# Patient Record
Sex: MALE | Race: BLACK OR AFRICAN AMERICAN | Employment: OTHER | URBAN - METROPOLITAN AREA
[De-identification: names, ages, dates, MRNs, and addresses within clinical notes are randomized per-mention and may not be internally consistent; named-entity substitution may affect disease eponyms.]

---

## 2017-01-20 ENCOUNTER — OFFICE VISIT (OUTPATIENT)
Dept: PRIMARY CARE CLINIC | Age: 77
End: 2017-01-20

## 2017-01-20 VITALS
DIASTOLIC BLOOD PRESSURE: 72 MMHG | OXYGEN SATURATION: 100 % | HEART RATE: 46 BPM | WEIGHT: 155.8 LBS | SYSTOLIC BLOOD PRESSURE: 131 MMHG | BODY MASS INDEX: 23.01 KG/M2 | TEMPERATURE: 97 F

## 2017-01-20 DIAGNOSIS — R60.0 BILATERAL LEG EDEMA: Primary | ICD-10-CM

## 2017-01-20 DIAGNOSIS — R00.1 BRADYCARDIA: ICD-10-CM

## 2017-01-20 PROCEDURE — 99215 OFFICE O/P EST HI 40 MIN: CPT | Performed by: FAMILY MEDICINE

## 2017-01-20 PROCEDURE — 93000 ELECTROCARDIOGRAM COMPLETE: CPT | Performed by: FAMILY MEDICINE

## 2017-01-20 PROCEDURE — G8510 SCR DEP NEG, NO PLAN REQD: HCPCS | Performed by: FAMILY MEDICINE

## 2017-01-20 PROCEDURE — 3288F FALL RISK ASSESSMENT DOCD: CPT | Performed by: FAMILY MEDICINE

## 2017-01-20 RX ORDER — FUROSEMIDE 20 MG/1
20 TABLET ORAL DAILY
Qty: 60 TABLET | Refills: 3 | Status: SHIPPED | OUTPATIENT
Start: 2017-01-20 | End: 2017-10-03 | Stop reason: SDUPTHER

## 2017-01-20 ASSESSMENT — PATIENT HEALTH QUESTIONNAIRE - PHQ9
1. LITTLE INTEREST OR PLEASURE IN DOING THINGS: 0
SUM OF ALL RESPONSES TO PHQ QUESTIONS 1-9: 0
SUM OF ALL RESPONSES TO PHQ9 QUESTIONS 1 & 2: 0
2. FEELING DOWN, DEPRESSED OR HOPELESS: 0

## 2017-01-23 ENCOUNTER — HOSPITAL ENCOUNTER (OUTPATIENT)
Dept: VASCULAR LAB | Age: 77
Discharge: HOME | End: 2017-01-23
Attending: FAMILY MEDICINE | Admitting: FAMILY MEDICINE

## 2017-01-23 DIAGNOSIS — R60.0 BILATERAL LEG EDEMA: ICD-10-CM

## 2017-01-23 DIAGNOSIS — R60.0 LOCALIZED EDEMA: ICD-10-CM

## 2017-01-23 LAB
A/G RATIO: 1.4 (ref 1.1–2.2)
ALBUMIN SERPL-MCNC: 3.7 G/DL (ref 3.4–5)
ALP BLD-CCNC: 111 U/L (ref 40–129)
ALT SERPL-CCNC: 10 U/L (ref 10–40)
ANION GAP SERPL CALCULATED.3IONS-SCNC: 11 MMOL/L (ref 3–16)
APTT: 45.7 SEC (ref 25.2–36.4)
AST SERPL-CCNC: 17 U/L (ref 15–37)
BILIRUB SERPL-MCNC: 0.7 MG/DL (ref 0–1)
BUN BLDV-MCNC: 14 MG/DL (ref 7–20)
CALCIUM SERPL-MCNC: 9.3 MG/DL (ref 8.3–10.6)
CHLORIDE BLD-SCNC: 107 MMOL/L (ref 99–110)
CO2: 28 MMOL/L (ref 21–32)
CREAT SERPL-MCNC: 1.1 MG/DL (ref 0.8–1.3)
GFR AFRICAN AMERICAN: >60
GFR NON-AFRICAN AMERICAN: >60
GLOBULIN: 2.6 G/DL
GLUCOSE BLD-MCNC: 124 MG/DL (ref 70–99)
POTASSIUM SERPL-SCNC: 4.5 MMOL/L (ref 3.5–5.1)
PRO-BNP: 285 PG/ML (ref 0–449)
SODIUM BLD-SCNC: 146 MMOL/L (ref 136–145)
TOTAL PROTEIN: 6.3 G/DL (ref 6.4–8.2)
TSH SERPL DL<=0.05 MIU/L-ACNC: 0.77 UIU/ML (ref 0.27–4.2)

## 2017-01-24 ENCOUNTER — OFFICE VISIT (OUTPATIENT)
Dept: PRIMARY CARE CLINIC | Age: 77
End: 2017-01-24

## 2017-01-24 VITALS
SYSTOLIC BLOOD PRESSURE: 120 MMHG | BODY MASS INDEX: 23.79 KG/M2 | HEIGHT: 68 IN | WEIGHT: 157 LBS | DIASTOLIC BLOOD PRESSURE: 60 MMHG

## 2017-01-24 DIAGNOSIS — R60.0 BILATERAL EDEMA OF LOWER EXTREMITY: Primary | ICD-10-CM

## 2017-01-24 PROCEDURE — 99213 OFFICE O/P EST LOW 20 MIN: CPT | Performed by: FAMILY MEDICINE

## 2017-01-24 RX ORDER — POTASSIUM CHLORIDE 20 MEQ/1
20 TABLET, EXTENDED RELEASE ORAL DAILY
Qty: 30 TABLET | Refills: 3 | Status: SHIPPED | OUTPATIENT
Start: 2017-01-24 | End: 2017-05-24 | Stop reason: SDUPTHER

## 2017-01-24 ASSESSMENT — ENCOUNTER SYMPTOMS
WHEEZING: 0
BLOOD IN STOOL: 0
CHEST TIGHTNESS: 0
CHOKING: 0
STRIDOR: 0
APNEA: 0
ABDOMINAL PAIN: 0
ABDOMINAL DISTENTION: 0
SHORTNESS OF BREATH: 0

## 2017-01-30 ENCOUNTER — TELEPHONE (OUTPATIENT)
Dept: PRIMARY CARE CLINIC | Age: 77
End: 2017-01-30

## 2017-05-12 PROBLEM — R19.00 PELVIC MASS IN MALE: Status: ACTIVE | Noted: 2017-05-12

## 2017-05-12 PROBLEM — E27.8 RIGHT ADRENAL MASS (HCC): Status: ACTIVE | Noted: 2017-05-12

## 2017-05-15 ENCOUNTER — HOSPITAL ENCOUNTER (OUTPATIENT)
Dept: CT IMAGING | Age: 77
Discharge: HOME | End: 2017-05-15

## 2017-05-17 ENCOUNTER — HOSPITAL ENCOUNTER (OUTPATIENT)
Dept: CT IMAGING | Age: 77
Discharge: HOME | End: 2017-05-17
Attending: INTERNAL MEDICINE | Admitting: INTERNAL MEDICINE

## 2017-05-17 VITALS
RESPIRATION RATE: 18 BRPM | TEMPERATURE: 98.4 F | SYSTOLIC BLOOD PRESSURE: 126 MMHG | DIASTOLIC BLOOD PRESSURE: 67 MMHG | OXYGEN SATURATION: 97 % | HEIGHT: 68 IN | WEIGHT: 155.75 LBS | BODY MASS INDEX: 23.61 KG/M2 | HEART RATE: 68 BPM

## 2017-05-17 DIAGNOSIS — C67.9 MALIGNANT NEOPLASM OF BLADDER (HCC): ICD-10-CM

## 2017-05-17 DIAGNOSIS — R19.00 PELVIC MASS IN MALE: ICD-10-CM

## 2017-05-17 DIAGNOSIS — C67.9 MALIGNANT NEOPLASM OF URINARY BLADDER, UNSPECIFIED SITE (HCC): Chronic | ICD-10-CM

## 2017-05-17 RX ORDER — ONDANSETRON 2 MG/ML
4 INJECTION INTRAMUSCULAR; INTRAVENOUS EVERY 8 HOURS PRN
Status: DISCONTINUED | OUTPATIENT
Start: 2017-05-17 | End: 2017-05-18 | Stop reason: HOSPADM

## 2017-05-17 RX ORDER — OXYCODONE HYDROCHLORIDE AND ACETAMINOPHEN 5; 325 MG/1; MG/1
1 TABLET ORAL EVERY 4 HOURS PRN
Status: DISCONTINUED | OUTPATIENT
Start: 2017-05-17 | End: 2017-05-18 | Stop reason: HOSPADM

## 2017-05-17 RX ORDER — OXYCODONE HYDROCHLORIDE AND ACETAMINOPHEN 5; 325 MG/1; MG/1
2 TABLET ORAL EVERY 4 HOURS PRN
Status: DISCONTINUED | OUTPATIENT
Start: 2017-05-17 | End: 2017-05-18 | Stop reason: HOSPADM

## 2017-05-17 RX ORDER — FENTANYL CITRATE 50 UG/ML
INJECTION, SOLUTION INTRAMUSCULAR; INTRAVENOUS DAILY PRN
Status: COMPLETED | OUTPATIENT
Start: 2017-05-17 | End: 2017-05-17

## 2017-05-17 RX ORDER — ACETAMINOPHEN 325 MG/1
650 TABLET ORAL EVERY 4 HOURS PRN
Status: DISCONTINUED | OUTPATIENT
Start: 2017-05-17 | End: 2017-05-18 | Stop reason: HOSPADM

## 2017-05-17 RX ORDER — MIDAZOLAM HYDROCHLORIDE 1 MG/ML
INJECTION INTRAMUSCULAR; INTRAVENOUS DAILY PRN
Status: COMPLETED | OUTPATIENT
Start: 2017-05-17 | End: 2017-05-17

## 2017-05-17 RX ADMIN — MIDAZOLAM HYDROCHLORIDE 1 MG: 1 INJECTION INTRAMUSCULAR; INTRAVENOUS at 13:36

## 2017-05-17 RX ADMIN — MIDAZOLAM HYDROCHLORIDE 1 MG: 1 INJECTION INTRAMUSCULAR; INTRAVENOUS at 13:27

## 2017-05-17 RX ADMIN — FENTANYL CITRATE 50 MCG: 50 INJECTION, SOLUTION INTRAMUSCULAR; INTRAVENOUS at 13:27

## 2017-05-17 ASSESSMENT — PAIN - FUNCTIONAL ASSESSMENT: PAIN_FUNCTIONAL_ASSESSMENT: 0-10

## 2017-05-17 ASSESSMENT — PAIN SCALES - GENERAL
PAINLEVEL_OUTOF10: 0

## 2017-05-18 ENCOUNTER — HOSPITAL ENCOUNTER (OUTPATIENT)
Dept: NUCLEAR MEDICINE | Age: 77
Discharge: HOME | End: 2017-05-18
Attending: INTERNAL MEDICINE | Admitting: INTERNAL MEDICINE

## 2017-05-18 DIAGNOSIS — E27.8 RIGHT ADRENAL MASS (HCC): ICD-10-CM

## 2017-05-18 DIAGNOSIS — R19.00 PELVIC MASS IN MALE: ICD-10-CM

## 2017-05-18 DIAGNOSIS — C67.9 MALIGNANT NEOPLASM OF URINARY BLADDER, UNSPECIFIED SITE (HCC): Chronic | ICD-10-CM

## 2017-05-18 RX ORDER — TC 99M MEDRONATE 20 MG/10ML
25 INJECTION, POWDER, LYOPHILIZED, FOR SOLUTION INTRAVENOUS
Status: COMPLETED | OUTPATIENT
Start: 2017-05-18 | End: 2017-05-18

## 2017-05-18 RX ORDER — TC 99M MEDRONATE 20 MG/10ML
25 INJECTION, POWDER, LYOPHILIZED, FOR SOLUTION INTRAVENOUS
Status: DISCONTINUED | OUTPATIENT
Start: 2017-05-18 | End: 2017-05-18

## 2017-05-18 RX ADMIN — TC 99M MEDRONATE 25 MILLICURIE: 20 INJECTION, POWDER, LYOPHILIZED, FOR SOLUTION INTRAVENOUS at 08:57

## 2017-05-23 ENCOUNTER — OFFICE VISIT (OUTPATIENT)
Dept: PRIMARY CARE CLINIC | Age: 77
End: 2017-05-23

## 2017-05-23 VITALS
HEART RATE: 60 BPM | WEIGHT: 158.2 LBS | OXYGEN SATURATION: 97 % | HEIGHT: 68 IN | SYSTOLIC BLOOD PRESSURE: 121 MMHG | DIASTOLIC BLOOD PRESSURE: 71 MMHG | BODY MASS INDEX: 23.98 KG/M2

## 2017-05-23 DIAGNOSIS — Z23 NEED FOR PNEUMOCOCCAL VACCINATION: ICD-10-CM

## 2017-05-23 DIAGNOSIS — C67.9 MALIGNANT NEOPLASM OF URINARY BLADDER, UNSPECIFIED SITE (HCC): ICD-10-CM

## 2017-05-23 DIAGNOSIS — R16.0 LIVER MASS: ICD-10-CM

## 2017-05-23 DIAGNOSIS — R19.00 PELVIC MASS: Primary | ICD-10-CM

## 2017-05-23 DIAGNOSIS — E27.8 ADRENAL MASS (HCC): ICD-10-CM

## 2017-05-23 DIAGNOSIS — I10 ESSENTIAL HYPERTENSION: ICD-10-CM

## 2017-05-23 PROCEDURE — 99213 OFFICE O/P EST LOW 20 MIN: CPT | Performed by: FAMILY MEDICINE

## 2017-05-23 PROCEDURE — 90670 PCV13 VACCINE IM: CPT | Performed by: FAMILY MEDICINE

## 2017-05-23 PROCEDURE — G0009 ADMIN PNEUMOCOCCAL VACCINE: HCPCS | Performed by: FAMILY MEDICINE

## 2017-05-24 DIAGNOSIS — R60.0 BILATERAL EDEMA OF LOWER EXTREMITY: ICD-10-CM

## 2017-05-24 RX ORDER — POTASSIUM CHLORIDE 20 MEQ/1
20 TABLET, EXTENDED RELEASE ORAL DAILY
Qty: 30 TABLET | Refills: 3 | Status: SHIPPED | OUTPATIENT
Start: 2017-05-24 | End: 2017-10-03 | Stop reason: SDUPTHER

## 2017-05-25 PROBLEM — G89.3 CANCER ASSOCIATED PAIN: Status: ACTIVE | Noted: 2017-05-25

## 2017-05-25 PROBLEM — C79.51 BLADDER CANCER METASTASIZED TO BONE (HCC): Status: ACTIVE | Noted: 2017-05-25

## 2017-05-25 PROBLEM — C67.9 BLADDER CANCER METASTASIZED TO BONE (HCC): Status: ACTIVE | Noted: 2017-05-25

## 2017-05-30 ENCOUNTER — TELEPHONE (OUTPATIENT)
Dept: INTERVENTIONAL RADIOLOGY/VASCULAR | Age: 77
End: 2017-05-30

## 2017-06-06 ENCOUNTER — HOSPITAL ENCOUNTER (OUTPATIENT)
Dept: INTERVENTIONAL RADIOLOGY/VASCULAR | Age: 77
Discharge: HOME | End: 2017-06-06
Attending: INTERNAL MEDICINE | Admitting: INTERNAL MEDICINE

## 2017-06-06 VITALS
WEIGHT: 156 LBS | BODY MASS INDEX: 23.64 KG/M2 | HEIGHT: 68 IN | HEART RATE: 70 BPM | OXYGEN SATURATION: 99 % | SYSTOLIC BLOOD PRESSURE: 141 MMHG | DIASTOLIC BLOOD PRESSURE: 74 MMHG | TEMPERATURE: 97.1 F | RESPIRATION RATE: 18 BRPM

## 2017-06-06 DIAGNOSIS — C67.9 MALIGNANT NEOPLASM OF BLADDER (HCC): ICD-10-CM

## 2017-06-06 DIAGNOSIS — C79.51 BLADDER CANCER METASTASIZED TO BONE (HCC): ICD-10-CM

## 2017-06-06 DIAGNOSIS — C67.9 BLADDER CANCER METASTASIZED TO BONE (HCC): ICD-10-CM

## 2017-06-06 RX ORDER — HYDROCODONE BITARTRATE AND ACETAMINOPHEN 5; 325 MG/1; MG/1
2 TABLET ORAL EVERY 4 HOURS PRN
Status: DISCONTINUED | OUTPATIENT
Start: 2017-06-06 | End: 2017-06-07 | Stop reason: HOSPADM

## 2017-06-06 RX ORDER — MIDAZOLAM HYDROCHLORIDE 1 MG/ML
INJECTION INTRAMUSCULAR; INTRAVENOUS DAILY PRN
Status: COMPLETED | OUTPATIENT
Start: 2017-06-06 | End: 2017-06-06

## 2017-06-06 RX ORDER — SODIUM CHLORIDE 9 MG/ML
INJECTION, SOLUTION INTRAVENOUS ONCE
Status: COMPLETED | OUTPATIENT
Start: 2017-06-06 | End: 2017-06-06

## 2017-06-06 RX ORDER — FENTANYL CITRATE 50 UG/ML
INJECTION, SOLUTION INTRAMUSCULAR; INTRAVENOUS DAILY PRN
Status: COMPLETED | OUTPATIENT
Start: 2017-06-06 | End: 2017-06-06

## 2017-06-06 RX ORDER — ONDANSETRON 2 MG/ML
4 INJECTION INTRAMUSCULAR; INTRAVENOUS EVERY 8 HOURS PRN
Status: DISCONTINUED | OUTPATIENT
Start: 2017-06-06 | End: 2017-06-07 | Stop reason: HOSPADM

## 2017-06-06 RX ORDER — HYDROCODONE BITARTRATE AND ACETAMINOPHEN 5; 325 MG/1; MG/1
1 TABLET ORAL EVERY 4 HOURS PRN
Status: DISCONTINUED | OUTPATIENT
Start: 2017-06-06 | End: 2017-06-07 | Stop reason: HOSPADM

## 2017-06-06 RX ADMIN — MIDAZOLAM HYDROCHLORIDE 1 MG: 1 INJECTION INTRAMUSCULAR; INTRAVENOUS at 10:31

## 2017-06-06 RX ADMIN — FENTANYL CITRATE 50 MCG: 50 INJECTION, SOLUTION INTRAMUSCULAR; INTRAVENOUS at 10:27

## 2017-06-06 RX ADMIN — SODIUM CHLORIDE: 9 INJECTION, SOLUTION INTRAVENOUS at 10:00

## 2017-06-06 RX ADMIN — MIDAZOLAM HYDROCHLORIDE 1 MG: 1 INJECTION INTRAMUSCULAR; INTRAVENOUS at 10:28

## 2017-06-06 ASSESSMENT — PAIN SCALES - GENERAL
PAINLEVEL_OUTOF10: 0

## 2017-06-06 ASSESSMENT — PAIN - FUNCTIONAL ASSESSMENT: PAIN_FUNCTIONAL_ASSESSMENT: 0-10

## 2017-06-12 PROBLEM — R53.82 CHRONIC FATIGUE: Status: ACTIVE | Noted: 2017-06-12

## 2017-06-20 ENCOUNTER — OFFICE VISIT (OUTPATIENT)
Dept: PRIMARY CARE CLINIC | Age: 77
End: 2017-06-20

## 2017-06-20 VITALS
BODY MASS INDEX: 22.28 KG/M2 | OXYGEN SATURATION: 100 % | WEIGHT: 147 LBS | HEART RATE: 71 BPM | DIASTOLIC BLOOD PRESSURE: 63 MMHG | HEIGHT: 68 IN | TEMPERATURE: 96.7 F | SYSTOLIC BLOOD PRESSURE: 107 MMHG

## 2017-06-20 DIAGNOSIS — C67.9 MALIGNANT NEOPLASM OF URINARY BLADDER, UNSPECIFIED SITE (HCC): ICD-10-CM

## 2017-06-20 DIAGNOSIS — I10 ESSENTIAL HYPERTENSION: Primary | ICD-10-CM

## 2017-06-20 PROCEDURE — 99213 OFFICE O/P EST LOW 20 MIN: CPT | Performed by: FAMILY MEDICINE

## 2017-06-20 PROCEDURE — G8510 SCR DEP NEG, NO PLAN REQD: HCPCS | Performed by: FAMILY MEDICINE

## 2017-06-20 ASSESSMENT — ENCOUNTER SYMPTOMS
EYE PAIN: 0
NAUSEA: 0
SINUS PRESSURE: 0
BACK PAIN: 0
SHORTNESS OF BREATH: 0
ABDOMINAL PAIN: 0
FACIAL SWELLING: 0
EYE DISCHARGE: 0
EYE REDNESS: 0
SORE THROAT: 0
PHOTOPHOBIA: 0
BLOOD IN STOOL: 0
CHEST TIGHTNESS: 0
TROUBLE SWALLOWING: 0
VOICE CHANGE: 0
APNEA: 0
WHEEZING: 0
CONSTIPATION: 0
VOMITING: 0
ANAL BLEEDING: 0
RECTAL PAIN: 0
COUGH: 0
COLOR CHANGE: 0
CHOKING: 0
EYE ITCHING: 0

## 2017-06-20 ASSESSMENT — PATIENT HEALTH QUESTIONNAIRE - PHQ9
SUM OF ALL RESPONSES TO PHQ QUESTIONS 1-9: 0
SUM OF ALL RESPONSES TO PHQ9 QUESTIONS 1 & 2: 0
1. LITTLE INTEREST OR PLEASURE IN DOING THINGS: 0
2. FEELING DOWN, DEPRESSED OR HOPELESS: 0

## 2017-08-10 ENCOUNTER — HOSPITAL ENCOUNTER (OUTPATIENT)
Dept: NUCLEAR MEDICINE | Age: 77
Discharge: HOME | End: 2017-08-10
Admitting: INTERNAL MEDICINE

## 2017-08-10 DIAGNOSIS — C67.1 MALIGNANT NEOPLASM OF DOME OF URINARY BLADDER (HCC): ICD-10-CM

## 2017-08-10 RX ORDER — TC 99M MEDRONATE 20 MG/10ML
25 INJECTION, POWDER, LYOPHILIZED, FOR SOLUTION INTRAVENOUS
Status: COMPLETED | OUTPATIENT
Start: 2017-08-10 | End: 2017-08-10

## 2017-08-10 RX ADMIN — TC 99M MEDRONATE 25 MILLICURIE: 20 INJECTION, POWDER, LYOPHILIZED, FOR SOLUTION INTRAVENOUS at 09:40

## 2017-08-17 ENCOUNTER — HOSPITAL ENCOUNTER (OUTPATIENT)
Dept: VASCULAR LAB | Age: 77
Discharge: HOME | End: 2017-08-17
Attending: CLINICAL NURSE SPECIALIST | Admitting: CLINICAL NURSE SPECIALIST

## 2017-08-17 DIAGNOSIS — M79.89 OTHER DISORDERS OF SOFT TISSUE: ICD-10-CM

## 2017-08-23 ENCOUNTER — OFFICE VISIT (OUTPATIENT)
Dept: PRIMARY CARE CLINIC | Age: 77
End: 2017-08-23

## 2017-08-23 VITALS
TEMPERATURE: 97.5 F | BODY MASS INDEX: 24.59 KG/M2 | DIASTOLIC BLOOD PRESSURE: 62 MMHG | HEART RATE: 68 BPM | OXYGEN SATURATION: 99 % | WEIGHT: 166 LBS | HEIGHT: 69 IN | SYSTOLIC BLOOD PRESSURE: 114 MMHG

## 2017-08-23 DIAGNOSIS — R60.0 BILATERAL EDEMA OF LOWER EXTREMITY: ICD-10-CM

## 2017-08-23 DIAGNOSIS — Z23 NEED FOR PROPHYLACTIC VACCINATION AGAINST DIPHTHERIA-TETANUS-PERTUSSIS (DTP): ICD-10-CM

## 2017-08-23 DIAGNOSIS — E88.09 HYPOALBUMINEMIA: Primary | ICD-10-CM

## 2017-08-23 PROCEDURE — 99214 OFFICE O/P EST MOD 30 MIN: CPT | Performed by: FAMILY MEDICINE

## 2017-08-23 PROCEDURE — 90715 TDAP VACCINE 7 YRS/> IM: CPT | Performed by: FAMILY MEDICINE

## 2017-08-23 PROCEDURE — 90471 IMMUNIZATION ADMIN: CPT | Performed by: FAMILY MEDICINE

## 2017-08-23 RX ORDER — LACTOSE-REDUCED FOOD
1 LIQUID (ML) ORAL 3 TIMES DAILY
Qty: 90 CAN | Refills: 5 | Status: SHIPPED | OUTPATIENT
Start: 2017-08-23 | End: 2021-01-01

## 2017-08-23 ASSESSMENT — ENCOUNTER SYMPTOMS
COUGH: 0
CONSTIPATION: 0
NAUSEA: 0
CHEST TIGHTNESS: 0
FACIAL SWELLING: 0
EYE ITCHING: 0
SHORTNESS OF BREATH: 0
PHOTOPHOBIA: 0
VOICE CHANGE: 0
SORE THROAT: 0
WHEEZING: 0
EYE REDNESS: 0
RECTAL PAIN: 0
ABDOMINAL PAIN: 0
COLOR CHANGE: 0
BLOOD IN STOOL: 0
BACK PAIN: 0
TROUBLE SWALLOWING: 0
EYE DISCHARGE: 0
APNEA: 0
SINUS PRESSURE: 0
VOMITING: 0
ANAL BLEEDING: 0
CHOKING: 0
EYE PAIN: 0

## 2017-10-03 DIAGNOSIS — R60.0 BILATERAL LEG EDEMA: ICD-10-CM

## 2017-10-03 DIAGNOSIS — R60.0 BILATERAL EDEMA OF LOWER EXTREMITY: ICD-10-CM

## 2017-10-03 DIAGNOSIS — I10 ESSENTIAL HYPERTENSION: Primary | ICD-10-CM

## 2017-10-03 RX ORDER — FUROSEMIDE 20 MG/1
20 TABLET ORAL DAILY
Qty: 60 TABLET | Refills: 3 | Status: CANCELLED | OUTPATIENT
Start: 2017-10-03

## 2017-10-03 RX ORDER — FUROSEMIDE 20 MG/1
20 TABLET ORAL DAILY
Qty: 60 TABLET | Refills: 3 | Status: SHIPPED | OUTPATIENT
Start: 2017-10-03 | End: 2018-09-08 | Stop reason: SDUPTHER

## 2017-10-03 RX ORDER — POTASSIUM CHLORIDE 20 MEQ/1
20 TABLET, EXTENDED RELEASE ORAL DAILY
Qty: 30 TABLET | Refills: 3 | Status: CANCELLED | OUTPATIENT
Start: 2017-10-03

## 2017-10-03 RX ORDER — POTASSIUM CHLORIDE 20 MEQ/1
20 TABLET, EXTENDED RELEASE ORAL DAILY
Qty: 30 TABLET | Refills: 3 | Status: SHIPPED | OUTPATIENT
Start: 2017-10-03 | End: 2018-02-26 | Stop reason: SDUPTHER

## 2017-10-12 ENCOUNTER — HOSPITAL ENCOUNTER (OUTPATIENT)
Dept: CT IMAGING | Age: 77
Discharge: HOME | End: 2017-10-12
Admitting: CLINICAL NURSE SPECIALIST

## 2017-10-12 DIAGNOSIS — C67.1 CANCER OF APEX OF URINARY BLADDER (HCC): ICD-10-CM

## 2017-10-12 DIAGNOSIS — C67.1 MALIGNANT NEOPLASM OF DOME OF BLADDER (HCC): ICD-10-CM

## 2017-10-12 RX ORDER — TC 99M MEDRONATE 20 MG/10ML
25 INJECTION, POWDER, LYOPHILIZED, FOR SOLUTION INTRAVENOUS
Status: COMPLETED | OUTPATIENT
Start: 2017-10-12 | End: 2017-10-12

## 2017-10-12 RX ADMIN — TC 99M MEDRONATE 25 MILLICURIE: 20 INJECTION, POWDER, LYOPHILIZED, FOR SOLUTION INTRAVENOUS at 11:01

## 2017-11-16 ENCOUNTER — CARE COORDINATION (OUTPATIENT)
Dept: CARE COORDINATION | Age: 77
End: 2017-11-16

## 2017-11-16 NOTE — CARE COORDINATION
Ambulatory Care Coordination ED Follow up Call       Reason for ED Visit: Rash    Care Management Risk Score: CMRS 12  How are you feeling? :     improved  Patient Reports the following:  none             Contact RNCC regarding any worsening symptoms from above. Did you call your PCP prior to going to the ED? No          Post Discharge Status:  What health concerns since you left the Emergency Room? None     Do you have wounds that you are caring for at home? No    Do you have a follow up appt scheduled?  no - Transferred patient to PCP office to schedule a follow up appointment     Review of Instructions:                                 Do you have any questions regarding your discharge instructions?:  No  Medications:    What questions do you have about your medications? None   Are you taking your medications as directed? If not - why? Yes   Can you afford your medications? yes  ADLS:  Do you need assistance of any kind at home? No   What assistance is needed? FU appts/Provider:    No future appointments. There are no preventive care reminders to display for this patient. Patient advised to contact PCP office to have HM items/records faxed to PCP Office directly?   [facility]   [phone]

## 2017-11-21 ENCOUNTER — OFFICE VISIT (OUTPATIENT)
Dept: PRIMARY CARE CLINIC | Age: 77
End: 2017-11-21

## 2017-11-21 VITALS
HEART RATE: 66 BPM | WEIGHT: 167 LBS | HEIGHT: 68 IN | DIASTOLIC BLOOD PRESSURE: 58 MMHG | SYSTOLIC BLOOD PRESSURE: 107 MMHG | BODY MASS INDEX: 25.31 KG/M2

## 2017-11-21 DIAGNOSIS — L30.9 DERMATITIS: Primary | ICD-10-CM

## 2017-11-21 DIAGNOSIS — Z23 NEEDS FLU SHOT: ICD-10-CM

## 2017-11-21 PROCEDURE — G8427 DOCREV CUR MEDS BY ELIG CLIN: HCPCS | Performed by: FAMILY MEDICINE

## 2017-11-21 PROCEDURE — 99213 OFFICE O/P EST LOW 20 MIN: CPT | Performed by: FAMILY MEDICINE

## 2017-11-21 PROCEDURE — G0008 ADMIN INFLUENZA VIRUS VAC: HCPCS | Performed by: FAMILY MEDICINE

## 2017-11-21 PROCEDURE — G8484 FLU IMMUNIZE NO ADMIN: HCPCS | Performed by: FAMILY MEDICINE

## 2017-11-21 PROCEDURE — 90662 IIV NO PRSV INCREASED AG IM: CPT | Performed by: FAMILY MEDICINE

## 2017-11-21 PROCEDURE — G8417 CALC BMI ABV UP PARAM F/U: HCPCS | Performed by: FAMILY MEDICINE

## 2017-11-21 PROCEDURE — 1036F TOBACCO NON-USER: CPT | Performed by: FAMILY MEDICINE

## 2017-11-21 PROCEDURE — 1123F ACP DISCUSS/DSCN MKR DOCD: CPT | Performed by: FAMILY MEDICINE

## 2017-11-21 PROCEDURE — 4040F PNEUMOC VAC/ADMIN/RCVD: CPT | Performed by: FAMILY MEDICINE

## 2017-11-21 ASSESSMENT — ENCOUNTER SYMPTOMS
BLOOD IN STOOL: 0
CHEST TIGHTNESS: 0
EYE PAIN: 0
EYE ITCHING: 0
BACK PAIN: 0
SINUS PRESSURE: 0
EYE REDNESS: 0
CHOKING: 0
RECTAL PAIN: 0
SORE THROAT: 0
NAUSEA: 0
VOMITING: 0
VOICE CHANGE: 0
EYE DISCHARGE: 0
CONSTIPATION: 0
COLOR CHANGE: 0
WHEEZING: 0
ANAL BLEEDING: 0
FACIAL SWELLING: 0
SHORTNESS OF BREATH: 0
TROUBLE SWALLOWING: 0
ABDOMINAL PAIN: 0
APNEA: 0
PHOTOPHOBIA: 0
COUGH: 0

## 2017-11-21 NOTE — PROGRESS NOTES
Subjective:      Patient ID: Carlos Camacho is a 76 y.o. male. The chief complaint(s)  Fu recent er visit for dermatitis  HPI Fu visit recent rash of the forearms & around stoma site  Has completed 7 days of double antibiotics/kefliex/septrads  Rash improved    He does have bladder cancer    Review of Systems   Constitutional: Negative for activity change, appetite change, chills, diaphoresis, fatigue, fever and unexpected weight change. HENT: Negative for congestion, dental problem, drooling, ear discharge, ear pain, facial swelling, hearing loss, mouth sores, nosebleeds, postnasal drip, sinus pressure, sneezing, sore throat, tinnitus, trouble swallowing and voice change. Eyes: Negative for photophobia, pain, discharge, redness, itching and visual disturbance. Respiratory: Negative for apnea, cough, choking, chest tightness, shortness of breath and wheezing. Cardiovascular: Negative for chest pain, palpitations and leg swelling. Gastrointestinal: Negative for abdominal pain, anal bleeding, blood in stool, constipation, nausea, rectal pain and vomiting. Genitourinary: Negative for decreased urine volume, difficulty urinating, discharge, dysuria, enuresis, flank pain, frequency, hematuria, penile swelling, scrotal swelling, testicular pain and urgency. Musculoskeletal: Negative for arthralgias, back pain, gait problem, joint swelling, myalgias, neck pain and neck stiffness. Skin: Negative for color change, pallor, rash and wound. Neurological: Negative for dizziness, tremors, seizures, syncope, facial asymmetry, speech difficulty, weakness, light-headedness, numbness and headaches. Hematological: Negative for adenopathy. Does not bruise/bleed easily. Psychiatric/Behavioral: Negative for agitation, behavioral problems, confusion, decreased concentration, dysphoric mood, hallucinations, self-injury, sleep disturbance and suicidal ideas.  The patient is not nervous/anxious and is not hyperactive. Objective:   Physical Exam   Cardiovascular: Normal rate, regular rhythm and normal heart sounds. Exam reveals no gallop and no friction rub. No murmur heard. Pulmonary/Chest: Effort normal and breath sounds normal. No respiratory distress. He has no wheezes. He has no rales. He exhibits no tenderness. Abdominal: Bowel sounds are normal. He exhibits no distension. There is no tenderness. There is no rebound. Skin:   Mostly resolved rash forearms & around stoma site   Nursing note and vitals reviewed. Assessment:       1. Dermatitis  Mostly resolved after antibiotics  Cont. Eucerin    2.  Needs flu shot    - INFLUENZA, HIGH DOSE, 65 YRS +, IM, PF, PREFILL SYR, 0.5ML (FLUZONE HD)                  Plan:      See me in 3 months

## 2018-02-21 ENCOUNTER — OFFICE VISIT (OUTPATIENT)
Dept: PRIMARY CARE CLINIC | Age: 78
End: 2018-02-21

## 2018-02-21 VITALS
HEIGHT: 68 IN | SYSTOLIC BLOOD PRESSURE: 126 MMHG | TEMPERATURE: 97.6 F | WEIGHT: 173.2 LBS | OXYGEN SATURATION: 99 % | RESPIRATION RATE: 16 BRPM | BODY MASS INDEX: 26.25 KG/M2 | HEART RATE: 60 BPM | DIASTOLIC BLOOD PRESSURE: 72 MMHG

## 2018-02-21 DIAGNOSIS — Z91.81 AT HIGH RISK FOR FALLS: ICD-10-CM

## 2018-02-21 DIAGNOSIS — R60.0 BILATERAL LEG EDEMA: ICD-10-CM

## 2018-02-21 DIAGNOSIS — C67.9 MALIGNANT NEOPLASM OF URINARY BLADDER, UNSPECIFIED SITE (HCC): ICD-10-CM

## 2018-02-21 DIAGNOSIS — E88.09 HYPOALBUMINEMIA: Primary | ICD-10-CM

## 2018-02-21 DIAGNOSIS — Z93.6 URINARY TRACT ARTIFICIAL OPENING IN PLACE (HCC): ICD-10-CM

## 2018-02-21 DIAGNOSIS — E21.5 PARATHYROID ABNORMALITY (HCC): ICD-10-CM

## 2018-02-21 DIAGNOSIS — E88.09 HYPOALBUMINEMIA: ICD-10-CM

## 2018-02-21 DIAGNOSIS — L60.8 TOENAIL DEFORMITY: ICD-10-CM

## 2018-02-21 LAB
ALBUMIN SERPL-MCNC: 4 G/DL (ref 3.4–5)
ALP BLD-CCNC: 80 U/L (ref 40–129)
ALT SERPL-CCNC: 11 U/L (ref 10–40)
AST SERPL-CCNC: 18 U/L (ref 15–37)
BILIRUB SERPL-MCNC: 0.9 MG/DL (ref 0–1)
BILIRUBIN DIRECT: <0.2 MG/DL (ref 0–0.3)
BILIRUBIN, INDIRECT: NORMAL MG/DL (ref 0–1)
TOTAL PROTEIN: 6.8 G/DL (ref 6.4–8.2)

## 2018-02-21 PROCEDURE — 1036F TOBACCO NON-USER: CPT | Performed by: FAMILY MEDICINE

## 2018-02-21 PROCEDURE — G8417 CALC BMI ABV UP PARAM F/U: HCPCS | Performed by: FAMILY MEDICINE

## 2018-02-21 PROCEDURE — 4040F PNEUMOC VAC/ADMIN/RCVD: CPT | Performed by: FAMILY MEDICINE

## 2018-02-21 PROCEDURE — 1123F ACP DISCUSS/DSCN MKR DOCD: CPT | Performed by: FAMILY MEDICINE

## 2018-02-21 PROCEDURE — 99214 OFFICE O/P EST MOD 30 MIN: CPT | Performed by: FAMILY MEDICINE

## 2018-02-21 PROCEDURE — G8427 DOCREV CUR MEDS BY ELIG CLIN: HCPCS | Performed by: FAMILY MEDICINE

## 2018-02-21 PROCEDURE — G8484 FLU IMMUNIZE NO ADMIN: HCPCS | Performed by: FAMILY MEDICINE

## 2018-02-21 ASSESSMENT — ENCOUNTER SYMPTOMS
BACK PAIN: 0
CHEST TIGHTNESS: 0
COLOR CHANGE: 0
EYE REDNESS: 0
VISUAL CHANGE: 0
RECTAL PAIN: 0
COUGH: 0
BLOOD IN STOOL: 0
EYE PAIN: 0
PHOTOPHOBIA: 0
SHORTNESS OF BREATH: 0
SWOLLEN GLANDS: 0
CHANGE IN BOWEL HABIT: 0
CHOKING: 0
CONSTIPATION: 0
ANAL BLEEDING: 0
EYE DISCHARGE: 0
APNEA: 0
TROUBLE SWALLOWING: 0
WHEEZING: 0
FACIAL SWELLING: 0
SORE THROAT: 0
ABDOMINAL PAIN: 0
VOMITING: 0
SINUS PRESSURE: 0
VOICE CHANGE: 0
NAUSEA: 0
EYE ITCHING: 0

## 2018-02-21 ASSESSMENT — PATIENT HEALTH QUESTIONNAIRE - PHQ9
SUM OF ALL RESPONSES TO PHQ9 QUESTIONS 1 & 2: 0
1. LITTLE INTEREST OR PLEASURE IN DOING THINGS: 0
SUM OF ALL RESPONSES TO PHQ QUESTIONS 1-9: 0
2. FEELING DOWN, DEPRESSED OR HOPELESS: 0

## 2018-02-21 NOTE — PROGRESS NOTES
Subjective:      Patient ID: Court Abdi is a 76 y.o. male. The chief complaint(s)  Fu visit, requests ref to pod for deformed toenais  Other   Chronicity: Fu for low albumin level. The current episode started more than 1 month ago. The problem has been gradually improving. Pertinent negatives include no abdominal pain, anorexia, arthralgias, change in bowel habit, chest pain, chills, congestion, coughing, diaphoresis (low albumin level), fatigue, fever, headaches, joint swelling, myalgias, nausea, neck pain, numbness, rash, sore throat, swollen glands, urinary symptoms, vertigo, visual change, vomiting or weakness. Exacerbated by: bladder cancer. Treatments tried: ensure supplements. The treatment provided mild relief. Now bladder ca with mets on chemo, low albumin level and some leg edema fu visit today. less leg edema at this point. Completely independent in activities   Appetite is ok. WT is stable  Wt Readings from Last 3 Encounters:   02/21/18 173 lb 3.2 oz (78.6 kg)   02/02/18 174 lb 6.1 oz (79.1 kg)   11/21/17 167 lb (75.8 kg)       He does get some pain from deformed toenail      Past Medical History:   Diagnosis Date    Cancer ([facility] [address]     bladder    GASTRIC ULCER     Hypertension     Leukopenia      Social History     Social History    Marital status:       Spouse name: N/A    Number of children: 3    Years of education: N/A     Occupational History          Social History Main Topics    Smoking status: Never Smoker    Smokeless tobacco: Never Used    Alcohol use No    Drug use: No    Sexual activity: Not on file     Other Topics Concern    Not on file     Social History Narrative    No narrative on file     Past Surgical History:   Procedure Laterality Date    COLONOSCOPY      colo 2007, Dr Asha Thorpe MD.   Peola Killings HISTORY  12/14/2016    RADICAL CYSTECTOMY, PROSTECTOMY ILEAL CONDUIT URINARY    TUNNELED VENOUS PORT PLACEMENT Right 06/06/2017 atraumatic. Right Ear: External ear normal.   Left Ear: External ear normal.   Nose: Nose normal.   Mouth/Throat: Oropharynx is clear and moist. No oropharyngeal exudate. Eyes: Conjunctivae and EOM are normal. Pupils are equal, round, and reactive to light. Right eye exhibits no discharge. Left eye exhibits no discharge. No scleral icterus. Neck: Normal range of motion. Neck supple. No JVD present. No tracheal deviation present. No thyromegaly present. Cardiovascular: Normal rate, regular rhythm, normal heart sounds and intact distal pulses. Exam reveals no friction rub. No murmur heard. Pulses:       Carotid pulses are 2+ on the right side, and 2+ on the left side. Radial pulses are 2+ on the right side, and 2+ on the left side. Femoral pulses are 2+ on the right side, and 2+ on the left side. Popliteal pulses are 2+ on the right side, and 2+ on the left side. Dorsalis pedis pulses are 2+ on the right side, and 2+ on the left side. Posterior tibial pulses are 2+ on the right side, and 2+ on the left side. Pulmonary/Chest: Effort normal and breath sounds normal. No stridor. No respiratory distress. He has no wheezes. He has no rales. He exhibits no tenderness. Abdominal: Soft. Bowel sounds are normal. He exhibits no distension and no mass. There is no tenderness. There is no rebound and no guarding. Musculoskeletal: Normal range of motion. He exhibits edema. He exhibits no tenderness. Trace edema L>R  Toenail deformity feet   Lymphadenopathy:     He has no cervical adenopathy. Neurological: He is oriented to person, place, and time. He displays normal reflexes. No cranial nerve deficit. He exhibits normal muscle tone. Coordination normal.   Skin: Skin is warm and dry. No rash noted. He is not diaphoretic. No pallor. Psychiatric: He has a normal mood and affect.  His behavior is normal. Judgment and thought content normal.   Nursing note and vitals

## 2018-02-26 ENCOUNTER — HOSPITAL ENCOUNTER (OUTPATIENT)
Dept: NUCLEAR MEDICINE | Age: 78
Discharge: HOME | End: 2018-02-26
Admitting: INTERNAL MEDICINE

## 2018-02-26 DIAGNOSIS — R60.0 BILATERAL EDEMA OF LOWER EXTREMITY: ICD-10-CM

## 2018-02-26 DIAGNOSIS — C67.1 CANCER OF DOME OF URINARY BLADDER (HCC): ICD-10-CM

## 2018-02-26 DIAGNOSIS — C67.1 CANCER OF APEX OF URINARY BLADDER (HCC): ICD-10-CM

## 2018-02-26 DIAGNOSIS — C67.1 MALIGNANT NEOPLASM OF DOME OF BLADDER (HCC): ICD-10-CM

## 2018-02-26 RX ORDER — TC 99M MEDRONATE 20 MG/10ML
25 INJECTION, POWDER, LYOPHILIZED, FOR SOLUTION INTRAVENOUS
Status: COMPLETED | OUTPATIENT
Start: 2018-02-26 | End: 2018-02-26

## 2018-02-26 RX ORDER — POTASSIUM CHLORIDE 1500 MG/1
20 TABLET, EXTENDED RELEASE ORAL DAILY
Qty: 30 TABLET | Refills: 3 | Status: SHIPPED | OUTPATIENT
Start: 2018-02-26 | End: 2018-09-08 | Stop reason: SDUPTHER

## 2018-02-26 RX ADMIN — TC 99M MEDRONATE 25 MILLICURIE: 20 INJECTION, POWDER, LYOPHILIZED, FOR SOLUTION INTRAVENOUS at 10:28

## 2018-05-03 ENCOUNTER — HOSPITAL ENCOUNTER (OUTPATIENT)
Dept: OTHER | Age: 78
Discharge: HOME | End: 2018-05-03

## 2018-05-03 DIAGNOSIS — C67.1 CANCER OF APEX OF URINARY BLADDER (HCC): ICD-10-CM

## 2018-05-03 DIAGNOSIS — S69.91XA INJURY OF RIGHT HAND, INITIAL ENCOUNTER: ICD-10-CM

## 2018-06-11 ENCOUNTER — HOSPITAL ENCOUNTER (OUTPATIENT)
Dept: NUCLEAR MEDICINE | Age: 78
Discharge: HOME | End: 2018-06-11
Admitting: INTERNAL MEDICINE

## 2018-06-11 DIAGNOSIS — C67.9 MALIGNANT NEOPLASM OF URINARY BLADDER, UNSPECIFIED SITE (HCC): ICD-10-CM

## 2018-06-11 DIAGNOSIS — C67.1 MALIGNANT NEOPLASM OF DOME OF BLADDER (HCC): ICD-10-CM

## 2018-06-11 DIAGNOSIS — C67.1 MALIGNANT TUMOR OF BLADDER DOME (HCC): ICD-10-CM

## 2018-06-11 DIAGNOSIS — C67.1 MALIGNANT NEOPLASM OF DOME OF URINARY BLADDER (HCC): ICD-10-CM

## 2018-06-11 RX ORDER — TC 99M MEDRONATE 20 MG/10ML
25 INJECTION, POWDER, LYOPHILIZED, FOR SOLUTION INTRAVENOUS
Status: COMPLETED | OUTPATIENT
Start: 2018-06-11 | End: 2018-06-11

## 2018-06-11 RX ADMIN — TC 99M MEDRONATE 25 MILLICURIE: 20 INJECTION, POWDER, LYOPHILIZED, FOR SOLUTION INTRAVENOUS at 08:53

## 2018-07-25 ENCOUNTER — TELEPHONE (OUTPATIENT)
Dept: PRIMARY CARE CLINIC | Age: 78
End: 2018-07-25

## 2018-07-26 ENCOUNTER — HOSPITAL ENCOUNTER (OUTPATIENT)
Dept: OTHER | Age: 78
Discharge: HOME | End: 2018-07-26
Attending: CLINICAL NURSE SPECIALIST | Admitting: CLINICAL NURSE SPECIALIST

## 2018-07-27 LAB
EKG ATRIAL RATE: 51 BPM
EKG DIAGNOSIS: NORMAL
EKG P AXIS: 60 DEGREES
EKG P-R INTERVAL: 184 MS
EKG Q-T INTERVAL: 412 MS
EKG QRS DURATION: 100 MS
EKG QTC CALCULATION (BAZETT): 379 MS
EKG R AXIS: 19 DEGREES
EKG T AXIS: 26 DEGREES
EKG VENTRICULAR RATE: 51 BPM

## 2018-07-27 PROCEDURE — 93010 ELECTROCARDIOGRAM REPORT: CPT | Performed by: INTERNAL MEDICINE

## 2018-07-30 ENCOUNTER — OFFICE VISIT (OUTPATIENT)
Dept: PRIMARY CARE CLINIC | Age: 78
End: 2018-07-30

## 2018-07-30 VITALS
DIASTOLIC BLOOD PRESSURE: 70 MMHG | HEART RATE: 55 BPM | OXYGEN SATURATION: 100 % | BODY MASS INDEX: 25.62 KG/M2 | HEIGHT: 69 IN | SYSTOLIC BLOOD PRESSURE: 126 MMHG | TEMPERATURE: 97 F | WEIGHT: 173 LBS

## 2018-07-30 DIAGNOSIS — R00.1 SINUS BRADYCARDIA: ICD-10-CM

## 2018-07-30 DIAGNOSIS — R00.1 SINUS BRADYCARDIA: Primary | ICD-10-CM

## 2018-07-30 LAB
ANION GAP SERPL CALCULATED.3IONS-SCNC: 11 MMOL/L (ref 3–16)
BUN BLDV-MCNC: 23 MG/DL (ref 7–20)
CALCIUM SERPL-MCNC: 9.3 MG/DL (ref 8.3–10.6)
CHLORIDE BLD-SCNC: 102 MMOL/L (ref 99–110)
CO2: 28 MMOL/L (ref 21–32)
CREAT SERPL-MCNC: 1.5 MG/DL (ref 0.8–1.3)
GFR AFRICAN AMERICAN: 54
GFR NON-AFRICAN AMERICAN: 45
GLUCOSE BLD-MCNC: 86 MG/DL (ref 70–99)
HCT VFR BLD CALC: 40.4 % (ref 40.5–52.5)
HEMOGLOBIN: 13.4 G/DL (ref 13.5–17.5)
MCH RBC QN AUTO: 32.5 PG (ref 26–34)
MCHC RBC AUTO-ENTMCNC: 33.1 G/DL (ref 31–36)
MCV RBC AUTO: 98.1 FL (ref 80–100)
PDW BLD-RTO: 13.5 % (ref 12.4–15.4)
PLATELET # BLD: 172 K/UL (ref 135–450)
PMV BLD AUTO: 10 FL (ref 5–10.5)
POTASSIUM SERPL-SCNC: 4.9 MMOL/L (ref 3.5–5.1)
RBC # BLD: 4.12 M/UL (ref 4.2–5.9)
SODIUM BLD-SCNC: 141 MMOL/L (ref 136–145)
TSH SERPL DL<=0.05 MIU/L-ACNC: 0.72 UIU/ML (ref 0.27–4.2)
WBC # BLD: 4 K/UL (ref 4–11)

## 2018-07-30 PROCEDURE — G8427 DOCREV CUR MEDS BY ELIG CLIN: HCPCS | Performed by: FAMILY MEDICINE

## 2018-07-30 PROCEDURE — 1101F PT FALLS ASSESS-DOCD LE1/YR: CPT | Performed by: FAMILY MEDICINE

## 2018-07-30 PROCEDURE — 1036F TOBACCO NON-USER: CPT | Performed by: FAMILY MEDICINE

## 2018-07-30 PROCEDURE — 4040F PNEUMOC VAC/ADMIN/RCVD: CPT | Performed by: FAMILY MEDICINE

## 2018-07-30 PROCEDURE — 1123F ACP DISCUSS/DSCN MKR DOCD: CPT | Performed by: FAMILY MEDICINE

## 2018-07-30 PROCEDURE — 99214 OFFICE O/P EST MOD 30 MIN: CPT | Performed by: FAMILY MEDICINE

## 2018-07-30 PROCEDURE — G8417 CALC BMI ABV UP PARAM F/U: HCPCS | Performed by: FAMILY MEDICINE

## 2018-07-30 ASSESSMENT — ENCOUNTER SYMPTOMS
COLOR CHANGE: 0
WHEEZING: 0
SORE THROAT: 0
SINUS PRESSURE: 0
BACK PAIN: 0
PHOTOPHOBIA: 0
APNEA: 0
VOMITING: 0
COUGH: 0
VOICE CHANGE: 0
SHORTNESS OF BREATH: 0
FACIAL SWELLING: 0
EYE PAIN: 0
NAUSEA: 0
EYE ITCHING: 0
CHEST TIGHTNESS: 0
BLOOD IN STOOL: 0
CHOKING: 0
EYE REDNESS: 0
ANAL BLEEDING: 0
CONSTIPATION: 0
ABDOMINAL PAIN: 0
RECTAL PAIN: 0
EYE DISCHARGE: 0
TROUBLE SWALLOWING: 0

## 2018-07-30 NOTE — PROGRESS NOTES
Subjective:      Patient ID: Ronna Lopez is a [de-identified] y.o. male. The chief complaint(s)  Ref for abnormal ekg  HPI [de-identified] y/o make known metastatic  bladder cancer on Keytruda and zometa who recently had  An abnormal ekg showing mild sinus bradycardia. Denies lightheadedness, denies chest pain, denies sob, denies heart palpitations . Denies  Excessive wt gain, increased sens to hotness or coldness, denies hair problems(shaves hair from scalp)    Past Medical History:   Diagnosis Date    Cancer ([facility] [address]     bladder    GASTRIC ULCER     Hypertension     Leukopenia      Social History     Social History    Marital status:      Spouse name: N/A    Number of children: 3    Years of education: N/A     Occupational History          Social History Main Topics    Smoking status: Never Smoker    Smokeless tobacco: Never Used    Alcohol use No    Drug use: No    Sexual activity: Not on file     Other Topics Concern    Not on file     Social History Narrative    No narrative on file     Past Surgical History:   Procedure Laterality Date    COLONOSCOPY      colo 2007, Dr Nelli Rodriguez MD.   Katherine Martinezam HISTORY  12/14/2016    RADICAL CYSTECTOMY, PROSTECTOMY ILEAL CONDUIT URINARY    TUNNELED VENOUS PORT PLACEMENT Right 06/06/2017    DR. BOND/IR POWER PORT     Current Outpatient Prescriptions   Medication Sig Dispense Refill    KLOR-CON M20 20 MEQ extended release tablet TAKE 1 TABLET BY MOUTH DAILY 30 tablet 3    furosemide (LASIX) 20 MG tablet Take 1 tablet by mouth daily 60 tablet 3    Nutritional Supplements (ENSURE ACTIVE HIGH PROTEIN) LIQD Take 1 Can by mouth three times daily 90 Can 5    prochlorperazine (COMPAZINE) 10 MG tablet Take 1 tablet by mouth every 6 hours as needed (Nausea) 30 tablet 2     No current facility-administered medications for this visit.         Review of Systems   Constitutional: Negative for activity change, appetite change, chills, diaphoresis, fatigue, fever and unexpected weight change. HENT: Negative for congestion, dental problem, drooling, ear discharge, ear pain, facial swelling, hearing loss, mouth sores, nosebleeds, postnasal drip, sinus pressure, sneezing, sore throat, tinnitus, trouble swallowing and voice change. Eyes: Negative for photophobia, pain, discharge, redness, itching and visual disturbance. Respiratory: Negative for apnea, cough, choking, chest tightness, shortness of breath and wheezing. Cardiovascular: Negative for chest pain, palpitations and leg swelling. Gastrointestinal: Negative for abdominal pain, anal bleeding, blood in stool, constipation, nausea, rectal pain and vomiting. Genitourinary: Negative for decreased urine volume, difficulty urinating, discharge, dysuria, enuresis, flank pain, frequency, hematuria, penile swelling, scrotal swelling, testicular pain and urgency. Musculoskeletal: Negative for arthralgias, back pain, gait problem, joint swelling, myalgias, neck pain and neck stiffness. Skin: Negative for color change, pallor, rash and wound. Neurological: Negative for dizziness, tremors, seizures, syncope, facial asymmetry, speech difficulty, weakness, light-headedness, numbness and headaches. Hematological: Negative for adenopathy. Does not bruise/bleed easily. Psychiatric/Behavioral: Negative for agitation, behavioral problems, confusion, decreased concentration, dysphoric mood, hallucinations, self-injury, sleep disturbance and suicidal ideas. The patient is not nervous/anxious and is not hyperactive. Objective:   Physical Exam   Constitutional: He is oriented to person, place, and time. He appears well-developed and well-nourished. No distress. HENT:   Head: Normocephalic and atraumatic. Right Ear: External ear normal.   Left Ear: External ear normal.   Nose: Nose normal.   Mouth/Throat: Oropharynx is clear and moist. No oropharyngeal exudate.    Eyes: Conjunctivae and EOM are

## 2018-07-31 DIAGNOSIS — N28.9 RENAL INSUFFICIENCY: Primary | ICD-10-CM

## 2018-08-01 ENCOUNTER — TELEPHONE (OUTPATIENT)
Dept: PRIMARY CARE CLINIC | Age: 78
End: 2018-08-01

## 2018-08-03 NOTE — TELEPHONE ENCOUNTER
Form on Dr. Julianna Guerrero desk awaiting signature. I will fax back once it is signed.   Call complete

## 2018-08-14 ENCOUNTER — TELEPHONE (OUTPATIENT)
Dept: PRIMARY CARE CLINIC | Age: 78
End: 2018-08-14

## 2018-09-05 ENCOUNTER — TELEPHONE (OUTPATIENT)
Dept: PRIMARY CARE CLINIC | Age: 78
End: 2018-09-05

## 2018-09-05 NOTE — TELEPHONE ENCOUNTER
I spoke with pt's wife. She states  needs dx for urostomy supplies. (s/p bladder cancer and had bladder removed). Call [phone] and give dx so pt's order can shipped to his house. I re-faxed form to [facility] with dx codes on it.   Call complete

## 2018-09-08 DIAGNOSIS — R60.0 BILATERAL LEG EDEMA: ICD-10-CM

## 2018-09-08 DIAGNOSIS — R60.0 BILATERAL EDEMA OF LOWER EXTREMITY: ICD-10-CM

## 2018-09-11 RX ORDER — POTASSIUM CHLORIDE 1500 MG/1
20 TABLET, EXTENDED RELEASE ORAL DAILY
Qty: 30 TABLET | Refills: 3 | Status: SHIPPED | OUTPATIENT
Start: 2018-09-11 | End: 2019-01-21 | Stop reason: SDUPTHER

## 2018-09-11 RX ORDER — FUROSEMIDE 20 MG/1
20 TABLET ORAL DAILY
Qty: 60 TABLET | Refills: 3 | Status: SHIPPED | OUTPATIENT
Start: 2018-09-11 | End: 2019-07-31 | Stop reason: SDUPTHER

## 2018-09-26 ENCOUNTER — HOSPITAL ENCOUNTER (OUTPATIENT)
Dept: NUCLEAR MEDICINE | Age: 78
Discharge: HOME | End: 2018-09-26
Payer: MEDICARE

## 2018-09-26 ENCOUNTER — HOSPITAL ENCOUNTER (OUTPATIENT)
Dept: CT IMAGING | Age: 78
Discharge: HOME | End: 2018-09-26
Payer: MEDICARE

## 2018-09-26 DIAGNOSIS — C67.1 CANCER OF APEX OF URINARY BLADDER (HCC): ICD-10-CM

## 2018-09-26 DIAGNOSIS — C67.9 MALIGNANT NEOPLASM OF URINARY BLADDER, UNSPECIFIED SITE (HCC): ICD-10-CM

## 2018-09-26 PROCEDURE — 74176 CT ABD & PELVIS W/O CONTRAST: CPT

## 2018-09-26 PROCEDURE — 3430000000 HC RX DIAGNOSTIC RADIOPHARMACEUTICAL: Performed by: INTERNAL MEDICINE

## 2018-09-26 PROCEDURE — A9503 TC99M MEDRONATE: HCPCS | Performed by: INTERNAL MEDICINE

## 2018-09-26 PROCEDURE — 71250 CT THORAX DX C-: CPT

## 2018-09-26 PROCEDURE — 78306 BONE IMAGING WHOLE BODY: CPT

## 2018-09-26 RX ORDER — TC 99M MEDRONATE 20 MG/10ML
25 INJECTION, POWDER, LYOPHILIZED, FOR SOLUTION INTRAVENOUS
Status: COMPLETED | OUTPATIENT
Start: 2018-09-26 | End: 2018-09-26

## 2018-09-26 RX ADMIN — Medication 25 MILLICURIE: at 10:24

## 2018-11-26 ENCOUNTER — NURSE ONLY (OUTPATIENT)
Dept: PRIMARY CARE CLINIC | Age: 78
End: 2018-11-26
Payer: MEDICARE

## 2018-11-26 DIAGNOSIS — Z23 NEEDS FLU SHOT: Primary | ICD-10-CM

## 2018-11-26 PROCEDURE — 95115 IMMUNOTHERAPY ONE INJECTION: CPT | Performed by: FAMILY MEDICINE

## 2018-11-26 PROCEDURE — G0008 ADMIN INFLUENZA VIRUS VAC: HCPCS | Performed by: FAMILY MEDICINE

## 2018-11-26 PROCEDURE — 90662 IIV NO PRSV INCREASED AG IM: CPT | Performed by: FAMILY MEDICINE

## 2018-11-26 NOTE — PROGRESS NOTES
Vaccine Information Sheet, \"Influenza - Inactivated\"  given to Lorna Oliva, or parent/legal guardian of  Lorna Oliva and verbalized understanding. Patient responses:    Have you ever had a reaction to a flu vaccine? No  Are you able to eat eggs without adverse effects? Yes  Do you have any current illness? No  Have you ever had [place] Syndrome? No    Flu vaccine given per order. Please see immunization tab.

## 2019-01-09 ENCOUNTER — HOSPITAL ENCOUNTER (OUTPATIENT)
Dept: CT IMAGING | Age: 79
Discharge: HOME | End: 2019-01-09
Payer: MEDICARE

## 2019-01-09 DIAGNOSIS — C67.1 MALIGNANT NEOPLASM OF DOME OF URINARY BLADDER (HCC): ICD-10-CM

## 2019-01-09 DIAGNOSIS — C67.1 MALIGNANT TUMOR OF BLADDER DOME (HCC): ICD-10-CM

## 2019-01-09 PROCEDURE — 74176 CT ABD & PELVIS W/O CONTRAST: CPT

## 2019-01-09 PROCEDURE — 6360000004 HC RX CONTRAST MEDICATION: Performed by: NURSE PRACTITIONER

## 2019-01-09 RX ADMIN — IOHEXOL 50 ML: 240 INJECTION, SOLUTION INTRATHECAL; INTRAVASCULAR; INTRAVENOUS; ORAL at 11:59

## 2019-01-21 DIAGNOSIS — R60.0 BILATERAL EDEMA OF LOWER EXTREMITY: ICD-10-CM

## 2019-01-21 RX ORDER — POTASSIUM CHLORIDE 20 MEQ/1
20 TABLET, EXTENDED RELEASE ORAL DAILY
Qty: 30 TABLET | Refills: 3 | Status: SHIPPED | OUTPATIENT
Start: 2019-01-21 | End: 2019-11-08 | Stop reason: SDUPTHER

## 2019-03-20 ENCOUNTER — TELEPHONE (OUTPATIENT)
Dept: PRIMARY CARE CLINIC | Age: 79
End: 2019-03-20

## 2019-04-03 ENCOUNTER — TELEPHONE (OUTPATIENT)
Dept: PRIMARY CARE CLINIC | Age: 79
End: 2019-04-03

## 2019-04-08 ENCOUNTER — TELEPHONE (OUTPATIENT)
Dept: PRIMARY CARE CLINIC | Age: 79
End: 2019-04-08

## 2019-04-08 NOTE — TELEPHONE ENCOUNTER
Damir phone:  [phone] [place] Suzanne said that on 3/20 they received a verbal from [facility] for ostomy supplies. They faxed us forms but still did not received back from us. They are re-faxing forms in case we did not receive them. I will give to MA when they arrive.

## 2019-05-15 ENCOUNTER — HOSPITAL ENCOUNTER (OUTPATIENT)
Dept: CT IMAGING | Age: 79
Discharge: HOME | End: 2019-05-15
Payer: MEDICARE

## 2019-05-15 ENCOUNTER — APPOINTMENT (OUTPATIENT)
Dept: NUCLEAR MEDICINE | Age: 79
End: 2019-05-15
Payer: MEDICARE

## 2019-05-15 ENCOUNTER — HOSPITAL ENCOUNTER (OUTPATIENT)
Dept: NUCLEAR MEDICINE | Age: 79
Discharge: HOME | End: 2019-05-15
Payer: MEDICARE

## 2019-05-15 DIAGNOSIS — C67.9 MALIGNANT NEOPLASM OF URINARY BLADDER, UNSPECIFIED SITE (HCC): ICD-10-CM

## 2019-05-15 DIAGNOSIS — C79.51 BONE METASTASES (HCC): ICD-10-CM

## 2019-05-15 DIAGNOSIS — C79.11 SECONDARY MALIGNANT NEOPLASM OF BLADDER (HCC): ICD-10-CM

## 2019-05-15 DIAGNOSIS — C67.1 MALIGNANT NEOPLASM OF DOME OF URINARY BLADDER (HCC): ICD-10-CM

## 2019-05-15 PROCEDURE — A9503 TC99M MEDRONATE: HCPCS | Performed by: CLINICAL NURSE SPECIALIST

## 2019-05-15 PROCEDURE — 6360000004 HC RX CONTRAST MEDICATION: Performed by: CLINICAL NURSE SPECIALIST

## 2019-05-15 PROCEDURE — 78306 BONE IMAGING WHOLE BODY: CPT

## 2019-05-15 PROCEDURE — 3430000000 HC RX DIAGNOSTIC RADIOPHARMACEUTICAL: Performed by: CLINICAL NURSE SPECIALIST

## 2019-05-15 PROCEDURE — 74176 CT ABD & PELVIS W/O CONTRAST: CPT

## 2019-05-15 RX ORDER — TC 99M MEDRONATE 20 MG/10ML
25 INJECTION, POWDER, LYOPHILIZED, FOR SOLUTION INTRAVENOUS
Status: COMPLETED | OUTPATIENT
Start: 2019-05-15 | End: 2019-05-15

## 2019-05-15 RX ADMIN — TC 99M MEDRONATE 25 MILLICURIE: 20 INJECTION, POWDER, LYOPHILIZED, FOR SOLUTION INTRAVENOUS at 07:48

## 2019-05-15 RX ADMIN — IOHEXOL 50 ML: 240 INJECTION, SOLUTION INTRATHECAL; INTRAVASCULAR; INTRAVENOUS; ORAL at 07:43

## 2019-05-16 ENCOUNTER — TELEPHONE (OUTPATIENT)
Dept: PRIMARY CARE CLINIC | Age: 79
End: 2019-05-16

## 2019-05-21 ENCOUNTER — HOSPITAL ENCOUNTER (OUTPATIENT)
Dept: MRI IMAGING | Age: 79
Discharge: HOME | End: 2019-05-21
Payer: MEDICARE

## 2019-05-21 DIAGNOSIS — C67.1 MALIGNANT NEOPLASM OF DOME OF URINARY BLADDER (HCC): ICD-10-CM

## 2019-05-21 LAB
GFR AFRICAN AMERICAN: 50
GFR NON-AFRICAN AMERICAN: 42
PERFORMED ON: ABNORMAL
POC CREATININE: 1.6 MG/DL (ref 0.8–1.3)
POC SAMPLE TYPE: ABNORMAL

## 2019-05-21 PROCEDURE — 6360000004 HC RX CONTRAST MEDICATION: Performed by: INTERNAL MEDICINE

## 2019-05-21 PROCEDURE — 74183 MRI ABD W/O CNTR FLWD CNTR: CPT

## 2019-05-21 PROCEDURE — A9577 INJ MULTIHANCE: HCPCS | Performed by: INTERNAL MEDICINE

## 2019-05-21 PROCEDURE — 82565 ASSAY OF CREATININE: CPT

## 2019-05-21 RX ADMIN — GADOBENATE DIMEGLUMINE 8 ML: 529 INJECTION, SOLUTION INTRAVENOUS at 18:40

## 2019-05-22 ENCOUNTER — TELEPHONE (OUTPATIENT)
Dept: PRIMARY CARE CLINIC | Age: 79
End: 2019-05-22

## 2019-05-23 ENCOUNTER — TELEPHONE (OUTPATIENT)
Dept: PRIMARY CARE CLINIC | Age: 79
End: 2019-05-23

## 2019-09-10 ENCOUNTER — OFFICE VISIT (OUTPATIENT)
Dept: PRIMARY CARE CLINIC | Age: 79
End: 2019-09-10
Payer: MEDICARE

## 2019-09-10 VITALS
SYSTOLIC BLOOD PRESSURE: 108 MMHG | WEIGHT: 172 LBS | DIASTOLIC BLOOD PRESSURE: 69 MMHG | HEART RATE: 62 BPM | BODY MASS INDEX: 25.48 KG/M2 | OXYGEN SATURATION: 99 % | HEIGHT: 69 IN

## 2019-09-10 DIAGNOSIS — R41.89 COGNITIVE IMPAIRMENT: ICD-10-CM

## 2019-09-10 DIAGNOSIS — Z23 NEEDS FLU SHOT: ICD-10-CM

## 2019-09-10 DIAGNOSIS — Z00.00 ROUTINE GENERAL MEDICAL EXAMINATION AT A HEALTH CARE FACILITY: Primary | ICD-10-CM

## 2019-09-10 DIAGNOSIS — Z23 NEED FOR PROPHYLACTIC VACCINATION AGAINST STREPTOCOCCUS PNEUMONIAE (PNEUMOCOCCUS): ICD-10-CM

## 2019-09-10 DIAGNOSIS — R82.90 ABNORMAL URINALYSIS: Primary | ICD-10-CM

## 2019-09-10 DIAGNOSIS — Z86.59 MENTAL STATUS CHANGE RESOLVED: ICD-10-CM

## 2019-09-10 DIAGNOSIS — D64.9 ANEMIA, UNSPECIFIED TYPE: ICD-10-CM

## 2019-09-10 DIAGNOSIS — R21 SKIN RASH: ICD-10-CM

## 2019-09-10 LAB
A/G RATIO: 1.5 (ref 1.1–2.2)
ALBUMIN SERPL-MCNC: 4.1 G/DL (ref 3.4–5)
ALP BLD-CCNC: 94 U/L (ref 40–129)
ALT SERPL-CCNC: 11 U/L (ref 10–40)
ANION GAP SERPL CALCULATED.3IONS-SCNC: 10 MMOL/L (ref 3–16)
AST SERPL-CCNC: 19 U/L (ref 15–37)
BACTERIA: ABNORMAL /HPF
BILIRUB SERPL-MCNC: 1 MG/DL (ref 0–1)
BILIRUBIN URINE: NEGATIVE
BLOOD, URINE: NEGATIVE
BUN BLDV-MCNC: 20 MG/DL (ref 7–20)
CALCIUM SERPL-MCNC: 9.3 MG/DL (ref 8.3–10.6)
CHLORIDE BLD-SCNC: 105 MMOL/L (ref 99–110)
CLARITY: ABNORMAL
CO2: 25 MMOL/L (ref 21–32)
COLOR: YELLOW
COMMENT UA: ABNORMAL
CREAT SERPL-MCNC: 1.8 MG/DL (ref 0.8–1.3)
EPITHELIAL CELLS, UA: 1 /HPF (ref 0–5)
FOLATE: 7 NG/ML (ref 4.78–24.2)
GFR AFRICAN AMERICAN: 44
GFR NON-AFRICAN AMERICAN: 36
GLOBULIN: 2.7 G/DL
GLUCOSE BLD-MCNC: 97 MG/DL (ref 70–99)
GLUCOSE URINE: NEGATIVE MG/DL
HCT VFR BLD CALC: 37.6 % (ref 40.5–52.5)
HEMOGLOBIN: 12.5 G/DL (ref 13.5–17.5)
HYALINE CASTS: 6 /LPF (ref 0–8)
KETONES, URINE: NEGATIVE MG/DL
LEUKOCYTE ESTERASE, URINE: ABNORMAL
MCH RBC QN AUTO: 32.4 PG (ref 26–34)
MCHC RBC AUTO-ENTMCNC: 33.4 G/DL (ref 31–36)
MCV RBC AUTO: 97.2 FL (ref 80–100)
MICROSCOPIC EXAMINATION: YES
NITRITE, URINE: POSITIVE
PDW BLD-RTO: 13.4 % (ref 12.4–15.4)
PH UA: 7 (ref 5–8)
PLATELET # BLD: 212 K/UL (ref 135–450)
PMV BLD AUTO: 10.1 FL (ref 5–10.5)
POTASSIUM SERPL-SCNC: 4.8 MMOL/L (ref 3.5–5.1)
PROTEIN UA: ABNORMAL MG/DL
RBC # BLD: 3.87 M/UL (ref 4.2–5.9)
RBC UA: 3 /HPF (ref 0–4)
SODIUM BLD-SCNC: 140 MMOL/L (ref 136–145)
SPECIFIC GRAVITY UA: 1.01 (ref 1–1.03)
TOTAL PROTEIN: 6.8 G/DL (ref 6.4–8.2)
TSH SERPL DL<=0.05 MIU/L-ACNC: 0.79 UIU/ML (ref 0.27–4.2)
URINE TYPE: ABNORMAL
UROBILINOGEN, URINE: 0.2 E.U./DL
VITAMIN B-12: 412 PG/ML (ref 211–911)
WBC # BLD: 4.7 K/UL (ref 4–11)
WBC UA: 66 /HPF (ref 0–5)

## 2019-09-10 PROCEDURE — G0008 ADMIN INFLUENZA VIRUS VAC: HCPCS | Performed by: FAMILY MEDICINE

## 2019-09-10 PROCEDURE — G0009 ADMIN PNEUMOCOCCAL VACCINE: HCPCS | Performed by: FAMILY MEDICINE

## 2019-09-10 PROCEDURE — 90732 PPSV23 VACC 2 YRS+ SUBQ/IM: CPT | Performed by: FAMILY MEDICINE

## 2019-09-10 PROCEDURE — 90653 IIV ADJUVANT VACCINE IM: CPT | Performed by: FAMILY MEDICINE

## 2019-09-10 PROCEDURE — 1123F ACP DISCUSS/DSCN MKR DOCD: CPT | Performed by: FAMILY MEDICINE

## 2019-09-10 PROCEDURE — 4040F PNEUMOC VAC/ADMIN/RCVD: CPT | Performed by: FAMILY MEDICINE

## 2019-09-10 PROCEDURE — G0438 PPPS, INITIAL VISIT: HCPCS | Performed by: FAMILY MEDICINE

## 2019-09-10 RX ORDER — DONEPEZIL HYDROCHLORIDE 5 MG/1
5 TABLET, FILM COATED ORAL NIGHTLY
Qty: 30 TABLET | Refills: 3 | Status: SHIPPED | OUTPATIENT
Start: 2019-09-10 | End: 2020-01-01 | Stop reason: SDUPTHER

## 2019-09-10 ASSESSMENT — ENCOUNTER SYMPTOMS
COUGH: 0
DIARRHEA: 0
BACK PAIN: 0
FACIAL SWELLING: 0
SORE THROAT: 0
SHORTNESS OF BREATH: 0
CHOKING: 0
WHEEZING: 0
TROUBLE SWALLOWING: 0
EYE DISCHARGE: 0
SWOLLEN GLANDS: 0
COLOR CHANGE: 0
APNEA: 0
VOICE CHANGE: 0
VISUAL CHANGE: 0
EYE PAIN: 0
EYE REDNESS: 0
RHINORRHEA: 0
SINUS PRESSURE: 0
ABDOMINAL PAIN: 0
PHOTOPHOBIA: 0
RECTAL PAIN: 0
ANAL BLEEDING: 0
NAUSEA: 0
VOMITING: 0
CHEST TIGHTNESS: 0
NAIL CHANGES: 0
BLOOD IN STOOL: 0
EYE ITCHING: 0
CONSTIPATION: 0

## 2019-09-10 ASSESSMENT — LIFESTYLE VARIABLES
HOW OFTEN DO YOU HAVE SIX OR MORE DRINKS ON ONE OCCASION: 0
HOW OFTEN DURING THE LAST YEAR HAVE YOU HAD A FEELING OF GUILT OR REMORSE AFTER DRINKING: 0
HOW OFTEN DURING THE LAST YEAR HAVE YOU FAILED TO DO WHAT WAS NORMALLY EXPECTED FROM YOU BECAUSE OF DRINKING: 0
HOW MANY STANDARD DRINKS CONTAINING ALCOHOL DO YOU HAVE ON A TYPICAL DAY: 0
AUDIT-C TOTAL SCORE: 1
HOW OFTEN DURING THE LAST YEAR HAVE YOU NEEDED AN ALCOHOLIC DRINK FIRST THING IN THE MORNING TO GET YOURSELF GOING AFTER A NIGHT OF HEAVY DRINKING: 0
AUDIT TOTAL SCORE: 1
HOW OFTEN DO YOU HAVE A DRINK CONTAINING ALCOHOL: 1
HOW OFTEN DURING THE LAST YEAR HAVE YOU FOUND THAT YOU WERE NOT ABLE TO STOP DRINKING ONCE YOU HAD STARTED: 0
HAVE YOU OR SOMEONE ELSE BEEN INJURED AS A RESULT OF YOUR DRINKING: 0
HAS A RELATIVE, FRIEND, DOCTOR, OR ANOTHER HEALTH PROFESSIONAL EXPRESSED CONCERN ABOUT YOUR DRINKING OR SUGGESTED YOU CUT DOWN: 0
HOW OFTEN DURING THE LAST YEAR HAVE YOU BEEN UNABLE TO REMEMBER WHAT HAPPENED THE NIGHT BEFORE BECAUSE YOU HAD BEEN DRINKING: 0

## 2019-09-10 ASSESSMENT — PATIENT HEALTH QUESTIONNAIRE - PHQ9
SUM OF ALL RESPONSES TO PHQ QUESTIONS 1-9: 0
SUM OF ALL RESPONSES TO PHQ QUESTIONS 1-9: 0

## 2019-09-10 NOTE — PROGRESS NOTES
[facility] Annual Wellness Visit  Name: Ofelia Riedel Date: 9/10/2019   MRN: [id number] Sex: Male   Age: 78 y.o. Ethnicity: Non-/Non    : 1940 Race: Black      Saad García is here for Umair Ulrich [facility]    Screenings for behavioral, psychosocial and functional/safety risks, and cognitive dysfunction are all negative except as indicated below. These results, as well as other patient data from the [address] form, are documented in Flowsheets linked to this Encounter. No Known Allergies  Prior to Visit Medications    Medication Sig Taking? Authorizing Provider   donepezil (ARICEPT) 5 MG tablet Take 1 tablet by mouth nightly Yes Marleta Goodpasture, MD   furosemide (LASIX) 20 MG tablet TAKE 1 TABLET BY MOUTH EVERY DAY Yes Marleta Goodpasture, MD   potassium chloride (KLOR-CON M20) 20 MEQ extended release tablet Take 1 tablet by mouth daily Yes Marleta Goodpasture, MD   Nutritional Supplements (ENSURE ACTIVE HIGH PROTEIN) LIQD Take 1 Can by mouth three times daily Yes Marleta Goodpasture, MD   prochlorperazine (COMPAZINE) 10 MG tablet Take 1 tablet by mouth every 6 hours as needed (Nausea)  Patient not taking: Reported on 9/10/2019  SLADE Mckeon - CNP     Past Medical History:   Diagnosis Date    Cancer [facility])     bladder    GASTRIC ULCER     Hypertension     Leukopenia      Past Surgical History:   Procedure Laterality Date    COLONOSCOPY      colo , Dr Martha Sears MD.   Canelo Funes  2016    RADICAL CYSTECTOMY, PROSTECTOMY ILEAL CONDUIT URINARY    TUNNELED VENOUS PORT PLACEMENT Right 2017    DR. BOND/IR POWER PORT     History reviewed. No pertinent family history.     CareTeam (Including outside providers/suppliers regularly involved in providing care):   Patient Care Team:  Marleta Goodpasture, MD as PCP - General (Family Medicine)  Misty Vernon MD as PCP - Hematology/Oncology (Hematology and Oncology)  Marleta Goodpasture, MD as PCP -

## 2019-09-11 LAB — TOTAL SYPHILLIS IGG/IGM: NORMAL

## 2019-09-17 ENCOUNTER — HOSPITAL ENCOUNTER (OUTPATIENT)
Dept: NUCLEAR MEDICINE | Age: 79
Discharge: HOME | End: 2019-09-17
Payer: MEDICARE

## 2019-09-17 ENCOUNTER — HOSPITAL ENCOUNTER (OUTPATIENT)
Dept: CT IMAGING | Age: 79
Discharge: HOME | End: 2019-09-17
Payer: MEDICARE

## 2019-09-17 DIAGNOSIS — C67.1 CANCER OF APEX OF URINARY BLADDER (HCC): ICD-10-CM

## 2019-09-17 DIAGNOSIS — C67.1: ICD-10-CM

## 2019-09-17 PROCEDURE — 6360000004 HC RX CONTRAST MEDICATION: Performed by: INTERNAL MEDICINE

## 2019-09-17 PROCEDURE — 78306 BONE IMAGING WHOLE BODY: CPT

## 2019-09-17 PROCEDURE — 3430000000 HC RX DIAGNOSTIC RADIOPHARMACEUTICAL: Performed by: CLINICAL NURSE SPECIALIST

## 2019-09-17 PROCEDURE — 74176 CT ABD & PELVIS W/O CONTRAST: CPT

## 2019-09-17 PROCEDURE — A9503 TC99M MEDRONATE: HCPCS | Performed by: CLINICAL NURSE SPECIALIST

## 2019-09-17 RX ORDER — TC 99M MEDRONATE 20 MG/10ML
25 INJECTION, POWDER, LYOPHILIZED, FOR SOLUTION INTRAVENOUS
Status: COMPLETED | OUTPATIENT
Start: 2019-09-17 | End: 2019-09-17

## 2019-09-17 RX ADMIN — IOHEXOL 50 ML: 240 INJECTION, SOLUTION INTRATHECAL; INTRAVASCULAR; INTRAVENOUS; ORAL at 08:45

## 2019-09-17 RX ADMIN — TC 99M MEDRONATE 25 MILLICURIE: 20 INJECTION, POWDER, LYOPHILIZED, FOR SOLUTION INTRAVENOUS at 08:01

## 2019-10-08 ENCOUNTER — OFFICE VISIT (OUTPATIENT)
Dept: PRIMARY CARE CLINIC | Age: 79
End: 2019-10-08
Payer: MEDICARE

## 2019-10-08 VITALS
DIASTOLIC BLOOD PRESSURE: 57 MMHG | WEIGHT: 173.4 LBS | BODY MASS INDEX: 25.68 KG/M2 | SYSTOLIC BLOOD PRESSURE: 114 MMHG | OXYGEN SATURATION: 89 % | HEIGHT: 69 IN | HEART RATE: 62 BPM

## 2019-10-08 DIAGNOSIS — L42 PITYRIASIS ROSEA: ICD-10-CM

## 2019-10-08 DIAGNOSIS — N47.1 PHIMOSIS OF PENIS: ICD-10-CM

## 2019-10-08 DIAGNOSIS — L42 PITYRIASIS ROSEA: Primary | ICD-10-CM

## 2019-10-08 PROCEDURE — G8419 CALC BMI OUT NRM PARAM NOF/U: HCPCS | Performed by: FAMILY MEDICINE

## 2019-10-08 PROCEDURE — G8427 DOCREV CUR MEDS BY ELIG CLIN: HCPCS | Performed by: FAMILY MEDICINE

## 2019-10-08 PROCEDURE — 1123F ACP DISCUSS/DSCN MKR DOCD: CPT | Performed by: FAMILY MEDICINE

## 2019-10-08 PROCEDURE — 99214 OFFICE O/P EST MOD 30 MIN: CPT | Performed by: FAMILY MEDICINE

## 2019-10-08 PROCEDURE — 1036F TOBACCO NON-USER: CPT | Performed by: FAMILY MEDICINE

## 2019-10-08 PROCEDURE — 4040F PNEUMOC VAC/ADMIN/RCVD: CPT | Performed by: FAMILY MEDICINE

## 2019-10-08 PROCEDURE — G8482 FLU IMMUNIZE ORDER/ADMIN: HCPCS | Performed by: FAMILY MEDICINE

## 2019-10-08 RX ORDER — CETIRIZINE HYDROCHLORIDE 10 MG/1
10 TABLET ORAL DAILY
Qty: 30 TABLET | Refills: 0 | Status: SHIPPED | OUTPATIENT
Start: 2019-10-08 | End: 2019-12-02 | Stop reason: SDUPTHER

## 2019-10-08 RX ORDER — CLOTRIMAZOLE AND BETAMETHASONE DIPROPIONATE 10; .64 MG/G; MG/G
CREAM TOPICAL
Qty: 45 G | Refills: 0 | Status: SHIPPED | OUTPATIENT
Start: 2019-10-08 | End: 2021-01-01

## 2019-10-08 ASSESSMENT — ENCOUNTER SYMPTOMS
COLOR CHANGE: 0
SINUS PRESSURE: 0
BLOOD IN STOOL: 0
PHOTOPHOBIA: 0
ABDOMINAL PAIN: 0
SORE THROAT: 0
EYE DISCHARGE: 0
APNEA: 0
SHORTNESS OF BREATH: 0
BACK PAIN: 0
VOMITING: 0
ANAL BLEEDING: 0
EYE ITCHING: 0
TROUBLE SWALLOWING: 0
CHOKING: 0
EYE PAIN: 0
WHEEZING: 0
VOICE CHANGE: 0
RECTAL PAIN: 0
CHEST TIGHTNESS: 0
EYE REDNESS: 0
NAUSEA: 0
CONSTIPATION: 0
COUGH: 0
FACIAL SWELLING: 0

## 2019-10-09 LAB — TOTAL SYPHILLIS IGG/IGM: NORMAL

## 2019-11-08 DIAGNOSIS — R60.0 BILATERAL EDEMA OF LOWER EXTREMITY: ICD-10-CM

## 2019-11-12 RX ORDER — POTASSIUM CHLORIDE 1500 MG/1
TABLET, EXTENDED RELEASE ORAL
Qty: 30 TABLET | Refills: 3 | Status: SHIPPED | OUTPATIENT
Start: 2019-11-12 | End: 2020-01-01

## 2020-01-01 ENCOUNTER — OFFICE VISIT (OUTPATIENT)
Dept: PRIMARY CARE CLINIC | Age: 80
End: 2020-01-01
Payer: MEDICARE

## 2020-01-01 ENCOUNTER — TELEPHONE (OUTPATIENT)
Dept: PRIMARY CARE CLINIC | Age: 80
End: 2020-01-01

## 2020-01-01 ENCOUNTER — HOSPITAL ENCOUNTER (OUTPATIENT)
Dept: CT IMAGING | Age: 80
Discharge: HOME | End: 2020-06-08
Payer: MEDICARE

## 2020-01-01 VITALS
HEART RATE: 58 BPM | WEIGHT: 180 LBS | SYSTOLIC BLOOD PRESSURE: 137 MMHG | DIASTOLIC BLOOD PRESSURE: 71 MMHG | TEMPERATURE: 96.4 F | HEIGHT: 68 IN | BODY MASS INDEX: 27.28 KG/M2

## 2020-01-01 PROCEDURE — 90694 VACC AIIV4 NO PRSRV 0.5ML IM: CPT | Performed by: FAMILY MEDICINE

## 2020-01-01 PROCEDURE — 99214 OFFICE O/P EST MOD 30 MIN: CPT | Performed by: FAMILY MEDICINE

## 2020-01-01 PROCEDURE — G8484 FLU IMMUNIZE NO ADMIN: HCPCS | Performed by: FAMILY MEDICINE

## 2020-01-01 PROCEDURE — 1036F TOBACCO NON-USER: CPT | Performed by: FAMILY MEDICINE

## 2020-01-01 PROCEDURE — G0008 ADMIN INFLUENZA VIRUS VAC: HCPCS | Performed by: FAMILY MEDICINE

## 2020-01-01 PROCEDURE — G8427 DOCREV CUR MEDS BY ELIG CLIN: HCPCS | Performed by: FAMILY MEDICINE

## 2020-01-01 PROCEDURE — 4040F PNEUMOC VAC/ADMIN/RCVD: CPT | Performed by: FAMILY MEDICINE

## 2020-01-01 PROCEDURE — 1123F ACP DISCUSS/DSCN MKR DOCD: CPT | Performed by: FAMILY MEDICINE

## 2020-01-01 PROCEDURE — 74176 CT ABD & PELVIS W/O CONTRAST: CPT

## 2020-01-01 PROCEDURE — G8417 CALC BMI ABV UP PARAM F/U: HCPCS | Performed by: FAMILY MEDICINE

## 2020-01-01 RX ORDER — DONEPEZIL HYDROCHLORIDE 5 MG/1
5 TABLET, FILM COATED ORAL NIGHTLY
Qty: 30 TABLET | Refills: 3 | Status: ON HOLD | OUTPATIENT
Start: 2020-01-01 | End: 2021-01-01 | Stop reason: HOSPADM

## 2020-01-01 RX ORDER — FUROSEMIDE 20 MG/1
TABLET ORAL
Qty: 30 TABLET | Refills: 2 | Status: SHIPPED | OUTPATIENT
Start: 2020-01-01 | End: 2021-01-01

## 2020-01-01 ASSESSMENT — ENCOUNTER SYMPTOMS
NAUSEA: 0
EYE REDNESS: 0
TROUBLE SWALLOWING: 0
RECTAL PAIN: 0
WHEEZING: 0
APNEA: 0
BLOOD IN STOOL: 0
FACIAL SWELLING: 0
ABDOMINAL PAIN: 0
EYE PAIN: 0
EYE DISCHARGE: 0
SHORTNESS OF BREATH: 0
SORE THROAT: 0
SINUS PRESSURE: 0
VOICE CHANGE: 0
BACK PAIN: 0
PHOTOPHOBIA: 0
CHEST TIGHTNESS: 0
COUGH: 0
EYE ITCHING: 0
ANAL BLEEDING: 0
COLOR CHANGE: 0
VOMITING: 0
CONSTIPATION: 0
CHOKING: 0

## 2020-01-17 ENCOUNTER — OFFICE VISIT (OUTPATIENT)
Dept: PRIMARY CARE CLINIC | Age: 80
End: 2020-01-17
Payer: MEDICARE

## 2020-01-17 VITALS
DIASTOLIC BLOOD PRESSURE: 68 MMHG | BODY MASS INDEX: 25.83 KG/M2 | HEART RATE: 59 BPM | OXYGEN SATURATION: 96 % | HEIGHT: 69 IN | WEIGHT: 174.4 LBS | SYSTOLIC BLOOD PRESSURE: 107 MMHG

## 2020-01-17 PROCEDURE — G8419 CALC BMI OUT NRM PARAM NOF/U: HCPCS | Performed by: FAMILY MEDICINE

## 2020-01-17 PROCEDURE — 4040F PNEUMOC VAC/ADMIN/RCVD: CPT | Performed by: FAMILY MEDICINE

## 2020-01-17 PROCEDURE — 1123F ACP DISCUSS/DSCN MKR DOCD: CPT | Performed by: FAMILY MEDICINE

## 2020-01-17 PROCEDURE — G8427 DOCREV CUR MEDS BY ELIG CLIN: HCPCS | Performed by: FAMILY MEDICINE

## 2020-01-17 PROCEDURE — 1036F TOBACCO NON-USER: CPT | Performed by: FAMILY MEDICINE

## 2020-01-17 PROCEDURE — G8482 FLU IMMUNIZE ORDER/ADMIN: HCPCS | Performed by: FAMILY MEDICINE

## 2020-01-17 PROCEDURE — 99213 OFFICE O/P EST LOW 20 MIN: CPT | Performed by: FAMILY MEDICINE

## 2020-01-17 ASSESSMENT — ENCOUNTER SYMPTOMS
SORE THROAT: 0
VOMITING: 0
COUGH: 0
ABDOMINAL PAIN: 0
RHINORRHEA: 0
DIARRHEA: 0

## 2020-01-17 ASSESSMENT — PATIENT HEALTH QUESTIONNAIRE - PHQ9
SUM OF ALL RESPONSES TO PHQ QUESTIONS 1-9: 0
2. FEELING DOWN, DEPRESSED OR HOPELESS: 0
1. LITTLE INTEREST OR PLEASURE IN DOING THINGS: 0
SUM OF ALL RESPONSES TO PHQ QUESTIONS 1-9: 0
SUM OF ALL RESPONSES TO PHQ9 QUESTIONS 1 & 2: 0

## 2020-01-31 ENCOUNTER — HOSPITAL ENCOUNTER (OUTPATIENT)
Dept: NUCLEAR MEDICINE | Age: 80
Discharge: HOME | End: 2020-01-31
Payer: MEDICARE

## 2020-01-31 ENCOUNTER — HOSPITAL ENCOUNTER (OUTPATIENT)
Dept: CT IMAGING | Age: 80
Discharge: HOME | End: 2020-01-31
Payer: MEDICARE

## 2020-01-31 PROCEDURE — 3430000000 HC RX DIAGNOSTIC RADIOPHARMACEUTICAL

## 2020-01-31 PROCEDURE — A9503 TC99M MEDRONATE: HCPCS

## 2020-01-31 PROCEDURE — 3430000000 HC RX DIAGNOSTIC RADIOPHARMACEUTICAL: Performed by: INTERNAL MEDICINE

## 2020-01-31 PROCEDURE — 78306 BONE IMAGING WHOLE BODY: CPT

## 2020-01-31 PROCEDURE — 6360000004 HC RX CONTRAST MEDICATION: Performed by: INTERNAL MEDICINE

## 2020-01-31 PROCEDURE — 74176 CT ABD & PELVIS W/O CONTRAST: CPT

## 2020-01-31 PROCEDURE — A9503 TC99M MEDRONATE: HCPCS | Performed by: INTERNAL MEDICINE

## 2020-01-31 RX ORDER — TC 99M MEDRONATE 20 MG/10ML
25 INJECTION, POWDER, LYOPHILIZED, FOR SOLUTION INTRAVENOUS
Status: COMPLETED | OUTPATIENT
Start: 2020-01-31 | End: 2020-01-31

## 2020-01-31 RX ADMIN — TC 99M MEDRONATE 25 MILLICURIE: 20 INJECTION, POWDER, LYOPHILIZED, FOR SOLUTION INTRAVENOUS at 09:33

## 2020-01-31 RX ADMIN — IOHEXOL 50 ML: 240 INJECTION, SOLUTION INTRATHECAL; INTRAVASCULAR; INTRAVENOUS; ORAL at 09:40

## 2020-03-03 ENCOUNTER — OFFICE VISIT (OUTPATIENT)
Dept: ENT CLINIC | Age: 80
End: 2020-03-03
Payer: MEDICARE

## 2020-03-03 VITALS
HEIGHT: 68 IN | SYSTOLIC BLOOD PRESSURE: 114 MMHG | BODY MASS INDEX: 27.68 KG/M2 | WEIGHT: 182.6 LBS | HEART RATE: 67 BPM | TEMPERATURE: 97.2 F | DIASTOLIC BLOOD PRESSURE: 58 MMHG

## 2020-03-03 PROCEDURE — 69210 REMOVE IMPACTED EAR WAX UNI: CPT | Performed by: OTOLARYNGOLOGY

## 2020-08-28 NOTE — TELEPHONE ENCOUNTER
Pt's is out of his lasix and needs a refill.        PHARMACY:   [facility] [id number]    PHONE:   [phone]       FUTURE APPT:  9/30  LOV 2/2020

## 2020-11-02 NOTE — PROGRESS NOTES
Subjective:      Patient ID: Vivian Chacon is a 78 y.o. male. Leg swelling   HPI  77 y/o male known bladder cancer approx 5 yrs treated with surgery ,ileal conduit,, and  IO per oncology. He is doing well . He has h/o chronic leg edema L>R but no pain or sob or h/o leg or lung clots. He has taken furosemide intermittently for the same. Bladder cancer  See hpi above  Follows with [facility]C  Dr Ирина Bustos    Has ileal conduit    Leg edema  No pain   L>R  Intermittent furosemide    Weight has been stable        Review of Systems   Constitutional: Negative for activity change, appetite change, chills, diaphoresis, fatigue, fever and unexpected weight change. HENT: Negative for congestion, dental problem, drooling, ear discharge, ear pain, facial swelling, hearing loss, mouth sores, nosebleeds, postnasal drip, sinus pressure, sneezing, sore throat, tinnitus, trouble swallowing and voice change. Eyes: Negative for photophobia, pain, discharge, redness, itching and visual disturbance. Respiratory: Negative for apnea, cough, choking, chest tightness, shortness of breath and wheezing. Cardiovascular: Negative for chest pain, palpitations and leg swelling. Gastrointestinal: Negative for abdominal pain, anal bleeding, blood in stool, constipation, nausea, rectal pain and vomiting. Genitourinary: Negative for decreased urine volume, difficulty urinating, discharge, dysuria, enuresis, flank pain, frequency, hematuria, penile swelling, scrotal swelling, testicular pain and urgency. Musculoskeletal: Negative for arthralgias, back pain, gait problem, joint swelling, myalgias, neck pain and neck stiffness. Skin: Negative for color change, pallor, rash and wound. Neurological: Negative for dizziness, tremors, seizures, syncope, facial asymmetry, speech difficulty, weakness, light-headedness, numbness and headaches. Hematological: Negative for adenopathy. Does not bruise/bleed easily. Psychiatric/Behavioral: Negative for agitation, behavioral problems, confusion, decreased concentration, dysphoric mood, hallucinations, self-injury, sleep disturbance and suicidal ideas. The patient is not nervous/anxious and is not hyperactive. Objective:   Physical Exam  Vitals signs and nursing note reviewed. Constitutional:       General: He is not in acute distress. Appearance: He is well-developed. He is not diaphoretic. HENT:      Head: Normocephalic and atraumatic. Right Ear: External ear normal.      Left Ear: External ear normal.      Nose: Nose normal.      Mouth/Throat:      Pharynx: No oropharyngeal exudate. Eyes:      General: No scleral icterus. Right eye: No discharge. Left eye: No discharge. Conjunctiva/sclera: Conjunctivae normal.      Pupils: Pupils are equal, round, and reactive to light. Neck:      Musculoskeletal: Normal range of motion and neck supple. Thyroid: No thyromegaly. Vascular: No JVD. Trachea: No tracheal deviation. Cardiovascular:      Rate and Rhythm: Normal rate and regular rhythm. Pulses:           Carotid pulses are 2+ on the right side and 2+ on the left side. Radial pulses are 2+ on the right side and 2+ on the left side. Femoral pulses are 2+ on the right side and 2+ on the left side. Popliteal pulses are 2+ on the right side and 2+ on the left side. Dorsalis pedis pulses are 2+ on the right side and 2+ on the left side. Posterior tibial pulses are 2+ on the right side and 2+ on the left side. Heart sounds: Normal heart sounds. No murmur. No friction rub. Pulmonary:      Effort: Pulmonary effort is normal. No respiratory distress. Breath sounds: Normal breath sounds. No stridor. No wheezing or rales. Chest:      Chest wall: No tenderness. Abdominal:      General: Bowel sounds are normal. There is no distension. Palpations: Abdomen is soft.  There is no mass.      Tenderness: There is no abdominal tenderness. There is no guarding or rebound. Musculoskeletal: Normal range of motion. General: No tenderness. Right lower leg: Edema (plus 1) present. Left lower leg: Edema (plus 2) present. Lymphadenopathy:      Cervical: No cervical adenopathy. Skin:     General: Skin is warm and dry. Coloration: Skin is not pale. Findings: No rash. Neurological:      Mental Status: He is oriented to person, place, and time. Cranial Nerves: No cranial nerve deficit. Motor: No abnormal muscle tone. Coordination: Coordination normal.      Deep Tendon Reflexes: Reflexes normal.   Psychiatric:         Behavior: Behavior normal.         Thought Content: Thought content normal.         Judgment: Judgment normal.         Assessment:      1. Bilateral leg edema  No previous DVT or PE history  Surgical stockings  Ck labs, prn furosemide   - Comprehensive Metabolic Panel; Future  - D-DIMER, QUANTITATIVE; Future  - furosemide (LASIX) 20 MG tablet; TAKE 1 TABLET BY MOUTH EVERY DAY  Dispense: 30 tablet; Refill: 2  - CBC; Future    2. Malignant neoplasm metastatic to bone ([facility] [address]  Follows with [facility], IO  - CBC; Future    3.  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

## 2021-01-01 ENCOUNTER — APPOINTMENT (OUTPATIENT)
Dept: GENERAL RADIOLOGY | Age: 81
DRG: 871 | End: 2021-01-01
Payer: MEDICARE

## 2021-01-01 ENCOUNTER — APPOINTMENT (OUTPATIENT)
Dept: GENERAL RADIOLOGY | Age: 81
DRG: 853 | End: 2021-01-01
Payer: MEDICARE

## 2021-01-01 ENCOUNTER — TELEPHONE (OUTPATIENT)
Dept: SURGERY | Age: 81
End: 2021-01-01

## 2021-01-01 ENCOUNTER — HOSPITAL ENCOUNTER (OUTPATIENT)
Dept: CT IMAGING | Age: 81
Discharge: HOME | End: 2021-02-11
Payer: MEDICARE

## 2021-01-01 ENCOUNTER — HOSPITAL ENCOUNTER (INPATIENT)
Age: 81
LOS: 11 days | Discharge: HOSPICE | DRG: 871 | End: 2021-03-23
Attending: EMERGENCY MEDICINE | Admitting: INTERNAL MEDICINE
Payer: MEDICARE

## 2021-01-01 ENCOUNTER — APPOINTMENT (OUTPATIENT)
Dept: CT IMAGING | Age: 81
DRG: 871 | End: 2021-01-01
Payer: MEDICARE

## 2021-01-01 ENCOUNTER — ANESTHESIA (OUTPATIENT)
Dept: OPERATING ROOM | Age: 81
DRG: 853 | End: 2021-01-01
Payer: MEDICARE

## 2021-01-01 ENCOUNTER — APPOINTMENT (OUTPATIENT)
Dept: CT IMAGING | Age: 81
DRG: 853 | End: 2021-01-01
Payer: MEDICARE

## 2021-01-01 ENCOUNTER — ANESTHESIA EVENT (OUTPATIENT)
Dept: OPERATING ROOM | Age: 81
DRG: 853 | End: 2021-01-01
Payer: MEDICARE

## 2021-01-01 ENCOUNTER — APPOINTMENT (OUTPATIENT)
Dept: MRI IMAGING | Age: 81
DRG: 853 | End: 2021-01-01
Payer: MEDICARE

## 2021-01-01 ENCOUNTER — HOSPITAL ENCOUNTER (INPATIENT)
Age: 81
LOS: 19 days | Discharge: NURSING, REHAB OR LONG-TERM CARE | DRG: 853 | End: 2021-03-09
Attending: STUDENT IN AN ORGANIZED HEALTH CARE EDUCATION/TRAINING PROGRAM | Admitting: SURGERY
Payer: MEDICARE

## 2021-01-01 VITALS
DIASTOLIC BLOOD PRESSURE: 57 MMHG | WEIGHT: 152.34 LBS | BODY MASS INDEX: 22.56 KG/M2 | RESPIRATION RATE: 16 BRPM | TEMPERATURE: 98.7 F | HEIGHT: 69 IN | HEART RATE: 114 BPM | OXYGEN SATURATION: 98 % | SYSTOLIC BLOOD PRESSURE: 130 MMHG

## 2021-01-01 VITALS
TEMPERATURE: 97.3 F | SYSTOLIC BLOOD PRESSURE: 74 MMHG | OXYGEN SATURATION: 90 % | HEIGHT: 69 IN | HEART RATE: 73 BPM | WEIGHT: 133.38 LBS | DIASTOLIC BLOOD PRESSURE: 46 MMHG | RESPIRATION RATE: 18 BRPM | BODY MASS INDEX: 19.76 KG/M2

## 2021-01-01 VITALS
RESPIRATION RATE: 26 BRPM | SYSTOLIC BLOOD PRESSURE: 119 MMHG | DIASTOLIC BLOOD PRESSURE: 60 MMHG | OXYGEN SATURATION: 98 % | TEMPERATURE: 96.1 F

## 2021-01-01 DIAGNOSIS — R65.10 SIRS (SYSTEMIC INFLAMMATORY RESPONSE SYNDROME) (HCC): ICD-10-CM

## 2021-01-01 DIAGNOSIS — E87.5 HYPERKALEMIA: ICD-10-CM

## 2021-01-01 DIAGNOSIS — Z93.6 STATUS POST ILEAL CONDUIT (HCC): ICD-10-CM

## 2021-01-01 DIAGNOSIS — G93.40 ACUTE ENCEPHALOPATHY: ICD-10-CM

## 2021-01-01 DIAGNOSIS — N17.9 ACUTE RENAL FAILURE, UNSPECIFIED ACUTE RENAL FAILURE TYPE (HCC): ICD-10-CM

## 2021-01-01 DIAGNOSIS — R41.82 ALTERED MENTAL STATUS, UNSPECIFIED ALTERED MENTAL STATUS TYPE: ICD-10-CM

## 2021-01-01 DIAGNOSIS — N13.9 URINARY OBSTRUCTION: ICD-10-CM

## 2021-01-01 DIAGNOSIS — F03.90 DEMENTIA WITHOUT BEHAVIORAL DISTURBANCE, UNSPECIFIED DEMENTIA TYPE: ICD-10-CM

## 2021-01-01 DIAGNOSIS — K56.609 SMALL BOWEL OBSTRUCTION (HCC): Primary | ICD-10-CM

## 2021-01-01 DIAGNOSIS — C67.9 MALIGNANT NEOPLASM OF URINARY BLADDER, UNSPECIFIED SITE (HCC): ICD-10-CM

## 2021-01-01 DIAGNOSIS — N17.9 ACUTE KIDNEY INJURY SUPERIMPOSED ON CHRONIC KIDNEY DISEASE (HCC): Primary | ICD-10-CM

## 2021-01-01 DIAGNOSIS — N18.9 ACUTE KIDNEY INJURY SUPERIMPOSED ON CHRONIC KIDNEY DISEASE (HCC): Primary | ICD-10-CM

## 2021-01-01 DIAGNOSIS — J96.01 ACUTE RESPIRATORY FAILURE WITH HYPOXIA (HCC): ICD-10-CM

## 2021-01-01 DIAGNOSIS — J18.9 PNEUMONIA DUE TO ORGANISM: ICD-10-CM

## 2021-01-01 DIAGNOSIS — L42 PITYRIASIS ROSEA: ICD-10-CM

## 2021-01-01 LAB
A/G RATIO: 0.7 (ref 1.1–2.2)
A/G RATIO: 1.1 (ref 1.1–2.2)
ALBUMIN SERPL-MCNC: 2.5 G/DL (ref 3.4–5)
ALBUMIN SERPL-MCNC: 2.5 G/DL (ref 3.4–5)
ALBUMIN SERPL-MCNC: 2.6 G/DL (ref 3.4–5)
ALBUMIN SERPL-MCNC: 2.8 G/DL (ref 3.4–5)
ALBUMIN SERPL-MCNC: 2.8 G/DL (ref 3.4–5)
ALBUMIN SERPL-MCNC: 3.1 G/DL (ref 3.4–5)
ALBUMIN SERPL-MCNC: 3.2 G/DL (ref 3.4–5)
ALBUMIN SERPL-MCNC: 3.9 G/DL (ref 3.4–5)
ALP BLD-CCNC: 123 U/L (ref 40–129)
ALP BLD-CCNC: 79 U/L (ref 40–129)
ALT SERPL-CCNC: 18 U/L (ref 10–40)
ALT SERPL-CCNC: 20 U/L (ref 10–40)
AMORPHOUS: ABNORMAL /HPF
ANION GAP SERPL CALCULATED.3IONS-SCNC: 10 MMOL/L (ref 3–16)
ANION GAP SERPL CALCULATED.3IONS-SCNC: 11 MMOL/L (ref 3–16)
ANION GAP SERPL CALCULATED.3IONS-SCNC: 11 MMOL/L (ref 3–16)
ANION GAP SERPL CALCULATED.3IONS-SCNC: 12 MMOL/L (ref 3–16)
ANION GAP SERPL CALCULATED.3IONS-SCNC: 14 MMOL/L (ref 3–16)
ANION GAP SERPL CALCULATED.3IONS-SCNC: 15 MMOL/L (ref 3–16)
ANION GAP SERPL CALCULATED.3IONS-SCNC: 17 MMOL/L (ref 3–16)
ANION GAP SERPL CALCULATED.3IONS-SCNC: 7 MMOL/L (ref 3–16)
ANION GAP SERPL CALCULATED.3IONS-SCNC: 8 MMOL/L (ref 3–16)
ANION GAP SERPL CALCULATED.3IONS-SCNC: 9 MMOL/L (ref 3–16)
APTT: 23.8 SEC (ref 24.2–36.2)
AST SERPL-CCNC: 33 U/L (ref 15–37)
AST SERPL-CCNC: 40 U/L (ref 15–37)
BACTERIA: ABNORMAL /HPF
BACTERIA: ABNORMAL /HPF
BASE EXCESS ARTERIAL: -2 (ref -3–3)
BASE EXCESS ARTERIAL: -3.3 MMOL/L (ref -3–3)
BASE EXCESS ARTERIAL: -4.8 MMOL/L (ref -3–3)
BASE EXCESS VENOUS: -3 (ref -3–3)
BASOPHILS ABSOLUTE: 0 K/UL (ref 0–0.2)
BASOPHILS ABSOLUTE: 0 K/UL (ref 0–0.2)
BASOPHILS ABSOLUTE: 0.1 K/UL (ref 0–0.2)
BASOPHILS ABSOLUTE: 0.2 K/UL (ref 0–0.2)
BASOPHILS RELATIVE PERCENT: 0.3 %
BASOPHILS RELATIVE PERCENT: 0.4 %
BASOPHILS RELATIVE PERCENT: 0.6 %
BASOPHILS RELATIVE PERCENT: 0.6 %
BASOPHILS RELATIVE PERCENT: 0.7 %
BASOPHILS RELATIVE PERCENT: 0.7 %
BASOPHILS RELATIVE PERCENT: 0.8 %
BASOPHILS RELATIVE PERCENT: 1 %
BASOPHILS RELATIVE PERCENT: 1 %
BASOPHILS RELATIVE PERCENT: 1.1 %
BASOPHILS RELATIVE PERCENT: 1.3 %
BASOPHILS RELATIVE PERCENT: 2 %
BASOPHILS RELATIVE PERCENT: 2 %
BASOPHILS RELATIVE PERCENT: 2.1 %
BASOPHILS RELATIVE PERCENT: 2.2 %
BASOPHILS RELATIVE PERCENT: 2.2 %
BILIRUB SERPL-MCNC: 0.4 MG/DL (ref 0–1)
BILIRUB SERPL-MCNC: 1.2 MG/DL (ref 0–1)
BILIRUBIN URINE: NEGATIVE
BLOOD CULTURE, ROUTINE: NORMAL
BLOOD CULTURE, ROUTINE: NORMAL
BLOOD, URINE: ABNORMAL
BLOOD, URINE: ABNORMAL
BLOOD, URINE: NEGATIVE
BUN BLDV-MCNC: 10 MG/DL (ref 7–20)
BUN BLDV-MCNC: 11 MG/DL (ref 7–20)
BUN BLDV-MCNC: 12 MG/DL (ref 7–20)
BUN BLDV-MCNC: 12 MG/DL (ref 7–20)
BUN BLDV-MCNC: 13 MG/DL (ref 7–20)
BUN BLDV-MCNC: 13 MG/DL (ref 7–20)
BUN BLDV-MCNC: 15 MG/DL (ref 7–20)
BUN BLDV-MCNC: 17 MG/DL (ref 7–20)
BUN BLDV-MCNC: 19 MG/DL (ref 7–20)
BUN BLDV-MCNC: 19 MG/DL (ref 7–20)
BUN BLDV-MCNC: 21 MG/DL (ref 7–20)
BUN BLDV-MCNC: 26 MG/DL (ref 7–20)
BUN BLDV-MCNC: 31 MG/DL (ref 7–20)
BUN BLDV-MCNC: 36 MG/DL (ref 7–20)
BUN BLDV-MCNC: 37 MG/DL (ref 7–20)
BUN BLDV-MCNC: 37 MG/DL (ref 7–20)
BUN BLDV-MCNC: 39 MG/DL (ref 7–20)
BUN BLDV-MCNC: 40 MG/DL (ref 7–20)
BUN BLDV-MCNC: 41 MG/DL (ref 7–20)
BUN BLDV-MCNC: 41 MG/DL (ref 7–20)
BUN BLDV-MCNC: 45 MG/DL (ref 7–20)
BUN BLDV-MCNC: 46 MG/DL (ref 7–20)
BUN BLDV-MCNC: 48 MG/DL (ref 7–20)
BUN BLDV-MCNC: 49 MG/DL (ref 7–20)
BUN BLDV-MCNC: 50 MG/DL (ref 7–20)
BUN BLDV-MCNC: 53 MG/DL (ref 7–20)
BUN BLDV-MCNC: 61 MG/DL (ref 7–20)
BUN BLDV-MCNC: 62 MG/DL (ref 7–20)
CALCIUM IONIZED: 1.16 MMOL/L (ref 1.12–1.32)
CALCIUM IONIZED: 1.18 MMOL/L (ref 1.12–1.32)
CALCIUM SERPL-MCNC: 10 MG/DL (ref 8.3–10.6)
CALCIUM SERPL-MCNC: 7.6 MG/DL (ref 8.3–10.6)
CALCIUM SERPL-MCNC: 7.9 MG/DL (ref 8.3–10.6)
CALCIUM SERPL-MCNC: 8 MG/DL (ref 8.3–10.6)
CALCIUM SERPL-MCNC: 8 MG/DL (ref 8.3–10.6)
CALCIUM SERPL-MCNC: 8.1 MG/DL (ref 8.3–10.6)
CALCIUM SERPL-MCNC: 8.2 MG/DL (ref 8.3–10.6)
CALCIUM SERPL-MCNC: 8.2 MG/DL (ref 8.3–10.6)
CALCIUM SERPL-MCNC: 8.3 MG/DL (ref 8.3–10.6)
CALCIUM SERPL-MCNC: 8.4 MG/DL (ref 8.3–10.6)
CALCIUM SERPL-MCNC: 8.4 MG/DL (ref 8.3–10.6)
CALCIUM SERPL-MCNC: 8.5 MG/DL (ref 8.3–10.6)
CALCIUM SERPL-MCNC: 8.6 MG/DL (ref 8.3–10.6)
CALCIUM SERPL-MCNC: 8.8 MG/DL (ref 8.3–10.6)
CALCIUM SERPL-MCNC: 8.9 MG/DL (ref 8.3–10.6)
CALCIUM SERPL-MCNC: 8.9 MG/DL (ref 8.3–10.6)
CALCIUM SERPL-MCNC: 9.2 MG/DL (ref 8.3–10.6)
CALCIUM SERPL-MCNC: 9.4 MG/DL (ref 8.3–10.6)
CALCIUM SERPL-MCNC: 9.5 MG/DL (ref 8.3–10.6)
CALCIUM SERPL-MCNC: 9.5 MG/DL (ref 8.3–10.6)
CARBOXYHEMOGLOBIN ARTERIAL: 1.2 % (ref 0–1.5)
CARBOXYHEMOGLOBIN ARTERIAL: 1.3 % (ref 0–1.5)
CHLORIDE BLD-SCNC: 102 MMOL/L (ref 99–110)
CHLORIDE BLD-SCNC: 107 MMOL/L (ref 99–110)
CHLORIDE BLD-SCNC: 107 MMOL/L (ref 99–110)
CHLORIDE BLD-SCNC: 108 MMOL/L (ref 99–110)
CHLORIDE BLD-SCNC: 108 MMOL/L (ref 99–110)
CHLORIDE BLD-SCNC: 109 MMOL/L (ref 99–110)
CHLORIDE BLD-SCNC: 109 MMOL/L (ref 99–110)
CHLORIDE BLD-SCNC: 110 MMOL/L (ref 99–110)
CHLORIDE BLD-SCNC: 111 MMOL/L (ref 99–110)
CHLORIDE BLD-SCNC: 111 MMOL/L (ref 99–110)
CHLORIDE BLD-SCNC: 112 MMOL/L (ref 99–110)
CHLORIDE BLD-SCNC: 113 MMOL/L (ref 99–110)
CHLORIDE BLD-SCNC: 114 MMOL/L (ref 99–110)
CHLORIDE BLD-SCNC: 114 MMOL/L (ref 99–110)
CHLORIDE BLD-SCNC: 116 MMOL/L (ref 99–110)
CHLORIDE BLD-SCNC: 117 MMOL/L (ref 99–110)
CHLORIDE BLD-SCNC: 119 MMOL/L (ref 99–110)
CHLORIDE BLD-SCNC: 119 MMOL/L (ref 99–110)
CHLORIDE BLD-SCNC: 120 MMOL/L (ref 99–110)
CHLORIDE BLD-SCNC: 122 MMOL/L (ref 99–110)
CHLORIDE BLD-SCNC: 122 MMOL/L (ref 99–110)
CHLORIDE BLD-SCNC: 123 MMOL/L (ref 99–110)
CHLORIDE BLD-SCNC: 125 MMOL/L (ref 99–110)
CHLORIDE BLD-SCNC: 99 MMOL/L (ref 99–110)
CLARITY: ABNORMAL
CO2: 19 MMOL/L (ref 21–32)
CO2: 20 MMOL/L (ref 21–32)
CO2: 21 MMOL/L (ref 21–32)
CO2: 22 MMOL/L (ref 21–32)
CO2: 23 MMOL/L (ref 21–32)
CO2: 24 MMOL/L (ref 21–32)
CO2: 25 MMOL/L (ref 21–32)
COLOR: ABNORMAL
COLOR: ABNORMAL
COLOR: YELLOW
COMMENT UA: ABNORMAL
CREAT SERPL-MCNC: 1.6 MG/DL (ref 0.8–1.3)
CREAT SERPL-MCNC: 1.7 MG/DL (ref 0.8–1.3)
CREAT SERPL-MCNC: 1.8 MG/DL (ref 0.8–1.3)
CREAT SERPL-MCNC: 1.9 MG/DL (ref 0.8–1.3)
CREAT SERPL-MCNC: 1.9 MG/DL (ref 0.8–1.3)
CREAT SERPL-MCNC: 2 MG/DL (ref 0.8–1.3)
CREAT SERPL-MCNC: 2.2 MG/DL (ref 0.8–1.3)
CREAT SERPL-MCNC: 2.3 MG/DL (ref 0.8–1.3)
CREAT SERPL-MCNC: 2.3 MG/DL (ref 0.8–1.3)
CREAT SERPL-MCNC: 2.4 MG/DL (ref 0.8–1.3)
CREAT SERPL-MCNC: 2.5 MG/DL (ref 0.8–1.3)
CREAT SERPL-MCNC: 2.7 MG/DL (ref 0.8–1.3)
CREAT SERPL-MCNC: 3.2 MG/DL (ref 0.8–1.3)
CREAT SERPL-MCNC: 3.8 MG/DL (ref 0.8–1.3)
CREAT SERPL-MCNC: 4.8 MG/DL (ref 0.8–1.3)
CREAT SERPL-MCNC: 5.8 MG/DL (ref 0.8–1.3)
CREATININE URINE: 164 MG/DL (ref 39–259)
CREATININE URINE: 60.5 MG/DL (ref 39–259)
CULTURE, BLOOD 2: NORMAL
CULTURE, BLOOD 2: NORMAL
CULTURE, RESPIRATORY: ABNORMAL
CULTURE, RESPIRATORY: ABNORMAL
EKG ATRIAL RATE: 103 BPM
EKG ATRIAL RATE: 110 BPM
EKG ATRIAL RATE: 86 BPM
EKG DIAGNOSIS: NORMAL
EKG P AXIS: 41 DEGREES
EKG P AXIS: 53 DEGREES
EKG P AXIS: 71 DEGREES
EKG P-R INTERVAL: 136 MS
EKG P-R INTERVAL: 138 MS
EKG P-R INTERVAL: 168 MS
EKG Q-T INTERVAL: 324 MS
EKG Q-T INTERVAL: 330 MS
EKG Q-T INTERVAL: 368 MS
EKG QRS DURATION: 76 MS
EKG QRS DURATION: 78 MS
EKG QRS DURATION: 82 MS
EKG QTC CALCULATION (BAZETT): 424 MS
EKG QTC CALCULATION (BAZETT): 440 MS
EKG QTC CALCULATION (BAZETT): 446 MS
EKG R AXIS: 25 DEGREES
EKG R AXIS: 27 DEGREES
EKG R AXIS: 32 DEGREES
EKG T AXIS: 23 DEGREES
EKG T AXIS: 26 DEGREES
EKG T AXIS: 63 DEGREES
EKG VENTRICULAR RATE: 103 BPM
EKG VENTRICULAR RATE: 110 BPM
EKG VENTRICULAR RATE: 86 BPM
EOSINOPHILS ABSOLUTE: 0 K/UL (ref 0–0.6)
EOSINOPHILS ABSOLUTE: 0 K/UL (ref 0–0.6)
EOSINOPHILS ABSOLUTE: 0.2 K/UL (ref 0–0.6)
EOSINOPHILS ABSOLUTE: 0.3 K/UL (ref 0–0.6)
EOSINOPHILS RELATIVE PERCENT: 0 %
EOSINOPHILS RELATIVE PERCENT: 0.1 %
EOSINOPHILS RELATIVE PERCENT: 1.9 %
EOSINOPHILS RELATIVE PERCENT: 2 %
EOSINOPHILS RELATIVE PERCENT: 2.1 %
EOSINOPHILS RELATIVE PERCENT: 2.3 %
EOSINOPHILS RELATIVE PERCENT: 2.9 %
EOSINOPHILS RELATIVE PERCENT: 2.9 %
EOSINOPHILS RELATIVE PERCENT: 3 %
EOSINOPHILS RELATIVE PERCENT: 3.2 %
EOSINOPHILS RELATIVE PERCENT: 3.2 %
EOSINOPHILS RELATIVE PERCENT: 3.6 %
EOSINOPHILS RELATIVE PERCENT: 3.7 %
EOSINOPHILS RELATIVE PERCENT: 4 %
EOSINOPHILS RELATIVE PERCENT: 4 %
EOSINOPHILS RELATIVE PERCENT: 4.2 %
EOSINOPHILS RELATIVE PERCENT: 4.5 %
EOSINOPHILS RELATIVE PERCENT: 5.1 %
EPITHELIAL CELLS, UA: 1 /HPF (ref 0–5)
EPITHELIAL CELLS, UA: 17 /HPF (ref 0–5)
EPITHELIAL CELLS, UA: 5 /HPF (ref 0–5)
GFR AFRICAN AMERICAN: 11
GFR AFRICAN AMERICAN: 14
GFR AFRICAN AMERICAN: 19
GFR AFRICAN AMERICAN: 23
GFR AFRICAN AMERICAN: 27
GFR AFRICAN AMERICAN: 30
GFR AFRICAN AMERICAN: 31
GFR AFRICAN AMERICAN: 33
GFR AFRICAN AMERICAN: 33
GFR AFRICAN AMERICAN: 35
GFR AFRICAN AMERICAN: 39
GFR AFRICAN AMERICAN: 41
GFR AFRICAN AMERICAN: 41
GFR AFRICAN AMERICAN: 44
GFR AFRICAN AMERICAN: 47
GFR AFRICAN AMERICAN: 50
GFR NON-AFRICAN AMERICAN: 12
GFR NON-AFRICAN AMERICAN: 15
GFR NON-AFRICAN AMERICAN: 19
GFR NON-AFRICAN AMERICAN: 23
GFR NON-AFRICAN AMERICAN: 25
GFR NON-AFRICAN AMERICAN: 26
GFR NON-AFRICAN AMERICAN: 27
GFR NON-AFRICAN AMERICAN: 27
GFR NON-AFRICAN AMERICAN: 29
GFR NON-AFRICAN AMERICAN: 32
GFR NON-AFRICAN AMERICAN: 34
GFR NON-AFRICAN AMERICAN: 34
GFR NON-AFRICAN AMERICAN: 36
GFR NON-AFRICAN AMERICAN: 39
GFR NON-AFRICAN AMERICAN: 41
GFR NON-AFRICAN AMERICAN: 9
GLOBULIN: 3.7 G/DL
GLOBULIN: 4.6 G/DL
GLUCOSE BLD-MCNC: 100 MG/DL (ref 70–99)
GLUCOSE BLD-MCNC: 100 MG/DL (ref 70–99)
GLUCOSE BLD-MCNC: 101 MG/DL (ref 70–99)
GLUCOSE BLD-MCNC: 103 MG/DL (ref 70–99)
GLUCOSE BLD-MCNC: 104 MG/DL (ref 70–99)
GLUCOSE BLD-MCNC: 105 MG/DL (ref 70–99)
GLUCOSE BLD-MCNC: 106 MG/DL (ref 70–99)
GLUCOSE BLD-MCNC: 107 MG/DL (ref 70–99)
GLUCOSE BLD-MCNC: 107 MG/DL (ref 70–99)
GLUCOSE BLD-MCNC: 108 MG/DL (ref 70–99)
GLUCOSE BLD-MCNC: 111 MG/DL (ref 70–99)
GLUCOSE BLD-MCNC: 111 MG/DL (ref 70–99)
GLUCOSE BLD-MCNC: 112 MG/DL (ref 70–99)
GLUCOSE BLD-MCNC: 114 MG/DL (ref 70–99)
GLUCOSE BLD-MCNC: 115 MG/DL (ref 70–99)
GLUCOSE BLD-MCNC: 117 MG/DL (ref 70–99)
GLUCOSE BLD-MCNC: 118 MG/DL (ref 70–99)
GLUCOSE BLD-MCNC: 120 MG/DL (ref 70–99)
GLUCOSE BLD-MCNC: 122 MG/DL (ref 70–99)
GLUCOSE BLD-MCNC: 123 MG/DL (ref 70–99)
GLUCOSE BLD-MCNC: 124 MG/DL (ref 70–99)
GLUCOSE BLD-MCNC: 124 MG/DL (ref 70–99)
GLUCOSE BLD-MCNC: 125 MG/DL (ref 70–99)
GLUCOSE BLD-MCNC: 127 MG/DL (ref 70–99)
GLUCOSE BLD-MCNC: 129 MG/DL (ref 70–99)
GLUCOSE BLD-MCNC: 130 MG/DL (ref 70–99)
GLUCOSE BLD-MCNC: 130 MG/DL (ref 70–99)
GLUCOSE BLD-MCNC: 132 MG/DL (ref 70–99)
GLUCOSE BLD-MCNC: 133 MG/DL (ref 70–99)
GLUCOSE BLD-MCNC: 134 MG/DL (ref 70–99)
GLUCOSE BLD-MCNC: 135 MG/DL (ref 70–99)
GLUCOSE BLD-MCNC: 135 MG/DL (ref 70–99)
GLUCOSE BLD-MCNC: 136 MG/DL (ref 70–99)
GLUCOSE BLD-MCNC: 136 MG/DL (ref 70–99)
GLUCOSE BLD-MCNC: 137 MG/DL (ref 70–99)
GLUCOSE BLD-MCNC: 138 MG/DL (ref 70–99)
GLUCOSE BLD-MCNC: 138 MG/DL (ref 70–99)
GLUCOSE BLD-MCNC: 140 MG/DL (ref 70–99)
GLUCOSE BLD-MCNC: 140 MG/DL (ref 70–99)
GLUCOSE BLD-MCNC: 141 MG/DL (ref 70–99)
GLUCOSE BLD-MCNC: 142 MG/DL (ref 70–99)
GLUCOSE BLD-MCNC: 144 MG/DL (ref 70–99)
GLUCOSE BLD-MCNC: 150 MG/DL (ref 70–99)
GLUCOSE BLD-MCNC: 151 MG/DL (ref 70–99)
GLUCOSE BLD-MCNC: 153 MG/DL (ref 70–99)
GLUCOSE BLD-MCNC: 154 MG/DL (ref 70–99)
GLUCOSE BLD-MCNC: 155 MG/DL (ref 70–99)
GLUCOSE BLD-MCNC: 155 MG/DL (ref 70–99)
GLUCOSE BLD-MCNC: 156 MG/DL (ref 70–99)
GLUCOSE BLD-MCNC: 159 MG/DL (ref 70–99)
GLUCOSE BLD-MCNC: 160 MG/DL (ref 70–99)
GLUCOSE BLD-MCNC: 163 MG/DL (ref 70–99)
GLUCOSE BLD-MCNC: 166 MG/DL (ref 70–99)
GLUCOSE BLD-MCNC: 174 MG/DL (ref 70–99)
GLUCOSE BLD-MCNC: 175 MG/DL (ref 70–99)
GLUCOSE BLD-MCNC: 176 MG/DL (ref 70–99)
GLUCOSE BLD-MCNC: 176 MG/DL (ref 70–99)
GLUCOSE BLD-MCNC: 183 MG/DL (ref 70–99)
GLUCOSE BLD-MCNC: 185 MG/DL (ref 70–99)
GLUCOSE BLD-MCNC: 191 MG/DL (ref 70–99)
GLUCOSE BLD-MCNC: 210 MG/DL (ref 70–99)
GLUCOSE BLD-MCNC: 216 MG/DL (ref 70–99)
GLUCOSE BLD-MCNC: 327 MG/DL (ref 70–99)
GLUCOSE BLD-MCNC: 49 MG/DL (ref 70–99)
GLUCOSE BLD-MCNC: 76 MG/DL (ref 70–99)
GLUCOSE BLD-MCNC: 81 MG/DL (ref 70–99)
GLUCOSE BLD-MCNC: 85 MG/DL (ref 70–99)
GLUCOSE BLD-MCNC: 89 MG/DL (ref 70–99)
GLUCOSE BLD-MCNC: 89 MG/DL (ref 70–99)
GLUCOSE BLD-MCNC: 93 MG/DL (ref 70–99)
GLUCOSE BLD-MCNC: 94 MG/DL (ref 70–99)
GLUCOSE BLD-MCNC: 96 MG/DL (ref 70–99)
GLUCOSE BLD-MCNC: 98 MG/DL (ref 70–99)
GLUCOSE URINE: NEGATIVE MG/DL
GRAM STAIN RESULT: ABNORMAL
HCO3 ARTERIAL: 19.9 MMOL/L (ref 21–29)
HCO3 ARTERIAL: 20.8 MMOL/L (ref 21–29)
HCO3 ARTERIAL: 21.7 MMOL/L (ref 21–29)
HCO3 VENOUS: 21.3 MMOL/L (ref 23–29)
HCT VFR BLD CALC: 24.3 % (ref 40.5–52.5)
HCT VFR BLD CALC: 25.5 % (ref 40.5–52.5)
HCT VFR BLD CALC: 25.6 % (ref 40.5–52.5)
HCT VFR BLD CALC: 25.7 % (ref 40.5–52.5)
HCT VFR BLD CALC: 26 % (ref 40.5–52.5)
HCT VFR BLD CALC: 26.1 % (ref 40.5–52.5)
HCT VFR BLD CALC: 26.5 % (ref 40.5–52.5)
HCT VFR BLD CALC: 26.6 % (ref 40.5–52.5)
HCT VFR BLD CALC: 26.9 % (ref 40.5–52.5)
HCT VFR BLD CALC: 27.1 % (ref 40.5–52.5)
HCT VFR BLD CALC: 27.2 % (ref 40.5–52.5)
HCT VFR BLD CALC: 27.5 % (ref 40.5–52.5)
HCT VFR BLD CALC: 27.6 % (ref 40.5–52.5)
HCT VFR BLD CALC: 27.7 % (ref 40.5–52.5)
HCT VFR BLD CALC: 28 % (ref 40.5–52.5)
HCT VFR BLD CALC: 28.2 % (ref 40.5–52.5)
HCT VFR BLD CALC: 28.5 % (ref 40.5–52.5)
HCT VFR BLD CALC: 30.3 % (ref 40.5–52.5)
HCT VFR BLD CALC: 30.7 % (ref 40.5–52.5)
HCT VFR BLD CALC: 31 % (ref 40.5–52.5)
HCT VFR BLD CALC: 31.7 % (ref 40.5–52.5)
HCT VFR BLD CALC: 33.4 % (ref 40.5–52.5)
HCT VFR BLD CALC: 33.9 % (ref 40.5–52.5)
HCT VFR BLD CALC: 41.1 % (ref 40.5–52.5)
HEMOGLOBIN, ART, EXTENDED: 8.6 G/DL (ref 13.5–17.5)
HEMOGLOBIN, ART, EXTENDED: 9.5 G/DL (ref 13.5–17.5)
HEMOGLOBIN: 10.2 G/DL (ref 13.5–17.5)
HEMOGLOBIN: 10.3 G/DL (ref 13.5–17.5)
HEMOGLOBIN: 10.5 G/DL (ref 13.5–17.5)
HEMOGLOBIN: 10.8 G/DL (ref 13.5–17.5)
HEMOGLOBIN: 11.2 G/DL (ref 13.5–17.5)
HEMOGLOBIN: 13.7 G/DL (ref 13.5–17.5)
HEMOGLOBIN: 8.2 G/DL (ref 13.5–17.5)
HEMOGLOBIN: 8.4 G/DL (ref 13.5–17.5)
HEMOGLOBIN: 8.4 G/DL (ref 13.5–17.5)
HEMOGLOBIN: 8.5 G/DL (ref 13.5–17.5)
HEMOGLOBIN: 8.6 G/DL (ref 13.5–17.5)
HEMOGLOBIN: 9 G/DL (ref 13.5–17.5)
HEMOGLOBIN: 9 G/DL (ref 13.5–17.5)
HEMOGLOBIN: 9.1 G/DL (ref 13.5–17.5)
HEMOGLOBIN: 9.1 G/DL (ref 13.5–17.5)
HEMOGLOBIN: 9.2 G/DL (ref 13.5–17.5)
HEMOGLOBIN: 9.2 GM/DL (ref 13.5–17.5)
HEMOGLOBIN: 9.3 G/DL (ref 13.5–17.5)
HEMOGLOBIN: 9.3 G/DL (ref 13.5–17.5)
HEMOGLOBIN: 9.5 GM/DL (ref 13.5–17.5)
HEMOGLOBIN: 9.7 G/DL (ref 13.5–17.5)
HYALINE CASTS: 12 /LPF (ref 0–8)
HYALINE CASTS: 6 /LPF (ref 0–8)
INR BLD: 1.19 (ref 0.86–1.14)
KETONES, URINE: NEGATIVE MG/DL
LACTATE: 1.42 MMOL/L (ref 0.4–2)
LACTATE: 1.56 MMOL/L (ref 0.4–2)
LACTIC ACID, SEPSIS: 1.8 MMOL/L (ref 0.4–1.9)
LACTIC ACID, SEPSIS: 2.6 MMOL/L (ref 0.4–1.9)
LACTIC ACID, SEPSIS: 5.3 MMOL/L (ref 0.4–1.9)
LACTIC ACID: 5.3 MMOL/L (ref 0.4–2)
LEUKOCYTE ESTERASE, URINE: ABNORMAL
LEUKOCYTE ESTERASE, URINE: ABNORMAL
LEUKOCYTE ESTERASE, URINE: NEGATIVE
LIPASE: 25 U/L (ref 13–60)
LYMPHOCYTES ABSOLUTE: 0.7 K/UL (ref 1–5.1)
LYMPHOCYTES ABSOLUTE: 0.7 K/UL (ref 1–5.1)
LYMPHOCYTES ABSOLUTE: 0.8 K/UL (ref 1–5.1)
LYMPHOCYTES ABSOLUTE: 1 K/UL (ref 1–5.1)
LYMPHOCYTES ABSOLUTE: 1.1 K/UL (ref 1–5.1)
LYMPHOCYTES ABSOLUTE: 1.2 K/UL (ref 1–5.1)
LYMPHOCYTES ABSOLUTE: 1.2 K/UL (ref 1–5.1)
LYMPHOCYTES ABSOLUTE: 1.3 K/UL (ref 1–5.1)
LYMPHOCYTES ABSOLUTE: 1.3 K/UL (ref 1–5.1)
LYMPHOCYTES ABSOLUTE: 1.4 K/UL (ref 1–5.1)
LYMPHOCYTES ABSOLUTE: 1.5 K/UL (ref 1–5.1)
LYMPHOCYTES ABSOLUTE: 1.8 K/UL (ref 1–5.1)
LYMPHOCYTES ABSOLUTE: 1.8 K/UL (ref 1–5.1)
LYMPHOCYTES RELATIVE PERCENT: 11.3 %
LYMPHOCYTES RELATIVE PERCENT: 12.6 %
LYMPHOCYTES RELATIVE PERCENT: 12.7 %
LYMPHOCYTES RELATIVE PERCENT: 14.8 %
LYMPHOCYTES RELATIVE PERCENT: 16 %
LYMPHOCYTES RELATIVE PERCENT: 16 %
LYMPHOCYTES RELATIVE PERCENT: 16.6 %
LYMPHOCYTES RELATIVE PERCENT: 16.9 %
LYMPHOCYTES RELATIVE PERCENT: 17.3 %
LYMPHOCYTES RELATIVE PERCENT: 18.3 %
LYMPHOCYTES RELATIVE PERCENT: 18.8 %
LYMPHOCYTES RELATIVE PERCENT: 18.9 %
LYMPHOCYTES RELATIVE PERCENT: 20 %
LYMPHOCYTES RELATIVE PERCENT: 21.2 %
LYMPHOCYTES RELATIVE PERCENT: 23.3 %
LYMPHOCYTES RELATIVE PERCENT: 4.6 %
LYMPHOCYTES RELATIVE PERCENT: 7.1 %
LYMPHOCYTES RELATIVE PERCENT: 8.4 %
MAGNESIUM: 1.8 MG/DL (ref 1.8–2.4)
MAGNESIUM: 1.9 MG/DL (ref 1.8–2.4)
MAGNESIUM: 1.9 MG/DL (ref 1.8–2.4)
MAGNESIUM: 2 MG/DL (ref 1.8–2.4)
MAGNESIUM: 2 MG/DL (ref 1.8–2.4)
MAGNESIUM: 2.2 MG/DL (ref 1.8–2.4)
MAGNESIUM: 2.2 MG/DL (ref 1.8–2.4)
MAGNESIUM: 2.3 MG/DL (ref 1.8–2.4)
MAGNESIUM: 2.3 MG/DL (ref 1.8–2.4)
MAGNESIUM: 2.4 MG/DL (ref 1.8–2.4)
MAGNESIUM: 2.5 MG/DL (ref 1.8–2.4)
MAGNESIUM: 2.6 MG/DL (ref 1.8–2.4)
MAGNESIUM: 2.7 MG/DL (ref 1.8–2.4)
MAGNESIUM: 2.9 MG/DL (ref 1.8–2.4)
MCH RBC QN AUTO: 30.4 PG (ref 26–34)
MCH RBC QN AUTO: 30.5 PG (ref 26–34)
MCH RBC QN AUTO: 30.5 PG (ref 26–34)
MCH RBC QN AUTO: 30.6 PG (ref 26–34)
MCH RBC QN AUTO: 30.7 PG (ref 26–34)
MCH RBC QN AUTO: 30.8 PG (ref 26–34)
MCH RBC QN AUTO: 30.9 PG (ref 26–34)
MCH RBC QN AUTO: 31 PG (ref 26–34)
MCH RBC QN AUTO: 31 PG (ref 26–34)
MCH RBC QN AUTO: 31.1 PG (ref 26–34)
MCH RBC QN AUTO: 31.1 PG (ref 26–34)
MCH RBC QN AUTO: 31.2 PG (ref 26–34)
MCH RBC QN AUTO: 31.3 PG (ref 26–34)
MCH RBC QN AUTO: 31.4 PG (ref 26–34)
MCH RBC QN AUTO: 31.5 PG (ref 26–34)
MCH RBC QN AUTO: 31.5 PG (ref 26–34)
MCH RBC QN AUTO: 31.6 PG (ref 26–34)
MCHC RBC AUTO-ENTMCNC: 31.9 G/DL (ref 31–36)
MCHC RBC AUTO-ENTMCNC: 31.9 G/DL (ref 31–36)
MCHC RBC AUTO-ENTMCNC: 32.2 G/DL (ref 31–36)
MCHC RBC AUTO-ENTMCNC: 32.3 G/DL (ref 31–36)
MCHC RBC AUTO-ENTMCNC: 32.4 G/DL (ref 31–36)
MCHC RBC AUTO-ENTMCNC: 32.6 G/DL (ref 31–36)
MCHC RBC AUTO-ENTMCNC: 32.9 G/DL (ref 31–36)
MCHC RBC AUTO-ENTMCNC: 33 G/DL (ref 31–36)
MCHC RBC AUTO-ENTMCNC: 33.2 G/DL (ref 31–36)
MCHC RBC AUTO-ENTMCNC: 33.3 G/DL (ref 31–36)
MCHC RBC AUTO-ENTMCNC: 33.4 G/DL (ref 31–36)
MCHC RBC AUTO-ENTMCNC: 33.7 G/DL (ref 31–36)
MCHC RBC AUTO-ENTMCNC: 33.7 G/DL (ref 31–36)
MCHC RBC AUTO-ENTMCNC: 33.8 G/DL (ref 31–36)
MCV RBC AUTO: 92.6 FL (ref 80–100)
MCV RBC AUTO: 92.8 FL (ref 80–100)
MCV RBC AUTO: 92.9 FL (ref 80–100)
MCV RBC AUTO: 93.1 FL (ref 80–100)
MCV RBC AUTO: 93.1 FL (ref 80–100)
MCV RBC AUTO: 93.3 FL (ref 80–100)
MCV RBC AUTO: 93.4 FL (ref 80–100)
MCV RBC AUTO: 93.4 FL (ref 80–100)
MCV RBC AUTO: 93.5 FL (ref 80–100)
MCV RBC AUTO: 93.5 FL (ref 80–100)
MCV RBC AUTO: 93.9 FL (ref 80–100)
MCV RBC AUTO: 94 FL (ref 80–100)
MCV RBC AUTO: 94 FL (ref 80–100)
MCV RBC AUTO: 94.2 FL (ref 80–100)
MCV RBC AUTO: 94.2 FL (ref 80–100)
MCV RBC AUTO: 94.3 FL (ref 80–100)
MCV RBC AUTO: 94.6 FL (ref 80–100)
MCV RBC AUTO: 94.7 FL (ref 80–100)
MCV RBC AUTO: 94.9 FL (ref 80–100)
MCV RBC AUTO: 95.4 FL (ref 80–100)
MCV RBC AUTO: 95.5 FL (ref 80–100)
MCV RBC AUTO: 96.5 FL (ref 80–100)
METHEMOGLOBIN ARTERIAL: 0.4 %
METHEMOGLOBIN ARTERIAL: 0.5 %
MICROSCOPIC EXAMINATION: YES
MONOCYTES ABSOLUTE: 0.6 K/UL (ref 0–1.3)
MONOCYTES ABSOLUTE: 0.7 K/UL (ref 0–1.3)
MONOCYTES ABSOLUTE: 0.8 K/UL (ref 0–1.3)
MONOCYTES ABSOLUTE: 0.9 K/UL (ref 0–1.3)
MONOCYTES ABSOLUTE: 0.9 K/UL (ref 0–1.3)
MONOCYTES ABSOLUTE: 1.2 K/UL (ref 0–1.3)
MONOCYTES RELATIVE PERCENT: 10.1 %
MONOCYTES RELATIVE PERCENT: 10.6 %
MONOCYTES RELATIVE PERCENT: 10.8 %
MONOCYTES RELATIVE PERCENT: 11 %
MONOCYTES RELATIVE PERCENT: 11.2 %
MONOCYTES RELATIVE PERCENT: 6.8 %
MONOCYTES RELATIVE PERCENT: 6.9 %
MONOCYTES RELATIVE PERCENT: 7 %
MONOCYTES RELATIVE PERCENT: 7.2 %
MONOCYTES RELATIVE PERCENT: 7.4 %
MONOCYTES RELATIVE PERCENT: 7.4 %
MONOCYTES RELATIVE PERCENT: 7.6 %
MONOCYTES RELATIVE PERCENT: 7.7 %
MONOCYTES RELATIVE PERCENT: 7.7 %
MONOCYTES RELATIVE PERCENT: 8.8 %
MONOCYTES RELATIVE PERCENT: 8.9 %
MONOCYTES RELATIVE PERCENT: 9.3 %
MONOCYTES RELATIVE PERCENT: 9.6 %
NEUTROPHILS ABSOLUTE: 10 K/UL (ref 1.7–7.7)
NEUTROPHILS ABSOLUTE: 13.5 K/UL (ref 1.7–7.7)
NEUTROPHILS ABSOLUTE: 3.8 K/UL (ref 1.7–7.7)
NEUTROPHILS ABSOLUTE: 4.4 K/UL (ref 1.7–7.7)
NEUTROPHILS ABSOLUTE: 4.5 K/UL (ref 1.7–7.7)
NEUTROPHILS ABSOLUTE: 4.6 K/UL (ref 1.7–7.7)
NEUTROPHILS ABSOLUTE: 4.7 K/UL (ref 1.7–7.7)
NEUTROPHILS ABSOLUTE: 5.1 K/UL (ref 1.7–7.7)
NEUTROPHILS ABSOLUTE: 5.6 K/UL (ref 1.7–7.7)
NEUTROPHILS ABSOLUTE: 5.9 K/UL (ref 1.7–7.7)
NEUTROPHILS ABSOLUTE: 6 K/UL (ref 1.7–7.7)
NEUTROPHILS ABSOLUTE: 6 K/UL (ref 1.7–7.7)
NEUTROPHILS ABSOLUTE: 6.2 K/UL (ref 1.7–7.7)
NEUTROPHILS ABSOLUTE: 6.7 K/UL (ref 1.7–7.7)
NEUTROPHILS ABSOLUTE: 7.2 K/UL (ref 1.7–7.7)
NEUTROPHILS ABSOLUTE: 7.4 K/UL (ref 1.7–7.7)
NEUTROPHILS ABSOLUTE: 7.5 K/UL (ref 1.7–7.7)
NEUTROPHILS ABSOLUTE: 8.3 K/UL (ref 1.7–7.7)
NEUTROPHILS RELATIVE PERCENT: 58.7 %
NEUTROPHILS RELATIVE PERCENT: 62.2 %
NEUTROPHILS RELATIVE PERCENT: 62.6 %
NEUTROPHILS RELATIVE PERCENT: 65.2 %
NEUTROPHILS RELATIVE PERCENT: 67.3 %
NEUTROPHILS RELATIVE PERCENT: 68.2 %
NEUTROPHILS RELATIVE PERCENT: 69.6 %
NEUTROPHILS RELATIVE PERCENT: 69.9 %
NEUTROPHILS RELATIVE PERCENT: 70.9 %
NEUTROPHILS RELATIVE PERCENT: 71.2 %
NEUTROPHILS RELATIVE PERCENT: 72.1 %
NEUTROPHILS RELATIVE PERCENT: 72.1 %
NEUTROPHILS RELATIVE PERCENT: 76.8 %
NEUTROPHILS RELATIVE PERCENT: 77.1 %
NEUTROPHILS RELATIVE PERCENT: 80.5 %
NEUTROPHILS RELATIVE PERCENT: 81.7 %
NEUTROPHILS RELATIVE PERCENT: 82.3 %
NEUTROPHILS RELATIVE PERCENT: 87.5 %
NITRITE, URINE: NEGATIVE
O2 CONTENT ARTERIAL: 12 ML/DL
O2 CONTENT ARTERIAL: 14 ML/DL
O2 SAT, ARTERIAL: 100 %
O2 SAT, ARTERIAL: 100 % (ref 93–100)
O2 SAT, ARTERIAL: 99.6 %
O2 SAT, VEN: 79 %
O2 THERAPY: ABNORMAL
O2 THERAPY: ABNORMAL
ORGANISM: ABNORMAL
ORGANISM: ABNORMAL
PCO2 ARTERIAL: 30.5 MM HG (ref 35–45)
PCO2 ARTERIAL: 32.8 MMHG (ref 35–45)
PCO2 ARTERIAL: 34.6 MMHG (ref 35–45)
PCO2, VEN: 34 MM HG (ref 40–50)
PDW BLD-RTO: 13.8 % (ref 12.4–15.4)
PDW BLD-RTO: 14 % (ref 12.4–15.4)
PDW BLD-RTO: 14.1 % (ref 12.4–15.4)
PDW BLD-RTO: 14.4 % (ref 12.4–15.4)
PDW BLD-RTO: 14.5 % (ref 12.4–15.4)
PDW BLD-RTO: 14.5 % (ref 12.4–15.4)
PDW BLD-RTO: 14.6 % (ref 12.4–15.4)
PDW BLD-RTO: 14.7 % (ref 12.4–15.4)
PDW BLD-RTO: 14.9 % (ref 12.4–15.4)
PDW BLD-RTO: 14.9 % (ref 12.4–15.4)
PDW BLD-RTO: 15.1 % (ref 12.4–15.4)
PDW BLD-RTO: 15.3 % (ref 12.4–15.4)
PDW BLD-RTO: 15.4 % (ref 12.4–15.4)
PDW BLD-RTO: 15.5 % (ref 12.4–15.4)
PDW BLD-RTO: 15.5 % (ref 12.4–15.4)
PDW BLD-RTO: 15.6 % (ref 12.4–15.4)
PDW BLD-RTO: 15.7 % (ref 12.4–15.4)
PDW BLD-RTO: 15.8 % (ref 12.4–15.4)
PDW BLD-RTO: 15.8 % (ref 12.4–15.4)
PDW BLD-RTO: 15.9 % (ref 12.4–15.4)
PDW BLD-RTO: 15.9 % (ref 12.4–15.4)
PDW BLD-RTO: 16.1 % (ref 12.4–15.4)
PERFORMED ON: ABNORMAL
PERFORMED ON: NORMAL
PH ARTERIAL: 7.37 (ref 7.35–7.45)
PH ARTERIAL: 7.41 (ref 7.35–7.45)
PH ARTERIAL: 7.46 (ref 7.35–7.45)
PH UA: 6 (ref 5–8)
PH UA: 6 (ref 5–8)
PH UA: 6.5 (ref 5–8)
PH VENOUS: 7.41 (ref 7.35–7.45)
PHOSPHORUS: 2.8 MG/DL (ref 2.5–4.9)
PHOSPHORUS: 2.9 MG/DL (ref 2.5–4.9)
PHOSPHORUS: 3 MG/DL (ref 2.5–4.9)
PHOSPHORUS: 3.3 MG/DL (ref 2.5–4.9)
PHOSPHORUS: 3.4 MG/DL (ref 2.5–4.9)
PHOSPHORUS: 3.5 MG/DL (ref 2.5–4.9)
PHOSPHORUS: 3.6 MG/DL (ref 2.5–4.9)
PHOSPHORUS: 3.7 MG/DL (ref 2.5–4.9)
PHOSPHORUS: 3.8 MG/DL (ref 2.5–4.9)
PHOSPHORUS: 3.9 MG/DL (ref 2.5–4.9)
PHOSPHORUS: 4.2 MG/DL (ref 2.5–4.9)
PHOSPHORUS: 5.2 MG/DL (ref 2.5–4.9)
PLATELET # BLD: 161 K/UL (ref 135–450)
PLATELET # BLD: 173 K/UL (ref 135–450)
PLATELET # BLD: 193 K/UL (ref 135–450)
PLATELET # BLD: 199 K/UL (ref 135–450)
PLATELET # BLD: 200 K/UL (ref 135–450)
PLATELET # BLD: 216 K/UL (ref 135–450)
PLATELET # BLD: 226 K/UL (ref 135–450)
PLATELET # BLD: 229 K/UL (ref 135–450)
PLATELET # BLD: 236 K/UL (ref 135–450)
PLATELET # BLD: 259 K/UL (ref 135–450)
PLATELET # BLD: 264 K/UL (ref 135–450)
PLATELET # BLD: 268 K/UL (ref 135–450)
PLATELET # BLD: 272 K/UL (ref 135–450)
PLATELET # BLD: 284 K/UL (ref 135–450)
PLATELET # BLD: 288 K/UL (ref 135–450)
PLATELET # BLD: 298 K/UL (ref 135–450)
PLATELET # BLD: 299 K/UL (ref 135–450)
PLATELET # BLD: 308 K/UL (ref 135–450)
PLATELET # BLD: 311 K/UL (ref 135–450)
PLATELET # BLD: 323 K/UL (ref 135–450)
PLATELET # BLD: 348 K/UL (ref 135–450)
PLATELET # BLD: 377 K/UL (ref 135–450)
PLATELET # BLD: 393 K/UL (ref 135–450)
PLATELET # BLD: 420 K/UL (ref 135–450)
PMV BLD AUTO: 8 FL (ref 5–10.5)
PMV BLD AUTO: 8.1 FL (ref 5–10.5)
PMV BLD AUTO: 8.2 FL (ref 5–10.5)
PMV BLD AUTO: 8.2 FL (ref 5–10.5)
PMV BLD AUTO: 8.4 FL (ref 5–10.5)
PMV BLD AUTO: 8.5 FL (ref 5–10.5)
PMV BLD AUTO: 8.6 FL (ref 5–10.5)
PMV BLD AUTO: 8.8 FL (ref 5–10.5)
PMV BLD AUTO: 8.8 FL (ref 5–10.5)
PMV BLD AUTO: 8.9 FL (ref 5–10.5)
PMV BLD AUTO: 9 FL (ref 5–10.5)
PMV BLD AUTO: 9.1 FL (ref 5–10.5)
PMV BLD AUTO: 9.1 FL (ref 5–10.5)
PMV BLD AUTO: 9.2 FL (ref 5–10.5)
PMV BLD AUTO: 9.6 FL (ref 5–10.5)
PO2 ARTERIAL: 131 MMHG (ref 75–108)
PO2 ARTERIAL: 286 MMHG (ref 75–108)
PO2 ARTERIAL: 372.8 MM HG (ref 75–108)
PO2, VEN: 43 MM HG
POC HEMATOCRIT: 27 % (ref 40.5–52.5)
POC HEMATOCRIT: 28 % (ref 40.5–52.5)
POC POTASSIUM: 3.6 MMOL/L (ref 3.5–5.1)
POC POTASSIUM: 3.7 MMOL/L (ref 3.5–5.1)
POC SAMPLE TYPE: ABNORMAL
POC SAMPLE TYPE: ABNORMAL
POC SODIUM: 140 MMOL/L (ref 136–145)
POC SODIUM: 141 MMOL/L (ref 136–145)
POTASSIUM REFLEX MAGNESIUM: 3.3 MMOL/L (ref 3.5–5.1)
POTASSIUM REFLEX MAGNESIUM: 3.4 MMOL/L (ref 3.5–5.1)
POTASSIUM REFLEX MAGNESIUM: 3.5 MMOL/L (ref 3.5–5.1)
POTASSIUM REFLEX MAGNESIUM: 3.6 MMOL/L (ref 3.5–5.1)
POTASSIUM REFLEX MAGNESIUM: 3.7 MMOL/L (ref 3.5–5.1)
POTASSIUM REFLEX MAGNESIUM: 3.9 MMOL/L (ref 3.5–5.1)
POTASSIUM REFLEX MAGNESIUM: 4.3 MMOL/L (ref 3.5–5.1)
POTASSIUM REFLEX MAGNESIUM: 4.5 MMOL/L (ref 3.5–5.1)
POTASSIUM REFLEX MAGNESIUM: 5 MMOL/L (ref 3.5–5.1)
POTASSIUM REFLEX MAGNESIUM: 6 MMOL/L (ref 3.5–5.1)
POTASSIUM SERPL-SCNC: 3.5 MMOL/L (ref 3.5–5.1)
POTASSIUM SERPL-SCNC: 3.6 MMOL/L (ref 3.5–5.1)
POTASSIUM SERPL-SCNC: 3.7 MMOL/L (ref 3.5–5.1)
POTASSIUM SERPL-SCNC: 3.7 MMOL/L (ref 3.5–5.1)
POTASSIUM SERPL-SCNC: 3.9 MMOL/L (ref 3.5–5.1)
POTASSIUM SERPL-SCNC: 4 MMOL/L (ref 3.5–5.1)
POTASSIUM SERPL-SCNC: 4 MMOL/L (ref 3.5–5.1)
POTASSIUM SERPL-SCNC: 4.1 MMOL/L (ref 3.5–5.1)
POTASSIUM SERPL-SCNC: 4.1 MMOL/L (ref 3.5–5.1)
POTASSIUM SERPL-SCNC: 4.2 MMOL/L (ref 3.5–5.1)
POTASSIUM SERPL-SCNC: 4.2 MMOL/L (ref 3.5–5.1)
POTASSIUM SERPL-SCNC: 4.3 MMOL/L (ref 3.5–5.1)
POTASSIUM SERPL-SCNC: 4.4 MMOL/L (ref 3.5–5.1)
POTASSIUM SERPL-SCNC: 4.5 MMOL/L (ref 3.5–5.1)
POTASSIUM SERPL-SCNC: 4.5 MMOL/L (ref 3.5–5.1)
POTASSIUM SERPL-SCNC: 4.7 MMOL/L (ref 3.5–5.1)
POTASSIUM SERPL-SCNC: 5 MMOL/L (ref 3.5–5.1)
POTASSIUM SERPL-SCNC: 5.1 MMOL/L (ref 3.5–5.1)
PRO-BNP: 446 PG/ML (ref 0–449)
PROTEIN UA: 100 MG/DL
PROTEIN UA: 30 MG/DL
PROTEIN UA: NEGATIVE MG/DL
PROTHROMBIN TIME: 13.8 SEC (ref 10–13.2)
RBC # BLD: 2.61 M/UL (ref 4.2–5.9)
RBC # BLD: 2.74 M/UL (ref 4.2–5.9)
RBC # BLD: 2.75 M/UL (ref 4.2–5.9)
RBC # BLD: 2.76 M/UL (ref 4.2–5.9)
RBC # BLD: 2.76 M/UL (ref 4.2–5.9)
RBC # BLD: 2.85 M/UL (ref 4.2–5.9)
RBC # BLD: 2.86 M/UL (ref 4.2–5.9)
RBC # BLD: 2.89 M/UL (ref 4.2–5.9)
RBC # BLD: 2.93 M/UL (ref 4.2–5.9)
RBC # BLD: 2.94 M/UL (ref 4.2–5.9)
RBC # BLD: 2.96 M/UL (ref 4.2–5.9)
RBC # BLD: 2.99 M/UL (ref 4.2–5.9)
RBC # BLD: 3.01 M/UL (ref 4.2–5.9)
RBC # BLD: 3.17 M/UL (ref 4.2–5.9)
RBC # BLD: 3.26 M/UL (ref 4.2–5.9)
RBC # BLD: 3.3 M/UL (ref 4.2–5.9)
RBC # BLD: 3.36 M/UL (ref 4.2–5.9)
RBC # BLD: 3.53 M/UL (ref 4.2–5.9)
RBC # BLD: 3.62 M/UL (ref 4.2–5.9)
RBC # BLD: 4.41 M/UL (ref 4.2–5.9)
RBC UA: 18 /HPF (ref 0–4)
RBC UA: 2 /HPF (ref 0–4)
RBC UA: ABNORMAL /HPF (ref 0–4)
SARS-COV-2, NAAT: NOT DETECTED
SARS-COV-2, NAAT: NOT DETECTED
SODIUM BLD-SCNC: 137 MMOL/L (ref 136–145)
SODIUM BLD-SCNC: 138 MMOL/L (ref 136–145)
SODIUM BLD-SCNC: 139 MMOL/L (ref 136–145)
SODIUM BLD-SCNC: 140 MMOL/L (ref 136–145)
SODIUM BLD-SCNC: 141 MMOL/L (ref 136–145)
SODIUM BLD-SCNC: 142 MMOL/L (ref 136–145)
SODIUM BLD-SCNC: 142 MMOL/L (ref 136–145)
SODIUM BLD-SCNC: 143 MMOL/L (ref 136–145)
SODIUM BLD-SCNC: 145 MMOL/L (ref 136–145)
SODIUM BLD-SCNC: 146 MMOL/L (ref 136–145)
SODIUM BLD-SCNC: 148 MMOL/L (ref 136–145)
SODIUM BLD-SCNC: 149 MMOL/L (ref 136–145)
SODIUM BLD-SCNC: 151 MMOL/L (ref 136–145)
SODIUM BLD-SCNC: 152 MMOL/L (ref 136–145)
SODIUM BLD-SCNC: 153 MMOL/L (ref 136–145)
SODIUM BLD-SCNC: 153 MMOL/L (ref 136–145)
SODIUM BLD-SCNC: 154 MMOL/L (ref 136–145)
SODIUM BLD-SCNC: 157 MMOL/L (ref 136–145)
SODIUM URINE: 50 MMOL/L
SODIUM URINE: 70 MMOL/L
SPECIFIC GRAVITY UA: 1.01 (ref 1–1.03)
TCO2 ARTERIAL: 21 MMOL/L
TCO2 ARTERIAL: 21.8 MMOL/L
TCO2 ARTERIAL: 23 MMOL/L
TCO2 CALC VENOUS: 22 MMOL/L
TOTAL PROTEIN: 7.6 G/DL (ref 6.4–8.2)
TOTAL PROTEIN: 7.8 G/DL (ref 6.4–8.2)
TROPONIN: 0.07 NG/ML
TROPONIN: 0.07 NG/ML
TROPONIN: 0.1 NG/ML
TSH REFLEX FT4: 0.63 UIU/ML (ref 0.27–4.2)
UREA NITROGEN, UR: 355.6 MG/DL (ref 800–1666)
UREA NITROGEN, UR: 630 MG/DL (ref 800–1666)
URINE CULTURE, ROUTINE: ABNORMAL
URINE REFLEX TO CULTURE: ABNORMAL
URINE REFLEX TO CULTURE: YES
URINE TYPE: 4
URINE TYPE: ABNORMAL
URINE TYPE: ABNORMAL
UROBILINOGEN, URINE: 0.2 E.U./DL
VANCOMYCIN RANDOM: 13.6 UG/ML
VANCOMYCIN RANDOM: 17.1 UG/ML
VANCOMYCIN RANDOM: 20.1 UG/ML
VANCOMYCIN RANDOM: 20.8 UG/ML
WBC # BLD: 10.2 K/UL (ref 4–11)
WBC # BLD: 10.4 K/UL (ref 4–11)
WBC # BLD: 12.4 K/UL (ref 4–11)
WBC # BLD: 13.3 K/UL (ref 4–11)
WBC # BLD: 15.4 K/UL (ref 4–11)
WBC # BLD: 5.3 K/UL (ref 4–11)
WBC # BLD: 5.7 K/UL (ref 4–11)
WBC # BLD: 6.5 K/UL (ref 4–11)
WBC # BLD: 6.7 K/UL (ref 4–11)
WBC # BLD: 7 K/UL (ref 4–11)
WBC # BLD: 7.2 K/UL (ref 4–11)
WBC # BLD: 7.3 K/UL (ref 4–11)
WBC # BLD: 7.7 K/UL (ref 4–11)
WBC # BLD: 8.1 K/UL (ref 4–11)
WBC # BLD: 8.1 K/UL (ref 4–11)
WBC # BLD: 8.3 K/UL (ref 4–11)
WBC # BLD: 8.5 K/UL (ref 4–11)
WBC # BLD: 8.5 K/UL (ref 4–11)
WBC # BLD: 8.7 K/UL (ref 4–11)
WBC # BLD: 9.1 K/UL (ref 4–11)
WBC # BLD: 9.4 K/UL (ref 4–11)
WBC # BLD: 9.6 K/UL (ref 4–11)
WBC UA: 15 /HPF (ref 0–5)
WBC UA: 753 /HPF (ref 0–5)
WBC UA: 8 /HPF (ref 0–5)

## 2021-01-01 PROCEDURE — 97110 THERAPEUTIC EXERCISES: CPT

## 2021-01-01 PROCEDURE — 6360000002 HC RX W HCPCS: Performed by: INTERNAL MEDICINE

## 2021-01-01 PROCEDURE — 92610 EVALUATE SWALLOWING FUNCTION: CPT

## 2021-01-01 PROCEDURE — 0DB80ZZ EXCISION OF SMALL INTESTINE, OPEN APPROACH: ICD-10-PCS | Performed by: SURGERY

## 2021-01-01 PROCEDURE — 2580000003 HC RX 258: Performed by: INTERNAL MEDICINE

## 2021-01-01 PROCEDURE — 6370000000 HC RX 637 (ALT 250 FOR IP): Performed by: PHYSICIAN ASSISTANT

## 2021-01-01 PROCEDURE — 2500000003 HC RX 250 WO HCPCS: Performed by: INTERNAL MEDICINE

## 2021-01-01 PROCEDURE — APPSS15 APP SPLIT SHARED TIME 0-15 MINUTES: Performed by: NURSE PRACTITIONER

## 2021-01-01 PROCEDURE — 2580000003 HC RX 258: Performed by: ANESTHESIOLOGY

## 2021-01-01 PROCEDURE — 84100 ASSAY OF PHOSPHORUS: CPT

## 2021-01-01 PROCEDURE — 84132 ASSAY OF SERUM POTASSIUM: CPT

## 2021-01-01 PROCEDURE — 80048 BASIC METABOLIC PNL TOTAL CA: CPT

## 2021-01-01 PROCEDURE — 97535 SELF CARE MNGMENT TRAINING: CPT

## 2021-01-01 PROCEDURE — 1200000000 HC SEMI PRIVATE

## 2021-01-01 PROCEDURE — 97530 THERAPEUTIC ACTIVITIES: CPT

## 2021-01-01 PROCEDURE — 6370000000 HC RX 637 (ALT 250 FOR IP): Performed by: SURGERY

## 2021-01-01 PROCEDURE — 6370000000 HC RX 637 (ALT 250 FOR IP): Performed by: INTERNAL MEDICINE

## 2021-01-01 PROCEDURE — 6360000002 HC RX W HCPCS: Performed by: SURGERY

## 2021-01-01 PROCEDURE — 94760 N-INVAS EAR/PLS OXIMETRY 1: CPT

## 2021-01-01 PROCEDURE — 97166 OT EVAL MOD COMPLEX 45 MIN: CPT

## 2021-01-01 PROCEDURE — 2500000003 HC RX 250 WO HCPCS: Performed by: SURGERY

## 2021-01-01 PROCEDURE — 85025 COMPLETE CBC W/AUTO DIFF WBC: CPT

## 2021-01-01 PROCEDURE — 6370000000 HC RX 637 (ALT 250 FOR IP): Performed by: STUDENT IN AN ORGANIZED HEALTH CARE EDUCATION/TRAINING PROGRAM

## 2021-01-01 PROCEDURE — 99024 POSTOP FOLLOW-UP VISIT: CPT | Performed by: SURGERY

## 2021-01-01 PROCEDURE — 80069 RENAL FUNCTION PANEL: CPT

## 2021-01-01 PROCEDURE — APPSS15 APP SPLIT SHARED TIME 0-15 MINUTES: Performed by: PHYSICIAN ASSISTANT

## 2021-01-01 PROCEDURE — 80202 ASSAY OF VANCOMYCIN: CPT

## 2021-01-01 PROCEDURE — 99024 POSTOP FOLLOW-UP VISIT: CPT | Performed by: PHYSICIAN ASSISTANT

## 2021-01-01 PROCEDURE — 85730 THROMBOPLASTIN TIME PARTIAL: CPT

## 2021-01-01 PROCEDURE — 96375 TX/PRO/DX INJ NEW DRUG ADDON: CPT

## 2021-01-01 PROCEDURE — 0BH18EZ INSERTION OF ENDOTRACHEAL AIRWAY INTO TRACHEA, VIA NATURAL OR ARTIFICIAL OPENING ENDOSCOPIC: ICD-10-PCS | Performed by: EMERGENCY MEDICINE

## 2021-01-01 PROCEDURE — 81001 URINALYSIS AUTO W/SCOPE: CPT

## 2021-01-01 PROCEDURE — 96374 THER/PROPH/DIAG INJ IV PUSH: CPT

## 2021-01-01 PROCEDURE — 36591 DRAW BLOOD OFF VENOUS DEVICE: CPT

## 2021-01-01 PROCEDURE — 84295 ASSAY OF SERUM SODIUM: CPT

## 2021-01-01 PROCEDURE — APPNB30 APP NON BILLABLE TIME 0-30 MINS: Performed by: PHYSICIAN ASSISTANT

## 2021-01-01 PROCEDURE — 2000000000 HC ICU R&B

## 2021-01-01 PROCEDURE — 84484 ASSAY OF TROPONIN QUANT: CPT

## 2021-01-01 PROCEDURE — 2060000000 HC ICU INTERMEDIATE R&B

## 2021-01-01 PROCEDURE — 92526 ORAL FUNCTION THERAPY: CPT

## 2021-01-01 PROCEDURE — 6370000000 HC RX 637 (ALT 250 FOR IP): Performed by: NURSE PRACTITIONER

## 2021-01-01 PROCEDURE — 2580000003 HC RX 258: Performed by: SURGERY

## 2021-01-01 PROCEDURE — 71045 X-RAY EXAM CHEST 1 VIEW: CPT

## 2021-01-01 PROCEDURE — 85027 COMPLETE CBC AUTOMATED: CPT

## 2021-01-01 PROCEDURE — 80053 COMPREHEN METABOLIC PANEL: CPT

## 2021-01-01 PROCEDURE — 2709999900 HC NON-CHARGEABLE SUPPLY: Performed by: SURGERY

## 2021-01-01 PROCEDURE — 44120 REMOVAL OF SMALL INTESTINE: CPT | Performed by: SURGERY

## 2021-01-01 PROCEDURE — 2500000003 HC RX 250 WO HCPCS: Performed by: PSYCHIATRY & NEUROLOGY

## 2021-01-01 PROCEDURE — 93010 ELECTROCARDIOGRAM REPORT: CPT | Performed by: INTERNAL MEDICINE

## 2021-01-01 PROCEDURE — 83735 ASSAY OF MAGNESIUM: CPT

## 2021-01-01 PROCEDURE — 94761 N-INVAS EAR/PLS OXIMETRY MLT: CPT

## 2021-01-01 PROCEDURE — 6370000000 HC RX 637 (ALT 250 FOR IP): Performed by: PSYCHIATRY & NEUROLOGY

## 2021-01-01 PROCEDURE — 6360000004 HC RX CONTRAST MEDICATION: Performed by: INTERNAL MEDICINE

## 2021-01-01 PROCEDURE — APPNB15 APP NON BILLABLE TIME 0-15 MINS: Performed by: NURSE PRACTITIONER

## 2021-01-01 PROCEDURE — 2500000003 HC RX 250 WO HCPCS: Performed by: ANESTHESIOLOGY

## 2021-01-01 PROCEDURE — 97163 PT EVAL HIGH COMPLEX 45 MIN: CPT

## 2021-01-01 PROCEDURE — 2700000000 HC OXYGEN THERAPY PER DAY

## 2021-01-01 PROCEDURE — 36600 WITHDRAWAL OF ARTERIAL BLOOD: CPT

## 2021-01-01 PROCEDURE — 83690 ASSAY OF LIPASE: CPT

## 2021-01-01 PROCEDURE — 6360000002 HC RX W HCPCS: Performed by: NURSE PRACTITIONER

## 2021-01-01 PROCEDURE — 74176 CT ABD & PELVIS W/O CONTRAST: CPT

## 2021-01-01 PROCEDURE — 99233 SBSQ HOSP IP/OBS HIGH 50: CPT | Performed by: INTERNAL MEDICINE

## 2021-01-01 PROCEDURE — 6360000002 HC RX W HCPCS: Performed by: EMERGENCY MEDICINE

## 2021-01-01 PROCEDURE — 74018 RADEX ABDOMEN 1 VIEW: CPT

## 2021-01-01 PROCEDURE — 0DNU0ZZ RELEASE OMENTUM, OPEN APPROACH: ICD-10-PCS | Performed by: SURGERY

## 2021-01-01 PROCEDURE — 82803 BLOOD GASES ANY COMBINATION: CPT

## 2021-01-01 PROCEDURE — 36592 COLLECT BLOOD FROM PICC: CPT

## 2021-01-01 PROCEDURE — 84443 ASSAY THYROID STIM HORMONE: CPT

## 2021-01-01 PROCEDURE — 82330 ASSAY OF CALCIUM: CPT

## 2021-01-01 PROCEDURE — 97162 PT EVAL MOD COMPLEX 30 MIN: CPT

## 2021-01-01 PROCEDURE — 6360000002 HC RX W HCPCS: Performed by: PHYSICIAN ASSISTANT

## 2021-01-01 PROCEDURE — 87186 SC STD MICRODIL/AGAR DIL: CPT

## 2021-01-01 PROCEDURE — 97116 GAIT TRAINING THERAPY: CPT

## 2021-01-01 PROCEDURE — 6370000000 HC RX 637 (ALT 250 FOR IP): Performed by: HOSPITALIST

## 2021-01-01 PROCEDURE — 2580000003 HC RX 258: Performed by: PHYSICIAN ASSISTANT

## 2021-01-01 PROCEDURE — 87077 CULTURE AEROBIC IDENTIFY: CPT

## 2021-01-01 PROCEDURE — 3600000004 HC SURGERY LEVEL 4 BASE: Performed by: SURGERY

## 2021-01-01 PROCEDURE — 3700000001 HC ADD 15 MINUTES (ANESTHESIA): Performed by: SURGERY

## 2021-01-01 PROCEDURE — 0DN80ZZ RELEASE SMALL INTESTINE, OPEN APPROACH: ICD-10-PCS | Performed by: SURGERY

## 2021-01-01 PROCEDURE — 87205 SMEAR GRAM STAIN: CPT

## 2021-01-01 PROCEDURE — 99291 CRITICAL CARE FIRST HOUR: CPT | Performed by: INTERNAL MEDICINE

## 2021-01-01 PROCEDURE — 88307 TISSUE EXAM BY PATHOLOGIST: CPT

## 2021-01-01 PROCEDURE — 36415 COLL VENOUS BLD VENIPUNCTURE: CPT

## 2021-01-01 PROCEDURE — 71250 CT THORAX DX C-: CPT

## 2021-01-01 PROCEDURE — 83605 ASSAY OF LACTIC ACID: CPT

## 2021-01-01 PROCEDURE — 85610 PROTHROMBIN TIME: CPT

## 2021-01-01 PROCEDURE — 2720000010 HC SURG SUPPLY STERILE: Performed by: SURGERY

## 2021-01-01 PROCEDURE — 2500000003 HC RX 250 WO HCPCS: Performed by: NURSE PRACTITIONER

## 2021-01-01 PROCEDURE — 97129 THER IVNTJ 1ST 15 MIN: CPT

## 2021-01-01 PROCEDURE — 70551 MRI BRAIN STEM W/O DYE: CPT

## 2021-01-01 PROCEDURE — 2580000003 HC RX 258: Performed by: EMERGENCY MEDICINE

## 2021-01-01 PROCEDURE — 87086 URINE CULTURE/COLONY COUNT: CPT

## 2021-01-01 PROCEDURE — 93005 ELECTROCARDIOGRAM TRACING: CPT | Performed by: SURGERY

## 2021-01-01 PROCEDURE — 2580000003 HC RX 258: Performed by: NURSE PRACTITIONER

## 2021-01-01 PROCEDURE — 94003 VENT MGMT INPAT SUBQ DAY: CPT

## 2021-01-01 PROCEDURE — 84540 ASSAY OF URINE/UREA-N: CPT

## 2021-01-01 PROCEDURE — 2580000003 HC RX 258

## 2021-01-01 PROCEDURE — 96361 HYDRATE IV INFUSION ADD-ON: CPT

## 2021-01-01 PROCEDURE — 7100000001 HC PACU RECOVERY - ADDTL 15 MIN: Performed by: SURGERY

## 2021-01-01 PROCEDURE — 97167 OT EVAL HIGH COMPLEX 60 MIN: CPT

## 2021-01-01 PROCEDURE — 83880 ASSAY OF NATRIURETIC PEPTIDE: CPT

## 2021-01-01 PROCEDURE — 6370000000 HC RX 637 (ALT 250 FOR IP): Performed by: ANESTHESIOLOGY

## 2021-01-01 PROCEDURE — 87635 SARS-COV-2 COVID-19 AMP PRB: CPT

## 2021-01-01 PROCEDURE — 86403 PARTICLE AGGLUT ANTBDY SCRN: CPT

## 2021-01-01 PROCEDURE — 85014 HEMATOCRIT: CPT

## 2021-01-01 PROCEDURE — 84300 ASSAY OF URINE SODIUM: CPT

## 2021-01-01 PROCEDURE — 99285 EMERGENCY DEPT VISIT HI MDM: CPT

## 2021-01-01 PROCEDURE — 93005 ELECTROCARDIOGRAM TRACING: CPT | Performed by: EMERGENCY MEDICINE

## 2021-01-01 PROCEDURE — 2500000003 HC RX 250 WO HCPCS: Performed by: PHYSICIAN ASSISTANT

## 2021-01-01 PROCEDURE — 94644 CONT INHLJ TX 1ST HOUR: CPT

## 2021-01-01 PROCEDURE — 82947 ASSAY GLUCOSE BLOOD QUANT: CPT

## 2021-01-01 PROCEDURE — 94150 VITAL CAPACITY TEST: CPT

## 2021-01-01 PROCEDURE — 31500 INSERT EMERGENCY AIRWAY: CPT

## 2021-01-01 PROCEDURE — 82570 ASSAY OF URINE CREATININE: CPT

## 2021-01-01 PROCEDURE — 2500000003 HC RX 250 WO HCPCS: Performed by: EMERGENCY MEDICINE

## 2021-01-01 PROCEDURE — 5A1945Z RESPIRATORY VENTILATION, 24-96 CONSECUTIVE HOURS: ICD-10-PCS | Performed by: EMERGENCY MEDICINE

## 2021-01-01 PROCEDURE — 7100000000 HC PACU RECOVERY - FIRST 15 MIN: Performed by: SURGERY

## 2021-01-01 PROCEDURE — 99221 1ST HOSP IP/OBS SF/LOW 40: CPT | Performed by: PSYCHIATRY & NEUROLOGY

## 2021-01-01 PROCEDURE — 3600000014 HC SURGERY LEVEL 4 ADDTL 15MIN: Performed by: SURGERY

## 2021-01-01 PROCEDURE — 70450 CT HEAD/BRAIN W/O DYE: CPT

## 2021-01-01 PROCEDURE — 3700000000 HC ANESTHESIA ATTENDED CARE: Performed by: SURGERY

## 2021-01-01 PROCEDURE — 2500000003 HC RX 250 WO HCPCS: Performed by: HOSPITALIST

## 2021-01-01 PROCEDURE — 6360000002 HC RX W HCPCS: Performed by: ANESTHESIOLOGY

## 2021-01-01 PROCEDURE — 93005 ELECTROCARDIOGRAM TRACING: CPT | Performed by: PHYSICIAN ASSISTANT

## 2021-01-01 PROCEDURE — 99231 SBSQ HOSP IP/OBS SF/LOW 25: CPT | Performed by: PSYCHIATRY & NEUROLOGY

## 2021-01-01 PROCEDURE — 87040 BLOOD CULTURE FOR BACTERIA: CPT

## 2021-01-01 PROCEDURE — 94002 VENT MGMT INPAT INIT DAY: CPT

## 2021-01-01 PROCEDURE — 87070 CULTURE OTHR SPECIMN AEROBIC: CPT

## 2021-01-01 PROCEDURE — 97112 NEUROMUSCULAR REEDUCATION: CPT

## 2021-01-01 PROCEDURE — 99223 1ST HOSP IP/OBS HIGH 75: CPT | Performed by: SURGERY

## 2021-01-01 RX ORDER — POLYETHYLENE GLYCOL 3350 17 G/17G
17 POWDER, FOR SOLUTION ORAL DAILY PRN
Status: DISCONTINUED | OUTPATIENT
Start: 2021-01-01 | End: 2021-01-01 | Stop reason: HOSPADM

## 2021-01-01 RX ORDER — KETOROLAC TROMETHAMINE 30 MG/ML
15 INJECTION, SOLUTION INTRAMUSCULAR; INTRAVENOUS ONCE
Status: COMPLETED | OUTPATIENT
Start: 2021-01-01 | End: 2021-01-01

## 2021-01-01 RX ORDER — HYDROCODONE BITARTRATE AND ACETAMINOPHEN 5; 325 MG/1; MG/1
2 TABLET ORAL PRN
Status: ACTIVE | OUTPATIENT
Start: 2021-01-01 | End: 2021-01-01

## 2021-01-01 RX ORDER — SODIUM CHLORIDE 0.9 % (FLUSH) 0.9 %
10 SYRINGE (ML) INJECTION PRN
Status: DISCONTINUED | OUTPATIENT
Start: 2021-01-01 | End: 2021-01-01 | Stop reason: HOSPADM

## 2021-01-01 RX ORDER — DEXTROSE MONOHYDRATE 50 MG/ML
100 INJECTION, SOLUTION INTRAVENOUS PRN
Status: DISCONTINUED | OUTPATIENT
Start: 2021-01-01 | End: 2021-01-01 | Stop reason: HOSPADM

## 2021-01-01 RX ORDER — POLYETHYLENE GLYCOL 3350 17 G/17G
17 POWDER, FOR SOLUTION ORAL DAILY
Status: DISCONTINUED | OUTPATIENT
Start: 2021-01-01 | End: 2021-01-01 | Stop reason: HOSPADM

## 2021-01-01 RX ORDER — SODIUM CHLORIDE 9 MG/ML
INJECTION, SOLUTION INTRAVENOUS CONTINUOUS
Status: DISCONTINUED | OUTPATIENT
Start: 2021-01-01 | End: 2021-01-01

## 2021-01-01 RX ORDER — MAGNESIUM HYDROXIDE 1200 MG/15ML
LIQUID ORAL CONTINUOUS PRN
Status: COMPLETED | OUTPATIENT
Start: 2021-01-01 | End: 2021-01-01

## 2021-01-01 RX ORDER — HEPARIN SODIUM 5000 [USP'U]/ML
5000 INJECTION, SOLUTION INTRAVENOUS; SUBCUTANEOUS EVERY 8 HOURS SCHEDULED
Status: DISCONTINUED | OUTPATIENT
Start: 2021-01-01 | End: 2021-01-01 | Stop reason: HOSPADM

## 2021-01-01 RX ORDER — PROPOFOL 10 MG/ML
INJECTION, EMULSION INTRAVENOUS PRN
Status: DISCONTINUED | OUTPATIENT
Start: 2021-01-01 | End: 2021-01-01 | Stop reason: SDUPTHER

## 2021-01-01 RX ORDER — ONDANSETRON 2 MG/ML
INJECTION INTRAMUSCULAR; INTRAVENOUS PRN
Status: DISCONTINUED | OUTPATIENT
Start: 2021-01-01 | End: 2021-01-01 | Stop reason: SDUPTHER

## 2021-01-01 RX ORDER — RISPERIDONE 0.5 MG/1
0.5 TABLET, FILM COATED ORAL 2 TIMES DAILY
Status: DISCONTINUED | OUTPATIENT
Start: 2021-01-01 | End: 2021-01-01

## 2021-01-01 RX ORDER — FENTANYL CITRATE 50 UG/ML
25 INJECTION, SOLUTION INTRAMUSCULAR; INTRAVENOUS EVERY 5 MIN PRN
Status: DISCONTINUED | OUTPATIENT
Start: 2021-01-01 | End: 2021-01-01 | Stop reason: HOSPADM

## 2021-01-01 RX ORDER — DONEPEZIL HYDROCHLORIDE 5 MG/1
5 TABLET, FILM COATED ORAL NIGHTLY
Status: DISCONTINUED | OUTPATIENT
Start: 2021-01-01 | End: 2021-01-01 | Stop reason: HOSPADM

## 2021-01-01 RX ORDER — SODIUM CHLORIDE 9 MG/ML
INJECTION, SOLUTION INTRAVENOUS ONCE
Status: COMPLETED | OUTPATIENT
Start: 2021-01-01 | End: 2021-01-01

## 2021-01-01 RX ORDER — DIVALPROEX SODIUM 125 MG/1
125 CAPSULE, COATED PELLETS ORAL 2 TIMES DAILY
COMMUNITY

## 2021-01-01 RX ORDER — ROCURONIUM BROMIDE 10 MG/ML
0.6 INJECTION, SOLUTION INTRAVENOUS ONCE
Status: COMPLETED | OUTPATIENT
Start: 2021-01-01 | End: 2021-01-01

## 2021-01-01 RX ORDER — FENTANYL CITRATE 50 UG/ML
INJECTION, SOLUTION INTRAMUSCULAR; INTRAVENOUS PRN
Status: DISCONTINUED | OUTPATIENT
Start: 2021-01-01 | End: 2021-01-01 | Stop reason: SDUPTHER

## 2021-01-01 RX ORDER — FENTANYL CITRATE 50 UG/ML
50 INJECTION, SOLUTION INTRAMUSCULAR; INTRAVENOUS EVERY 5 MIN PRN
Status: DISCONTINUED | OUTPATIENT
Start: 2021-01-01 | End: 2021-01-01 | Stop reason: HOSPADM

## 2021-01-01 RX ORDER — MORPHINE SULFATE 2 MG/ML
2 INJECTION, SOLUTION INTRAMUSCULAR; INTRAVENOUS
Status: DISCONTINUED | OUTPATIENT
Start: 2021-01-01 | End: 2021-01-01

## 2021-01-01 RX ORDER — DEXTROSE MONOHYDRATE 50 MG/ML
INJECTION, SOLUTION INTRAVENOUS CONTINUOUS
Status: DISCONTINUED | OUTPATIENT
Start: 2021-01-01 | End: 2021-01-01

## 2021-01-01 RX ORDER — CHLORHEXIDINE GLUCONATE 0.12 MG/ML
15 RINSE ORAL 2 TIMES DAILY
Status: DISCONTINUED | OUTPATIENT
Start: 2021-01-01 | End: 2021-01-01

## 2021-01-01 RX ORDER — DEXTROSE, SODIUM CHLORIDE, AND POTASSIUM CHLORIDE 5; .2; .15 G/100ML; G/100ML; G/100ML
INJECTION INTRAVENOUS CONTINUOUS
Status: DISCONTINUED | OUTPATIENT
Start: 2021-01-01 | End: 2021-01-01 | Stop reason: HOSPADM

## 2021-01-01 RX ORDER — OLANZAPINE 10 MG/1
10 INJECTION, POWDER, LYOPHILIZED, FOR SOLUTION INTRAMUSCULAR EVERY 6 HOURS PRN
Status: DISCONTINUED | OUTPATIENT
Start: 2021-01-01 | End: 2021-01-01

## 2021-01-01 RX ORDER — PROPOFOL 10 MG/ML
5-50 INJECTION, EMULSION INTRAVENOUS
Status: DISCONTINUED | OUTPATIENT
Start: 2021-01-01 | End: 2021-01-01

## 2021-01-01 RX ORDER — SODIUM CHLORIDE 9 MG/ML
INJECTION, SOLUTION INTRAVENOUS CONTINUOUS PRN
Status: DISCONTINUED | OUTPATIENT
Start: 2021-01-01 | End: 2021-01-01 | Stop reason: SDUPTHER

## 2021-01-01 RX ORDER — DEXAMETHASONE SODIUM PHOSPHATE 4 MG/ML
INJECTION, SOLUTION INTRA-ARTICULAR; INTRALESIONAL; INTRAMUSCULAR; INTRAVENOUS; SOFT TISSUE PRN
Status: DISCONTINUED | OUTPATIENT
Start: 2021-01-01 | End: 2021-01-01 | Stop reason: SDUPTHER

## 2021-01-01 RX ORDER — OLANZAPINE 10 MG/1
2.5 INJECTION, POWDER, LYOPHILIZED, FOR SOLUTION INTRAMUSCULAR EVERY 6 HOURS PRN
Status: DISCONTINUED | OUTPATIENT
Start: 2021-01-01 | End: 2021-01-01 | Stop reason: HOSPADM

## 2021-01-01 RX ORDER — QUETIAPINE FUMARATE 25 MG/1
50 TABLET, FILM COATED ORAL NIGHTLY PRN
Status: DISCONTINUED | OUTPATIENT
Start: 2021-01-01 | End: 2021-01-01

## 2021-01-01 RX ORDER — OLANZAPINE 10 MG/1
10 INJECTION, POWDER, LYOPHILIZED, FOR SOLUTION INTRAMUSCULAR 2 TIMES DAILY PRN
Status: DISCONTINUED | OUTPATIENT
Start: 2021-01-01 | End: 2021-01-01 | Stop reason: HOSPADM

## 2021-01-01 RX ORDER — LORAZEPAM 0.5 MG/1
0.5 TABLET ORAL EVERY 8 HOURS PRN
Status: ON HOLD | COMMUNITY
End: 2021-01-01 | Stop reason: HOSPADM

## 2021-01-01 RX ORDER — ONDANSETRON 2 MG/ML
4 INJECTION INTRAMUSCULAR; INTRAVENOUS EVERY 6 HOURS PRN
Status: DISCONTINUED | OUTPATIENT
Start: 2021-01-01 | End: 2021-01-01 | Stop reason: HOSPADM

## 2021-01-01 RX ORDER — BISACODYL 10 MG
10 SUPPOSITORY, RECTAL RECTAL DAILY PRN
Status: DISCONTINUED | OUTPATIENT
Start: 2021-01-01 | End: 2021-01-01 | Stop reason: HOSPADM

## 2021-01-01 RX ORDER — PROMETHAZINE HYDROCHLORIDE 25 MG/1
12.5 TABLET ORAL EVERY 6 HOURS PRN
Status: DISCONTINUED | OUTPATIENT
Start: 2021-01-01 | End: 2021-01-01

## 2021-01-01 RX ORDER — SODIUM CHLORIDE 0.9 % (FLUSH) 0.9 %
10 SYRINGE (ML) INJECTION EVERY 12 HOURS SCHEDULED
Status: DISCONTINUED | OUTPATIENT
Start: 2021-01-01 | End: 2021-01-01 | Stop reason: HOSPADM

## 2021-01-01 RX ORDER — DEXTROSE MONOHYDRATE 25 G/50ML
25 INJECTION, SOLUTION INTRAVENOUS ONCE
Status: COMPLETED | OUTPATIENT
Start: 2021-01-01 | End: 2021-01-01

## 2021-01-01 RX ORDER — 0.9 % SODIUM CHLORIDE 0.9 %
1000 INTRAVENOUS SOLUTION INTRAVENOUS ONCE
Status: COMPLETED | OUTPATIENT
Start: 2021-01-01 | End: 2021-01-01

## 2021-01-01 RX ORDER — ONDANSETRON 2 MG/ML
4 INJECTION INTRAMUSCULAR; INTRAVENOUS ONCE
Status: COMPLETED | OUTPATIENT
Start: 2021-01-01 | End: 2021-01-01

## 2021-01-01 RX ORDER — MEPERIDINE HYDROCHLORIDE 25 MG/ML
12.5 INJECTION INTRAMUSCULAR; INTRAVENOUS; SUBCUTANEOUS
Status: ACTIVE | OUTPATIENT
Start: 2021-01-01 | End: 2021-01-01

## 2021-01-01 RX ORDER — SODIUM CHLORIDE 9 MG/ML
1000 INJECTION, SOLUTION INTRAVENOUS CONTINUOUS
Status: DISCONTINUED | OUTPATIENT
Start: 2021-01-01 | End: 2021-01-01

## 2021-01-01 RX ORDER — NICOTINE POLACRILEX 4 MG
15 LOZENGE BUCCAL PRN
Status: DISCONTINUED | OUTPATIENT
Start: 2021-01-01 | End: 2021-01-01 | Stop reason: HOSPADM

## 2021-01-01 RX ORDER — ETOMIDATE 2 MG/ML
0.3 INJECTION INTRAVENOUS ONCE
Status: COMPLETED | OUTPATIENT
Start: 2021-01-01 | End: 2021-01-01

## 2021-01-01 RX ORDER — NEOSTIGMINE METHYLSULFATE 1 MG/ML
INJECTION, SOLUTION INTRAVENOUS PRN
Status: DISCONTINUED | OUTPATIENT
Start: 2021-01-01 | End: 2021-01-01 | Stop reason: SDUPTHER

## 2021-01-01 RX ORDER — ONDANSETRON 2 MG/ML
4 INJECTION INTRAMUSCULAR; INTRAVENOUS
Status: ACTIVE | OUTPATIENT
Start: 2021-01-01 | End: 2021-01-01

## 2021-01-01 RX ORDER — ACETAMINOPHEN 325 MG/1
650 TABLET ORAL EVERY 6 HOURS PRN
Status: DISCONTINUED | OUTPATIENT
Start: 2021-01-01 | End: 2021-01-01

## 2021-01-01 RX ORDER — PHENYLEPHRINE HCL IN 0.9% NACL 1 MG/10 ML
SYRINGE (ML) INTRAVENOUS PRN
Status: DISCONTINUED | OUTPATIENT
Start: 2021-01-01 | End: 2021-01-01 | Stop reason: SDUPTHER

## 2021-01-01 RX ORDER — DEXTROSE MONOHYDRATE 25 G/50ML
INJECTION, SOLUTION INTRAVENOUS
Status: COMPLETED
Start: 2021-01-01 | End: 2021-01-01

## 2021-01-01 RX ORDER — ACETAMINOPHEN 325 MG/1
650 TABLET ORAL EVERY 6 HOURS PRN
Status: DISCONTINUED | OUTPATIENT
Start: 2021-01-01 | End: 2021-01-01 | Stop reason: HOSPADM

## 2021-01-01 RX ORDER — HYDROCODONE BITARTRATE AND ACETAMINOPHEN 5; 325 MG/1; MG/1
1 TABLET ORAL PRN
Status: ACTIVE | OUTPATIENT
Start: 2021-01-01 | End: 2021-01-01

## 2021-01-01 RX ORDER — DEXTROSE, SODIUM CHLORIDE, AND POTASSIUM CHLORIDE 5; .45; .15 G/100ML; G/100ML; G/100ML
INJECTION INTRAVENOUS
Status: DISPENSED
Start: 2021-01-01 | End: 2021-01-01

## 2021-01-01 RX ORDER — GLYCOPYRROLATE 0.2 MG/ML
INJECTION INTRAMUSCULAR; INTRAVENOUS PRN
Status: DISCONTINUED | OUTPATIENT
Start: 2021-01-01 | End: 2021-01-01 | Stop reason: SDUPTHER

## 2021-01-01 RX ORDER — CIPROFLOXACIN 2 MG/ML
400 INJECTION, SOLUTION INTRAVENOUS EVERY 12 HOURS
Status: DISCONTINUED | OUTPATIENT
Start: 2021-01-01 | End: 2021-01-01

## 2021-01-01 RX ORDER — HYDRALAZINE HYDROCHLORIDE 20 MG/ML
5 INJECTION INTRAMUSCULAR; INTRAVENOUS EVERY 10 MIN PRN
Status: DISCONTINUED | OUTPATIENT
Start: 2021-01-01 | End: 2021-01-01 | Stop reason: HOSPADM

## 2021-01-01 RX ORDER — ZIPRASIDONE MESYLATE 20 MG/ML
20 INJECTION, POWDER, LYOPHILIZED, FOR SOLUTION INTRAMUSCULAR ONCE
Status: DISCONTINUED | OUTPATIENT
Start: 2021-01-01 | End: 2021-01-01

## 2021-01-01 RX ORDER — LORAZEPAM 2 MG/ML
1 INJECTION INTRAMUSCULAR
Status: DISCONTINUED | OUTPATIENT
Start: 2021-01-01 | End: 2021-01-01

## 2021-01-01 RX ORDER — SODIUM CHLORIDE 450 MG/100ML
INJECTION, SOLUTION INTRAVENOUS CONTINUOUS
Status: DISCONTINUED | OUTPATIENT
Start: 2021-01-01 | End: 2021-01-01

## 2021-01-01 RX ORDER — DEXTROSE MONOHYDRATE 25 G/50ML
INJECTION, SOLUTION INTRAVENOUS PRN
Status: DISCONTINUED | OUTPATIENT
Start: 2021-01-01 | End: 2021-01-01 | Stop reason: SDUPTHER

## 2021-01-01 RX ORDER — OLANZAPINE 10 MG/1
10 INJECTION, POWDER, LYOPHILIZED, FOR SOLUTION INTRAMUSCULAR ONCE
Status: COMPLETED | OUTPATIENT
Start: 2021-01-01 | End: 2021-01-01

## 2021-01-01 RX ORDER — PROPOFOL 10 MG/ML
5-50 INJECTION, EMULSION INTRAVENOUS ONCE
Status: COMPLETED | OUTPATIENT
Start: 2021-01-01 | End: 2021-01-01

## 2021-01-01 RX ORDER — HALOPERIDOL 5 MG/ML
5 INJECTION INTRAMUSCULAR EVERY 6 HOURS PRN
Status: DISCONTINUED | OUTPATIENT
Start: 2021-01-01 | End: 2021-01-01

## 2021-01-01 RX ORDER — LACTOBACILLUS RHAMNOSUS GG 10B CELL
2 CAPSULE ORAL 2 TIMES DAILY WITH MEALS
Status: DISCONTINUED | OUTPATIENT
Start: 2021-01-01 | End: 2021-01-01 | Stop reason: HOSPADM

## 2021-01-01 RX ORDER — OLANZAPINE 5 MG/1
5 TABLET ORAL NIGHTLY
Status: DISCONTINUED | OUTPATIENT
Start: 2021-01-01 | End: 2021-01-01 | Stop reason: HOSPADM

## 2021-01-01 RX ORDER — RISPERIDONE 1 MG/1
1 TABLET, FILM COATED ORAL 2 TIMES DAILY
Status: DISCONTINUED | OUTPATIENT
Start: 2021-01-01 | End: 2021-01-01

## 2021-01-01 RX ORDER — BUPIVACAINE HYDROCHLORIDE 5 MG/ML
INJECTION, SOLUTION EPIDURAL; INTRACAUDAL
Status: COMPLETED | OUTPATIENT
Start: 2021-01-01 | End: 2021-01-01

## 2021-01-01 RX ORDER — RISPERIDONE 1 MG/1
1 TABLET, FILM COATED ORAL 2 TIMES DAILY
Qty: 60 TABLET | Refills: 0 | Status: ON HOLD | OUTPATIENT
Start: 2021-01-01 | End: 2021-01-01 | Stop reason: HOSPADM

## 2021-01-01 RX ORDER — SODIUM CHLORIDE, SODIUM LACTATE, POTASSIUM CHLORIDE, CALCIUM CHLORIDE 600; 310; 30; 20 MG/100ML; MG/100ML; MG/100ML; MG/100ML
1000 INJECTION, SOLUTION INTRAVENOUS ONCE
Status: COMPLETED | OUTPATIENT
Start: 2021-01-01 | End: 2021-01-01

## 2021-01-01 RX ORDER — POTASSIUM CHLORIDE 29.8 MG/ML
20 INJECTION INTRAVENOUS ONCE
Status: COMPLETED | OUTPATIENT
Start: 2021-01-01 | End: 2021-01-01

## 2021-01-01 RX ORDER — HALOPERIDOL 5 MG/ML
5 INJECTION INTRAMUSCULAR EVERY 6 HOURS PRN
Qty: 2 ML | Refills: 0 | Status: ON HOLD
Start: 2021-01-01 | End: 2021-01-01 | Stop reason: HOSPADM

## 2021-01-01 RX ORDER — ACETAMINOPHEN 650 MG/1
650 SUPPOSITORY RECTAL EVERY 6 HOURS PRN
Status: DISCONTINUED | OUTPATIENT
Start: 2021-01-01 | End: 2021-01-01 | Stop reason: HOSPADM

## 2021-01-01 RX ORDER — MORPHINE SULFATE 4 MG/ML
4 INJECTION, SOLUTION INTRAMUSCULAR; INTRAVENOUS
Status: DISCONTINUED | OUTPATIENT
Start: 2021-01-01 | End: 2021-01-01

## 2021-01-01 RX ORDER — CALCIUM CHLORIDE 100 MG/ML
INJECTION INTRAVENOUS; INTRAVENTRICULAR PRN
Status: DISCONTINUED | OUTPATIENT
Start: 2021-01-01 | End: 2021-01-01 | Stop reason: SDUPTHER

## 2021-01-01 RX ORDER — DEXTROSE AND SODIUM CHLORIDE 5; .2 G/100ML; G/100ML
INJECTION, SOLUTION INTRAVENOUS CONTINUOUS
Status: DISCONTINUED | OUTPATIENT
Start: 2021-01-01 | End: 2021-01-01

## 2021-01-01 RX ORDER — ROCURONIUM BROMIDE 10 MG/ML
INJECTION, SOLUTION INTRAVENOUS PRN
Status: DISCONTINUED | OUTPATIENT
Start: 2021-01-01 | End: 2021-01-01 | Stop reason: SDUPTHER

## 2021-01-01 RX ORDER — POTASSIUM CHLORIDE 20 MEQ/1
20 TABLET, EXTENDED RELEASE ORAL ONCE
Status: DISCONTINUED | OUTPATIENT
Start: 2021-01-01 | End: 2021-01-01 | Stop reason: HOSPADM

## 2021-01-01 RX ORDER — CALCIUM GLUCONATE 94 MG/ML
1000 INJECTION, SOLUTION INTRAVENOUS ONCE
Status: COMPLETED | OUTPATIENT
Start: 2021-01-01 | End: 2021-01-01

## 2021-01-01 RX ORDER — ACETAMINOPHEN 325 MG/1
650 TABLET ORAL EVERY 4 HOURS PRN
Status: DISCONTINUED | OUTPATIENT
Start: 2021-01-01 | End: 2021-01-01 | Stop reason: HOSPADM

## 2021-01-01 RX ORDER — PROPOFOL 10 MG/ML
10 INJECTION, EMULSION INTRAVENOUS CONTINUOUS PRN
Status: DISCONTINUED | OUTPATIENT
Start: 2021-01-01 | End: 2021-01-01

## 2021-01-01 RX ORDER — QUETIAPINE FUMARATE 25 MG/1
25 TABLET, FILM COATED ORAL ONCE
Status: DISCONTINUED | OUTPATIENT
Start: 2021-01-01 | End: 2021-01-01

## 2021-01-01 RX ORDER — OXYCODONE HYDROCHLORIDE 5 MG/1
5 TABLET ORAL EVERY 4 HOURS PRN
Status: DISCONTINUED | OUTPATIENT
Start: 2021-01-01 | End: 2021-01-01

## 2021-01-01 RX ORDER — CALCIUM GLUCONATE 20 MG/ML
1000 INJECTION, SOLUTION INTRAVENOUS ONCE
Status: COMPLETED | OUTPATIENT
Start: 2021-01-01 | End: 2021-01-01

## 2021-01-01 RX ORDER — ACETAMINOPHEN 500 MG
500 TABLET ORAL 3 TIMES DAILY PRN
COMMUNITY

## 2021-01-01 RX ORDER — PROMETHAZINE HYDROCHLORIDE 25 MG/ML
6.25 INJECTION, SOLUTION INTRAMUSCULAR; INTRAVENOUS
Status: ACTIVE | OUTPATIENT
Start: 2021-01-01 | End: 2021-01-01

## 2021-01-01 RX ORDER — DEXTROSE MONOHYDRATE 25 G/50ML
12.5 INJECTION, SOLUTION INTRAVENOUS PRN
Status: DISCONTINUED | OUTPATIENT
Start: 2021-01-01 | End: 2021-01-01 | Stop reason: HOSPADM

## 2021-01-01 RX ORDER — LORAZEPAM 2 MG/ML
1 CONCENTRATE ORAL EVERY 4 HOURS PRN
Qty: 1 BOTTLE | Refills: 0 | Status: SHIPPED | OUTPATIENT
Start: 2021-01-01 | End: 2021-04-06

## 2021-01-01 RX ORDER — LANOLIN ALCOHOL/MO/W.PET/CERES
400 CREAM (GRAM) TOPICAL 2 TIMES DAILY
Status: DISCONTINUED | OUTPATIENT
Start: 2021-01-01 | End: 2021-01-01

## 2021-01-01 RX ORDER — OXYCODONE HYDROCHLORIDE 10 MG/1
10 TABLET ORAL EVERY 4 HOURS PRN
Status: DISCONTINUED | OUTPATIENT
Start: 2021-01-01 | End: 2021-01-01

## 2021-01-01 RX ADMIN — LORAZEPAM 1 MG: 2 INJECTION INTRAMUSCULAR; INTRAVENOUS at 01:26

## 2021-01-01 RX ADMIN — SODIUM CHLORIDE, PRESERVATIVE FREE 10 ML: 5 INJECTION INTRAVENOUS at 09:04

## 2021-01-01 RX ADMIN — Medication 400 MG: at 09:55

## 2021-01-01 RX ADMIN — Medication 400 MG: at 09:41

## 2021-01-01 RX ADMIN — SODIUM CHLORIDE, PRESERVATIVE FREE 10 ML: 5 INJECTION INTRAVENOUS at 21:32

## 2021-01-01 RX ADMIN — HEPARIN SODIUM 5000 UNITS: 5000 INJECTION INTRAVENOUS; SUBCUTANEOUS at 21:34

## 2021-01-01 RX ADMIN — METRONIDAZOLE 500 MG: 500 INJECTION, SOLUTION INTRAVENOUS at 00:27

## 2021-01-01 RX ADMIN — OLANZAPINE 10 MG: 10 INJECTION, POWDER, FOR SOLUTION INTRAMUSCULAR at 20:45

## 2021-01-01 RX ADMIN — MORPHINE SULFATE 2 MG: 2 INJECTION, SOLUTION INTRAMUSCULAR; INTRAVENOUS at 09:38

## 2021-01-01 RX ADMIN — HEPARIN SODIUM 5000 UNITS: 5000 INJECTION INTRAVENOUS; SUBCUTANEOUS at 05:37

## 2021-01-01 RX ADMIN — SODIUM CHLORIDE 1500 MG: 900 INJECTION INTRAVENOUS at 05:13

## 2021-01-01 RX ADMIN — HEPARIN SODIUM 5000 UNITS: 5000 INJECTION INTRAVENOUS; SUBCUTANEOUS at 14:44

## 2021-01-01 RX ADMIN — POLYETHYLENE GLYCOL 3350 17 G: 17 POWDER, FOR SOLUTION ORAL at 09:22

## 2021-01-01 RX ADMIN — HEPARIN SODIUM 5000 UNITS: 5000 INJECTION INTRAVENOUS; SUBCUTANEOUS at 21:45

## 2021-01-01 RX ADMIN — HEPARIN SODIUM 5000 UNITS: 5000 INJECTION INTRAVENOUS; SUBCUTANEOUS at 21:13

## 2021-01-01 RX ADMIN — SODIUM CHLORIDE 5 UNITS: 9 INJECTION, SOLUTION INTRAVENOUS at 21:44

## 2021-01-01 RX ADMIN — METRONIDAZOLE 500 MG: 500 INJECTION, SOLUTION INTRAVENOUS at 01:10

## 2021-01-01 RX ADMIN — BISACODYL 10 MG: 10 SUPPOSITORY RECTAL at 15:09

## 2021-01-01 RX ADMIN — DONEPEZIL HYDROCHLORIDE 5 MG: 5 TABLET, FILM COATED ORAL at 21:29

## 2021-01-01 RX ADMIN — SODIUM CHLORIDE: 4.5 INJECTION, SOLUTION INTRAVENOUS at 12:28

## 2021-01-01 RX ADMIN — POLYETHYLENE GLYCOL 3350 17 G: 17 POWDER, FOR SOLUTION ORAL at 09:14

## 2021-01-01 RX ADMIN — Medication 2 CAPSULE: at 08:31

## 2021-01-01 RX ADMIN — PROPOFOL 40 MCG/KG/MIN: 10 INJECTION, EMULSION INTRAVENOUS at 00:47

## 2021-01-01 RX ADMIN — POTASSIUM CHLORIDE, DEXTROSE MONOHYDRATE AND SODIUM CHLORIDE: 150; 5; 200 INJECTION, SOLUTION INTRAVENOUS at 19:00

## 2021-01-01 RX ADMIN — HEPARIN SODIUM 5000 UNITS: 5000 INJECTION INTRAVENOUS; SUBCUTANEOUS at 05:48

## 2021-01-01 RX ADMIN — METRONIDAZOLE 500 MG: 500 INJECTION, SOLUTION INTRAVENOUS at 17:13

## 2021-01-01 RX ADMIN — SODIUM CHLORIDE, PRESERVATIVE FREE 10 ML: 5 INJECTION INTRAVENOUS at 08:25

## 2021-01-01 RX ADMIN — RISPERIDONE 1 MG: 1 TABLET ORAL at 09:12

## 2021-01-01 RX ADMIN — METRONIDAZOLE 500 MG: 500 INJECTION, SOLUTION INTRAVENOUS at 09:12

## 2021-01-01 RX ADMIN — OLANZAPINE 2.5 MG: 10 INJECTION, POWDER, FOR SOLUTION INTRAMUSCULAR at 01:29

## 2021-01-01 RX ADMIN — HEPARIN SODIUM 5000 UNITS: 5000 INJECTION INTRAVENOUS; SUBCUTANEOUS at 07:21

## 2021-01-01 RX ADMIN — CIPROFLOXACIN 400 MG: 2 INJECTION, SOLUTION INTRAVENOUS at 15:59

## 2021-01-01 RX ADMIN — SODIUM CHLORIDE, PRESERVATIVE FREE 10 ML: 5 INJECTION INTRAVENOUS at 20:54

## 2021-01-01 RX ADMIN — FAMOTIDINE 20 MG: 10 INJECTION, SOLUTION INTRAVENOUS at 08:33

## 2021-01-01 RX ADMIN — RISPERIDONE 1 MG: 1 TABLET ORAL at 20:25

## 2021-01-01 RX ADMIN — ONDANSETRON 4 MG: 2 INJECTION INTRAMUSCULAR; INTRAVENOUS at 08:01

## 2021-01-01 RX ADMIN — SODIUM CHLORIDE, PRESERVATIVE FREE 10 ML: 5 INJECTION INTRAVENOUS at 08:23

## 2021-01-01 RX ADMIN — DEXTROSE MONOHYDRATE 25 G: 25 INJECTION, SOLUTION INTRAVENOUS at 23:03

## 2021-01-01 RX ADMIN — POTASSIUM CHLORIDE, DEXTROSE MONOHYDRATE AND SODIUM CHLORIDE: 150; 5; 200 INJECTION, SOLUTION INTRAVENOUS at 23:24

## 2021-01-01 RX ADMIN — SODIUM CHLORIDE, PRESERVATIVE FREE 10 ML: 5 INJECTION INTRAVENOUS at 21:22

## 2021-01-01 RX ADMIN — OLANZAPINE 2.5 MG: 10 INJECTION, POWDER, FOR SOLUTION INTRAMUSCULAR at 04:51

## 2021-01-01 RX ADMIN — OLANZAPINE 2.5 MG: 10 INJECTION, POWDER, FOR SOLUTION INTRAMUSCULAR at 23:49

## 2021-01-01 RX ADMIN — ETOMIDATE 20 MG: 2 INJECTION INTRAVENOUS at 13:22

## 2021-01-01 RX ADMIN — Medication 100 MCG: at 21:38

## 2021-01-01 RX ADMIN — FAMOTIDINE 20 MG: 10 INJECTION, SOLUTION INTRAVENOUS at 09:12

## 2021-01-01 RX ADMIN — SODIUM CHLORIDE: 9 INJECTION, SOLUTION INTRAVENOUS at 21:17

## 2021-01-01 RX ADMIN — HEPARIN SODIUM 5000 UNITS: 5000 INJECTION INTRAVENOUS; SUBCUTANEOUS at 21:19

## 2021-01-01 RX ADMIN — Medication 400 MG: at 12:06

## 2021-01-01 RX ADMIN — SODIUM CHLORIDE 1500 MG: 900 INJECTION INTRAVENOUS at 04:20

## 2021-01-01 RX ADMIN — METRONIDAZOLE 500 MG: 500 INJECTION, SOLUTION INTRAVENOUS at 09:20

## 2021-01-01 RX ADMIN — SODIUM CHLORIDE 1500 MG: 900 INJECTION INTRAVENOUS at 05:14

## 2021-01-01 RX ADMIN — HEPARIN SODIUM 5000 UNITS: 5000 INJECTION INTRAVENOUS; SUBCUTANEOUS at 15:09

## 2021-01-01 RX ADMIN — INSULIN LISPRO 1 UNITS: 100 INJECTION, SOLUTION INTRAVENOUS; SUBCUTANEOUS at 17:11

## 2021-01-01 RX ADMIN — POLYETHYLENE GLYCOL 3350 17 G: 17 POWDER, FOR SOLUTION ORAL at 09:20

## 2021-01-01 RX ADMIN — DONEPEZIL HYDROCHLORIDE 5 MG: 5 TABLET, FILM COATED ORAL at 21:45

## 2021-01-01 RX ADMIN — METRONIDAZOLE 500 MG: 500 INJECTION, SOLUTION INTRAVENOUS at 09:14

## 2021-01-01 RX ADMIN — CIPROFLOXACIN 400 MG: 2 INJECTION, SOLUTION INTRAVENOUS at 03:13

## 2021-01-01 RX ADMIN — INSULIN LISPRO 1 UNITS: 100 INJECTION, SOLUTION INTRAVENOUS; SUBCUTANEOUS at 08:25

## 2021-01-01 RX ADMIN — POLYETHYLENE GLYCOL 3350 17 G: 17 POWDER, FOR SOLUTION ORAL at 09:12

## 2021-01-01 RX ADMIN — INSULIN LISPRO 1 UNITS: 100 INJECTION, SOLUTION INTRAVENOUS; SUBCUTANEOUS at 12:13

## 2021-01-01 RX ADMIN — METRONIDAZOLE 500 MG: 500 INJECTION, SOLUTION INTRAVENOUS at 18:39

## 2021-01-01 RX ADMIN — IOHEXOL 50 ML: 240 INJECTION, SOLUTION INTRATHECAL; INTRAVASCULAR; INTRAVENOUS; ORAL at 10:46

## 2021-01-01 RX ADMIN — SODIUM CHLORIDE, PRESERVATIVE FREE 10 ML: 5 INJECTION INTRAVENOUS at 21:44

## 2021-01-01 RX ADMIN — OLANZAPINE 10 MG: 10 INJECTION, POWDER, FOR SOLUTION INTRAMUSCULAR at 20:40

## 2021-01-01 RX ADMIN — CEFTRIAXONE 1000 MG: 1 INJECTION, POWDER, FOR SOLUTION INTRAMUSCULAR; INTRAVENOUS at 14:07

## 2021-01-01 RX ADMIN — HEPARIN SODIUM 5000 UNITS: 5000 INJECTION INTRAVENOUS; SUBCUTANEOUS at 21:58

## 2021-01-01 RX ADMIN — Medication 400 MG: at 19:36

## 2021-01-01 RX ADMIN — SODIUM CHLORIDE 1500 MG: 900 INJECTION INTRAVENOUS at 14:04

## 2021-01-01 RX ADMIN — HEPARIN SODIUM 5000 UNITS: 5000 INJECTION INTRAVENOUS; SUBCUTANEOUS at 05:28

## 2021-01-01 RX ADMIN — DEXTROSE MONOHYDRATE: 50 INJECTION, SOLUTION INTRAVENOUS at 12:40

## 2021-01-01 RX ADMIN — PROPOFOL 120 MG: 10 INJECTION, EMULSION INTRAVENOUS at 21:32

## 2021-01-01 RX ADMIN — METRONIDAZOLE 500 MG: 500 INJECTION, SOLUTION INTRAVENOUS at 00:07

## 2021-01-01 RX ADMIN — SODIUM CHLORIDE, PRESERVATIVE FREE 10 ML: 5 INJECTION INTRAVENOUS at 09:47

## 2021-01-01 RX ADMIN — HEPARIN SODIUM 5000 UNITS: 5000 INJECTION INTRAVENOUS; SUBCUTANEOUS at 05:03

## 2021-01-01 RX ADMIN — SODIUM CHLORIDE 1500 MG: 900 INJECTION INTRAVENOUS at 05:41

## 2021-01-01 RX ADMIN — SODIUM CHLORIDE: 4.5 INJECTION, SOLUTION INTRAVENOUS at 00:25

## 2021-01-01 RX ADMIN — HEPARIN SODIUM 5000 UNITS: 5000 INJECTION INTRAVENOUS; SUBCUTANEOUS at 21:37

## 2021-01-01 RX ADMIN — SODIUM CHLORIDE, PRESERVATIVE FREE 10 ML: 5 INJECTION INTRAVENOUS at 20:30

## 2021-01-01 RX ADMIN — Medication 1 TABLET: at 08:33

## 2021-01-01 RX ADMIN — MORPHINE SULFATE 2 MG: 2 INJECTION, SOLUTION INTRAMUSCULAR; INTRAVENOUS at 23:00

## 2021-01-01 RX ADMIN — HEPARIN SODIUM 5000 UNITS: 5000 INJECTION INTRAVENOUS; SUBCUTANEOUS at 07:01

## 2021-01-01 RX ADMIN — POLYETHYLENE GLYCOL 3350 17 G: 17 POWDER, FOR SOLUTION ORAL at 11:55

## 2021-01-01 RX ADMIN — OLANZAPINE 2.5 MG: 10 INJECTION, POWDER, FOR SOLUTION INTRAMUSCULAR at 10:07

## 2021-01-01 RX ADMIN — HEPARIN SODIUM 5000 UNITS: 5000 INJECTION INTRAVENOUS; SUBCUTANEOUS at 06:54

## 2021-01-01 RX ADMIN — FAMOTIDINE 20 MG: 10 INJECTION, SOLUTION INTRAVENOUS at 09:20

## 2021-01-01 RX ADMIN — HEPARIN SODIUM 5000 UNITS: 5000 INJECTION INTRAVENOUS; SUBCUTANEOUS at 21:44

## 2021-01-01 RX ADMIN — HEPARIN SODIUM 5000 UNITS: 5000 INJECTION INTRAVENOUS; SUBCUTANEOUS at 15:10

## 2021-01-01 RX ADMIN — HEPARIN SODIUM 5000 UNITS: 5000 INJECTION INTRAVENOUS; SUBCUTANEOUS at 05:27

## 2021-01-01 RX ADMIN — HEPARIN SODIUM 5000 UNITS: 5000 INJECTION INTRAVENOUS; SUBCUTANEOUS at 13:28

## 2021-01-01 RX ADMIN — OLANZAPINE 10 MG: 10 INJECTION, POWDER, FOR SOLUTION INTRAMUSCULAR at 00:13

## 2021-01-01 RX ADMIN — SODIUM CHLORIDE: 9 INJECTION, SOLUTION INTRAVENOUS at 05:46

## 2021-01-01 RX ADMIN — SODIUM CHLORIDE, PRESERVATIVE FREE 10 ML: 5 INJECTION INTRAVENOUS at 10:06

## 2021-01-01 RX ADMIN — SODIUM CHLORIDE, PRESERVATIVE FREE 10 ML: 5 INJECTION INTRAVENOUS at 09:39

## 2021-01-01 RX ADMIN — METRONIDAZOLE 500 MG: 500 INJECTION, SOLUTION INTRAVENOUS at 16:45

## 2021-01-01 RX ADMIN — FAMOTIDINE 20 MG: 10 INJECTION, SOLUTION INTRAVENOUS at 10:24

## 2021-01-01 RX ADMIN — DEXTROSE MONOHYDRATE 12.5 G: 25 INJECTION, SOLUTION INTRAVENOUS at 21:44

## 2021-01-01 RX ADMIN — RISPERIDONE 1 MG: 1 TABLET ORAL at 09:41

## 2021-01-01 RX ADMIN — Medication 1 TABLET: at 21:57

## 2021-01-01 RX ADMIN — FAMOTIDINE 20 MG: 10 INJECTION, SOLUTION INTRAVENOUS at 09:15

## 2021-01-01 RX ADMIN — INSULIN LISPRO 1 UNITS: 100 INJECTION, SOLUTION INTRAVENOUS; SUBCUTANEOUS at 08:19

## 2021-01-01 RX ADMIN — Medication 400 MG: at 21:29

## 2021-01-01 RX ADMIN — OLANZAPINE 2.5 MG: 10 INJECTION, POWDER, FOR SOLUTION INTRAMUSCULAR at 15:45

## 2021-01-01 RX ADMIN — FAMOTIDINE 20 MG: 10 INJECTION, SOLUTION INTRAVENOUS at 09:14

## 2021-01-01 RX ADMIN — POTASSIUM CHLORIDE, DEXTROSE MONOHYDRATE AND SODIUM CHLORIDE: 150; 5; 200 INJECTION, SOLUTION INTRAVENOUS at 21:45

## 2021-01-01 RX ADMIN — HEPARIN SODIUM 5000 UNITS: 5000 INJECTION INTRAVENOUS; SUBCUTANEOUS at 13:33

## 2021-01-01 RX ADMIN — HEPARIN SODIUM 5000 UNITS: 5000 INJECTION INTRAVENOUS; SUBCUTANEOUS at 21:57

## 2021-01-01 RX ADMIN — METRONIDAZOLE 500 MG: 500 INJECTION, SOLUTION INTRAVENOUS at 01:29

## 2021-01-01 RX ADMIN — FENTANYL CITRATE 100 MCG: 50 INJECTION INTRAMUSCULAR; INTRAVENOUS at 21:32

## 2021-01-01 RX ADMIN — SODIUM CHLORIDE, PRESERVATIVE FREE 10 ML: 5 INJECTION INTRAVENOUS at 09:14

## 2021-01-01 RX ADMIN — MORPHINE SULFATE 2 MG: 2 INJECTION, SOLUTION INTRAMUSCULAR; INTRAVENOUS at 21:13

## 2021-01-01 RX ADMIN — INSULIN LISPRO 2 UNITS: 100 INJECTION, SOLUTION INTRAVENOUS; SUBCUTANEOUS at 17:19

## 2021-01-01 RX ADMIN — VANCOMYCIN HYDROCHLORIDE 1000 MG: 1 INJECTION, POWDER, LYOPHILIZED, FOR SOLUTION INTRAVENOUS at 11:57

## 2021-01-01 RX ADMIN — DONEPEZIL HYDROCHLORIDE 5 MG: 5 TABLET, FILM COATED ORAL at 20:56

## 2021-01-01 RX ADMIN — MORPHINE SULFATE 2 MG: 2 INJECTION, SOLUTION INTRAMUSCULAR; INTRAVENOUS at 14:15

## 2021-01-01 RX ADMIN — MORPHINE SULFATE 4 MG: 4 INJECTION, SOLUTION INTRAMUSCULAR; INTRAVENOUS at 00:41

## 2021-01-01 RX ADMIN — DONEPEZIL HYDROCHLORIDE 5 MG: 5 TABLET, FILM COATED ORAL at 21:36

## 2021-01-01 RX ADMIN — HEPARIN SODIUM 5000 UNITS: 5000 INJECTION INTRAVENOUS; SUBCUTANEOUS at 05:53

## 2021-01-01 RX ADMIN — DONEPEZIL HYDROCHLORIDE 5 MG: 5 TABLET, FILM COATED ORAL at 21:23

## 2021-01-01 RX ADMIN — METRONIDAZOLE 500 MG: 500 INJECTION, SOLUTION INTRAVENOUS at 09:40

## 2021-01-01 RX ADMIN — Medication 1 TABLET: at 21:45

## 2021-01-01 RX ADMIN — INSULIN LISPRO 1 UNITS: 100 INJECTION, SOLUTION INTRAVENOUS; SUBCUTANEOUS at 00:27

## 2021-01-01 RX ADMIN — SODIUM CHLORIDE, PRESERVATIVE FREE 10 ML: 5 INJECTION INTRAVENOUS at 09:12

## 2021-01-01 RX ADMIN — INSULIN LISPRO 1 UNITS: 100 INJECTION, SOLUTION INTRAVENOUS; SUBCUTANEOUS at 21:24

## 2021-01-01 RX ADMIN — DONEPEZIL HYDROCHLORIDE 5 MG: 5 TABLET, FILM COATED ORAL at 20:30

## 2021-01-01 RX ADMIN — FAMOTIDINE 20 MG: 10 INJECTION, SOLUTION INTRAVENOUS at 10:03

## 2021-01-01 RX ADMIN — QUETIAPINE FUMARATE 50 MG: 25 TABLET ORAL at 00:07

## 2021-01-01 RX ADMIN — SODIUM CHLORIDE, PRESERVATIVE FREE 10 ML: 5 INJECTION INTRAVENOUS at 10:04

## 2021-01-01 RX ADMIN — OLANZAPINE 2.5 MG: 10 INJECTION, POWDER, FOR SOLUTION INTRAMUSCULAR at 17:10

## 2021-01-01 RX ADMIN — HEPARIN SODIUM 5000 UNITS: 5000 INJECTION INTRAVENOUS; SUBCUTANEOUS at 14:08

## 2021-01-01 RX ADMIN — HEPARIN SODIUM 5000 UNITS: 5000 INJECTION INTRAVENOUS; SUBCUTANEOUS at 14:51

## 2021-01-01 RX ADMIN — POLYETHYLENE GLYCOL 3350 17 G: 17 POWDER, FOR SOLUTION ORAL at 10:01

## 2021-01-01 RX ADMIN — CIPROFLOXACIN 400 MG: 2 INJECTION, SOLUTION INTRAVENOUS at 02:36

## 2021-01-01 RX ADMIN — DONEPEZIL HYDROCHLORIDE 5 MG: 5 TABLET, FILM COATED ORAL at 21:57

## 2021-01-01 RX ADMIN — OLANZAPINE 2.5 MG: 10 INJECTION, POWDER, FOR SOLUTION INTRAMUSCULAR at 03:34

## 2021-01-01 RX ADMIN — OLANZAPINE 2.5 MG: 10 INJECTION, POWDER, FOR SOLUTION INTRAMUSCULAR at 21:10

## 2021-01-01 RX ADMIN — SODIUM CHLORIDE 1500 MG: 900 INJECTION INTRAVENOUS at 23:30

## 2021-01-01 RX ADMIN — METRONIDAZOLE 500 MG: 500 INJECTION, SOLUTION INTRAVENOUS at 16:52

## 2021-01-01 RX ADMIN — HEPARIN SODIUM 5000 UNITS: 5000 INJECTION INTRAVENOUS; SUBCUTANEOUS at 23:04

## 2021-01-01 RX ADMIN — ACETAMINOPHEN 650 MG: 325 TABLET ORAL at 19:39

## 2021-01-01 RX ADMIN — HEPARIN SODIUM 5000 UNITS: 5000 INJECTION INTRAVENOUS; SUBCUTANEOUS at 06:21

## 2021-01-01 RX ADMIN — HEPARIN SODIUM 5000 UNITS: 5000 INJECTION INTRAVENOUS; SUBCUTANEOUS at 13:31

## 2021-01-01 RX ADMIN — LORAZEPAM 1 MG: 2 INJECTION INTRAMUSCULAR; INTRAVENOUS at 12:17

## 2021-01-01 RX ADMIN — HEPARIN SODIUM 5000 UNITS: 5000 INJECTION INTRAVENOUS; SUBCUTANEOUS at 06:40

## 2021-01-01 RX ADMIN — SODIUM CHLORIDE, PRESERVATIVE FREE 10 ML: 5 INJECTION INTRAVENOUS at 08:58

## 2021-01-01 RX ADMIN — SODIUM CHLORIDE: 9 INJECTION, SOLUTION INTRAVENOUS at 12:39

## 2021-01-01 RX ADMIN — SODIUM CHLORIDE 1000 ML: 9 INJECTION, SOLUTION INTRAVENOUS at 13:20

## 2021-01-01 RX ADMIN — METRONIDAZOLE 500 MG: 500 INJECTION, SOLUTION INTRAVENOUS at 01:57

## 2021-01-01 RX ADMIN — ONDANSETRON 4 MG: 2 INJECTION INTRAMUSCULAR; INTRAVENOUS at 08:31

## 2021-01-01 RX ADMIN — DONEPEZIL HYDROCHLORIDE 5 MG: 5 TABLET, FILM COATED ORAL at 21:13

## 2021-01-01 RX ADMIN — VANCOMYCIN HYDROCHLORIDE 1000 MG: 1 INJECTION, POWDER, LYOPHILIZED, FOR SOLUTION INTRAVENOUS at 08:24

## 2021-01-01 RX ADMIN — METRONIDAZOLE 500 MG: 500 INJECTION, SOLUTION INTRAVENOUS at 17:30

## 2021-01-01 RX ADMIN — OLANZAPINE 10 MG: 10 INJECTION, POWDER, FOR SOLUTION INTRAMUSCULAR at 01:09

## 2021-01-01 RX ADMIN — Medication 400 MG: at 09:12

## 2021-01-01 RX ADMIN — POLYETHYLENE GLYCOL 3350 17 G: 17 POWDER, FOR SOLUTION ORAL at 09:15

## 2021-01-01 RX ADMIN — DEXAMETHASONE SODIUM PHOSPHATE 8 MG: 4 INJECTION, SOLUTION INTRAMUSCULAR; INTRAVENOUS at 22:06

## 2021-01-01 RX ADMIN — MORPHINE SULFATE 4 MG: 4 INJECTION, SOLUTION INTRAMUSCULAR; INTRAVENOUS at 17:21

## 2021-01-01 RX ADMIN — Medication 1 TABLET: at 21:50

## 2021-01-01 RX ADMIN — Medication 2 CAPSULE: at 08:25

## 2021-01-01 RX ADMIN — SODIUM CHLORIDE, PRESERVATIVE FREE 10 ML: 5 INJECTION INTRAVENOUS at 08:05

## 2021-01-01 RX ADMIN — HEPARIN SODIUM 5000 UNITS: 5000 INJECTION INTRAVENOUS; SUBCUTANEOUS at 21:23

## 2021-01-01 RX ADMIN — RISPERIDONE 1 MG: 1 TABLET ORAL at 21:36

## 2021-01-01 RX ADMIN — FAMOTIDINE 20 MG: 10 INJECTION, SOLUTION INTRAVENOUS at 08:32

## 2021-01-01 RX ADMIN — CEFTRIAXONE 1000 MG: 1 INJECTION, POWDER, FOR SOLUTION INTRAMUSCULAR; INTRAVENOUS at 13:31

## 2021-01-01 RX ADMIN — LORAZEPAM 1 MG: 2 INJECTION INTRAMUSCULAR; INTRAVENOUS at 04:43

## 2021-01-01 RX ADMIN — DEXTROSE MONOHYDRATE: 50 INJECTION, SOLUTION INTRAVENOUS at 16:24

## 2021-01-01 RX ADMIN — SODIUM CHLORIDE 1500 MG: 900 INJECTION INTRAVENOUS at 22:12

## 2021-01-01 RX ADMIN — METRONIDAZOLE 500 MG: 500 INJECTION, SOLUTION INTRAVENOUS at 20:50

## 2021-01-01 RX ADMIN — CEFTRIAXONE 1000 MG: 1 INJECTION, POWDER, FOR SOLUTION INTRAMUSCULAR; INTRAVENOUS at 12:47

## 2021-01-01 RX ADMIN — OLANZAPINE 5 MG: 5 TABLET, FILM COATED ORAL at 19:36

## 2021-01-01 RX ADMIN — DONEPEZIL HYDROCHLORIDE 5 MG: 5 TABLET, FILM COATED ORAL at 21:20

## 2021-01-01 RX ADMIN — SODIUM CHLORIDE, PRESERVATIVE FREE 10 ML: 5 INJECTION INTRAVENOUS at 20:39

## 2021-01-01 RX ADMIN — VANCOMYCIN HYDROCHLORIDE 1250 MG: 5 INJECTION, POWDER, LYOPHILIZED, FOR SOLUTION INTRAVENOUS at 11:49

## 2021-01-01 RX ADMIN — Medication 400 MG: at 08:04

## 2021-01-01 RX ADMIN — Medication 400 MG: at 21:36

## 2021-01-01 RX ADMIN — FAMOTIDINE 20 MG: 10 INJECTION, SOLUTION INTRAVENOUS at 10:05

## 2021-01-01 RX ADMIN — HEPARIN SODIUM 5000 UNITS: 5000 INJECTION INTRAVENOUS; SUBCUTANEOUS at 14:10

## 2021-01-01 RX ADMIN — PROPOFOL 5 MCG/KG/MIN: 10 INJECTION, EMULSION INTRAVENOUS at 14:21

## 2021-01-01 RX ADMIN — LORAZEPAM 1 MG: 2 INJECTION INTRAMUSCULAR; INTRAVENOUS at 19:50

## 2021-01-01 RX ADMIN — SODIUM CHLORIDE, PRESERVATIVE FREE 10 ML: 5 INJECTION INTRAVENOUS at 21:16

## 2021-01-01 RX ADMIN — SODIUM CHLORIDE 1500 MG: 900 INJECTION INTRAVENOUS at 21:33

## 2021-01-01 RX ADMIN — OLANZAPINE 10 MG: 10 INJECTION, POWDER, FOR SOLUTION INTRAMUSCULAR at 12:42

## 2021-01-01 RX ADMIN — Medication 10 ML: at 01:30

## 2021-01-01 RX ADMIN — POLYETHYLENE GLYCOL 3350 17 G: 17 POWDER, FOR SOLUTION ORAL at 09:09

## 2021-01-01 RX ADMIN — POLYETHYLENE GLYCOL 3350 17 G: 17 POWDER, FOR SOLUTION ORAL at 09:33

## 2021-01-01 RX ADMIN — HEPARIN SODIUM 5000 UNITS: 5000 INJECTION INTRAVENOUS; SUBCUTANEOUS at 13:48

## 2021-01-01 RX ADMIN — POTASSIUM CHLORIDE, DEXTROSE MONOHYDRATE AND SODIUM CHLORIDE: 150; 5; 200 INJECTION, SOLUTION INTRAVENOUS at 09:33

## 2021-01-01 RX ADMIN — CHLORHEXIDINE GLUCONATE 15 ML: 0.12 RINSE ORAL at 10:24

## 2021-01-01 RX ADMIN — RISPERIDONE 1 MG: 1 TABLET ORAL at 09:14

## 2021-01-01 RX ADMIN — SODIUM CHLORIDE, PRESERVATIVE FREE 10 ML: 5 INJECTION INTRAVENOUS at 09:15

## 2021-01-01 RX ADMIN — HEPARIN SODIUM 5000 UNITS: 5000 INJECTION INTRAVENOUS; SUBCUTANEOUS at 07:09

## 2021-01-01 RX ADMIN — BISACODYL 10 MG: 10 SUPPOSITORY RECTAL at 05:15

## 2021-01-01 RX ADMIN — OLANZAPINE 2.5 MG: 10 INJECTION, POWDER, FOR SOLUTION INTRAMUSCULAR at 09:28

## 2021-01-01 RX ADMIN — POLYETHYLENE GLYCOL 3350 17 G: 17 POWDER, FOR SOLUTION ORAL at 08:04

## 2021-01-01 RX ADMIN — SODIUM CHLORIDE 1500 MG: 900 INJECTION INTRAVENOUS at 13:35

## 2021-01-01 RX ADMIN — SODIUM CHLORIDE: 9 INJECTION, SOLUTION INTRAVENOUS at 21:28

## 2021-01-01 RX ADMIN — HEPARIN SODIUM 5000 UNITS: 5000 INJECTION INTRAVENOUS; SUBCUTANEOUS at 05:14

## 2021-01-01 RX ADMIN — ROCURONIUM BROMIDE 40 MG: 10 INJECTION INTRAVENOUS at 21:33

## 2021-01-01 RX ADMIN — METRONIDAZOLE 500 MG: 500 INJECTION, SOLUTION INTRAVENOUS at 08:04

## 2021-01-01 RX ADMIN — OLANZAPINE 2.5 MG: 10 INJECTION, POWDER, FOR SOLUTION INTRAMUSCULAR at 20:54

## 2021-01-01 RX ADMIN — METRONIDAZOLE 500 MG: 500 INJECTION, SOLUTION INTRAVENOUS at 08:01

## 2021-01-01 RX ADMIN — HEPARIN SODIUM 5000 UNITS: 5000 INJECTION INTRAVENOUS; SUBCUTANEOUS at 05:38

## 2021-01-01 RX ADMIN — SODIUM CHLORIDE, PRESERVATIVE FREE 10 ML: 5 INJECTION INTRAVENOUS at 21:30

## 2021-01-01 RX ADMIN — VANCOMYCIN HYDROCHLORIDE 1000 MG: 1 INJECTION, POWDER, LYOPHILIZED, FOR SOLUTION INTRAVENOUS at 09:29

## 2021-01-01 RX ADMIN — SODIUM CHLORIDE: 4.5 INJECTION, SOLUTION INTRAVENOUS at 10:06

## 2021-01-01 RX ADMIN — RISPERIDONE 0.5 MG: 0.5 TABLET ORAL at 21:20

## 2021-01-01 RX ADMIN — VANCOMYCIN HYDROCHLORIDE 1000 MG: 1 INJECTION, POWDER, LYOPHILIZED, FOR SOLUTION INTRAVENOUS at 18:29

## 2021-01-01 RX ADMIN — SODIUM CHLORIDE 1500 MG: 900 INJECTION INTRAVENOUS at 14:34

## 2021-01-01 RX ADMIN — DEXTROSE MONOHYDRATE: 50 INJECTION, SOLUTION INTRAVENOUS at 22:27

## 2021-01-01 RX ADMIN — POLYETHYLENE GLYCOL 3350 17 G: 17 POWDER, FOR SOLUTION ORAL at 09:55

## 2021-01-01 RX ADMIN — ONDANSETRON 4 MG: 2 INJECTION INTRAMUSCULAR; INTRAVENOUS at 17:51

## 2021-01-01 RX ADMIN — SODIUM CHLORIDE: 4.5 INJECTION, SOLUTION INTRAVENOUS at 02:24

## 2021-01-01 RX ADMIN — HEPARIN SODIUM 5000 UNITS: 5000 INJECTION INTRAVENOUS; SUBCUTANEOUS at 21:27

## 2021-01-01 RX ADMIN — HEPARIN SODIUM 5000 UNITS: 5000 INJECTION INTRAVENOUS; SUBCUTANEOUS at 22:25

## 2021-01-01 RX ADMIN — QUETIAPINE FUMARATE 50 MG: 25 TABLET ORAL at 21:20

## 2021-01-01 RX ADMIN — SODIUM CHLORIDE 1500 MG: 900 INJECTION INTRAVENOUS at 21:23

## 2021-01-01 RX ADMIN — OLANZAPINE 10 MG: 10 INJECTION, POWDER, FOR SOLUTION INTRAMUSCULAR at 17:48

## 2021-01-01 RX ADMIN — CEFTRIAXONE 1000 MG: 1 INJECTION, POWDER, FOR SOLUTION INTRAMUSCULAR; INTRAVENOUS at 14:15

## 2021-01-01 RX ADMIN — SODIUM CHLORIDE 1500 MG: 900 INJECTION INTRAVENOUS at 20:29

## 2021-01-01 RX ADMIN — RISPERIDONE 1 MG: 1 TABLET ORAL at 09:11

## 2021-01-01 RX ADMIN — NEOSTIGMINE 4 MG: 1 INJECTION INTRAVENOUS at 22:44

## 2021-01-01 RX ADMIN — ONDANSETRON 4 MG: 2 INJECTION INTRAMUSCULAR; INTRAVENOUS at 22:44

## 2021-01-01 RX ADMIN — FAMOTIDINE 20 MG: 10 INJECTION, SOLUTION INTRAVENOUS at 08:22

## 2021-01-01 RX ADMIN — POTASSIUM CHLORIDE, DEXTROSE MONOHYDRATE AND SODIUM CHLORIDE: 150; 5; 200 INJECTION, SOLUTION INTRAVENOUS at 14:08

## 2021-01-01 RX ADMIN — HEPARIN SODIUM 5000 UNITS: 5000 INJECTION INTRAVENOUS; SUBCUTANEOUS at 21:35

## 2021-01-01 RX ADMIN — SODIUM CHLORIDE 1500 MG: 900 INJECTION INTRAVENOUS at 13:28

## 2021-01-01 RX ADMIN — SODIUM CHLORIDE, PRESERVATIVE FREE 10 ML: 5 INJECTION INTRAVENOUS at 10:03

## 2021-01-01 RX ADMIN — FAMOTIDINE 20 MG: 10 INJECTION, SOLUTION INTRAVENOUS at 09:32

## 2021-01-01 RX ADMIN — DONEPEZIL HYDROCHLORIDE 5 MG: 5 TABLET, FILM COATED ORAL at 19:39

## 2021-01-01 RX ADMIN — SODIUM CHLORIDE, PRESERVATIVE FREE 10 ML: 5 INJECTION INTRAVENOUS at 22:40

## 2021-01-01 RX ADMIN — MORPHINE SULFATE 2 MG: 2 INJECTION, SOLUTION INTRAMUSCULAR; INTRAVENOUS at 12:00

## 2021-01-01 RX ADMIN — HEPARIN SODIUM 5000 UNITS: 5000 INJECTION INTRAVENOUS; SUBCUTANEOUS at 15:40

## 2021-01-01 RX ADMIN — FAMOTIDINE 20 MG: 10 INJECTION, SOLUTION INTRAVENOUS at 08:01

## 2021-01-01 RX ADMIN — OLANZAPINE 10 MG: 10 INJECTION, POWDER, FOR SOLUTION INTRAMUSCULAR at 22:19

## 2021-01-01 RX ADMIN — HEPARIN SODIUM 5000 UNITS: 5000 INJECTION INTRAVENOUS; SUBCUTANEOUS at 23:15

## 2021-01-01 RX ADMIN — HEPARIN SODIUM 5000 UNITS: 5000 INJECTION INTRAVENOUS; SUBCUTANEOUS at 15:36

## 2021-01-01 RX ADMIN — Medication 2 CAPSULE: at 17:38

## 2021-01-01 RX ADMIN — GLYCOPYRROLATE 0.8 MG: 0.2 INJECTION, SOLUTION INTRAMUSCULAR; INTRAVENOUS at 22:44

## 2021-01-01 RX ADMIN — SODIUM CHLORIDE, PRESERVATIVE FREE 10 ML: 5 INJECTION INTRAVENOUS at 09:31

## 2021-01-01 RX ADMIN — HEPARIN SODIUM 5000 UNITS: 5000 INJECTION INTRAVENOUS; SUBCUTANEOUS at 05:42

## 2021-01-01 RX ADMIN — HEPARIN SODIUM 5000 UNITS: 5000 INJECTION INTRAVENOUS; SUBCUTANEOUS at 20:45

## 2021-01-01 RX ADMIN — SODIUM CHLORIDE, PRESERVATIVE FREE 10 ML: 5 INJECTION INTRAVENOUS at 08:31

## 2021-01-01 RX ADMIN — DEXTROSE MONOHYDRATE: 50 INJECTION, SOLUTION INTRAVENOUS at 08:45

## 2021-01-01 RX ADMIN — MORPHINE SULFATE 4 MG: 4 INJECTION, SOLUTION INTRAMUSCULAR; INTRAVENOUS at 08:03

## 2021-01-01 RX ADMIN — CIPROFLOXACIN 400 MG: 2 INJECTION, SOLUTION INTRAVENOUS at 02:43

## 2021-01-01 RX ADMIN — SODIUM CHLORIDE, PRESERVATIVE FREE 10 ML: 5 INJECTION INTRAVENOUS at 20:42

## 2021-01-01 RX ADMIN — CHLORHEXIDINE GLUCONATE 15 ML: 0.12 RINSE ORAL at 20:39

## 2021-01-01 RX ADMIN — SODIUM CHLORIDE 1500 MG: 900 INJECTION INTRAVENOUS at 14:01

## 2021-01-01 RX ADMIN — CALCIUM GLUCONATE 1000 MG: 20 INJECTION, SOLUTION INTRAVENOUS at 08:19

## 2021-01-01 RX ADMIN — HEPARIN SODIUM 5000 UNITS: 5000 INJECTION INTRAVENOUS; SUBCUTANEOUS at 14:11

## 2021-01-01 RX ADMIN — METRONIDAZOLE 500 MG: 500 INJECTION, SOLUTION INTRAVENOUS at 17:47

## 2021-01-01 RX ADMIN — SODIUM CHLORIDE, POTASSIUM CHLORIDE, SODIUM LACTATE AND CALCIUM CHLORIDE 1000 ML: 600; 310; 30; 20 INJECTION, SOLUTION INTRAVENOUS at 17:51

## 2021-01-01 RX ADMIN — HEPARIN SODIUM 5000 UNITS: 5000 INJECTION INTRAVENOUS; SUBCUTANEOUS at 14:04

## 2021-01-01 RX ADMIN — HEPARIN SODIUM 5000 UNITS: 5000 INJECTION INTRAVENOUS; SUBCUTANEOUS at 22:27

## 2021-01-01 RX ADMIN — INSULIN LISPRO 1 UNITS: 100 INJECTION, SOLUTION INTRAVENOUS; SUBCUTANEOUS at 20:37

## 2021-01-01 RX ADMIN — DONEPEZIL HYDROCHLORIDE 5 MG: 5 TABLET, FILM COATED ORAL at 18:48

## 2021-01-01 RX ADMIN — HEPARIN SODIUM 5000 UNITS: 5000 INJECTION INTRAVENOUS; SUBCUTANEOUS at 06:05

## 2021-01-01 RX ADMIN — FAMOTIDINE 20 MG: 10 INJECTION, SOLUTION INTRAVENOUS at 08:04

## 2021-01-01 RX ADMIN — MORPHINE SULFATE 4 MG: 4 INJECTION, SOLUTION INTRAMUSCULAR; INTRAVENOUS at 02:31

## 2021-01-01 RX ADMIN — INSULIN LISPRO 1 UNITS: 100 INJECTION, SOLUTION INTRAVENOUS; SUBCUTANEOUS at 11:56

## 2021-01-01 RX ADMIN — Medication 400 MG: at 08:01

## 2021-01-01 RX ADMIN — PROPOFOL 50 MCG/KG/MIN: 10 INJECTION, EMULSION INTRAVENOUS at 18:29

## 2021-01-01 RX ADMIN — DONEPEZIL HYDROCHLORIDE 5 MG: 5 TABLET, FILM COATED ORAL at 19:36

## 2021-01-01 RX ADMIN — Medication 2 CAPSULE: at 17:21

## 2021-01-01 RX ADMIN — DONEPEZIL HYDROCHLORIDE 5 MG: 5 TABLET, FILM COATED ORAL at 20:25

## 2021-01-01 RX ADMIN — HEPARIN SODIUM 5000 UNITS: 5000 INJECTION INTRAVENOUS; SUBCUTANEOUS at 15:59

## 2021-01-01 RX ADMIN — QUETIAPINE FUMARATE 50 MG: 25 TABLET ORAL at 18:16

## 2021-01-01 RX ADMIN — METRONIDAZOLE 500 MG: 500 INJECTION, SOLUTION INTRAVENOUS at 09:15

## 2021-01-01 RX ADMIN — CIPROFLOXACIN 400 MG: 2 INJECTION, SOLUTION INTRAVENOUS at 14:30

## 2021-01-01 RX ADMIN — SODIUM CHLORIDE 1500 MG: 900 INJECTION INTRAVENOUS at 16:16

## 2021-01-01 RX ADMIN — HEPARIN SODIUM 5000 UNITS: 5000 INJECTION INTRAVENOUS; SUBCUTANEOUS at 04:20

## 2021-01-01 RX ADMIN — HEPARIN SODIUM 5000 UNITS: 5000 INJECTION INTRAVENOUS; SUBCUTANEOUS at 14:15

## 2021-01-01 RX ADMIN — SODIUM CHLORIDE 1500 MG: 900 INJECTION INTRAVENOUS at 22:27

## 2021-01-01 RX ADMIN — SODIUM CHLORIDE: 4.5 INJECTION, SOLUTION INTRAVENOUS at 07:52

## 2021-01-01 RX ADMIN — Medication 2 CAPSULE: at 09:29

## 2021-01-01 RX ADMIN — SODIUM CHLORIDE 1500 MG: 900 INJECTION INTRAVENOUS at 22:17

## 2021-01-01 RX ADMIN — HEPARIN SODIUM 5000 UNITS: 5000 INJECTION INTRAVENOUS; SUBCUTANEOUS at 14:01

## 2021-01-01 RX ADMIN — HEPARIN SODIUM 5000 UNITS: 5000 INJECTION INTRAVENOUS; SUBCUTANEOUS at 14:49

## 2021-01-01 RX ADMIN — HEPARIN SODIUM 5000 UNITS: 5000 INJECTION INTRAVENOUS; SUBCUTANEOUS at 14:45

## 2021-01-01 RX ADMIN — HEPARIN SODIUM 5000 UNITS: 5000 INJECTION INTRAVENOUS; SUBCUTANEOUS at 14:30

## 2021-01-01 RX ADMIN — RISPERIDONE 1 MG: 1 TABLET ORAL at 08:04

## 2021-01-01 RX ADMIN — HEPARIN SODIUM 5000 UNITS: 5000 INJECTION INTRAVENOUS; SUBCUTANEOUS at 14:52

## 2021-01-01 RX ADMIN — POTASSIUM CHLORIDE, DEXTROSE MONOHYDRATE AND SODIUM CHLORIDE: 150; 5; 200 INJECTION, SOLUTION INTRAVENOUS at 11:51

## 2021-01-01 RX ADMIN — SODIUM CHLORIDE 1500 MG: 900 INJECTION INTRAVENOUS at 05:35

## 2021-01-01 RX ADMIN — SODIUM CHLORIDE: 4.5 INJECTION, SOLUTION INTRAVENOUS at 16:33

## 2021-01-01 RX ADMIN — HEPARIN SODIUM 5000 UNITS: 5000 INJECTION INTRAVENOUS; SUBCUTANEOUS at 20:29

## 2021-01-01 RX ADMIN — KETOROLAC TROMETHAMINE 15 MG: 30 INJECTION, SOLUTION INTRAMUSCULAR at 17:52

## 2021-01-01 RX ADMIN — METRONIDAZOLE 500 MG: 500 INJECTION, SOLUTION INTRAVENOUS at 17:00

## 2021-01-01 RX ADMIN — OLANZAPINE 10 MG: 10 INJECTION, POWDER, FOR SOLUTION INTRAMUSCULAR at 12:48

## 2021-01-01 RX ADMIN — RISPERIDONE 1 MG: 1 TABLET ORAL at 21:29

## 2021-01-01 RX ADMIN — OLANZAPINE 10 MG: 10 INJECTION, POWDER, FOR SOLUTION INTRAMUSCULAR at 09:56

## 2021-01-01 RX ADMIN — Medication 400 MG: at 09:15

## 2021-01-01 RX ADMIN — CHLORHEXIDINE GLUCONATE 15 ML: 0.12 RINSE ORAL at 08:23

## 2021-01-01 RX ADMIN — POTASSIUM CHLORIDE 20 MEQ: 29.8 INJECTION, SOLUTION INTRAVENOUS at 10:31

## 2021-01-01 RX ADMIN — HEPARIN SODIUM 5000 UNITS: 5000 INJECTION INTRAVENOUS; SUBCUTANEOUS at 21:48

## 2021-01-01 RX ADMIN — HEPARIN SODIUM 5000 UNITS: 5000 INJECTION INTRAVENOUS; SUBCUTANEOUS at 13:52

## 2021-01-01 RX ADMIN — SODIUM CHLORIDE, PRESERVATIVE FREE 10 ML: 5 INJECTION INTRAVENOUS at 20:26

## 2021-01-01 RX ADMIN — Medication 1 TABLET: at 21:23

## 2021-01-01 RX ADMIN — CIPROFLOXACIN 400 MG: 2 INJECTION, SOLUTION INTRAVENOUS at 15:05

## 2021-01-01 RX ADMIN — MORPHINE SULFATE 2 MG: 2 INJECTION, SOLUTION INTRAMUSCULAR; INTRAVENOUS at 00:27

## 2021-01-01 RX ADMIN — PROPOFOL 10 MCG/KG/MIN: 10 INJECTION, EMULSION INTRAVENOUS at 15:13

## 2021-01-01 RX ADMIN — MORPHINE SULFATE 4 MG: 4 INJECTION, SOLUTION INTRAMUSCULAR; INTRAVENOUS at 12:37

## 2021-01-01 RX ADMIN — SODIUM CHLORIDE, PRESERVATIVE FREE 10 ML: 5 INJECTION INTRAVENOUS at 21:56

## 2021-01-01 RX ADMIN — Medication 400 MG: at 09:22

## 2021-01-01 RX ADMIN — OXYCODONE 5 MG: 5 TABLET ORAL at 11:56

## 2021-01-01 RX ADMIN — MORPHINE SULFATE 2 MG: 2 INJECTION, SOLUTION INTRAMUSCULAR; INTRAVENOUS at 17:49

## 2021-01-01 RX ADMIN — MORPHINE SULFATE 4 MG: 4 INJECTION, SOLUTION INTRAMUSCULAR; INTRAVENOUS at 21:23

## 2021-01-01 RX ADMIN — HEPARIN SODIUM 5000 UNITS: 5000 INJECTION INTRAVENOUS; SUBCUTANEOUS at 15:04

## 2021-01-01 RX ADMIN — FAMOTIDINE 20 MG: 10 INJECTION, SOLUTION INTRAVENOUS at 09:38

## 2021-01-01 RX ADMIN — DEXTROSE MONOHYDRATE: 50 INJECTION, SOLUTION INTRAVENOUS at 02:25

## 2021-01-01 RX ADMIN — HEPARIN SODIUM 5000 UNITS: 5000 INJECTION INTRAVENOUS; SUBCUTANEOUS at 14:37

## 2021-01-01 RX ADMIN — ALBUTEROL SULFATE 10 MG: 2.5 SOLUTION RESPIRATORY (INHALATION) at 20:02

## 2021-01-01 RX ADMIN — OLANZAPINE 5 MG: 5 TABLET, FILM COATED ORAL at 18:49

## 2021-01-01 RX ADMIN — ROCURONIUM BROMIDE 50 MG: 10 INJECTION INTRAVENOUS at 13:23

## 2021-01-01 RX ADMIN — HEPARIN SODIUM 5000 UNITS: 5000 INJECTION INTRAVENOUS; SUBCUTANEOUS at 06:29

## 2021-01-01 RX ADMIN — HEPARIN SODIUM 5000 UNITS: 5000 INJECTION INTRAVENOUS; SUBCUTANEOUS at 21:29

## 2021-01-01 RX ADMIN — HEPARIN SODIUM 5000 UNITS: 5000 INJECTION INTRAVENOUS; SUBCUTANEOUS at 05:18

## 2021-01-01 RX ADMIN — RISPERIDONE 0.5 MG: 0.5 TABLET ORAL at 09:20

## 2021-01-01 RX ADMIN — METRONIDAZOLE 500 MG: 500 INJECTION, SOLUTION INTRAVENOUS at 00:52

## 2021-01-01 RX ADMIN — HEPARIN SODIUM 5000 UNITS: 5000 INJECTION INTRAVENOUS; SUBCUTANEOUS at 06:17

## 2021-01-01 RX ADMIN — SODIUM CHLORIDE, PRESERVATIVE FREE 10 ML: 5 INJECTION INTRAVENOUS at 09:34

## 2021-01-01 RX ADMIN — DONEPEZIL HYDROCHLORIDE 5 MG: 5 TABLET, FILM COATED ORAL at 20:45

## 2021-01-01 RX ADMIN — HEPARIN SODIUM 5000 UNITS: 5000 INJECTION INTRAVENOUS; SUBCUTANEOUS at 14:34

## 2021-01-01 RX ADMIN — HEPARIN SODIUM 5000 UNITS: 5000 INJECTION INTRAVENOUS; SUBCUTANEOUS at 21:30

## 2021-01-01 RX ADMIN — INSULIN LISPRO 1 UNITS: 100 INJECTION, SOLUTION INTRAVENOUS; SUBCUTANEOUS at 00:54

## 2021-01-01 RX ADMIN — Medication 400 MG: at 20:25

## 2021-01-01 RX ADMIN — SODIUM CHLORIDE, PRESERVATIVE FREE 10 ML: 5 INJECTION INTRAVENOUS at 08:33

## 2021-01-01 RX ADMIN — CEFTRIAXONE 1 G: 1 INJECTION, POWDER, FOR SOLUTION INTRAMUSCULAR; INTRAVENOUS at 21:32

## 2021-01-01 RX ADMIN — ACETAMINOPHEN 650 MG: 325 TABLET ORAL at 21:38

## 2021-01-01 RX ADMIN — HEPARIN SODIUM 5000 UNITS: 5000 INJECTION INTRAVENOUS; SUBCUTANEOUS at 22:07

## 2021-01-01 RX ADMIN — CEFTRIAXONE 1000 MG: 1 INJECTION, POWDER, FOR SOLUTION INTRAMUSCULAR; INTRAVENOUS at 13:48

## 2021-01-01 RX ADMIN — INSULIN HUMAN 5 UNITS: 100 INJECTION, SOLUTION PARENTERAL at 20:33

## 2021-01-01 RX ADMIN — POLYETHYLENE GLYCOL 3350 17 G: 17 POWDER, FOR SOLUTION ORAL at 10:12

## 2021-01-01 RX ADMIN — SODIUM CHLORIDE, PRESERVATIVE FREE 10 ML: 5 INJECTION INTRAVENOUS at 10:24

## 2021-01-01 RX ADMIN — Medication 400 MG: at 20:55

## 2021-01-01 RX ADMIN — OLANZAPINE 10 MG: 10 INJECTION, POWDER, FOR SOLUTION INTRAMUSCULAR at 15:44

## 2021-01-01 RX ADMIN — HEPARIN SODIUM 5000 UNITS: 5000 INJECTION INTRAVENOUS; SUBCUTANEOUS at 15:44

## 2021-01-01 RX ADMIN — SODIUM CHLORIDE: 9 INJECTION, SOLUTION INTRAVENOUS at 00:06

## 2021-01-01 RX ADMIN — CALCIUM CHLORIDE 0.5 G: 100 INJECTION, SOLUTION INTRAVENOUS; INTRAVENTRICULAR at 21:42

## 2021-01-01 RX ADMIN — QUETIAPINE FUMARATE 50 MG: 25 TABLET ORAL at 21:36

## 2021-01-01 RX ADMIN — HEPARIN SODIUM 5000 UNITS: 5000 INJECTION INTRAVENOUS; SUBCUTANEOUS at 06:45

## 2021-01-01 RX ADMIN — Medication 2 CAPSULE: at 08:57

## 2021-01-01 RX ADMIN — Medication 400 MG: at 20:45

## 2021-01-01 RX ADMIN — SODIUM CHLORIDE 1500 MG: 900 INJECTION INTRAVENOUS at 14:49

## 2021-01-01 RX ADMIN — SODIUM CHLORIDE 1000 ML: 9 INJECTION, SOLUTION INTRAVENOUS at 20:37

## 2021-01-01 RX ADMIN — HEPARIN SODIUM 5000 UNITS: 5000 INJECTION INTRAVENOUS; SUBCUTANEOUS at 00:59

## 2021-01-01 RX ADMIN — RISPERIDONE 0.5 MG: 0.5 TABLET ORAL at 15:04

## 2021-01-01 RX ADMIN — POTASSIUM CHLORIDE, DEXTROSE MONOHYDRATE AND SODIUM CHLORIDE: 150; 5; 200 INJECTION, SOLUTION INTRAVENOUS at 10:06

## 2021-01-01 RX ADMIN — SODIUM CHLORIDE, PRESERVATIVE FREE 10 ML: 5 INJECTION INTRAVENOUS at 22:18

## 2021-01-01 RX ADMIN — MORPHINE SULFATE 2 MG: 2 INJECTION, SOLUTION INTRAMUSCULAR; INTRAVENOUS at 16:59

## 2021-01-01 RX ADMIN — SODIUM CHLORIDE 1000 ML: 9 INJECTION, SOLUTION INTRAVENOUS at 15:20

## 2021-01-01 RX ADMIN — HEPARIN SODIUM 5000 UNITS: 5000 INJECTION INTRAVENOUS; SUBCUTANEOUS at 13:38

## 2021-01-01 RX ADMIN — OLANZAPINE 5 MG: 5 TABLET, FILM COATED ORAL at 19:39

## 2021-01-01 RX ADMIN — RISPERIDONE 1 MG: 1 TABLET ORAL at 20:56

## 2021-01-01 RX ADMIN — METRONIDAZOLE 500 MG: 500 INJECTION, SOLUTION INTRAVENOUS at 00:37

## 2021-01-01 RX ADMIN — HEPARIN SODIUM 5000 UNITS: 5000 INJECTION INTRAVENOUS; SUBCUTANEOUS at 06:49

## 2021-01-01 RX ADMIN — OXYCODONE 5 MG: 5 TABLET ORAL at 16:41

## 2021-01-01 RX ADMIN — DEXTROSE MONOHYDRATE 25 G: 25 INJECTION, SOLUTION INTRAVENOUS at 20:30

## 2021-01-01 RX ADMIN — POTASSIUM CHLORIDE, DEXTROSE MONOHYDRATE AND SODIUM CHLORIDE: 150; 5; 200 INJECTION, SOLUTION INTRAVENOUS at 23:57

## 2021-01-01 RX ADMIN — CALCIUM GLUCONATE 1000 MG: 98 INJECTION, SOLUTION INTRAVENOUS at 20:22

## 2021-01-01 RX ADMIN — FAMOTIDINE 20 MG: 10 INJECTION, SOLUTION INTRAVENOUS at 10:06

## 2021-01-01 RX ADMIN — POTASSIUM CHLORIDE, DEXTROSE MONOHYDRATE AND SODIUM CHLORIDE: 150; 5; 200 INJECTION, SOLUTION INTRAVENOUS at 20:00

## 2021-01-01 RX ADMIN — LORAZEPAM 1 MG: 2 INJECTION INTRAMUSCULAR; INTRAVENOUS at 18:10

## 2021-01-01 RX ADMIN — LORAZEPAM 1 MG: 2 INJECTION INTRAMUSCULAR; INTRAVENOUS at 07:40

## 2021-01-01 RX ADMIN — FAMOTIDINE 20 MG: 10 INJECTION, SOLUTION INTRAVENOUS at 10:01

## 2021-01-01 RX ADMIN — Medication 1 TABLET: at 20:30

## 2021-01-01 RX ADMIN — SODIUM CHLORIDE: 9 INJECTION, SOLUTION INTRAVENOUS at 20:46

## 2021-01-01 RX ADMIN — SODIUM CHLORIDE, PRESERVATIVE FREE 10 ML: 5 INJECTION INTRAVENOUS at 21:45

## 2021-01-01 RX ADMIN — SODIUM CHLORIDE: 9 INJECTION, SOLUTION INTRAVENOUS at 10:27

## 2021-01-01 RX ADMIN — OXYCODONE 5 MG: 5 TABLET ORAL at 18:55

## 2021-01-01 RX ADMIN — Medication 400 MG: at 09:11

## 2021-01-01 RX ADMIN — FAMOTIDINE 20 MG: 10 INJECTION, SOLUTION INTRAVENOUS at 09:40

## 2021-01-01 RX ADMIN — SODIUM CHLORIDE, PRESERVATIVE FREE 10 ML: 5 INJECTION INTRAVENOUS at 19:34

## 2021-01-01 RX ADMIN — SODIUM CHLORIDE 1500 MG: 900 INJECTION INTRAVENOUS at 05:48

## 2021-01-01 RX ADMIN — INSULIN LISPRO 4 UNITS: 100 INJECTION, SOLUTION INTRAVENOUS; SUBCUTANEOUS at 21:29

## 2021-01-01 RX ADMIN — INSULIN LISPRO 1 UNITS: 100 INJECTION, SOLUTION INTRAVENOUS; SUBCUTANEOUS at 11:34

## 2021-01-01 ASSESSMENT — PAIN SCALES - PAIN ASSESSMENT IN ADVANCED DEMENTIA (PAINAD)
NEGVOCALIZATION: 0
BREATHING: 0
BREATHING: 0
TOTALSCORE: 0
TOTALSCORE: 0
BODYLANGUAGE: 0
CONSOLABILITY: 0
FACIALEXPRESSION: 1
BREATHING: 0
NEGVOCALIZATION: 0
FACIALEXPRESSION: 0
BREATHING: 1
TOTALSCORE: 0
NEGVOCALIZATION: 0
BODYLANGUAGE: 0
BREATHING: 0
CONSOLABILITY: 0
BREATHING: 0
NEGVOCALIZATION: 0
BODYLANGUAGE: 0
FACIALEXPRESSION: 0
BREATHING: 0
FACIALEXPRESSION: 0
BREATHING: 1
FACIALEXPRESSION: 0
FACIALEXPRESSION: 0
BREATHING: 0
CONSOLABILITY: 0
NEGVOCALIZATION: 0
TOTALSCORE: 0
FACIALEXPRESSION: 0
BREATHING: 0
CONSOLABILITY: 2
CONSOLABILITY: 1
BODYLANGUAGE: 1
FACIALEXPRESSION: 0
NEGVOCALIZATION: 0
NEGVOCALIZATION: 0
BODYLANGUAGE: 0
NEGVOCALIZATION: 0
BODYLANGUAGE: 0
TOTALSCORE: 6
TOTALSCORE: 0
NEGVOCALIZATION: 1
CONSOLABILITY: 0
NEGVOCALIZATION: 0
CONSOLABILITY: 0
BREATHING: 0
NEGVOCALIZATION: 0
CONSOLABILITY: 0
BREATHING: 0
NEGVOCALIZATION: 0
TOTALSCORE: 0
FACIALEXPRESSION: 0
BODYLANGUAGE: 0
FACIALEXPRESSION: 0
CONSOLABILITY: 0
BODYLANGUAGE: 0
FACIALEXPRESSION: 0
BREATHING: 1
TOTALSCORE: 0
CONSOLABILITY: 0
FACIALEXPRESSION: 0
FACIALEXPRESSION: 0
TOTALSCORE: 0
FACIALEXPRESSION: 0
BODYLANGUAGE: 0
BODYLANGUAGE: 0
NEGVOCALIZATION: 1
FACIALEXPRESSION: 0
FACIALEXPRESSION: 1
TOTALSCORE: 0
NEGVOCALIZATION: 0
BREATHING: 0
BODYLANGUAGE: 0
FACIALEXPRESSION: 0
BREATHING: 0
FACIALEXPRESSION: 0
BODYLANGUAGE: 0
BODYLANGUAGE: 0
FACIALEXPRESSION: 0
TOTALSCORE: 0
BREATHING: 0
CONSOLABILITY: 0
CONSOLABILITY: 0
NEGVOCALIZATION: 0
BODYLANGUAGE: 0
TOTALSCORE: 0
BREATHING: 0
NEGVOCALIZATION: 0
CONSOLABILITY: 0
NEGVOCALIZATION: 0
CONSOLABILITY: 0
BODYLANGUAGE: 0
FACIALEXPRESSION: 0
BREATHING: 0
FACIALEXPRESSION: 0
NEGVOCALIZATION: 0
NEGVOCALIZATION: 0
TOTALSCORE: 0
BREATHING: 0
TOTALSCORE: 0
BODYLANGUAGE: 0
BREATHING: 0
NEGVOCALIZATION: 0
FACIALEXPRESSION: 0
CONSOLABILITY: 0
TOTALSCORE: 0
BREATHING: 0
NEGVOCALIZATION: 0
NEGVOCALIZATION: 0
BODYLANGUAGE: 0
BODYLANGUAGE: 0
TOTALSCORE: 0
TOTALSCORE: 0
BREATHING: 0
TOTALSCORE: 0
NEGVOCALIZATION: 1
CONSOLABILITY: 0
NEGVOCALIZATION: 0
BREATHING: 0
BREATHING: 0
TOTALSCORE: 0
TOTALSCORE: 0
CONSOLABILITY: 0
BREATHING: 0
TOTALSCORE: 0
FACIALEXPRESSION: 0
BODYLANGUAGE: 1
BREATHING: 1
FACIALEXPRESSION: 0
NEGVOCALIZATION: 0
CONSOLABILITY: 0
FACIALEXPRESSION: 1
NEGVOCALIZATION: 0
BREATHING: 0
BODYLANGUAGE: 0
NEGVOCALIZATION: 0
BREATHING: 0
FACIALEXPRESSION: 0
TOTALSCORE: 0
CONSOLABILITY: 0
CONSOLABILITY: 0
TOTALSCORE: 0
TOTALSCORE: 0
FACIALEXPRESSION: 0
FACIALEXPRESSION: 0
BODYLANGUAGE: 0
BODYLANGUAGE: 0
NEGVOCALIZATION: 0
TOTALSCORE: 0
BREATHING: 0
BREATHING: 0
BODYLANGUAGE: 0
BREATHING: 0
NEGVOCALIZATION: 0
TOTALSCORE: 0
FACIALEXPRESSION: 0
BREATHING: 0
FACIALEXPRESSION: 0
TOTALSCORE: 0
TOTALSCORE: 0
BREATHING: 0
BREATHING: 0
CONSOLABILITY: 0
FACIALEXPRESSION: 0
FACIALEXPRESSION: 0
CONSOLABILITY: 0
TOTALSCORE: 6
BREATHING: 0
TOTALSCORE: 0
TOTALSCORE: 0
BREATHING: 0
FACIALEXPRESSION: 0
CONSOLABILITY: 1
FACIALEXPRESSION: 0
CONSOLABILITY: 1
BODYLANGUAGE: 0
BODYLANGUAGE: 0
TOTALSCORE: 0
CONSOLABILITY: 0
BODYLANGUAGE: 0
BODYLANGUAGE: 0
TOTALSCORE: 4
BREATHING: 0
TOTALSCORE: 5
NEGVOCALIZATION: 0
NEGVOCALIZATION: 1
CONSOLABILITY: 0
NEGVOCALIZATION: 0
NEGVOCALIZATION: 0
CONSOLABILITY: 0
FACIALEXPRESSION: 0
TOTALSCORE: 0
CONSOLABILITY: 0
TOTALSCORE: 0
BREATHING: 0
CONSOLABILITY: 0
TOTALSCORE: 4
BREATHING: 1
CONSOLABILITY: 0
FACIALEXPRESSION: 0
CONSOLABILITY: 0
CONSOLABILITY: 0
FACIALEXPRESSION: 2
BODYLANGUAGE: 0
FACIALEXPRESSION: 0
CONSOLABILITY: 0
FACIALEXPRESSION: 0
CONSOLABILITY: 0
BREATHING: 0
CONSOLABILITY: 0
BREATHING: 0
BODYLANGUAGE: 0
NEGVOCALIZATION: 0
BODYLANGUAGE: 0
BODYLANGUAGE: 0
FACIALEXPRESSION: 0
BREATHING: 0
FACIALEXPRESSION: 0
NEGVOCALIZATION: 0
BODYLANGUAGE: 0
FACIALEXPRESSION: 0
BREATHING: 0
NEGVOCALIZATION: 0
BODYLANGUAGE: 0
FACIALEXPRESSION: 0
FACIALEXPRESSION: 0
TOTALSCORE: 0
NEGVOCALIZATION: 0
CONSOLABILITY: 0
BREATHING: 0
CONSOLABILITY: 1
NEGVOCALIZATION: 1
BREATHING: 0
TOTALSCORE: 0
BODYLANGUAGE: 0
CONSOLABILITY: 0
CONSOLABILITY: 0
NEGVOCALIZATION: 0
BREATHING: 0
NEGVOCALIZATION: 0
BODYLANGUAGE: 1
BODYLANGUAGE: 0
FACIALEXPRESSION: 0
CONSOLABILITY: 0
TOTALSCORE: 0
BODYLANGUAGE: 0
BREATHING: 0
TOTALSCORE: 0
TOTALSCORE: 0
NEGVOCALIZATION: 0
CONSOLABILITY: 0
TOTALSCORE: 7
FACIALEXPRESSION: 0
FACIALEXPRESSION: 0
NEGVOCALIZATION: 0
CONSOLABILITY: 1
CONSOLABILITY: 1
FACIALEXPRESSION: 1
TOTALSCORE: 0
BREATHING: 0
FACIALEXPRESSION: 0
FACIALEXPRESSION: 0
TOTALSCORE: 0
FACIALEXPRESSION: 0
BREATHING: 0
CONSOLABILITY: 0
NEGVOCALIZATION: 0
TOTALSCORE: 0
CONSOLABILITY: 0
BODYLANGUAGE: 0
BODYLANGUAGE: 0
BREATHING: 0
FACIALEXPRESSION: 0
CONSOLABILITY: 1
NEGVOCALIZATION: 0
CONSOLABILITY: 0
TOTALSCORE: 0
BREATHING: 0
TOTALSCORE: 0
CONSOLABILITY: 0
FACIALEXPRESSION: 0
BREATHING: 0
FACIALEXPRESSION: 1
BREATHING: 0
BREATHING: 0
TOTALSCORE: 0
BODYLANGUAGE: 0
NEGVOCALIZATION: 0
TOTALSCORE: 0
NEGVOCALIZATION: 1
BODYLANGUAGE: 0
FACIALEXPRESSION: 0
CONSOLABILITY: 0
CONSOLABILITY: 0
TOTALSCORE: 4
NEGVOCALIZATION: 0
BODYLANGUAGE: 1
BODYLANGUAGE: 0
BREATHING: 0
CONSOLABILITY: 0
NEGVOCALIZATION: 0
TOTALSCORE: 0
BODYLANGUAGE: 0
FACIALEXPRESSION: 0
TOTALSCORE: 0
FACIALEXPRESSION: 0
FACIALEXPRESSION: 0
BODYLANGUAGE: 1
TOTALSCORE: 0
BODYLANGUAGE: 0
FACIALEXPRESSION: 0
BODYLANGUAGE: 0
BREATHING: 0
FACIALEXPRESSION: 0
TOTALSCORE: 0
NEGVOCALIZATION: 0
BREATHING: 0
NEGVOCALIZATION: 0
CONSOLABILITY: 0
TOTALSCORE: 0
NEGVOCALIZATION: 0
BREATHING: 0
CONSOLABILITY: 0
BODYLANGUAGE: 0
CONSOLABILITY: 0
NEGVOCALIZATION: 0
CONSOLABILITY: 0
BODYLANGUAGE: 0
TOTALSCORE: 0
NEGVOCALIZATION: 1
BREATHING: 0
FACIALEXPRESSION: 0
TOTALSCORE: 0
BREATHING: 0
BODYLANGUAGE: 0
NEGVOCALIZATION: 0
BODYLANGUAGE: 0
TOTALSCORE: 0
NEGVOCALIZATION: 0
BODYLANGUAGE: 1
TOTALSCORE: 0
BODYLANGUAGE: 0
CONSOLABILITY: 0
TOTALSCORE: 0
TOTALSCORE: 6
NEGVOCALIZATION: 0
BODYLANGUAGE: 1
FACIALEXPRESSION: 0
BODYLANGUAGE: 1

## 2021-01-01 ASSESSMENT — PULMONARY FUNCTION TESTS
PIF_VALUE: 17
PIF_VALUE: 17
PIF_VALUE: 18
PIF_VALUE: 11
PIF_VALUE: 17
PIF_VALUE: 11
PIF_VALUE: 17
PIF_VALUE: 18
PIF_VALUE: 17
PIF_VALUE: 17
PIF_VALUE: 15
PIF_VALUE: 16
PIF_VALUE: 18
PIF_VALUE: 4
PIF_VALUE: 18
PIF_VALUE: 24
PIF_VALUE: 17
PIF_VALUE: 18
PIF_VALUE: 17
PIF_VALUE: 18
PIF_VALUE: 17
PIF_VALUE: 18
PIF_VALUE: 20
PIF_VALUE: 18
PIF_VALUE: 15
PIF_VALUE: 21
PIF_VALUE: 18
PIF_VALUE: 17
PIF_VALUE: 5
PIF_VALUE: 18
PIF_VALUE: 19
PIF_VALUE: 4
PIF_VALUE: 17
PIF_VALUE: 17
PIF_VALUE: 18
PIF_VALUE: 5
PIF_VALUE: 11
PIF_VALUE: 17
PIF_VALUE: 4
PIF_VALUE: 19
PIF_VALUE: 18
PIF_VALUE: 17
PIF_VALUE: 19
PIF_VALUE: 17
PIF_VALUE: 19
PIF_VALUE: 17
PIF_VALUE: 18
PIF_VALUE: 20
PIF_VALUE: 17
PIF_VALUE: 17
PIF_VALUE: 18
PIF_VALUE: 19
PIF_VALUE: 10
PIF_VALUE: 16
PIF_VALUE: 17
PIF_VALUE: 17
PIF_VALUE: 11
PIF_VALUE: 19
PIF_VALUE: 18
PIF_VALUE: 19
PIF_VALUE: 18
PIF_VALUE: 16
PIF_VALUE: 17
PIF_VALUE: 5
PIF_VALUE: 18
PIF_VALUE: 17
PIF_VALUE: 16
PIF_VALUE: 17

## 2021-01-01 ASSESSMENT — PAIN SCALES - WONG BAKER

## 2021-01-01 ASSESSMENT — PAIN DESCRIPTION - LOCATION
LOCATION: ABDOMEN

## 2021-01-01 ASSESSMENT — LIFESTYLE VARIABLES: SMOKING_STATUS: 0

## 2021-01-01 ASSESSMENT — PAIN SCALES - GENERAL
PAINLEVEL_OUTOF10: 0
PAINLEVEL_OUTOF10: 1
PAINLEVEL_OUTOF10: 0
PAINLEVEL_OUTOF10: 7
PAINLEVEL_OUTOF10: 0
PAINLEVEL_OUTOF10: 8
PAINLEVEL_OUTOF10: 7
PAINLEVEL_OUTOF10: 0
PAINLEVEL_OUTOF10: 2
PAINLEVEL_OUTOF10: 0
PAINLEVEL_OUTOF10: 5
PAINLEVEL_OUTOF10: 0
PAINLEVEL_OUTOF10: 3
PAINLEVEL_OUTOF10: 0
PAINLEVEL_OUTOF10: 3
PAINLEVEL_OUTOF10: 4
PAINLEVEL_OUTOF10: 6
PAINLEVEL_OUTOF10: 4
PAINLEVEL_OUTOF10: 7
PAINLEVEL_OUTOF10: 2
PAINLEVEL_OUTOF10: 0
PAINLEVEL_OUTOF10: 4
PAINLEVEL_OUTOF10: 0
PAINLEVEL_OUTOF10: 4
PAINLEVEL_OUTOF10: 0

## 2021-01-01 ASSESSMENT — PAIN DESCRIPTION - FREQUENCY
FREQUENCY: CONTINUOUS
FREQUENCY: CONTINUOUS
FREQUENCY: INTERMITTENT
FREQUENCY: CONTINUOUS
FREQUENCY: INTERMITTENT
FREQUENCY: CONTINUOUS

## 2021-01-01 ASSESSMENT — PAIN DESCRIPTION - ONSET
ONSET: ON-GOING

## 2021-01-01 ASSESSMENT — PAIN - FUNCTIONAL ASSESSMENT
PAIN_FUNCTIONAL_ASSESSMENT: 0-10
PAIN_FUNCTIONAL_ASSESSMENT: PREVENTS OR INTERFERES SOME ACTIVE ACTIVITIES AND ADLS

## 2021-01-01 ASSESSMENT — PAIN DESCRIPTION - PAIN TYPE
TYPE: SURGICAL PAIN
TYPE: ACUTE PAIN
TYPE: SURGICAL PAIN

## 2021-01-01 ASSESSMENT — PAIN DESCRIPTION - ORIENTATION
ORIENTATION: LOWER
ORIENTATION: RIGHT;LEFT
ORIENTATION: RIGHT;LEFT
ORIENTATION: MID
ORIENTATION: LOWER
ORIENTATION: MID
ORIENTATION: LOWER

## 2021-01-01 ASSESSMENT — PAIN DESCRIPTION - PROGRESSION
CLINICAL_PROGRESSION: GRADUALLY WORSENING
CLINICAL_PROGRESSION: OTHER (COMMENT)
CLINICAL_PROGRESSION: GRADUALLY IMPROVING
CLINICAL_PROGRESSION: GRADUALLY IMPROVING
CLINICAL_PROGRESSION: GRADUALLY WORSENING
CLINICAL_PROGRESSION: GRADUALLY WORSENING

## 2021-01-01 ASSESSMENT — PAIN DESCRIPTION - DESCRIPTORS
DESCRIPTORS: PATIENT UNABLE TO DESCRIBE
DESCRIPTORS: ACHING

## 2021-02-18 PROBLEM — N17.9 AKI (ACUTE KIDNEY INJURY) (HCC): Status: ACTIVE | Noted: 2021-01-01

## 2021-02-18 PROBLEM — K56.609 SBO (SMALL BOWEL OBSTRUCTION) (HCC): Status: ACTIVE | Noted: 2021-01-01

## 2021-02-18 PROBLEM — E87.20 LACTIC ACIDOSIS: Status: ACTIVE | Noted: 2021-01-01

## 2021-02-18 NOTE — LETTER
[facility] [facility]  [address] [address]  Phone: [phone]         February 19, 2021     Patient: Ani Multani Sr   YOB: 1940   Date of Visit: 2/18/2021       To Whom It May Concern:    Renzo Linwood. was seen at [facility] accompanying his father on 2/19/2021.     Sincerely,      Melisa Gusman RN        Signature:__________________________________

## 2021-02-19 NOTE — PROGRESS NOTES
Pt pulled out his ng tube. Tube replaced. Unable to put bridle on it due to pt thrashing his head. Restraints still on. NG placed at 45 bhaskar. Abd xray shows proper placement. NG tube hooked back up to suction.

## 2021-02-19 NOTE — ANESTHESIA PRE PROCEDURE
[facility] Department of Anesthesiology  Pre-Anesthesia Evaluation/Consultation       Name:  Estrella Funes  : 1940  Age:  78 y.o. MRN:  [id number]  Date: 2021           Surgeon: Surgeon(s):  Mejia Arevalo MD    Procedure: Procedure(s):  EXPLORATORY LAPAROTOMY, POSSIBLE BOWEL RESECTION     No Known Allergies  Patient Active Problem List   Diagnosis    Hypertension    Leukopenia    Lightheadedness    Rectal bleed    Bladder cancer ([address]    S/P ileal conduit ([address]    Rectal injury    Pelvic mass in male    Right adrenal mass ([address]    Bladder cancer metastasized to bone ([address]    Cancer associated pain    Chronic fatigue    Benign prostatic hyperplasia without urinary obstruction     Past Medical History:   Diagnosis Date    [facility]     bladder    GASTRIC ULCER     Hypertension     Leukopenia      Past Surgical History:   Procedure Laterality Date    COLONOSCOPY      colo , Dr Meliza Dixon MD.   Courtney Maynardasin HISTORY  2016    RADICAL CYSTECTOMY, PROSTECTOMY ILEAL CONDUIT URINARY    TUNNELED VENOUS PORT PLACEMENT Right 2017    DR. BOND/IR POWER PORT     Social History     Tobacco Use    Smoking status: Never Smoker    Smokeless tobacco: Never Used   Substance Use Topics    Alcohol use: No    Drug use: No     Medications  No current facility-administered medications on file prior to encounter. Current Outpatient Medications on File Prior to Encounter   Medication Sig Dispense Refill    furosemide (LASIX) 20 MG tablet Take 1 tablet by mouth daily 90 tablet 1    potassium chloride (KLOR-CON M20) 20 MEQ extended release tablet Take 1 tablet by mouth daily Go to lab and hospital were orders are waiting for blood tests.  Can get 90 day supply after blood tests 14 tablet 0    donepezil (ARICEPT) 5 MG tablet Take 1 tablet by mouth nightly 30 tablet 3    cetirizine (ZYRTEC) 10 MG tablet TAKE 1 TABLET BY MOUTH EVERY DAY 30 tablet 10    clotrimazole-betamethasone (LOTRISONE) 1-0.05 % cream Apply topically 2 times daily. 45 g 0    Nutritional Supplements (ENSURE ACTIVE HIGH PROTEIN) LIQD Take 1 Can by mouth three times daily 90 Can 5     Current Facility-Administered Medications   Medication Dose Route Frequency Provider Last Rate Last Admin    0.9 % sodium chloride bolus  1,000 mL Intravenous Once DARLIN Marcus 1,000 mL/hr at 21 1,000 mL at 21    cefTRIAXone (ROCEPHIN) 1000 mg IVPB in 50 mL D5W minibag  1,000 mg Intravenous Once DARLNI Bermudez        metronidazole (FLAGYL) 500 mg in NaCl 100 mL IVPB premix  500 mg Intravenous Once DARLIN Marcus         Current Outpatient Medications   Medication Sig Dispense Refill    furosemide (LASIX) 20 MG tablet Take 1 tablet by mouth daily 90 tablet 1    potassium chloride (KLOR-CON M20) 20 MEQ extended release tablet Take 1 tablet by mouth daily Go to lab and hospital were orders are waiting for blood tests. Can get 90 day supply after blood tests 14 tablet 0    donepezil (ARICEPT) 5 MG tablet Take 1 tablet by mouth nightly 30 tablet 3    cetirizine (ZYRTEC) 10 MG tablet TAKE 1 TABLET BY MOUTH EVERY DAY 30 tablet 10    clotrimazole-betamethasone (LOTRISONE) 1-0.05 % cream Apply topically 2 times daily.  45 g 0    Nutritional Supplements (ENSURE ACTIVE HIGH PROTEIN) LIQD Take 1 Can by mouth three times daily 90 Can 5     Vital Signs (Current)   Vitals:    21   BP: (!) 151/83 (!) 152/76  (!) 147/76   Pulse: 99 92  103   Resp:  28 22 25   Temp:       TempSrc:       SpO2:  98% 93% 98%   Weight:       Height:                                              BP Readings from Last 3 Encounters:   21 (!) 147/76   20 137/71   20 (!) 114/58     Vital Signs Statistics (for past 48 hrs)     Temp  Av.6 °F (36.4 °C)  Min: 97.6 °F (36.4 °C)   Min taken time: 21 1637  Max: 97.6 °F (36.4 °C)   Max taken time: 21 1637  Pulse  Av.9  Min: 92   Min taken time: 21  Max: 106   Max taken time: 21 1800  Resp  Av  Min: 14   Min taken time: 21 1652  Max: 31   Max taken time: 21 1800  BP  Min: 129/76   Min taken time: 21 1830  Max: 152/76   Max taken time: 21  MAP (mmHg)  Av.8  Min: 80   Min taken time: 21  Max: 103   Max taken time: 21  SpO2  Av.6 %  Min: 93 %   Min taken time: 21  Max: 100 %   Max taken time: 21  BP Readings from Last 3 Encounters:   21 (!) 147/76   20 137/71   20 (!) 114/58       BMI  Body mass index is 28.19 kg/m². Estimated body mass index is 28.19 kg/m² as calculated from the following:    Height as of this encounter: 5' 9\" (1.753 m). Weight as of this encounter: 190 lb 14.7 oz (86.6 kg).     CBC   Lab Results   Component Value Date    WBC 15.4 2021    RBC 4.41 2021    RBC 4.07 2017    HGB 13.7 2021    HCT 41.1 2021    MCV 93.3 2021    RDW 13.8 2021     2021     CMP    Lab Results   Component Value Date     2021    K 6.0 2021    CL 99 2021    CO2 24 2021    BUN 46 2021    CREATININE 2.5 2021    GFRAA 30 2021    GFRAA >60 2013    AGRATIO 1.1 2021    LABGLOM 25 2021    GLUCOSE 174 2021    GLUCOSE 128 2017    PROT 7.6 2021    PROT 6.8 2017    CALCIUM 10.0 2021    BILITOT 1.2 2021    ALKPHOS 123 2021    AST 40 2021    ALT 18 2021     BMP    Lab Results   Component Value Date     2021    K 6.0 2021    CL 99 2021    CO2 24 2021    BUN 46 2021    CREATININE 2.5 2021    CALCIUM 10.0 2021    GFRAA 30 2021    GFRAA >60 2013    LABGLOM 25 2021    GLUCOSE 174 2021    GLUCOSE 128 08/17/2017     POCGlucose  Recent Labs     02/18/21  1742   GLUCOSE 174*      Coags    Lab Results   Component Value Date    PROTIME 12.3 05/13/2017    INR 1.09 05/13/2017    APTT 30.7 96/20/3729     HCG (If Applicable) No results found for: PREGTESTUR, PREGSERUM, HCG, HCGQUANT   ABGs No results found for: PHART, PO2ART, MPU2ESP, FHZ2KNW, BEART, Z9ITJAZU   Type & Screen (If Applicable)  No results found for: LABABO, LABRH                         BMI: Wt Readings from Last 3 Encounters:       NPO Status:  >8h                          Anesthesia Evaluation  Patient summary reviewed no history of anesthetic complications:   Airway: Mallampati: II  TM distance: >3 FB   Neck ROM: full  Mouth opening: > = 3 FB Dental: normal exam         Pulmonary: breath sounds clear to auscultation      (-) COPD, sleep apnea and not a current smoker                           Cardiovascular:    (+) hypertension:,     (-) past MI and CABG/stent        Rate: normal                    Neuro/Psych:      (-) seizures, TIA and CVA           GI/Hepatic/Renal:   (+) GERD:, PUD,           Endo/Other:    (+) electrolyte abnormalities, malignancy/cancer. (-) diabetes mellitus, hypothyroidism               Abdominal:           Vascular:     - DVT and PE. Anesthesia Plan      general     ASA 3 - emergent       Induction: intravenous. MIPS: Postoperative opioids intended and Prophylactic antiemetics administered. Anesthetic plan and risks discussed with patient. This pre-anesthesia assessment may be used as a history and physical.    DOS STAFF ADDENDUM:    Pt seen and examined, chart reviewed (including anesthesia, drug and allergy history). No interval changes to history and physical examination. Anesthetic plan, risks, benefits, alternatives, and personnel involved discussed with patient. Patient verbalized an understanding and agrees to proceed.       Elizabeth Vicente MD  February 18, 2021  8:52 PM

## 2021-02-19 NOTE — OP NOTE
[address] [place]                                 OPERATIVE REPORT    PATIENT NAME: Dariel FLORES                  :        1940  MED REC NO:   [id number]                          ROOM:       5262  ACCOUNT NO:   [de-identified]                           ADMIT DATE: 2021  PROVIDER:     George Salvador MD    DATE OF PROCEDURE:  2021    PREOPERATIVE DIAGNOSIS:  Small bowel obstruction. POSTOPERATIVE DIAGNOSIS:  Closed loop small bowel obstruction. OPERATION PERFORMED:  Exploratory laparotomy with lysis of adhesions and  small bowel resection    SURGEON:  George Salvador MD    ANESTHESIA:  General endotracheal anesthesia. ASA CLASS:  IIIE. ANTIBIOTICS:  Rocephin and Flagyl. DVT PROPHYLAXIS:  Bilateral pneumatic compression devices. ESTIMATED BLOOD LOSS:  100 mL. SPECIMENS:  Small bowel. INDICATIONS:  The patient is an 70-year-old gentleman with dementia. Per son, he has been complaining of abdominal pain for the last few  days. He lives alone, but when they saw him, he was complaining of  worsening pain. The patient denied nausea or vomiting. On arrival to  the emergency department, he was tachycardic with lactic acid of 5 and  acute kidney injury. CT was concerning for possible incarcerated  parastomal hernia as well as potential closed loop obstruction. He has  had some mesenteric fluid and some thickening concerning for ischemic  changes and given that and compounded with his lactic acid, I  recommended exploration, possible bowel resection. Risks, benefits and  alternatives were discussed at length and he was agreeable to proceed. OPERATIVE PROCEDURE:  The patient was brought to the operating suite and  placed in the supine position on the operating room table. General  endotracheal anesthesia was induced that he tolerated well.   His  previous pouch in his ileal bowel.    The two ends were lined up with 3-0 silk suture and corner of the staple  line was transected and an additional staple firing was used to create  the common channel of the anastomosis. The enterotomies were closed  with another staple firing. There was some bleeding at the staple line  that was controlled with cautery. The angle of the anastomosis was  narrowed with a 3-0 silk suture as well. Anastomosis appeared pink,  patent and healthy and lied in the midline. The conduit was reinspected and the balloon palpated and he was having  urine throughout the case. He did have a parastomal hernia lateral to  his conduit that contained nonobstructive bowel on a CT a week ago, and  the bowel was reduced during this case. I had difficulty getting  through it due the dense nature of the adhesions and given his acute  changes now, I decided to leave this alone. If these were to recur or  he became symptomatic from it, we could address this through an incision  externally over top of the stoma. It will be easier than through the  midline that we had during the operation. At that point, Marcaine was injected at the level of the fascia and the  skin. The fascia was closed with 0-loop PDS, skin with staples, and  dressings applied. Urostomy pouch was replaced over top of his stoma  after the catheter was removed. The patient tolerated the procedure  well. He was extubated in the operating room and taken to PACU in  stable condition. All counts were correct at the end of the case.         Margarette Benitez MD    D: 02/19/2021 9:36:13       T: 02/19/2021 10:28:14     [email or web address]  Job#: [id number]     Doc#: [id number]    CC:  Paris Jones MD

## 2021-02-19 NOTE — ED NOTES
Aleksandr SAEED talking with son at bedside about plan of care and need for surgery.       Jennifer Daniel RN  02/18/21 2041

## 2021-02-19 NOTE — H&P
General Surgery History & Physical      Jenna York   : 1940 MRN: [id number]  Date of Admission: 2021  Admitting Physician:No admitting provider for patient encounter. Primary Care Physician: Cirilo Dickinson MD    Chief Complaint: \"abdominal pain\"    Diagnosis Present on Admission:   SBO (small bowel obstruction) ([address]   Hypertension   S/P ileal conduit (HCC)   Lactic acidosis   KEENAN (acute kidney injury) ([address]      Secondary Diagnosis  Patient Active Problem List   Diagnosis    Hypertension    Leukopenia    Lightheadedness    Rectal bleed    Bladder cancer ([address]    S/P ileal conduit (HCC)    Rectal injury    Pelvic mass in male    Right adrenal mass ([address]    Bladder cancer metastasized to bone ([address]    Cancer associated pain    Chronic fatigue    Benign prostatic hyperplasia without urinary obstruction    SBO (small bowel obstruction) (HCC)    Lactic acidosis    KEENAN (acute kidney injury) ([address]       History of Present Illness  Saad Casillas is a 78 y.o. male admitted on 2021 for abdominal pain. Patient with dementia, poor historian and information is obtained from son. Suspected abdominal pain over the last week. Denies nausea or emesis. Last BM yesterday. History bladder cancer s/p ileal conduit. No UOP until recently while in ED. Hyperkalemia and acute on CKD. Insulin and D50 given. Prior to Admission medications    Medication Sig Start Date End Date Taking? Authorizing Provider   furosemide (LASIX) 20 MG tablet Take 1 tablet by mouth daily 21   Paz Valle MD   potassium chloride (KLOR-CON M20) 20 MEQ extended release tablet Take 1 tablet by mouth daily Go to lab and hospital were orders are waiting for blood tests.  Can get 90 day supply after blood tests 20   Jessica Duval MD   donepezil (ARICEPT) 5 MG tablet Take 1 tablet by mouth nightly 20   Paz Valle MD   cetirizine (ZYRTEC) 10 MG tablet TAKE 1 TABLET BY MOUTH EVERY DAY 12/3/19   Ruchi Abdalla MD   clotrimazole-betamethasone (LOTRISONE) 1-0.05 % cream Apply topically 2 times daily. 10/8/19   Ruchi Abdalla MD   Nutritional Supplements (ENSURE ACTIVE HIGH PROTEIN) LIQD Take 1 Can by mouth three times daily 8/23/17   Ruchi Abdalla MD     Past Medical History:   Diagnosis Date    Cancer [facility])     bladder    GASTRIC ULCER     Hypertension     Leukopenia      Past Surgical History:   Procedure Laterality Date    COLONOSCOPY      colo 2007, Dr Mili Elder MD.   Demaris Hirangel luis HISTORY  12/14/2016    RADICAL CYSTECTOMY, PROSTECTOMY ILEAL CONDUIT URINARY    TUNNELED VENOUS PORT PLACEMENT Right 06/06/2017    DR. BOND/IR POWER PORT     History reviewed. No pertinent family history. Social History     Socioeconomic History    Marital status:       Spouse name: Not on file    Number of children: 3    Years of education: Not on file    Highest education level: Not on file   Occupational History    Occupation: [employer]. Financial resource strain: Not on file    Food insecurity     Worry: Not on file     Inability: Not on file   Jalousier needs     Medical: Not on file     Non-medical: Not on file   Tobacco Use    Smoking status: Never Smoker    Smokeless tobacco: Never Used   Substance and Sexual Activity    Alcohol use: No    Drug use: No    Sexual activity: Not on file   Lifestyle    Physical activity     Days per week: Not on file     Minutes per session: Not on file    Stress: Not on file   Relationships    Social connections     Talks on phone: Not on file     Gets together: Not on file     Attends Confucianism service: Not on file     Active member of club or organization: Not on file     Attends meetings of clubs or organizations: Not on file     Relationship status: Not on file    Intimate partner violence     Fear of current or ex partner: Not on file     Emotionally abused: Not on file     Physically abused: Not on file     Forced sexual activity: Not on file   Other Topics Concern    Not on file   Social History Narrative    Not on file     No Known Allergies      Review of Systems  12 point review of systems was reviewed and negative except for that listed in HPI    Physical Exam  Vitals:    02/18/21 2018 02/18/21 2031 02/18/21 2037 02/18/21 2046   BP: (!) 151/83 (!) 152/76  (!) 147/76   Pulse: 99 92  103   Resp:  28 22 25   Temp:       TempSrc:       SpO2:  98% 93% 98%   Weight:       Height:           General/Appearance: NAD, alert to place  HEENT: NCAT, PERRLA, Conjunctiva non injected, no scleral icterus. External ears and nares normal bilaterally. Mucous membranes pink and moist.   Neck: Neck is symmetrical. Trachea appears midline. Lung: clear to auscultation bilaterally, normal rate and effort  Cardiac: regular rate and rhythm  Abdomen: soft, distended with diffuse tenderness periumbilically. Ileal conduit RLQ with yellow urine  Neuro: No gross motor or sensory deficits. Skin: No open wounds or rashes. MSK: normal ROM, no edema  Psych: normal insight, judgment    Labs  Recent Labs     02/18/21  1742   WBC 15.4*   HGB 13.7   HCT 41.1        Recent Labs     02/18/21  1742      K 6.0*   CL 99   CO2 24   BUN 46*   GFRAA 30*     Recent Labs     02/18/21  1742   GLOB 3.7   AST 40*   ALT 18     Invalid input(s): UAPR, UCOL, UNAR, [place], [place], [place], [place], [place], [place], EOS    Imaging  CT CHEST ABDOMEN PELVIS WO CONTRAST   Final Result   Dilated loop of small bowel within a peristomal hernia and associated mild   hydroureter and hydronephrosis are concerning for obstruction of the ileal   conduit. Additionally, there are several loops of small bowel in the right   hemiabdomen which are fluid-filled and demonstrate mild wall thickening with   significant inflammatory stranding of the mesentery which has a somewhat   swirled appearance. Findings are somewhat concerning for an internal hernia. Surgical evaluation is suggested. XR CHEST PORTABLE   Final Result   No acute cardiopulmonary disease. Assessment  Alida Cook is a 78 y.o. male admitted for SBO. Lactic acid and CT appearance concerning for ischemic changes with mesenteric fluid and possible internal hernia. Also with potential incarcerated loop SB causing obstructive uropathy    Plan    1. SBO  · OR emergently for exlap, possible bowel resection  · The risks, benefits and alternatives to the planned procedure were discussed. Son expressed an understanding and is willing to proceed. · Abx given in ED  2. Acute on CKD  · Secondary to obstruction near conduit  · Will place coleman intraop. Starting to produce urine after resuscitation  · D/W Dr. Raina Mac. Will hold on vascath as should improve after obstruction relieved  · Continue IVF postop  3.  HTN  · Resume home meds postop    Electronically signed by Tio Desir MD on 2/18/2021 at 9:03 PM

## 2021-02-19 NOTE — PROGRESS NOTES
Physician Progress Note      Daniela Davis  CSN #:                  [id number]  :                       1940  ADMIT DATE:       2021 4:30 PM  [address] [place] DATE:  RESPONDING  PROVIDER #:        Latasha Lemos MD          QUERY TEXT:    Patient admitted with SBO with ischemic bowel. Please document in the   progress notes and discharge summary if you are evaluating and/or treating any   of the following: The medical record reflects the following:  Risk Factors: SBO  Clinical Indicators: ischemic bowel  Treatment: bowel resection    Thank you,  Fam Stevens RN, BSN, CCDS  [email or web address]  Options provided:  -- Acute diffuse ischemia of small intestine  -- Acute focal ischemia of small intestine  -- Acute diffuse ischemia of large intestine  -- Acute focal ischemia of large intestine  -- Chronic bowel ischemia  -- Mesenteric arterial insufficiency  -- Other - I will add my own diagnosis  -- Disagree - Not applicable / Not valid  -- Disagree - Clinically unable to determine / Unknown  -- Refer to Clinical Documentation Reviewer    PROVIDER RESPONSE TEXT:    This patient has acute focal ischemia of small intestine.     Query created by: Jerrod Ornelas on 2021 10:46 AM      Electronically signed by:  Latasha Lemos MD 2021 11:06 AM

## 2021-02-19 NOTE — ED NOTES
Patient was found in the bedside chair, naked. Patient had pulled out IV. Patient was cleaned up from blood being on legs and hand from IV being pulled out. Patient was assisted back to bed placed on monitor, pulse ox, and blood pressure. Bed alarm placed on patient.  Patient also has a power port in the R upper chest.      Hernesto Mayo RN  02/18/21 2010

## 2021-02-19 NOTE — PROGRESS NOTES
4 Eyes Skin Assessment     NAME:  Quinten Butler Sr  YOB: 1940  MEDICAL RECORD NUMBER:  [id number]    The patient is being assess for  Admission    I agree that 2 RN's have performed a thorough Head to Toe Skin Assessment on the patient. ALL assessment sites listed below have been assessed. Areas assessed by both nurses:    Head, Face, Ears, Shoulders, Back, Chest, Arms, Elbows, Hands, Sacrum. Buttock, Coccyx, Ischium and Legs. Feet and Heels        Does the Patient have a Wound?  No noted wound(s)       [place] Prevention initiated:  Yes   Wound Care Orders initiated:  No    Pressure Injury (Stage 3,4, Unstageable, DTI, NWPT, and Complex wounds) if present place consult order under [de-identified] No    New and Established Ostomies if present place consult order under : Yes      Nurse 1 eSignature: Electronically signed by Zuleyma Barrett RN on 2/19/21 at 4:48 AM EST    **SHARE this note so that the co-signing nurse is able to place an eSignature**    Nurse 2 eSignature: Electronically signed by Isabella Vargas RN on 2/19/21 at 4:49 AM EST

## 2021-02-19 NOTE — PROGRESS NOTES
Xray came back showing that NG tube needs advancement 10-15 cm. NG tube advanced to 60 cm at nose. Pt is less combative this time. New xray shows tube in correct position. 154.1

## 2021-02-19 NOTE — PLAN OF CARE
Problem: Falls - Risk of:  Goal: Will remain free from falls  Description: Will remain free from falls  2/19/2021 1553 by Clark Ruiz RN  Outcome: Ongoing  2/19/2021 0456 by Christina Monique RN  Outcome: Ongoing  Goal: Absence of physical injury  Description: Absence of physical injury  2/19/2021 1553 by Clark Ruiz RN  Outcome: Ongoing  2/19/2021 0456 by Christina Monique RN  Outcome: Ongoing     Problem: Confusion - Acute:  Goal: Absence of continued neurological deterioration signs and symptoms  Description: Absence of continued neurological deterioration signs and symptoms  2/19/2021 1553 by Clark Ruiz RN  Outcome: Ongoing  2/19/2021 0456 by Christina Monique RN  Outcome: Ongoing  Goal: Mental status will be restored to baseline  Description: Mental status will be restored to baseline  2/19/2021 1553 by Clark Ruiz RN  Outcome: Ongoing  2/19/2021 0456 by Christina Monique RN  Outcome: Ongoing     Problem: Discharge Planning:  Goal: Ability to perform activities of daily living will improve  Description: Ability to perform activities of daily living will improve  2/19/2021 1553 by Clark Ruiz RN  Outcome: Ongoing  2/19/2021 0456 by Christina Monique RN  Outcome: Ongoing  Goal: Participates in care planning  Description: Participates in care planning  2/19/2021 1553 by Clark Ruiz RN  Outcome: Ongoing  2/19/2021 0456 by Christina Monique RN  Outcome: Ongoing

## 2021-02-19 NOTE — BRIEF OP NOTE
Brief Postoperative Note      Patient: Anahi Umana Sr  YOB: 1940  MRN: [id number]    Date of Procedure: 2/18/2021    Pre-Op Diagnosis: ISCHEMIC BOWEL    Post-Op Diagnosis: Same       Procedure(s):  EXPLORATORY LAPAROTOMY, BOWEL RESECTION    Surgeon(s):  Sandra Bobby MD    Assistant:  Surgical Assistant: Esau Abad    Anesthesia: General    Estimated Blood Loss (mL): less than 653     Complications: None    Specimens:   ID Type Source Tests Collected by Time Destination   A : A. SMALL BOWEL Tissue Tissue SURGICAL PATHOLOGY Sandra Bobby MD 2/18/2021 2225        Implants:  * No implants in log *      Drains:   Urostomy Ileal conduit RLQ (Active)       Urostomy RLQ (Active)       Findings: closed loop SBO with ischemic bowel.   Parastomal hernia uninvolved    Electronically signed by Sandra Bobby MD on 2/18/2021 at 10:53 PM

## 2021-02-19 NOTE — PROGRESS NOTES
Nephrology Progress Note  [phone]  [phone]   [email or web address]    Patient:  Ani Multani Sr   : 1940    Consult received. Discussed the case with the ED staff. Mr. Christina Madsen appears to have signs of ileal conduit obstruction on his CT. Noted KEENAN and hyperkalemia. He has some dementia by report. I believe he will not need hemodialysis as long as his obstructive uropathy is addressed. If his obstruction is improved and he has some urine output, he will not need a dialysis catheter tonight and we can continue with conservative supportive medical management from there. Discussed care with Dr. Rocio Escobedo as well. Likely he needs isotonic volume expansion post-op. Please call with questions overnight. Thanks for the consult!     Ciera Lisa MD  [facility]  Office Number: [phone]

## 2021-02-19 NOTE — CONSULTS
Nephrology ([facility]) Consult Note    Gildardo Diane is a 78 y.o. male whom we were asked to see for KEENAN and hyperkalemia. The patient presents with abdominal pain x 1 week. Unclear last BM. Patient has no urine output from urostomy. In the ER, he was noted to have KEENAN and hyperkalemia, and his abdominal CT showed significant bowel obstruction with suspected blockage of ileal conduit. Dr. Estela Hi was consulted, and he took the patient to the OR this AM.  He is currently restrained. He is unable to provide history secondary to dementia. Son lyndsay. Past Medical History:  dementia  h/o bladder cancer s/p ileal conduit  HTN      Review of System:  Otherwise unremarkable    Allergies:  Patient has no known allergies. Medications:  Current medications reviewed. Social History:  no tobacco  Family Medical History:  Negative for kidney disase    Physical Exam:  Blood pressure 100/68, pulse 111, temperature 98.2 °F (36.8 °C), temperature source Axillary, resp. rate 20, height 5' 9\" (1.753 m), weight 185 lb 10 oz (84.2 kg), SpO2 93 %.     General:  NAD, confused, ill-appearing, normal body habitus  HEENT:  PERRL, EOMI  Neck:  Supple, normal range of movement, thyroid unremarkable  Chest:  CTAB, good respiratory effort, good air movement  CV:  RRR, no murmurs or rubs, no carotid bruit, no abdominal bruits  Abdomen:  NTND, soft, +BS, surgical site  Extremities:  No peripheral edema  Neurological:  Moving all four extremities, CN II-XII grossly intact  Lymphatics:  No palpable lymph nodes  Skin:  No rash, no jaundice  Psychiatric:  confused    Laboratory Studies:  Lab Results   Component Value Date/Time     02/19/2021 05:50 AM    K 4.7 02/19/2021 05:50 AM    K 6.0 (HH) 02/18/2021 05:42 PM     02/19/2021 05:50 AM    CO2 25 02/19/2021 05:50 AM    BUN 48 (H) 02/19/2021 05:50 AM    CREATININE 2.4 (H) 02/19/2021 05:50 AM    CALCIUM 8.8 02/19/2021 05:50 AM    PHOS 3.1 12/21/2016 04:30 AM    MG 1.60 (L) 12/21/2016 04:30 AM     Lab Results   Component Value Date/Time    WBC 13.3 (H) 02/19/2021 05:50 AM    HGB 11.2 (L) 02/19/2021 05:50 AM    HCT 33.9 (L) 02/19/2021 05:50 AM     02/19/2021 05:50 AM       Assessment/Plan:  Reviewed old records and labs.     1) KEENAN   - baseline 09/10/19 Cr 1.8   - check urine studies    - monitor renal function   - if the patient has advanced dementia, but the patient seems to have periods of lucidity at times    2) SBO from ischemic bowel s/p exp lap and partial bowel resection 02/19/21   - per Dr. Bonnie Ríos    3) baldder cancer and ileal conduit   - per Dr. Tessie Gaines    4) FEN   - hyperkalemia    - no EKG changes    - treated medically (and surgically)    - already improved   - magnesium    - will check    5) dementia

## 2021-02-19 NOTE — PROGRESS NOTES
Patient repeatedly pulling at NG tube despite staff redirection. Becoming agitated and saying he will pull the tube out if we do not.

## 2021-02-19 NOTE — CONSULTS
Consulting Physician: Tabitha    Reason for Consult: potential obstruction of an ileal conduit. History of Present Illness: Merrick Kearney is a 78 y.o. who underwent radical cystectomy and ileal [facility]a [facility]. Patient subsequently developed metastatic disease and been under Dr. Jimi Jewell care. Currently on a check point inhibitor. He was emergently hospitalized last evening with abdominal pain and a CT scan that showed mild bilateral hydroureteronephrosis and potential dead bowel. At surgery he was found to have a closed loop small bowel obstruction with a portion of ischemic bowel that was successfully excised. 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 Since surgery, his urine output has picked up nicely but his creatinine has remained elevated at 2.5. He has been seen by nephrology. Past Medical History:   Past Medical History:   Diagnosis Date    Cancer ([facility] [address]     bladder    GASTRIC ULCER     Hypertension     Leukopenia        Past Surgical History:  Past Surgical History:   Procedure Laterality Date    COLONOSCOPY      colo 2007, Dr Mili Elder MD.   Perico Doe N/A 2/18/2021    EXPLORATORY LAPAROTOMY, BOWEL RESECTION performed by Napoleon Chapman MD at [address] [place] [address]  12/14/2016    RADICAL CYSTECTOMY, PROSTECTOMY ILEAL CONDUIT URINARY    TUNNELED VENOUS PORT PLACEMENT Right 06/06/2017    DR. BOND/ POWER PORT       Social History:  Social History     Socioeconomic History    Marital status:       Spouse name: Not on file    Number of children: 3    Years of education: Not on file    Highest education level: Not on file   Occupational History    Occupation: [employer]. Financial resource strain: Not on file    Food insecurity     Worry: Not on file     Inability: Not on file   mGaadi needs     Medical: Not on file Non-medical: Not on file   Tobacco Use    Smoking status: Never Smoker    Smokeless tobacco: Never Used   Substance and Sexual Activity    Alcohol use: No    Drug use: No    Sexual activity: Not on file   Lifestyle    Physical activity     Days per week: Not on file     Minutes per session: Not on file    Stress: Not on file   Relationships    Social connections     Talks on phone: Not on file     Gets together: Not on file     Attends Spiritism service: Not on file     Active member of club or organization: Not on file     Attends meetings of clubs or organizations: Not on file     Relationship status: Not on file    Intimate partner violence     Fear of current or ex partner: Not on file     Emotionally abused: Not on file     Physically abused: Not on file     Forced sexual activity: Not on file   Other Topics Concern    Not on file   Social History Narrative    Not on file       Family History:  History reviewed. No pertinent family history.     Meds:   Current Facility-Administered Medications: donepezil (ARICEPT) tablet 5 mg, 5 mg, Oral, Nightly  sodium chloride flush 0.9 % injection 10 mL, 10 mL, Intravenous, 2 times per day  sodium chloride flush 0.9 % injection 10 mL, 10 mL, Intravenous, PRN  0.9 % sodium chloride infusion, , Intravenous, Continuous  oxyCODONE (ROXICODONE) immediate release tablet 5 mg, 5 mg, Oral, Q4H PRN **OR** oxyCODONE HCl (OXY-IR) immediate release tablet 10 mg, 10 mg, Oral, Q4H PRN  morphine (PF) injection 2 mg, 2 mg, Intravenous, Q2H PRN **OR** morphine (PF) injection 4 mg, 4 mg, Intravenous, Q2H PRN  acetaminophen (TYLENOL) tablet 650 mg, 650 mg, Oral, Q6H PRN  ondansetron (ZOFRAN) injection 4 mg, 4 mg, Intravenous, Q6H PRN  heparin (porcine) injection 5,000 Units, 5,000 Units, Subcutaneous, 3 times per day  famotidine (PEPCID) injection 20 mg, 20 mg, Intravenous, Daily    Review of Systems:  10 Systems were reviewed and negative except as in HPI    Vitals:  /69 Pulse 96   Temp 98.2 °F (36.8 °C) (Axillary)   Resp 18   Ht 5' 9\" (1.753 m)   Wt 185 lb 10 oz (84.2 kg)   SpO2 97%   BMI 27.41 kg/m²     Intake/Output Summary (Last 24 hours) at 2/19/2021 1211  Last data filed at 2/19/2021 1043  Gross per 24 hour   Intake 3090 ml   Output 1150 ml   Net 1940 ml       Physical Exam:  General Appearance: Alert somewhat disoriented. , cooperative, no distress, appears stated age  Head: Normocephalic, without obvious abnormality, atraumatic  Back: no CVA tenderness  Lungs: respirations unlabored, no wheezing  Heart: Regular rate and rhythm, no lower extremity edema noted  Abdomen:   non-distended,  Stoma appears healthy bright pink. Skin: Skin color, texture, turgor normal, no rashes or lesions  Neurologic: no gross deficits      Labs:  CBC   Lab Results   Component Value Date    WBC 13.3 02/19/2021    RBC 3.62 02/19/2021    RBC 4.07 08/17/2017    HGB 11.2 02/19/2021    HCT 33.9 02/19/2021    MCV 93.5 02/19/2021    MCH 30.9 02/19/2021    MCHC 33.0 02/19/2021    RDW 14.1 02/19/2021     02/19/2021    MPV 9.6 02/19/2021     BMP   Lab Results   Component Value Date     02/19/2021    K 4.7 02/19/2021    K 6.0 02/18/2021     02/19/2021    CO2 25 02/19/2021    BUN 48 02/19/2021    CREATININE 2.4 02/19/2021    GLUCOSE 122 02/19/2021    GLUCOSE 128 08/17/2017    CALCIUM 8.8 02/19/2021       Urinalysis:   Lab Results   Component Value Date    COLORU DK YELLOW 02/18/2021    GLUCOSEU Negative 02/18/2021    BLOODU LARGE 02/18/2021    NITRU Negative 02/18/2021    LEUKOCYTESUR LARGE 02/18/2021       Imaging:   Mild bilateral hydroureteronephrosis down to the ileal conduit. This does represent a change from recent imaging. 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   I suspect the new findings of mild hydronephrosis on the current CT scan represent a reaction to the acute ischemic dead bowel situation. As he is now making good urine output I would observe and would hope that his creatinine would improve over time. I don't think this is an obstructive situation. If things would worsen, would repeat scan and if significant hydronephrosis did develop   percutaneous  Nephrostomy would be indicated.   Scott King,  2/19/202112:11 PM

## 2021-02-19 NOTE — PROGRESS NOTES
Pt administered continuous 10mg albuterol. Filters are in place due to Covid Rule out.  Door is shut and notified RN to wear protective equipment due to [facility] given in covid rule out

## 2021-02-19 NOTE — PROGRESS NOTES
[facility] Surgery [phone]                                     Daily Progress Note                                                         Pt Name: Darren Patel Sr  Medical Record Number: [id number]  Date of Birth 1940   Today's Date: 2/19/2021    ASSESSMENT/PLAN  - POD #1 (2/18) S/p Exploratory laparotomy, bowel resection for ischemic bowel  - NPO with sips of ice chips   - NG to LWS   - OOB ambulate   - Monitor and control pian   - Pulmonary toilet / Encourage IS use       Don Prow has improved from yesterday. Patient's son at bedside. Pt in wrist restraints to avoid pulling out NG. Despite this he pulled it out. It was replaced and needs to be advanced. Pain is controlled. He has no nausea and no vomiting. He has not passed flatus and has not had a bowel movement. He is tolerating ice chips. OBJECTIVE  VITALS:  height is 5' 9\" (1.753 m) and weight is 185 lb 10 oz (84.2 kg). His axillary temperature is 98.2 °F (36.8 °C). His blood pressure is 100/68 and his pulse is 111. His respiration is 20 and oxygen saturation is 93%. INTAKE/OUTPUT:      Intake/Output Summary (Last 24 hours) at 2/19/2021 1238  Last data filed at 2/19/2021 1043  Gross per 24 hour   Intake 3090 ml   Output 1150 ml   Net 1940 ml     GENERAL: alert to person; confused conversation   LUNGS: clear to ausculation, without wheezes, rales or rhonci  HEART: normal rate and regular rhythm  ABDOMEN: Soft, appropriately tender from recent surgery/ surgical incision dressing with shadow drainage c/d/I,  non distended. EXTREMITY: no cyanosis, clubbing or edema    I/O last 3 completed shifts:   In: 3090 [I.V.:3090]  Out: 750 [Urine:600; Emesis/NG output:150]    LABS  Recent Labs     02/18/21 1742 02/18/21 2057 02/18/21 2057 02/19/21  0550   WBC 15.4*  --   --  13.3*   HGB 13.7  --   --  11.2*   HCT 41.1  --   --  33.9*     --   --  199     -- --  143   K 6.0*  --    < > 4.7   CL 99  --   --  108   CO2 24  --   --  25   BUN 46*  --   --  48*   CREATININE 2.5*  --   --  2.4*   CALCIUM 10.0  --   --  8.8   AST 40*  --   --   --    ALT 18  --   --   --    BILITOT 1.2*  --   --   --    NITRU  --  Negative  --   --    COLORU  --  DK YELLOW  --   --    BACTERIA  --  4+*  --   --     < > = values in this interval not displayed. EDUCATION  Patient educated about Disease Process, Medications, Smoking Cessation, Oxygenation, Incentive Spirometry and Deep Breath and Cough, Diabetes, Hyperlipidemia, Smoking Cessation, Nutrition, Exercise and Hypertension    Electronically signed by SLADE Love - CNP on 2/19/2021 at 12:38 PM      [address] and Vascular Surgery   [phone] Office  [phone] Cell        Surgery Staff  I have examined this patient and read and agree with the note by SLADE Zayas from today. Stable postop. Pleasantly confused  OK to leave NG out if pulls again  Cr trending down with resuscitation.   UOP increased    Electronically signed by Deepak Vick MD on 2/19/2021 at 4:46 PM

## 2021-02-19 NOTE — ANESTHESIA POSTPROCEDURE EVALUATION
Department of Anesthesiology  Postprocedure Note    Patient: Domingo Lewis  MRN: [id number]  YOB: 1940  Date of evaluation: 2/18/2021  Time:  11:06 PM     Procedure Summary     Date: 02/18/21 Room / Location: [address] [place] [address]    Anesthesia Start: 2128 Anesthesia Stop: 2306    Procedure: EXPLORATORY LAPAROTOMY, BOWEL RESECTION (N/A Abdomen) Diagnosis: (ISCHEMIC BOWEL)    Surgeons: Sepedy Curiel MD Responsible Provider: Keara Kincaid MD    Anesthesia Type: general ASA Status: 3 - Emergent          Anesthesia Type: general    Jes Phase I: Jes Score: 4    Jes Phase II:      Last vitals: Reviewed and per EMR flowsheets. Anesthesia Post Evaluation    Patient location during evaluation: PACU  Patient participation: complete - patient participated  Level of consciousness: sleepy but conscious  Pain score: 3  Airway patency: patent  Nausea & Vomiting: no nausea and no vomiting  Complications: no  Cardiovascular status: blood pressure returned to baseline  Respiratory status: acceptable  Hydration status: euvolemic  Comments: Pt hypoglycemic in PACU. Blood glucose 49. Given 25g of dextrose IV.

## 2021-02-19 NOTE — PROGRESS NOTES
Physical Therapy  PT referral received and chart reviewed. Pt currently In restraints and unable to actively participate with PT Eval.  Will follow-up as approp.   Thank you, Aaliyah Hinojosa, PT [id number]

## 2021-02-19 NOTE — PROGRESS NOTES
Physician Progress Note      PATIENT:               Latricia Moritz  CSN #:                  [id number]  :                       1940  ADMIT DATE:       2021 4:30 PM  [address] [place] DATE:  RESPONDING  PROVIDER #:        Jean-Pierre Goldman MD        QUERY TEXT:    Stage of Chronic Kidney Disease: Please provide further specificity, if known. Clinical indicators include: acute on ckd, bun  Options provided:  -- Chronic kidney disease stage 1  -- Chronic kidney disease stage 2  -- Chronic kidney disease stage 3  -- Chronic kidney disease stage 4  -- Chronic kidney disease stage 5  -- Chronic kidney disease stage 5, requiring dialysis  -- End stage renal disease        PROVIDER RESPONSE TEXT:    The patient has chronic kidney disease stage 3.       Electronically signed by:  Jean-Pierre Goldman MD 2021 12:10 PM

## 2021-02-19 NOTE — CARE COORDINATION
Wound care consulted for existing ileal conduit. Pt has had this for 5 years with hx of bladder cancer. Pt currently in bed and confused. Son in room. Confusion is not baseline, but thought to be from anesthesia. Pt in restraints due to attempting to pull out NG tube. Pt currently in a 2-piece urostomy pouching system. Pouch connected to coleman bag. Urine in pouch. Current pouch intact, reinforced with barrier strips. Stoma, red, moist and protruding. Pt son unsure of what pt uses outpatient for supplies. While pt admitted please order:  [employer] [place] Flex flat #[id number] (3/8-1-7/8\" RED)  [employer] [place] Flex Urostomy Pouch #11909 RED  [employer] protective Seal thin #12035  [employer] Elastic Barrier Strips #[id number]    -Change pouch every 3 days & PRN  Measure stoma & cut wafer to fit snuggly around base of stoma  Remove pouch, cleanse skin with warm water, dry thoroughly  Apply wafer    When more more alert and girlfriend present can utilize pt baseline conduit care.     Electronically signed by Vee Rosa RN on 2/19/2021 at 1:03 PM

## 2021-02-19 NOTE — PROGRESS NOTES
0300 Telesitter placed for safety of lines and fall prevention. Patient repeatedly trying to get up stating that he has to get up and go to work despite staff attempts to redirect. Called patient's son Willam Diop per request, son was able to redirect patient somewhat. Willam Diop stated that it would be okay to put in restraints if absolutely necessary but that it might agitate the patient more. [id number] [address] patient from 0 to [phone] to be closer to nurses' station for added safety. Staff sitting nearby or in room for safety. Requested  for day shift if possible.

## 2021-02-19 NOTE — ED NOTES
[facility], all questions answered at this time     Harry Griffin RN  02/18/21 2047

## 2021-02-20 NOTE — PROGRESS NOTES
Occupational Therapy   Occupational Therapy Initial Assessment  Date: 2021   Patient Name: Kali Hayes  MRN: [id number]     : 1940    Date of Service: 2021    Discharge Recommendations:  3-5 sessions per week, 24 hour supervision or assist, Patient would benefit from continued therapy after discharge  OT Equipment Recommendations  Equipment Needed: No  Other: defer to next level of care   Kali Hayes scored a  on the AM-PAC ADL Inpatient form. Current research shows that an AM-PAC score of 17 or less is typically not associated with a discharge to the patient's home setting. Based on the patient's AM-PAC score and their current ADL deficits, it is recommended that the patient have 3-5 sessions per week of Occupational Therapy at d/c to increase the patient's independence. Please see assessment section for further patient specific details. If patient discharges prior to next session this note will serve as a discharge summary. Please see below for the latest assessment towards goals. Assessment   Performance deficits / Impairments: Decreased functional mobility ; Decreased endurance;Decreased coordination;Decreased ADL status; Decreased posture;Decreased ROM; Decreased balance;Decreased strength;Decreased safe awareness;Decreased cognition  Assessment: Pt is an 78 y.o. male admitted with SBO 21 underwent exploratory laparotomy, bowel resection for ischemic bowel. PMH:  Patient with hx of dementia, bladder cancer s/p ileal conduit, hyperkalemia, CKD. Currently pt requires mod-max A x2 for bed mob, min A x 2 for sit>< stand transfers and to amb a few steps with hand held assist x2 for positioning. Pt very confused, but pleasant, limited ability to stay on task and followed commands inconsistently. Based on mob staus/cognition expect pt dep for all ADL tasks @ this time. Prior living situation/level of function unknown as pt unable to self report, and info not in chart.  Pt currently functioning below his baseline and will benefit from inpt OT tx to faciliate maximum return of function to restore prior autonomy, decrease caregiver burden, and to allow for safe D/C plan. Feel pt will require cont'd OT tx post D/C 3-5x/week as not safe to D/C home @ this time. Plan to cont. OT treatment while in acute care. Treatment Diagnosis: impaired: Cognition, ADL function, mob/transfers, balance, ROM, coord  Prognosis: Fair  Decision Making: High Complexity  History: see assessment section above  Exam: ADL function, mob/transfers, balance, ROM, coord, strength  Assistance / Modification: see assessment section above  OT Education: OT Role;Plan of Care;Orientation;Transfer Training  REQUIRES OT FOLLOW UP: Yes  Activity Tolerance  Activity Tolerance: Treatment limited secondary to decreased cognition;Treatment limited secondary to agitation;Patient limited by fatigue;Patient limited by pain  Safety Devices  Safety Devices in place: Yes  Type of devices: Left in bed;Bed alarm in place;Nurse notified;Call light within reach; All fall risk precautions in place; Patient at risk for falls           Patient Diagnosis(es): The primary encounter diagnosis was Small bowel obstruction ([facility] Diagnoses of Status post ileal conduit ([facility] [address] Urinary obstruction, Acute renal failure, unspecified acute renal failure type ([facility] [address] Hyperkalemia, SIRS (systemic inflammatory response syndrome) ([facility] [address] and Dementia without behavioral disturbance, unspecified dementia type [facility]) were also pertinent to this visit. has a past medical history of Cancer ([facility] [address] GASTRIC ULCER, Hypertension, and Leukopenia. has a past surgical history that includes Colonoscopy; Cystoscopy; other surgical history (12/14/2016); Tunneled venous port placement (Right, 06/06/2017); and laparotomy (N/A, 2/18/2021).     Treatment Diagnosis: impaired: Cognition, ADL function, mob/transfers, balance, ROM, coord      Restrictions  Restrictions/Precautions  Restrictions/Precautions: Fall Risk  Position Activity Restriction  Other position/activity restrictions: NPO except ice chips/popsicles; telecamera and sitter. ; urostomy bag, NG to wall suction, cental line/port    Subjective   General  Chart Reviewed: Yes, Orders, Progress Notes, History and Physical  Additional Pertinent Hx: Hanna Westbrook is a 78 y.o. male admitted on 2021 for abdominal pain. Patient with dementia, poor historian and information is obtained from son. Suspected abdominal pain over the last week. Denies nausea or emesis. Last BM yesterday. History bladder cancer s/p ileal conduit. No UOP until recently while in ED. Hyperkalemia and acute on CKD. \" copied per Thony Hylton MD 21 note  Referring Practitioner: Lawyer Chace TEJEDA  Diagnosis: SBO () S/p Exploratory laparotomy, bowel resection for ischemic bowel  Subjective  Subjective: Pt met in room for OT emerald/tx. Seen with PT d/c pt's confusion. Pt met in room in bed confused with sitter whom left during OT/PT session. Pt expressed significant pain in abd, did not rate. General Comment  Comments: OK per nurse to work with pt. Social/Functional History  Social/Functional History  Lives With: (pt UNABLE to give any accurate PLOF of history, and no family present.)  Additional Comments: Unsure of PLOF--will need SW consult       Objective   Vision: Within Functional Limits  Hearing: Exceptions to [facility]  Hearing Exceptions: Hard of hearing/hearing concerns    Orientation  Overall Orientation Status: Impaired  Orientation Level: Oriented to person;Disoriented to place; Disoriented to time;Disoriented to situation(unable to state own )     Balance  Sitting Balance: Minimal assistance(EOB)  Standing Balance:  Moderate assistance(min A X2)  Standing Balance  Activity: at EOB  Functional Mobility  Functional Mobility Comments: Pt took a few side steps min A, hand held assist x2 to get aligned higher in the bed prior to sitting  ADL  Additional Comments: Curently based on strength, cognition, functional mob, balance, ROM anticipate pt is dep for all ADL tasks. Coordination  Movements Are Fluid And Coordinated: No  Coordination and Movement description: Fine motor impairments;Gross motor impairments  Quality of Movement Other  Comment: GM & FM min to mod impaired partly D/T impaired cognition     Bed mobility  Bridging: Dependent/Total(attempted, pt unable, dep to pull up in bed)  Rolling to Left: Unable to assess  Rolling to Right: Unable to assess  Supine to Sit: 2 Person assistance; Moderate assistance;Maximum assistance  Sit to Supine: Moderate assistance;2 Person assistance;Maximum assistance  Scooting: Maximal assistance;Dependent/Total;2 Person assistance(up into bed to comfort at end of session.)  Comment: warm blanket provided for pt once pt back in bed. Transfers  Sit to stand: 2 Person assistance;Minimal assistance  Stand to sit: Minimal assistance;2 Person assistance  Transfer Comments: sit >< stand min A x2 max cues, hand held assist, some resistance  (OT/PT changed bed & bed pad while pt in stance, as was soiled.)     Cognition  Overall Cognitive Status: Exceptions  Arousal/Alertness: Delayed responses to stimuli  Following Commands: Inconsistently follows commands  Attention Span: Attends with cues to redirect; Difficulty attending to directions  Memory: Decreased long term memory;Decreased short term memory;Decreased recall of precautions;Decreased recall of recent events;Decreased recall of biographical Information  Safety Judgement: Decreased awareness of need for safety;Decreased awareness of need for assistance  Problem Solving: Decreased awareness of errors;Assistance required to implement solutions;Assistance required to generate solutions;Assistance required to correct errors made;Assistance required to identify errors made  Insights: Decreased awareness of mob tasks with min A to CGA with use of AD. Long term goals  Time Frame for Long term goals : same as LTG  Patient Goals   Patient goals : Pt unable to express goal at this time.        Therapy Time   Individual Concurrent Group Co-treatment   Time In 0800         Time Out 0910         Minutes 70         Timed Code Treatment Minutes: 55 Minutes(+ 15 eval)       Sofia Kirkpatrick OT   Electronically signed by BARBARA Berry/YEVGENIY  License # [id number]
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
## 2021-02-20 NOTE — PLAN OF CARE
Problem: Falls - Risk of:  Goal: Will remain free from falls  Description: Will remain free from falls  2/20/2021 0227 by Deejay Michaud RN  Outcome: Ongoing  2/19/2021 1553 by To Baker RN  Outcome: Ongoing     Problem: Confusion - Acute:  Goal: Absence of continued neurological deterioration signs and symptoms  Description: Absence of continued neurological deterioration signs and symptoms  2/20/2021 0227 by Deejay Michaud RN  Outcome: Ongoing  2/19/2021 1553 by To Baker RN  Outcome: Ongoing     Problem: Sensory Perception - Impaired:  Goal: Demonstrations of improved sensory functioning will increase  Description: Demonstrations of improved sensory functioning will increase  2/20/2021 0227 by Deejay Michaud RN  Outcome: Ongoing  2/19/2021 1553 by To Baker RN  Outcome: Ongoing     Problem: Skin Integrity:  Goal: Will show no infection signs and symptoms  Description: Will show no infection signs and symptoms  2/20/2021 0227 by Deejay Michaud RN  Outcome: Ongoing  2/19/2021 1553 by To Baker RN  Outcome: Ongoing

## 2021-02-20 NOTE — PROGRESS NOTES
exam:->ng placement after advancement FINDINGS: A single frontal view of the chest and upper abdomen was performed. There has been interval advancement of the patient's NG tube, with tip and side port now lying within the gastric body, in satisfactory position. The bowel gas pattern is nonspecific. There is no gross evidence of free air. There is a stable right-sided Port-A-Cath in place. Stable small bilateral pleural effusions, with stable mild pulmonary edema. There is stable cardiomegaly. There is no evidence of a pneumothorax. 1. Interval advancement of the patient's NG tube, now in satisfactory position within the gastric body. 2. Findings suggestive of mild CHF. Xr Chest Portable    Result Date: 2/19/2021  EXAMINATION: ONE XRAY VIEW OF THE CHEST 2/19/2021 1:56 pm COMPARISON: None. HISTORY: ORDERING SYSTEM PROVIDED HISTORY: ng placement TECHNOLOGIST PROVIDED HISTORY: Reason for exam:->ng placement FINDINGS: Nasogastric tube tip is just below the GE junction. Side hole in the distal esophagus. Nasogastric tube tip just below the GE junction. Recommend advancing the tube 10-15 cm and obtaining a follow-up abdomen radiograph centered on the upper abdomen     Xr Chest Portable    Result Date: 2/18/2021  EXAMINATION: ONE XRAY VIEW OF THE CHEST 2/18/2021 4:55 pm COMPARISON: 05/03/2017. HISTORY: ORDERING SYSTEM PROVIDED HISTORY: Abdominal pain TECHNOLOGIST PROVIDED HISTORY: Reason for exam:->Abdominal pain FINDINGS: The cardiomediastinal silhouette is unremarkable. Aortic vascular calcification. The lungs are clear. No infiltrate, pleural fluid or evidence of overt failure. Right-sided venous access port. Degenerative changes at the shoulders bilaterally. No acute cardiopulmonary disease.      Ct Chest Abdomen Pelvis Wo Contrast    Result Date: 2/18/2021  EXAMINATION: CT OF THE CHEST, ABDOMEN, AND PELVIS WITHOUT CONTRAST 2/18/2021 6:02 pm TECHNIQUE: CT of the chest, abdomen and pelvis was obstruction) ([facility] [address] 2/18/2021 Yes    S/P ileal conduit ([facility] [address] 2/18/2021 Yes    Hypertension 2/18/2021 Yes    Lactic acidosis 2/18/2021 Yes    KEENAN (acute kidney injury) ([facility] [address] 2/18/2021 Yes        Assessment & Plan    Small bowel obstruction   POD 2 from bowel resection   NG in place and draining   Awaiting GI function   Pain controlled   Urine output improved    Electronically signed by Lesley Zayas MD on 2/20/2021 at 10:46 AM    Electronically signed by Lesley Zayas MD on 2/20/21 at 10:44 AM EST

## 2021-02-20 NOTE — PROGRESS NOTES
Physical Therapy  Facility/Department: [address] PROGRESSIVE CARE  Initial Assessment    NAME: Estrella Funes  : 1940  MRN: [id number]    Date of Service: 2021  Discharge Recommendations:  Continue to assess pending progress, 3-5 sessions per week, Patient would benefit from continued therapy after discharge   PT Equipment Recommendations  Other: defer to next level of care    Assessment   Body structures, Functions, Activity limitations: Decreased functional mobility ; Decreased balance;Decreased strength;Decreased safe awareness;Decreased cognition;Decreased endurance  Assessment: Pt presents with significantly decreased fucntional mobility after admission for Abdominal Pain, Bowel Obstruction, and underwent ExpLap on 21. Pt alsot s/p radical cystectomy and ileal conduit from  with metastatic dsease. Prior to admission, it is not clear to this therapist what pr's full PLOF was. Pt is unable to give any accurate information regarding this. This date, pt needed Mod-Max A x 2 persons for supine to/from sitting to eob, and only stood very briefly at edge of bed with Min A x 2 persons. Pt was uncooperative with mobility due to pain and poor cognition. Unsure of discharge plans at this time--but in current state pt is unfit for returning home and continued skilled PT is warranted at low-mod intensity for d/c needs. Will cont to follow and mobilize as able, plan cot with OT. Estrella Funes scored a  on the AM-PAC short mobility form. Current research shows that an AM-PAC score of 17 or less is typically not associated with a discharge to the patient's home setting. Based on the patient's AM-PAC score and their current functional mobility deficits, it is recommended that the patient have 3-5 sessions per week of Physical Therapy at d/c to increase the patient's independence. Please see assessment section for further patient specific details.     If patient discharges prior to next session this note will serve as a discharge summary. Please see below for the latest assessment towards goals. Specific instructions for Next Treatment: cotx with OT  Prognosis: Guarded; Fair  Decision Making: High Complexity  History: as noted  Exam: as above  Clinical Presentation: unstable/unpredictable  PT Education: Goals;PT Role;General Safety;Orientation  Barriers to Learning: cognition/some agitation  REQUIRES PT FOLLOW UP: Yes  Activity Tolerance  Activity Tolerance: Treatment limited secondary to agitation;Patient limited by pain; Patient limited by cognitive status       Patient Diagnosis(es): The primary encounter diagnosis was Small bowel obstruction ([facility] [address] Diagnoses of Status post ileal conduit ([facility] [address] Urinary obstruction, Acute renal failure, unspecified [facility] failure [facility] Hyperkalemia, SIRS (systemic inflammatory response syndrome) ([facility] and Dementia without behavioral disturbance, unspecified dementia type [facility]) were also pertinent to this visit. has a past medical history of Cancer ([facility] [address] GASTRIC ULCER, Hypertension, and Leukopenia. has a past surgical history that includes Colonoscopy; Cystoscopy; other surgical history (12/14/2016); Tunneled venous port placement (Right, 06/06/2017); and laparotomy (N/A, 2/18/2021). Restrictions  Restrictions/Precautions  Restrictions/Precautions: Fall Risk  Position Activity Restriction  Other position/activity restrictions: NPO except ice chips/popsicles; telecamera and sitter. ; urostomy bag, NG to wall suction, cental line/port  Vision/Hearing  Vision: Within Functional Limits  Hearing: Exceptions to [facility] Exceptions: Hard of hearing/hearing concerns     Subjective  General  Chart Reviewed: Yes  Additional Pertinent Hx: Per Urology H&P, \" Alida Cook is a 78 y.o. who underwent radical cystectomy and ileal conduit by Anna Dc at [facility] circa 2016/17.   Patient subsequently developed metastatic disease and been under Dr. Kirby Hawkins care. Currently on a check point inhibitor. He was emergently hospitalized last evening with abdominal pain and a CT scan that showed mild bilateral hydroureteronephrosis and potential dead bowel. At surgery he was found to have a closed loop small bowel obstruction with a portion of ischemic bowel that was successfully excised. 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 Since surgery, his urine output has picked up nicely but his creatinine has remained elevated at 2.5\"   On 21, pt underwent \"Exploratory laparotomy, bowel resection for ischemic bowel\". Family / Caregiver Present: No  Subjective  Subjective: Pt in supine, awake and alert to self. Conversive but not necessarily meaningful. Pt in pain, especially with attempting to mobilize, but unable to rate pain. Pt reported \"I just know I'm sick\"-reminded he had abd surgery. Orientation  Orientation  Overall Orientation Status: Impaired(pt unable to state his  or other; Unable to give any accurate history.)  Orientation Level: Oriented to person;Disoriented to place; Disoriented to time;Disoriented to situation  Social/Functional History  Social/Functional History  Lives With: (pt UNABLE to give any accurate PLOF of history, and no family present.)  Additional Comments: Unsure of PLOF--will need SW consult     Objective  AROM RLE (degrees)  RLE General AROM: functionally fair at best--pt unsooperative with rom/strenght assessment/did not follow commands or directions  AROM LLE (degrees)  LLE General AROM: functionally fair at best--pt unsooperative with rom/strenght assessment/did not follow commands or directions  Strength RLE  Comment: functionally fair at best--pt unsooperative with rom/strength assessment/did not follow commands or directions  Strength LLE  Comment: functionally fair at best--pt unsooperative with rom/strength assessment/did not follow commands or directions  Strength Other  Other: + edema noted B LE, with L>R LE     Bed mobility  Supine to Sit: 2 Person assistance; Moderate assistance;Maximum assistance  Sit to Supine: Moderate assistance;2 Person assistance;Maximum assistance  Scooting: Maximal assistance;Dependent/Total;2 Person assistance(up into bed to comfort at end of session.)  Transfers  Sit to Stand: Minimal Assistance;2 Person Assistance(brief stance at side of bed/2 minimal steps to [facility] prior to returning back into supine. Pt uncooperative with most all mobility.)  Stand to sit: 2 Person Assistance;Minimal Assistance  Bed to Chair: Unable to assess(pt uncooperative, with little constanza command following with attemptes to mobilize this date. Pt only wanting to return back into supine.)  Ambulation  Ambulation?: No(unable to assess--uncooperative.)     Balance  Sitting - Static: Fair  Sitting - Dynamic: Fair  Standing - Static: Poor(needed Min A x 2 for brief stance at eob--overall uncooperative with mobility this date.)  Standing - Dynamic: (talat)  Comments: Sitting eob variable assist--initially MOd/Max A with posterior lean/resistance due to pain and uncooperative, stance Min A x 2 at eob very brief.       Plan   Plan  Times per week: 3-5 x wk in acute setting  Specific instructions for Next Treatment: cotx with OT  Current Treatment Recommendations: Functional Mobility Training  Safety Devices  Type of devices: Bed alarm in place, Telesitter in use, Sitter present, Call light within reach, Patient at risk for falls, Left in bed, Nurse notified, All fall risk precautions in place            AM-PAC Score  AM-PAC Inpatient Mobility Raw Score : 9 (02/20/21 0916)  AM-PAC Inpatient T-Scale Score : 30.55 (02/20/21 0916)  Mobility Inpatient CMS 0-100% Score: 81.38 (02/20/21 0916)  Mobility Inpatient CMS G-Code Modifier : CM (02/20/21 7390)     Goals  Short term goals  Time Frame for Short term goals: by acute d/c--anticipate ongoing skilled PT post acute setting  Short term goal 1: bed mobility Min A x 2  Short term goal 2: sit<>stand trasnfers Min A x 1  Short term goal 3: assess ambulation with LRAD if/as needed x 25 ft with Min A  Long term goals  Time Frame for Long term goals : tbd if/as needed. Patient Goals   Patient goals : pt unable to formulate a therapy goal at this time.   Therapy Time   Individual Concurrent Group Co-treatment   Time In 0800         Time Out 0910         Minutes 1300 [facility] Electronically signed by Antoni Ngo PT on 2/20/2021 at 9:17 AM

## 2021-02-21 NOTE — PROGRESS NOTES
D:  Dr. Yisel Shankar returned call for consult for hospitalist, RT at bedside performing EKG A: this RN reviewed chart with Dr. Yisel Shankar R: Dr. Yisel Shankar coming to see pt

## 2021-02-21 NOTE — PROGRESS NOTES
Removed NG tube as ordered, repositioned pt to chair, changed dressing, pt complaining of pain when movement in the stomach, morphine to be given

## 2021-02-21 NOTE — PROGRESS NOTES
Pt Name: Ada Stevens Sr  Medical Record Number: [id number]  Date of Birth 1940   Today's Date: 2/20/2021      Subjective:  - good UOP - clear urine > 1900 ml last 24 hours    ROS: Constitutional: No fever    Vitals:  Vitals:    02/20/21 1237 02/20/21 1238 02/20/21 1622 02/20/21 1912   BP: (!) 169/88  (!) 143/71 (!) 160/80   Pulse: 131 121 94 101   Resp:   18 20   Temp:   98.9 °F (37.2 °C) 98.6 °F (37 °C)   TempSrc:   Oral Oral   SpO2:  90% 92% 92%   Weight:       Height:         I/O last 3 completed shifts: In: 3457 [I.V.:3457]  Out: 80 [Urine:2725; Emesis/NG output:100]    Exam:  General: medicated   Respiratory: Nonlabored breathing  Abdomen: Soft,, non-distended, no masses  : - health ileal conduit stoma- clear urine   Skin: Skin color, texture, turgor normal, no rashes or lesions  Neurologic: no gross deficits    CURRENT MEDICATIONS   Scheduled Meds:   magnesium cl-calcium carbonate  1 tablet Oral BID    donepezil  5 mg Oral Nightly    sodium chloride flush  10 mL Intravenous 2 times per day    heparin (porcine)  5,000 Units Subcutaneous 3 times per day    famotidine (PEPCID) injection  20 mg Intravenous Daily     Continuous Infusions:   sodium chloride 75 mL/hr at 02/20/21 1627     PRN Meds:. LORazepam, sodium chloride flush, oxyCODONE **OR** oxyCODONE, morphine **OR** morphine, acetaminophen, ondansetron    LABS     Recent Labs     02/18/21  1742 02/19/21  0045 02/19/21  0550 02/20/21  0600   WBC 15.4*  --  13.3* 8.1   HGB 13.7  --  11.2* 9.1*   HCT 41.1  --  33.9* 27.7*     --  199 161     --  143 146*   K 6.0* 5.1 4.7 4.2   CL 99  --  108 116*   CO2 24  --  25 23   BUN 46*  --  48* 49*   CREATININE 2.5*  --  2.4* 2.5*   CALCIUM 10.0  --  8.8 8.3   AST 40*  --   --   --    ALT 18  --   --   --    BILITOT 1.2*  --   --   --      -      ASSESSMENT   1. Hospital day # 2  2. No issues with conduit - good UOP - follow creat for now  PLAN   1.  Will be available PRN     [address] Yevgeniy Escobar MD 2/20/2021 7:46 PM

## 2021-02-21 NOTE — PROGRESS NOTES
Nephrology ([facility]) Progress Note    CC: KEENAN    Subjective:    HPI:  Breathing comfortably. No CP. Renal function stable. Producing urine. ROS:  In bed. No fever. [address]:  medications reviewed. Daughter present. Objective:  Blood pressure (!) 169/82, pulse 99, temperature 98.5 °F (36.9 °C), temperature source Oral, resp. rate 18, height 5' 9\" (1.753 m), weight 178 lb 5.6 oz (80.9 kg), SpO2 93 %. Intake/Output Summary (Last 24 hours) at 2/21/2021 1636  Last data filed at 2/21/2021 1227  Gross per 24 hour   Intake --   Output 3045 ml   Net -3045 ml     General:  NAD, A+Ox3  Chest:  CTAB  CVS:  RRR  Abdominal:  surgical site, decreased BS  Extremities:  no edema  Skin:  no rash    Labs:  Renal panel:  Lab Results   Component Value Date/Time     (H) 02/21/2021 04:37 AM    K 3.9 02/21/2021 04:37 AM    K 6.0 (HH) 02/18/2021 05:42 PM    CO2 23 02/21/2021 04:37 AM    BUN 41 (H) 02/21/2021 04:37 AM    CREATININE 2.2 (H) 02/21/2021 04:37 AM    CALCIUM 8.3 02/21/2021 04:37 AM    PHOS 3.1 12/21/2016 04:30 AM    MG 1.60 (L) 12/21/2016 04:30 AM     CBC:  Lab Results   Component Value Date/Time    WBC 6.7 02/21/2021 04:37 AM    HGB 9.2 (L) 02/21/2021 04:37 AM    HCT 27.6 (L) 02/21/2021 04:37 AM     02/21/2021 04:37 AM       Assessment/Plan:  Reviewed old records and labs.     1) KEENAN              - baseline 09/10/19 Cr 1.8   - ATN              - renal function slowly improving   - unlikely to need dialysis   - maintenance IVF as still NPO     2) SBO from ischemic bowel s/p exp lap and partial bowel resection 02/19/21              - per surgery     3) baldder cancer and ileal conduit              - per Dr. William Ramos     4) FEN              - hyperkalemia                          - corrected              - magnesium                          - supplement     5) dementia

## 2021-02-21 NOTE — CONSULTS
donepezil (ARICEPT) 5 MG tablet Take 1 tablet by mouth nightly 7/14/20   Gisel Minor MD       Allergies:  Patient has no known allergies. Social History:      The patient currently lives at home    TOBACCO:   reports that he has never smoked. He has never used smokeless tobacco.  ETOH:   reports no history of alcohol use. Family History:     Reviewed in detail and negative for DM, CAD, Cancer, CVA. Positive as follows:    History reviewed. No pertinent family history. REVIEW OF SYSTEMS:   Pertinent positives as noted in the HPI. All other systems reviewed and negative. PHYSICAL EXAM:  BP (!) 165/70   Pulse 108   Temp 98.5 °F (36.9 °C) (Oral)   Resp 18   Ht 5' 9\" (1.753 m)   Wt 178 lb 5.6 oz (80.9 kg)   SpO2 95%   BMI 26.34 kg/m²   General appearance: No apparent distress, appears stated age and cooperative. HEENT: Normal cephalic, atraumatic without obvious deformity. Pupils equal, round, and reactive to light. Extra ocular muscles intact. Conjunctivae/corneas clear. Neck: Supple, with full range of motion. No jugular venous distention. Trachea midline. Respiratory:  Normal respiratory effort. Clear to auscultation, bilaterally without Rales/Wheezes/Rhonchi. Cardiovascular: Tachycardic rate and regular rhythm with normal S1/S2 without murmurs, rubs or gallops. Abdomen: Surgical scars c/d/i. soft, non-tender, non-distended with normal bowel sounds. Musculoskeletal: No clubbing, cyanosis or edema bilaterally. Skin: Skin color, texture, turgor normal.  No rashes or lesions. Neurologic:  Neurovascularly intact without any focal sensory/motor deficits.  Cranial nerves: II-XII intact, grossly non-focal.  Psychiatric: Alert and oriented, thought content appropriate, normal insight  Capillary Refill: Brisk,< 3 seconds   Peripheral Pulses: +2 palpable, equal bilaterally     Labs:     Recent Labs     02/19/21  0550 02/20/21  0600 02/21/21  0437   WBC 13.3* 8.1 6.7   HGB 11.2* 9.1* 9.2*   HCT 33.9* 27.7* 27.6*    161 173     Recent Labs     02/19/21  0550 02/20/21  0600 02/21/21  0437    146* 152*   K 4.7 4.2 3.9    116* 119*   CO2 25 23 23   BUN 48* 49* 41*   CREATININE 2.4* 2.5* 2.2*   CALCIUM 8.8 8.3 8.3     No results for input(s): AST, ALT, BILIDIR, BILITOT, ALKPHOS in the last 72 hours. No results for input(s): INR in the last 72 hours. No results for input(s): Lurdes Torey in the last 72 hours. Urinalysis:    Lab Results   Component Value Date    NITRU Negative 02/18/2021    WBCUA 753 02/18/2021    BACTERIA 4+ 02/18/2021    RBCUA 18 02/18/2021    BLOODU LARGE 02/18/2021    SPECGRAV 1.014 02/18/2021    GLUCOSEU Negative 02/18/2021       Radiology: I have reviewed the radiology reports with the following interpretation:     XR ABDOMEN (KUB) (SINGLE AP VIEW)   Final Result   No evidence of a persistent small-bowel obstruction. XR CHEST PORTABLE   Final Result   1. Interval advancement of the patient's NG tube, now in satisfactory   position within the gastric body. 2. Findings suggestive of mild CHF. XR CHEST PORTABLE   Final Result   Nasogastric tube tip just below the GE junction. Recommend advancing the   tube 10-15 cm and obtaining a follow-up abdomen radiograph centered on the   upper abdomen         CT CHEST ABDOMEN PELVIS WO CONTRAST   Final Result   Dilated loop of small bowel within a peristomal hernia and associated mild   hydroureter and hydronephrosis are concerning for obstruction of the ileal   conduit. Additionally, there are several loops of small bowel in the right   hemiabdomen which are fluid-filled and demonstrate mild wall thickening with   significant inflammatory stranding of the mesentery which has a somewhat   swirled appearance. Findings are somewhat concerning for an internal hernia. Surgical evaluation is suggested. XR CHEST PORTABLE   Final Result   No acute cardiopulmonary disease.                 EKG:  I have reviewed the EKG with the following interpretation:    @ekgread@      ASSESSMENT/PLAN:    Active Hospital Problems    Diagnosis Date Noted    S/P ileal conduit ([facility] [Z93.6] 12/14/2016     Priority: Medium     Class: Acute    [facility] [K56.609] 02/18/2021    Lactic acidosis [E87.2] 02/18/2021    KEENAN (acute kidney injury) ([facility] [N17.9] 02/18/2021    Hypertension [I10] 05/20/2011       Non-sustained VTach - patient hemodynamically stable. STAT EKG ordered which showed sinus tachycardia with PVCs.    -recommend checking STAT BMP, Mg, troponin level and replace electrolytes PRN  -continue tele monitoring  -as patient was stable and VTach was not sustained (less than 30 seconds), no urgent treatment needed  -if patient develops sustained VTach, can consider anti-arrhythmic therapy such as amiodarone (150 mg IV bolus followed by 1 mg/min x 6 hours) if the patient is hemodynamically stable or cardioversion for hemodynamic instability  -if patient has frequent runs of non-sustained VTach, consider cardiology consult for evaluation of underlying structural heart disease    KEENAN - 2/2 ATN  -on IVF  -nephrology managing    Bladder cancer, ileal conduit  -urology managin    SBO s/p ex lap and partial bowel resection  -management per general surgery      DVT Prophylaxis: heparin  Diet: DIET CLEAR LIQUID;  Code Status: Full Code    PT/OT Eval Status: Active and ongoing    Dispo - per general surgery    Thank you for the consultation, will follow     Adore Aquino MD

## 2021-02-21 NOTE — PROGRESS NOTES
Progress Note  Date:2021       Room:O8F-5052/5262-01  Patient Name:Saad Elder Sr     YOB: 1940     Age:80 y.o. Subjective    Subjective:  Symptoms:  Stable. Diet:  NPO. Activity level: Returning to normal (agitated today). Review of Systems  Objective         Vitals Last 24 Hours:  TEMPERATURE:  Temp  Av.8 °F (37.1 °C)  Min: 98.5 °F (36.9 °C)  Max: 99.4 °F (37.4 °C)  RESPIRATIONS RANGE: Resp  Av.4  Min: 16  Max: 20  PULSE OXIMETRY RANGE: SpO2  Av.7 %  Min: 89 %  Max: 94 %  PULSE RANGE: Pulse  Av.7  Min: 90  Max: 131  BLOOD PRESSURE RANGE: Systolic (78LAC), KUF:189 , Min:136 , AGUSTIN:101   ; Diastolic (60UJS), HHT:57, Min:71, Max:89    I/O (24Hr): Intake/Output Summary (Last 24 hours) at 2021 1026  Last data filed at 2021 0511  Gross per 24 hour   Intake --   Output 2950 ml   Net -2950 ml     Objective  Labs/Imaging/Diagnostics    Labs:  CBC:  Recent Labs     21  0550 21  0600 21  0437   WBC 13.3* 8.1 6.7   RBC 3.62* 2.94* 2.94*   HGB 11.2* 9.1* 9.2*   HCT 33.9* 27.7* 27.6*   MCV 93.5 94.3 94.0   RDW 14.1 14.0 14.7    161 173     CHEMISTRIES:  Recent Labs     21  0550 21  0600 21  0437    146* 152*   K 4.7 4.2 3.9    116* 119*   CO2 25 23 23   BUN 48* 49* 41*   CREATININE 2.4* 2.5* 2.2*   GLUCOSE 122* 81 76     PT/INR:No results for input(s): PROTIME, INR in the last 72 hours. APTT:No results for input(s): APTT in the last 72 hours. LIVER PROFILE:  Recent Labs     21  1742   AST 40*   ALT 18   BILITOT 1.2*   ALKPHOS 123       Imaging Last 24 Hours:  Xr Chest Portable    Result Date: 2021  EXAMINATION: ONE XRAY VIEW OF THE CHEST 2021 5:38 pm COMPARISON: 2021 at 1344 hours.  HISTORY: ORDERING SYSTEM PROVIDED HISTORY: ng placement after advancement TECHNOLOGIST PROVIDED HISTORY: Reason for exam:->ng placement after advancement FINDINGS: A single frontal view of the chest and upper abdomen was performed. There has been interval advancement of the patient's NG tube, with tip and side port now lying within the gastric body, in satisfactory position. The bowel gas pattern is nonspecific. There is no gross evidence of free air. There is a stable right-sided Port-A-Cath in place. There are stable small bilateral pleural effusions, with stable mild pulmonary edema. There is stable cardiomegaly. There is no evidence of a pneumothorax. 1. Interval advancement of the patient's NG tube, now in satisfactory position within the gastric body. 2. Findings suggestive of mild CHF. Xr Chest Portable    Result Date: 2/19/2021  EXAMINATION: ONE XRAY VIEW OF THE CHEST 2/19/2021 1:56 pm COMPARISON: None. HISTORY: ORDERING SYSTEM PROVIDED HISTORY: ng placement TECHNOLOGIST PROVIDED HISTORY: Reason for exam:->ng placement FINDINGS: Nasogastric tube tip is just below the GE junction. Side hole in the distal esophagus. Nasogastric tube tip just below the GE junction. Recommend advancing the tube 10-15 cm and obtaining a follow-up abdomen radiograph centered on the upper abdomen     Assessment//Plan           Hospital Problems           Last Modified POA    * (Principal) SBO (small bowel obstruction) ([address] 2/18/2021 Yes    S/P ileal conduit ([address] 2/18/2021 Yes    Hypertension 2/18/2021 Yes    Lactic acidosis 2/18/2021 Yes    KEENAN (acute kidney injury) ([address] 2/18/2021 Yes        Assessment & Plan  Small bowel obstruction   Postop from resection   Agitated last night and this AM, confused. Does not know where he is.  Does not know he had surgery   Will check X ray today and remove NG if possible, no output last 24 hours   Urine output is improved    Continue restraints for control of IV and NG   Also needed to protect patient and staff    Electronically signed by Austin Pichardo MD on 2/21/2021 at 10:28 AM    Electronically signed by Austin Pichardo MD on 2/21/2021 at 10:27 AM    Electronically signed by Dimitri Brady MD on 2/21/21 at 10:26 AM EST

## 2021-02-21 NOTE — PLAN OF CARE
Problem: Falls - Risk of:  Goal: Will remain free from falls  Description: Will remain free from falls  2/21/2021 0618 by Edelmira Mcdowell RN  Outcome: Ongoing  2/20/2021 1630 by Wendi Anguiano RN  Outcome: Ongoing  Goal: Absence of physical injury  Description: Absence of physical injury  2/21/2021 0618 by Edelmira Mcdowell RN  Outcome: Ongoing  2/20/2021 1630 by Wendi Anguiano RN  Outcome: Ongoing     Problem: Confusion - Acute:  Goal: Absence of continued neurological deterioration signs and symptoms  Description: Absence of continued neurological deterioration signs and symptoms  2/21/2021 0618 by Edelmira Mcdowell RN  Outcome: Ongoing  2/20/2021 1630 by Wendi Anguiano RN  Outcome: Ongoing  Goal: Mental status will be restored to baseline  Description: Mental status will be restored to baseline  2/21/2021 0618 by Edelmira Mcdowell RN  Outcome: Ongoing  2/20/2021 1630 by Wendi Anguiano RN  Outcome: Ongoing     Problem: Discharge Planning:  Goal: Ability to perform activities of daily living will improve  Description: Ability to perform activities of daily living will improve  2/21/2021 0618 by Edelmira Mcdowell RN  Outcome: Ongoing  2/20/2021 1630 by Wendi Anguiano RN  Outcome: Ongoing  Goal: Participates in care planning  Description: Participates in care planning  2/21/2021 0618 by Edelmira Mcdowell RN  Outcome: Ongoing  2/20/2021 1630 by Wendi Anguiano RN  Outcome: Ongoing     Problem: Injury - Risk of, Physical Injury:  Goal: Will remain free from falls  Description: Will remain free from falls  2/21/2021 0618 by Edelmira Mcdowell RN  Outcome: Ongoing  2/20/2021 1630 by Wendi Anguiano RN  Outcome: Ongoing  Goal: Absence of physical injury  Description: Absence of physical injury  2/21/2021 0618 by Edelmira Mcdowell RN  Outcome: Ongoing  2/20/2021 1630 by Wendi Anguiano RN  Outcome: Ongoing     Problem: Mood - Altered:  Goal: Mood stable  Description: Mood stable  2/21/2021 0618 by Edelmira Mcdowell RN  Outcome: Ongoing  2/20/2021 1630 by Dorian Colbert RN  Outcome: Ongoing  Goal: Absence of abusive behavior  Description: Absence of abusive behavior  2/21/2021 0618 by Sherice Harrison RN  Outcome: Ongoing  2/20/2021 1630 by Dorian Colbert RN  Outcome: Ongoing  Goal: Verbalizations of feeling emotionally comfortable while being cared for will increase  Description: Verbalizations of feeling emotionally comfortable while being cared for will increase  2/21/2021 0618 by Sherice Harrison RN  Outcome: Ongoing  2/20/2021 1630 by Dorian Colbert RN  Outcome: Ongoing     Problem: Psychomotor Activity - Altered:  Goal: Absence of psychomotor disturbance signs and symptoms  Description: Absence of psychomotor disturbance signs and symptoms  2/21/2021 0618 by Sherice Harrison RN  Outcome: Ongoing  2/20/2021 1630 by Dorian Colbert RN  Outcome: Ongoing     Problem: Sensory Perception - Impaired:  Goal: Demonstrations of improved sensory functioning will increase  Description: Demonstrations of improved sensory functioning will increase  2/21/2021 0618 by Sherice Harrison RN  Outcome: Ongoing  2/20/2021 1630 by Dorian Colbert RN  Outcome: Ongoing  Goal: Decrease in sensory misperception frequency  Description: Decrease in sensory misperception frequency  2/21/2021 0618 by Sherice Harrison RN  Outcome: Ongoing  2/20/2021 1630 by Dorian Colbert RN  Outcome: Ongoing  Goal: Able to refrain from responding to false sensory perceptions  Description: Able to refrain from responding to false sensory perceptions  2/21/2021 0618 by Sherice Harrison RN  Outcome: Ongoing  2/20/2021 1630 by Dorian Colbert RN  Outcome: Ongoing  Goal: Demonstrates accurate environmental perceptions  Description: Demonstrates accurate environmental perceptions  2/21/2021 0618 by Sherice Harrison RN  Outcome: Ongoing  2/20/2021 1630 by Dorian Colbert RN  Outcome: Ongoing  Goal: Able to distinguish between reality-based and nonreality-based thinking  Description: Able to distinguish between reality-based and nonreality-based thinking  2/21/2021 0618 by Harshal Gray RN  Outcome: Ongoing  2/20/2021 1630 by Cedric Gary RN  Outcome: Ongoing  Goal: Able to interrupt nonreality-based thinking  Description: Able to interrupt nonreality-based thinking  2/21/2021 0618 by Harshal Gray RN  Outcome: Ongoing  2/20/2021 1630 by Cedric Gary RN  Outcome: Ongoing     Problem: Sleep Pattern Disturbance:  Goal: Appears well-rested  Description: Appears well-rested  2/21/2021 0618 by Harshal Gray RN  Outcome: Ongoing  2/20/2021 1630 by Cedric Gary RN  Outcome: Ongoing     Problem: Skin Integrity:  Goal: Will show no infection signs and symptoms  Description: Will show no infection signs and symptoms  2/21/2021 0618 by Harshal Gray RN  Outcome: Ongoing  2/20/2021 1630 by Cedric Gary RN  Outcome: Ongoing  Goal: Absence of new skin breakdown  Description: Absence of new skin breakdown  2/21/2021 0618 by Harshal Gray RN  Outcome: Ongoing  2/20/2021 1630 by Cedric Gary RN  Outcome: Ongoing     Problem:  Bowel/Gastric:  Goal: Control of bowel function will improve  Description: Control of bowel function will improve  2/21/2021 0618 by Harshal Gray RN  Outcome: Ongoing  2/20/2021 1630 by eCdric Gary RN  Outcome: Ongoing  Goal: Ability to achieve a regular elimination pattern will improve  Description: Ability to achieve a regular elimination pattern will improve  2/21/2021 0618 by Harshal Gray RN  Outcome: Ongoing  2/20/2021 1630 by Cedric Gary RN  Outcome: Ongoing     Problem: Nutritional:  Goal: Ability to follow a diet with enough fiber (20 to 30 grams) for normal bowel function will improve  Description: Ability to follow a diet with enough fiber (20 to 30 grams) for normal bowel function will improve  2/21/2021 0618 by Harshal Gray RN  Outcome: Ongoing  2/20/2021 1630 by Cedric Gary RN  Outcome: Ongoing     Problem: Non-Violent Restraints  Goal: Removal from restraints as soon as assessed to be safe  2/21/2021 0618 by Sherice Harrison RN  Outcome: Ongoing  2/20/2021 1630 by Dorian Colbert RN  Outcome: Ongoing  Goal: No harm/injury to patient while restraints in use  2/21/2021 0618 by Sherice Harrison RN  Outcome: Ongoing  2/20/2021 1630 by Dorian Colbert RN  Outcome: Ongoing  Goal: Patient's dignity will be maintained  2/21/2021 0618 by Sherice Harrison RN  Outcome: Ongoing  2/20/2021 1630 by Dorian Colbert RN  Outcome: Ongoing     Problem: Pain:  Goal: Pain level will decrease  Description: Pain level will decrease  Outcome: Ongoing  Goal: Control of acute pain  Description: Control of acute pain  Outcome: Ongoing  Goal: Control of chronic pain  Description: Control of chronic pain  Outcome: Ongoing

## 2021-02-22 NOTE — PLAN OF CARE
Problem: Falls - Risk of:  Goal: Will remain free from falls  Description: Will remain free from falls  2/22/2021 1027 by Preet Rodriguez RN  Outcome: Ongoing   Bed in lowest position, call light in reach, nonskid footwear on pt, sitter bedside, possessions in reach, chair/bed alarm in place, hourly rounding in place. Problem: Falls - Risk of:  Goal: Absence of physical injury  Description: Absence of physical injury  2/22/2021 1027 by Preet Rodriguez RN  Outcome: Ongoing     Problem: Confusion - Acute:  Goal: Absence of continued neurological deterioration signs and symptoms  Description: Absence of continued neurological deterioration signs and symptoms  2/22/2021 1027 by Preet Rodriguez RN  Outcome: Ongoing     Problem: Confusion - Acute:  Goal: Mental status will be restored to baseline  Description: Mental status will be restored to baseline  2/22/2021 1027 by Preet Rodriguez RN  Outcome: Ongoing   Mental status assessed throughout shift, orientated to person only at this time. Problem: Discharge Planning:  Goal: Ability to perform activities of daily living will improve  Description: Ability to perform activities of daily living will improve  2/22/2021 1027 by Preet Rodriguez RN  Outcome: Ongoing     Problem: Discharge Planning:  Goal: Participates in care planning  Description: Participates in care planning  2/22/2021 1027 by Preet Rodriguez RN  Outcome: Ongoing     Problem: Mood - Altered:  Goal: Mood stable  Description: Mood stable  2/22/2021 1027 by Preet Rodriguez RN  Outcome: Ongoing   Conversations throughout shift, no concerns at this time, compliant with care.     Problem: Mood - Altered:  Goal: Absence of abusive behavior  Description: Absence of abusive behavior  2/22/2021 1027 by Preet Rodriguez RN  Outcome: Ongoing     Problem: Mood - Altered:  Goal: Verbalizations of feeling emotionally comfortable while being cared for will increase  Description: Verbalizations of feeling emotionally comfortable while being cared for will increase  2/22/2021 1027 by Danilo Miller RN  Outcome: Ongoing     Problem: Psychomotor Activity - Altered:  Goal: Absence of psychomotor disturbance signs and symptoms  Description: Absence of psychomotor disturbance signs and symptoms  2/22/2021 1027 by Danilo Miller RN  Outcome: Ongoing     Problem: Sensory Perception - Impaired:  Goal: Demonstrations of improved sensory functioning will increase  Description: Demonstrations of improved sensory functioning will increase  2/22/2021 1027 by Danilo Miller RN  Outcome: Ongoing     Problem: Sensory Perception - Impaired:  Goal: Able to refrain from responding to false sensory perceptions  Description: Able to refrain from responding to false sensory perceptions  2/22/2021 1027 by Danilo Miller RN  Outcome: Ongoing     Problem: Sensory Perception - Impaired:  Goal: Demonstrates accurate environmental perceptions  Description: Demonstrates accurate environmental perceptions  2/22/2021 1027 by Danilo Miller RN  Outcome: Ongoing     Problem: Sensory Perception - Impaired:  Goal: Able to distinguish between reality-based and nonreality-based thinking  Description: Able to distinguish between reality-based and nonreality-based thinking  2/22/2021 1027 by Danilo Miller RN  Outcome: Ongoing     Problem: Sensory Perception - Impaired:  Goal: Able to interrupt nonreality-based thinking  Description: Able to interrupt nonreality-based thinking  2/22/2021 1027 by Danilo Miller RN  Outcome: Ongoing     Problem: Sleep Pattern Disturbance:  Goal: Appears well-rested  Description: Appears well-rested  2/22/2021 1027 by Danilo Miller RN  Outcome: Ongoing     Problem: Skin Integrity:  Goal: Will show no infection signs and symptoms  Description: Will show no infection signs and symptoms  2/22/2021 1027 by Danilo Miller RN  Outcome: Ongoing   Educated on infection control measures.     Problem: Skin Integrity:  Goal: Absence of new skin breakdown  Description: Absence of new skin breakdown  2/22/2021 1027 by Margie Gould RN  Outcome: Ongoing   Educated on turning every two hours while in bed, turning schedule in place. Problem:  Bowel/Gastric:  Goal: Control of bowel function will improve  Description: Control of bowel function will improve  2/22/2021 1027 by Margie Gould RN  Outcome: Ongoing     Problem: Nutritional:  Goal: Ability to follow a diet with enough fiber (20 to 30 grams) for normal bowel function will improve  Description: Ability to follow a diet with enough fiber (20 to 30 grams) for normal bowel function will improve  2/22/2021 1027 by Margie Gould RN  Outcome: Ongoing

## 2021-02-22 NOTE — CARE COORDINATION
INITIAL CASE MANAGEMENT ASSESSMENT     Reviewed chart, per chart review, patient is confused. Sw called to patient's son, Chuy Crain ([phone]) to assess possible discharge needs. Explained Case Management role/services. Living Situation: Son verified patient's home address. Patient lives with  in a house; 2 steps to enter with a basement. Son reported patient had no issues with steps prior to admission. ADLs: Independent prior to admission     DME: None  Currently on 2Liters of oxygen; no oxygen prior to admission. PT/OT Recs: Date of Service: 2/20/2021     Discharge Recommendations:  3-5 sessions per week, 24 hour supervision or assist, Patient would benefit from continued therapy after discharge  OT Equipment Recommendations  Equipment Needed: No  Other: defer to next level of care      Active Services: No active services     Transportation: Family to transport at discharge     Medications: [facility] [address]    PCP: Teddy Medina MD      HD/PD: Per son, patient may need dialysis in the future but not at this time. PLAN/COMMENTS: Patient's goal is to return home with 24 hour supervision and support. SW discussed PT/OT recommendation with son. Son stated patient's reaction and increased confusion is \"typical\" for patient after procedure with anesthesia. Son confirmed he would speak with his siblings to discuss SNF and establish a safe discharge plan if that level of care is recommended at discharge. SW spoke with patient's daughter and discussed discharge plan. [email or web address]  Discussed COA. Daughter requested online referral be placed to initiate services for patient. SW to e-mail Giseladina Calista and SNF list to daughter. SW/CM provided contact information for patient or family to call with any questions. SW/CM will follow and assist as needed.     CINTIA Mcdaniels Cap, KRYSTALW, Social Work/Case Management   [phone]  Electronically signed by CINTIA Mcdaniels Cap, GRANT on 2/22/2021 at 1:09 PM

## 2021-02-22 NOTE — PROGRESS NOTES
Pt A&O to self, does not respond to answer for birthdate, knows name. Scant drainage noted to midline incisions, wound approximated, no s/s of infection at this time. Urine clear, yellow from illeostomy. Pt resting in bed, bed in lowest position, pneumo boots in place, bed alarm in place, call light in reach, nonskid footwear on pt, possessions in place, hourly rounding in place.  Electronically signed by Oliva Larson RN on 2/22/2021 at 8:32 AM

## 2021-02-22 NOTE — PROGRESS NOTES
pt presents with run of 4 PVC. Dr. Fior Sandoval notified.  Electronically signed by Preet Rodriguez RN on 2/22/2021 at 12:12 PM

## 2021-02-22 NOTE — PROGRESS NOTES
[facility] [phone]                                     Daily Progress Note                                                         Pt Name: Heena Lewis   Medical Record Number: [id number]  Date of Birth 1940   Today's Date: 2/22/2021    ASSESSMENT/PLAN  - POD #4 (2/18) S/p Exploratory laparotomy, bowel resection for ischemic bowel  -Xray's done yesterday showed no evidence of persistent SBO  -Awaiting return of GI function  -Continue clear liquid diet  -Will start on miralax  - OOB ambulate   - Monitor and control pian   - Pulmonary toilet / Encourage IS use   Saran Mora has slightly improved from yesterday. He is tolerating clear liquids. OBJECTIVE  VITALS:  height is 5' 9\" (1.753 m) and weight is 178 lb 5.6 oz (80.9 kg). His axillary temperature is 98.5 °F (36.9 °C). His blood pressure is 179/78 (abnormal) and his pulse is 100. His respiration is 16 and oxygen saturation is 97%. INTAKE/OUTPUT:      Intake/Output Summary (Last 24 hours) at 2/22/2021 1320  Last data filed at 2/22/2021 6797  Gross per 24 hour   Intake 360 ml   Output 1900 ml   Net -1540 ml     GENERAL: alert to person; confused conversation   LUNGS: clear to ausculation, without wheezes, rales or rhonci  HEART: normal rate and regular rhythm  ABDOMEN: Soft, appropriately tendernon distended.    EXTREMITY: no cyanosis, clubbing or edema    I/O last 3 completed shifts:  In: -   Out: 2095 [Urine:2075; Emesis/NG output:20]    LABS  Recent Labs     02/21/21  1830 02/22/21  0505   WBC  --  5.3   HGB  --  9.0*   HCT  --  26.9*   PLT  --  193   * 154*   K 3.5 3.7   * 122*   CO2 21 25   BUN 31* 36*   CREATININE 1.8* 2.0*   MG 2.00  --    CALCIUM 7.9* 8.3       EDUCATION  Patient educated about Disease Process, Medications, Smoking Cessation, Oxygenation, Incentive Spirometry and Deep Breath and Cough, Diabetes, Hyperlipidemia, Smoking Cessation, Nutrition, Exercise and Hypertension    Electronically signed by Jv Dolan PA-C on 2/22/2021 at 1:20 PM      [facility]   [phone] Office  [phone] Cell      Surgery Staff  I have examined this patient and read and agree with the note by Trey More PA-C from today. Remains pleasantly confused  Tolerating clears. No BM recorded  Wound clean. Continue liquids until GI function returns.   Add miralax      Electronically signed by Georgette Escobar MD on 2/22/2021 at 1:58 PM

## 2021-02-22 NOTE — PROGRESS NOTES
Hospitalist Progress Note      PCP: Hesham Hwang MD    Date of Admission: 2/18/2021    Chief Complaint: abdominal pain    Hospital Course: 78 y.o. male who we are asked to see/evaluate by Anahy Motley MD for medical management of non-sustained Vtach. Patient is an 77 y/o M who presented with abdominal pain x 1 week. Patient was found to have a small bowel obstruction as well as blockage of his ileal conduit. He was taken to the OR and underwent an exploratory laparotomy with lysis of adhesions and small bowel resection on 2/18. Nephrology and urology are also both following. Today, patient was returning to the bed when he was noted to have a 27 beat run of non-sustained VTach. He was asymptomatic during the episode and currently in sinus tach with no symptoms. He is also hemodynamically stable. He denies fever, chills, chest pain, and shortness of breath. Subjective: Pt seen and examined. He feels fine. Denies chest pain, shortness of breath. Medications:  Reviewed    Infusion Medications    sodium chloride 75 mL/hr at 02/21/21 1027     Scheduled Medications    magnesium cl-calcium carbonate  1 tablet Oral BID    donepezil  5 mg Oral Nightly    sodium chloride flush  10 mL Intravenous 2 times per day    heparin (porcine)  5,000 Units Subcutaneous 3 times per day    famotidine (PEPCID) injection  20 mg Intravenous Daily     PRN Meds: LORazepam, sodium chloride flush, oxyCODONE **OR** oxyCODONE, morphine **OR** morphine, acetaminophen, ondansetron      Intake/Output Summary (Last 24 hours) at 2/22/2021 0936  Last data filed at 2/22/2021 0931  Gross per 24 hour   Intake 360 ml   Output 2645 ml   Net -2285 ml       Exam:    BP (!) 171/95   Pulse 86   Temp 98.2 °F (36.8 °C) (Axillary)   Resp 18   Ht 5' 9\" (1.753 m)   Wt 178 lb 5.6 oz (80.9 kg)   SpO2 98%   BMI 26.34 kg/m²     General appearance: No apparent distress, appears stated age and cooperative.   HEENT: Pupils equal, round, and satisfactory   position within the gastric body. 2. Findings suggestive of mild CHF. XR CHEST PORTABLE   Final Result   Nasogastric tube tip just below the GE junction. Recommend advancing the   tube 10-15 cm and obtaining a follow-up abdomen radiograph centered on the   upper abdomen         CT CHEST ABDOMEN PELVIS WO CONTRAST   Final Result   Dilated loop of small bowel within a peristomal hernia and associated mild   hydroureter and hydronephrosis are concerning for obstruction of the ileal   conduit. Additionally, there are several loops of small bowel in the right   hemiabdomen which are fluid-filled and demonstrate mild wall thickening with   significant inflammatory stranding of the mesentery which has a somewhat   swirled appearance. Findings are somewhat concerning for an internal hernia. Surgical evaluation is suggested. XR CHEST PORTABLE   Final Result   No acute cardiopulmonary disease. Assessment/Plan:    Active Hospital Problems    Diagnosis Date Noted    S[facility] [Z93.6] 12/14/2016     Priority: Medium     Class: Acute    SBO (small bowel obstruction) ([facility] [K56.609] 02/18/2021    Lactic acidosis [E87.2] 02/18/2021    KEENAN (acute kidney injury) ([facility] [N17.9] 02/18/2021    Hypertension [I10] 05/20/2011       Non-sustained VTach - patient hemodynamically stable. STAT EKG ordered which showed sinus tachycardia with PVCs. -recommend checking STAT BMP, Mg, troponin level and replace electrolytes PRN  -continue tele monitoring; no further episodes of VTach overnight.  PVCs noted.  -as patient was stable and VTach was not sustained (less than 30 seconds), no urgent treatment needed  -if patient develops sustained VTach, can consider anti-arrhythmic therapy such as amiodarone (150 mg IV bolus followed by 1 mg/min x 6 hours) if the patient is hemodynamically stable or cardioversion for hemodynamic instability  -if patient has frequent runs of non-sustained VTach, consider cardiology consult for evaluation of underlying structural heart disease     KEENAN - 2/2 ATN  -on IVF  -nephrology managing    Elevated troponin - suspect demand ischemia  -flat overnight  -no ischemic changes noted on EKG and patient denies chest pain  -tele monitoring     Bladder cancer, ileal conduit  -urology managin     SBO s/p ex lap and partial bowel resection  -management per general surgery    DVT Prophylaxis: heparin  Diet: DIET CLEAR LIQUID;  Code Status: Full Code    PT/OT Eval Status: following    Dispo - per surgery, will sign off. Please re-consult if any further questions/concerns arise.     Mandi Michael MD

## 2021-02-22 NOTE — PROGRESS NOTES
[facility]phrology ([facility] Progress Note    CC: KEENAN    Subjective:    HPI:  Breathing comfortably. confused sitter at bedside. ROS:  In chair  No fever. [address]:  medications reviewed. No family present    Objective:  Blood pressure (!) 179/78, pulse 100, temperature 98.5 °F (36.9 °C), temperature source Axillary, resp. rate 16, height 5' 9\" (1.753 m), weight 178 lb 5.6 oz (80.9 kg), SpO2 97 %. Intake/Output Summary (Last 24 hours) at 2/22/2021 1219  Last data filed at 2/22/2021 0906  Gross per 24 hour   Intake 360 ml   Output 1920 ml   Net -1560 ml     General:  NAD, confused  Chest:  CTAB  CVS:  RRR  Abdominal:  surgical site, decreased BS Ileal conduit RLQ   Extremities:  no edema  Skin:  no rash    Labs:  Renal panel:  Lab Results   Component Value Date/Time     (H) 02/22/2021 05:05 AM    K 3.7 02/22/2021 05:05 AM    K 6.0 (HH) 02/18/2021 05:42 PM    CO2 25 02/22/2021 05:05 AM    BUN 36 (H) 02/22/2021 05:05 AM    CREATININE 2.0 (H) 02/22/2021 05:05 AM    CALCIUM 8.3 02/22/2021 05:05 AM    PHOS 3.1 12/21/2016 04:30 AM    MG 2.00 02/21/2021 06:30 PM     CBC:  Lab Results   Component Value Date/Time    WBC 5.3 02/22/2021 05:05 AM    HGB 9.0 (L) 02/22/2021 05:05 AM    HCT 26.9 (L) 02/22/2021 05:05 AM     02/22/2021 05:05 AM       Assessment/Plan:  Reviewed old records and labs.     1) KEENAN              - baseline 09/10/19 Cr 1.8   - ATN              - renal function  improving   -Cr down to 2 mg today Good UOP      2) SBO from ischemic bowel s/p exp lap and partial bowel resection 02/19/21              - per surgery     3) baldder cancer and ileal conduit              - per Dr. William Ramos     4) FEN              - hypernatremia Na+ 154 meq Change IVF's to 1/2 NS at 125 ml/hr Encouraged PO free water                                         magnesium                          -corrected     5) dementia

## 2021-02-22 NOTE — PROGRESS NOTES
Occupational Therapy  Facility/Department: [address] PROGRESSIVE CARE  Daily Treatment Note  NAME: Marci Martínez  : 1940  MRN: [id number]    Date of Service: 2021    Discharge Recommendations:  3-5 sessions per week, Patient would benefit from continued therapy after discharge    Marci Martínez scored a  on the AM-PAC ADL Inpatient form. Current research shows that an AM-PAC score of 17 or less is typically not associated with a discharge to the patient's home setting. Based on the patient's AM-PAC score and their current ADL deficits, it is recommended that the patient have 3-5 sessions per week of Occupational Therapy at d/c to increase the patient's independence. Please see assessment section for further patient specific details. If patient discharges prior to next session this note will serve as a discharge summary. Please see below for the latest assessment towards goals. Assessment   Performance deficits / Impairments: Decreased functional mobility ; Decreased endurance;Decreased coordination;Decreased ADL status; Decreased posture;Decreased ROM; Decreased balance;Decreased strength;Decreased safe awareness;Decreased cognition  Assessment: Discussed with OTR: Today, pt continues with confusion, difficulty following instructions. Pt required Mod/Min A for bed mob, sit><stands at EOB. Pt standing statically with CGA at walker 1x. Pt stand-pivoted to recliner from bed w/o device, Min A x2. Pt will benefit from cont OT at d/c at [address] Cont poc. Treatment Diagnosis: impaired: Cognition, ADL function, mob/transfers, balance, ROM, coord  Prognosis: Fair  History: Pt is an 78 y.o. male admitted with SBO 21 underwent exploratory laparotomy, bowel resection for ischemic bowel. PMH:  Patient with hx of dementia, bladder cancer s/p ileal conduit, hyperkalemia, CKD.  Currently pt requires mod-max A x2 for bed mob, min A x 2 for sit>< stand transfers and to amb a few steps with nausea or emesis. Last BM yesterday. History bladder cancer s/p ileal conduit. No UOP until recently while in ED. Hyperkalemia and acute on CKD. \" copied per Franklin Celaya MD 2/18/21 note  Family / Caregiver Present: No  Referring Practitioner: Seng Baxter APRN-CPN  Diagnosis: SBO (2/18) S/p Exploratory laparotomy, bowel resection for ischemic bowel  Subjective  Subjective: Pt met BS, in bed. PCA present to assist with transfers. Pt alert, confused, disoriented. Pt did not express pain. General Comment  Comments: ok to see per RN      Orientation  Orientation  Overall Orientation Status: Impaired  Orientation Level: Oriented to person  Objective    ADL  Additional Comments: NA-anticpate would require overal Max/dep forADL's. Balance  Sitting Balance: Contact guard assistance(at EOB)  Standing Balance  Activity: Min/CGA static balance at EOB, using bed rails. At walker, CGA and gradual posterior lean on to recliner as fatigued. Wheelchair Bed Transfers  Wheelchair/Bed - Technique: Stand pivot  Equipment Used: Bed(to recliner)  Level of Asssistance: Minimal assistance;2 Person assistance(PCA assisting)  Wheelchair Transfers Comments: no device. Pt difficulty following instructions to pivot to chair  Bed mobility  Supine to Sit: Moderate assistance(x1; HOB raised. Assist to initiate task)  Transfers  Sit to stand: Minimal assistance; Moderate assistance(x1)  Stand to sit: Moderate assistance;Minimal assistance(x1)  Transfer Comments: sit><stands at bed using bed rail. Assist, cues d/t decreased cognition. Sit><stand at recliner with RW, assist, cues for hand placement            Cognition  Overall Cognitive Status: Exceptions  Arousal/Alertness: Delayed responses to stimuli  Following Commands: Inconsistently follows commands  Attention Span: Attends with cues to redirect; Difficulty attending to directions  Memory: Decreased long term memory;Decreased short term memory;Decreased recall of precautions;Decreased recall of recent events;Decreased recall of biographical Information  Safety Judgement: Decreased awareness of need for safety;Decreased awareness of need for assistance  Problem Solving: Decreased awareness of errors;Assistance required to implement solutions;Assistance required to generate solutions;Assistance required to correct errors made;Assistance required to identify errors made  Insights: Decreased awareness of deficits  Initiation: Requires cues for all  Sequencing: Requires cues for all                    Type of ROM/Therapeutic Exercise  Type of ROM/Therapeutic Exercise: AAROM  Comment: BUE ex's with AAROM for LUE, d/t decreased shoulder ROM. Pt with difficutly initiating and following ex's also. Plan   Plan  Times per week: 3-5  Times per day: Daily  Plan weeks: until D/C  Current Treatment Recommendations: Strengthening, Patient/Caregiver Education & Training, Equipment Evaluation, Education, & procurement, Balance Training, Neuromuscular Re-education, Functional Mobility Training, Cognitive Reorientation, Cognitive/Perceptual Training, Safety Education & Training, Self-Care / ADL                                   AM-PAC Score        AM-Located within [facility] Inpatient Daily Activity Raw Score: 7 (02/22/21 [facility])  AM-PAC Inpatient ADL T-Scale Score : 20.13 (02/22/21 1555)  ADL Inpatient CMS 0-100% Score: 92.44 (02/22/21 1555)  ADL Inpatient CMS G-Code Modifier : CM (02/22/21 [facility])    Goals  Short term goals  Time Frame for Short term goals: until D/C. Status: goals ongoing  Short term goal 1: Pt will feed self with set-up. Short term goal 2: Pt will groom self with min A. Short term goal 3: Pt will perform functional transfers min A x1 using AD as needed. Short term goal 4: Pt will improve cognition to participate in upper body ADL tasks with min A and mod cues. Short term goal 5: Pt will stand for lower body ADL tasks/positioning with CGA with use of AD/rails.   Short term goal 6: Pt will participate in functional ADL mob tasks with min A to CGA with use of AD. Long term goals  Time Frame for Long term goals : same as LTG  Patient Goals   Patient goals : Pt unable to express goal at this time.        Therapy Time   Individual Concurrent Group Co-treatment   Time In 1526         Time Out 1600         Minutes [id number] gamesGRABR Wilmer HYDE/YEVGENIY,515

## 2021-02-22 NOTE — PLAN OF CARE
Problem: Falls - Risk of:  Goal: Will remain free from falls  Description: Will remain free from falls  Outcome: Ongoing  Goal: Absence of physical injury  Description: Absence of physical injury  Outcome: Ongoing     Problem: Confusion - Acute:  Goal: Absence of continued neurological deterioration signs and symptoms  Description: Absence of continued neurological deterioration signs and symptoms  Outcome: Ongoing  Goal: Mental status will be restored to baseline  Description: Mental status will be restored to baseline  Outcome: Ongoing     Problem: Discharge Planning:  Goal: Ability to perform activities of daily living will improve  Description: Ability to perform activities of daily living will improve  Outcome: Ongoing  Goal: Participates in care planning  Description: Participates in care planning  Outcome: Ongoing     Problem: Injury - Risk of, Physical Injury:  Goal: Will remain free from falls  Description: Will remain free from falls  Outcome: Ongoing  Goal: Absence of physical injury  Description: Absence of physical injury  Outcome: Ongoing     Problem: Mood - Altered:  Goal: Mood stable  Description: Mood stable  Outcome: Ongoing  Goal: Absence of abusive behavior  Description: Absence of abusive behavior  Outcome: Ongoing  Goal: Verbalizations of feeling emotionally comfortable while being cared for will increase  Description: Verbalizations of feeling emotionally comfortable while being cared for will increase  Outcome: Ongoing     Problem: Psychomotor Activity - Altered:  Goal: Absence of psychomotor disturbance signs and symptoms  Description: Absence of psychomotor disturbance signs and symptoms  Outcome: Ongoing     Problem: Sensory Perception - Impaired:  Goal: Demonstrations of improved sensory functioning will increase  Description: Demonstrations of improved sensory functioning will increase  Outcome: Ongoing  Goal: Decrease in sensory misperception frequency  Description: Decrease in Ongoing  Goal: Control of acute pain  Description: Control of acute pain  Outcome: Ongoing  Goal: Control of chronic pain  Description: Control of chronic pain  Outcome: Ongoing

## 2021-02-23 NOTE — PROGRESS NOTES
Comprehensive Nutrition Assessment    Type and Reason for Visit:  NPO/Clear Liquid    Nutrition Recommendations/Plan:   Continue Clear liquid diet, advance as able per surgery. Monitor need for ONS. Nutrition Assessment:  Pt presented with SBO and PMH of bladder cancer. Pt s/p ex lap, bowel resection on 2/19. Awaiting GI function. Started on Clear liquid yesterday and doing well. Awaiting for diet advancement. Will monitor need for ONS once diet advances. Malnutrition Assessment:  Malnutrition Status:  Insufficient data      Estimated Daily Nutrient Needs:  Energy (kcal):  1399-5814 millie (20-25 kcal/kg CBW)  Protein (g):   gm (1.2-1.4gm/kg IBW)  Fluid (ml/day):  1 mL/kcal    Nutrition Related Findings:  trace BLE and BUE edema; BM 2/23      Wounds:  Surgical Incision       Current Nutrition Therapies:    DIET CLEAR LIQUID; Anthropometric Measures:  · Height: 5' 9\" (175.3 cm)  · Current Body Weight: 173 lb (78.5 kg)   · Admission Body Weight: 190 lb (86.2 kg)    · Ideal Body Weight: 160 lbs; % Ideal Body Weight 108.1 %   · BMI: 25.5  · BMI Categories: Overweight (BMI 25.0-29. 9)       Nutrition Diagnosis:   · Inadequate oral intake related to altered GI function as evidenced by NPO or clear liquid status due to medical condition      Nutrition Interventions:   Food and/or Nutrient Delivery:  (Advance diet as able)  Nutrition Education/Counseling:  No recommendation at this time   Coordination of Nutrition Care:  Continue to monitor while inpatient    Goals:  tolerate most appropriate form of nutrition per Md       Nutrition Monitoring and Evaluation:   Food/Nutrient Intake Outcomes:  Diet Advancement/Tolerance  Physical Signs/Symptoms Outcomes:  Biochemical Data, Skin, Weight, Fluid Status or Edema, Nausea or Vomiting, GI Status, Diarrhea, Constipation     Discharge Planning:     Too soon to determine     Electronically signed by Suzie Howe RD, LD on 2/23/21 at 12:38 PM EST    Contact: [address]

## 2021-02-23 NOTE — PROGRESS NOTES
Nephrology ([facility]) Progress Note    CC: KEENAN    Subjective:    HPI:  Breathing comfortably. confused sitter at bedside. ROS:  In bed No fever. sleeping after ativan Had been up during the night  [address]:  medications reviewed. Good UOP 2500 ml  No family present    Objective:  Blood pressure (!) 150/80, pulse 84, temperature 98.1 °F (36.7 °C), temperature source Axillary, resp. rate 18, height 5' 9\" (1.753 m), weight 173 lb 15.1 oz (78.9 kg), SpO2 95 %. Intake/Output Summary (Last 24 hours) at 2/23/2021 1203  Last data filed at 2/23/2021 1006  Gross per 24 hour   Intake 3530 ml   Output 2700 ml   Net 830 ml     General:  NAD, confused  Chest:  CTAB  CVS:  RRR  Abdominal:  surgical site, decreased BS Ileal conduit RLQ   Extremities:  no edema  Skin:  no rash    Labs:  Renal panel:  Lab Results   Component Value Date/Time     (H) 02/23/2021 06:14 AM    K 3.6 02/23/2021 06:14 AM    K 6.0 (HH) 02/18/2021 05:42 PM    CO2 23 02/23/2021 06:14 AM    BUN 26 (H) 02/23/2021 06:14 AM    CREATININE 1.8 (H) 02/23/2021 06:14 AM    CALCIUM 8.2 (L) 02/23/2021 06:14 AM    PHOS 3.1 12/21/2016 04:30 AM    MG 2.00 02/21/2021 06:30 PM     CBC:  Lab Results   Component Value Date/Time    WBC 5.7 02/23/2021 06:14 AM    HGB 9.3 (L) 02/23/2021 06:14 AM    HCT 27.5 (L) 02/23/2021 06:14 AM     02/23/2021 06:14 AM       Assessment/Plan:  Reviewed old records and labs.     1) KEENAN              - baseline 09/10/19 Cr 1.8   - ATN              - renal function  improving   -Cr down to 1.8 mg today Good UOP      2) SBO from ischemic bowel s/p exp lap and partial bowel resection 02/19/21              - per surgery     3) baldder cancer and ileal conduit              - per Dr. Brady Hook     4) FEN              - hypernatremia started on  1/2 NS at 125 ml/hr +ve 440 ml/24 hours Na+ improving, 148 meq today, Encouraged PO free water                                        magnesium -corrected     5) dementia

## 2021-02-23 NOTE — PROGRESS NOTES
Patient's significant other, Sarah Marcelino, here visiting. Pt is currently resting in bed resting with eyes closed. No outward signs of pain. Ostomy pouch changed at this time. Pt tolerated well. Call light within reach.

## 2021-02-23 NOTE — PLAN OF CARE
Problem: Falls - Risk of:  Goal: Will remain free from falls  Description: Will remain free from falls  Outcome: Ongoing  Goal: Absence of physical injury  Description: Absence of physical injury  Outcome: Ongoing     Problem: Confusion - Acute:  Goal: Absence of continued neurological deterioration signs and symptoms  Description: Absence of continued neurological deterioration signs and symptoms  Outcome: Ongoing  Goal: Mental status will be restored to baseline  Description: Mental status will be restored to baseline  Outcome: Ongoing     Problem: Discharge Planning:  Goal: Ability to perform activities of daily living will improve  Description: Ability to perform activities of daily living will improve  Outcome: Ongoing  Goal: Participates in care planning  Description: Participates in care planning  Outcome: Ongoing     Problem: Injury - Risk of, Physical Injury:  Goal: Will remain free from falls  Description: Will remain free from falls  Outcome: Ongoing  Goal: Absence of physical injury  Description: Absence of physical injury  Outcome: Ongoing     Problem: Mood - Altered:  Goal: Mood stable  Description: Mood stable  Outcome: Ongoing  Goal: Absence of abusive behavior  Description: Absence of abusive behavior  Outcome: Ongoing  Goal: Verbalizations of feeling emotionally comfortable while being cared for will increase  Description: Verbalizations of feeling emotionally comfortable while being cared for will increase  Outcome: Ongoing     Problem: Psychomotor Activity - Altered:  Goal: Absence of psychomotor disturbance signs and symptoms  Description: Absence of psychomotor disturbance signs and symptoms  Outcome: Ongoing     Problem: Sensory Perception - Impaired:  Goal: Demonstrations of improved sensory functioning will increase  Description: Demonstrations of improved sensory functioning will increase  Outcome: Ongoing  Goal: Decrease in sensory misperception frequency  Description: Decrease in sensory misperception frequency  Outcome: Ongoing  Goal: Able to refrain from responding to false sensory perceptions  Description: Able to refrain from responding to false sensory perceptions  Outcome: Ongoing  Goal: Demonstrates accurate environmental perceptions  Description: Demonstrates accurate environmental perceptions  Outcome: Ongoing  Goal: Able to distinguish between reality-based and nonreality-based thinking  Description: Able to distinguish between reality-based and nonreality-based thinking  Outcome: Ongoing  Goal: Able to interrupt nonreality-based thinking  Description: Able to interrupt nonreality-based thinking  Outcome: Ongoing     Problem: Sleep Pattern Disturbance:  Goal: Appears well-rested  Description: Appears well-rested  Outcome: Ongoing     Problem: Skin Integrity:  Goal: Will show no infection signs and symptoms  Description: Will show no infection signs and symptoms  Outcome: Ongoing  Goal: Absence of new skin breakdown  Description: Absence of new skin breakdown  Outcome: Ongoing     Problem:  Bowel/Gastric:  Goal: Control of bowel function will improve  Description: Control of bowel function will improve  Outcome: Ongoing  Goal: Ability to achieve a regular elimination pattern will improve  Description: Ability to achieve a regular elimination pattern will improve  Outcome: Ongoing     Problem: Nutritional:  Goal: Ability to follow a diet with enough fiber (20 to 30 grams) for normal bowel function will improve  Description: Ability to follow a diet with enough fiber (20 to 30 grams) for normal bowel function will improve  Outcome: Ongoing     Problem: Non-Violent Restraints  Goal: Removal from restraints as soon as assessed to be safe  Outcome: Ongoing  Goal: No harm/injury to patient while restraints in use  Outcome: Ongoing  Goal: Patient's dignity will be maintained  Outcome: Ongoing     Problem: Pain:  Goal: Pain level will decrease  Description: Pain level will decrease  Outcome: Ongoing  Goal: Control of acute pain  Description: Control of acute pain  Outcome: Ongoing  Goal: Control of chronic pain  Description: Control of chronic pain  Outcome: Ongoing

## 2021-02-23 NOTE — PROGRESS NOTES
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 Noted to have drop in H/H from 13.7/41.1 down to   9.0/26.9. If possible, please document in the progress notes and discharge   summary if you are evaluating and/or treating any of the following: The medical record reflects the following:  Risk Factors: post-op bowel resection  Clinical Indicators:  drop in H/H from 13.7/41.1 down to 9.0/26.9  Treatment: monitoring H/H    Thank you,  Ana Brown RN, BSN, [email or web address]  Options provided:  -- Postoperative acute blood loss anemia  -- Chronic blood loss anemia  -- Dilutional anemia  -- Other - I will add my own diagnosis  -- Disagree - Not applicable / Not valid  -- Disagree - Clinically unable to determine / Unknown  -- Refer to Clinical Documentation Reviewer    PROVIDER RESPONSE TEXT:    Anemia is due to dilution.     Query created by: Melvi Klein on 2021 8:23 AM      Electronically signed by:  Guru Moreno MD 2021 12:34 PM

## 2021-02-23 NOTE — CARE COORDINATION
Case management follow up. Discussed PT/OT recs for SNF w/ daughter Linda Stinson. Family is unsure if they want SNF vs [facility]. Linda Stinson has not had a chance to review [place] [id number] or SNF list that was emailed. She wanted to review w/ family on Sat but I explained patient may be discharged before then so we need a plan in place. She will try to review lists as soon as she can and touch base with me judson about her choices.  Electronically signed by Sofie Diamond RN Case Management [phone] on 2/23/2021 at 4:37 PM

## 2021-02-23 NOTE — PROGRESS NOTES
Physical Therapy  PT attempt  Saad Pendleton Sr  Attempted PT treatment this AM.  Per RN, hold treatment this date due to pt being combative. Will attempt again as schedule permits. See last treatment note for d/c recommendations.     Electronically signed by Clarita Duncan, PT [id number] on 2/23/2021 at 10:02 AM

## 2021-02-23 NOTE — PROGRESS NOTES
Upon initial assessment pt was extremely agitated and restless. Grabbing at  and nurse. Pt given ativan and morphine. Within several minutes pt much calmer and sleeping. Was also told in report that pt did not sleep at all over night.

## 2021-02-23 NOTE — PLAN OF CARE
Problem: Nutrition  Goal: Optimal nutrition therapy  Outcome: Ongoing   Nutrition Problem #1: Inadequate oral intake  Intervention: Food and/or Nutrient Delivery: (Advance diet as able)  Nutritional Goals: tolerate most appropriate form of nutrition per Md

## 2021-02-23 NOTE — PROGRESS NOTES
[facility] [phone]                                     Daily Progress Note                                                         Pt Name: Ajit Xiao   Medical Record Number: [id number]  Date of Birth 1940   Today's Date: 2/23/2021    ASSESSMENT/PLAN  - POD #5 (2/18) S/p Exploratory laparotomy, bowel resection for ischemic bowel  -Xrays showed no evidence of persistent SBO  -Awaiting return of GI function  -Continue clear liquid diet  -continue miralax  - OOB ambulate    - Pulmonary toilet / Encourage IS use     Harsh Patterson is about the same. Sleeping, received ativan for agitation  He is tolerating clear liquids. OBJECTIVE  VITALS:  height is 5' 9\" (1.753 m) and weight is 173 lb 15.1 oz (78.9 kg). His axillary temperature is 97.9 °F (36.6 °C). His blood pressure is 142/74 (abnormal) and his pulse is 109. His respiration is 20 and oxygen saturation is 93%. INTAKE/OUTPUT:      Intake/Output Summary (Last 24 hours) at 2/23/2021 1147  Last data filed at 2/23/2021 1006  Gross per 24 hour   Intake 3530 ml   Output 2700 ml   Net 830 ml     GENERAL: alert to person; confused conversation   LUNGS: clear to ausculation, without wheezes, rales or rhonci  HEART: normal rate and regular rhythm  ABDOMEN: Soft, appropriately tendernon distended. EXTREMITY: no cyanosis, clubbing or edema    I/O last 3 completed shifts: In: 2940 [P.O.:840; I.V.:2100]  Out: 2500 [Urine:2500]    LABS  Recent Labs     02/21/21  1830 02/21/21  1830 02/23/21  0614   WBC  --    < > 5.7   HGB  --    < > 9.3*   HCT  --    < > 27.5*   PLT  --    < > 216   *   < > 148*   K 3.5   < > 3.6   *   < > 116*   CO2 21   < > 23   BUN 31*   < > 26*   CREATININE 1.8*   < > 1.8*   MG 2.00  --   --    CALCIUM 7.9*   < > 8.2*    < > = values in this interval not displayed.        EDUCATION  Patient educated about Disease Process, Medications, Smoking Cessation, Oxygenation, Incentive Spirometry and Deep Breath and Cough, Diabetes, Hyperlipidemia, Smoking Cessation, Nutrition, Exercise and Hypertension    Electronically signed by SLADE Fernando - CNP on 2/23/2021 at 11:47 AM      [facility]   [phone] Office  [phone] Cell      Surgery Staff  I have examined this patient and read and agree with the note by SLADE Workman from today. Awaiting GI function.   Continue clears, miralax  Monitor wound drainage    Electronically signed by Speedy Curiel MD on 2/23/2021 at 12:33 PM

## 2021-02-23 NOTE — PROGRESS NOTES
Occupational Therapy  Per RN, pt not appropriate for therapy today d/t agitation. Will follow and attempt as able. Refer to last note for status if pt is discharged. 1333: per RN, pt remains inappropriate for therapy today. Will re-attempt tomorrow.   Gris HYDE/YEVGENIY,515

## 2021-02-23 NOTE — PLAN OF CARE
Sensory Perception - Impaired:  Goal: Able to refrain from responding to false sensory perceptions  Description: Able to refrain from responding to false sensory perceptions  2/23/2021 1520 by Nara Walton RN  Outcome: Ongoing  Note: . Problem: Sensory Perception - Impaired:  Goal: Demonstrates accurate environmental perceptions  Description: Demonstrates accurate environmental perceptions  2/23/2021 1520 by Nara Walton RN  Outcome: Ongoing  Note: . Problem: Sensory Perception - Impaired:  Goal: Able to distinguish between reality-based and nonreality-based thinking  Description: Able to distinguish between reality-based and nonreality-based thinking  2/23/2021 1520 by Nara Walton RN  Outcome: Ongoing  Note: Pt is very confused, unable to verbalize where he is. Problem: Sensory Perception - Impaired:  Goal: Able to interrupt nonreality-based thinking  Description: Able to interrupt nonreality-based thinking  2/23/2021 1520 by Nara Walton RN  Outcome: Ongoing  Note: . Problem: Skin Integrity:  Goal: Will show no infection signs and symptoms  Description: Will show no infection signs and symptoms  2/23/2021 1520 by Nara Walton RN  Outcome: Ongoing  Note: Surgical incision has hoa, Dr. Vi Xiao changed dressing this morning. Skin assessment completed every shift. Pt assessed for incontinence, appropriate barrier cream applied prn. Pt encouraged to turn/rotate every 2 hours. Assistance provided if pt unable to do so themselves. Problem: Skin Integrity:  Goal: Absence of new skin breakdown  Description: Absence of new skin breakdown  2/23/2021 1520 by Nara Walton RN  Outcome: Ongoing  Note: No signs of new skin breakdown. Problem: Bowel/Gastric:  Goal: Control of bowel function will improve  Description: Control of bowel function will improve  2/23/2021 1520 by Nara Walton RN  Outcome: Ongoing  Note: Pt has not had a bowel movement this shift.  Did speak with Judy Hernandes about concern that pt is unable to take miralax today. She ordered prn dulcolax suppository. Suppository given. Problem: Bowel/Gastric:  Goal: Ability to achieve a regular elimination pattern will improve  Description: Ability to achieve a regular elimination pattern will improve  2/23/2021 1520 by Mariel Lovell RN  Outcome: Ongoing  Note: Abdomen is slightly distended. Bowel sounds hypoactive. Problem: Nutritional:  Goal: Ability to follow a diet with enough fiber (20 to 30 grams) for normal bowel function will improve  Description: Ability to follow a diet with enough fiber (20 to 30 grams) for normal bowel function will improve  2/23/2021 1520 by Mariel Lovell RN  Outcome: Ongoing  Note: Pt remains on clear liquids and has only minimal amounts to drink this shift. Problem: Pain:  Description: Pain management should include both nonpharmacologic and pharmacologic interventions. Goal: Pain level will decrease  Description: Pain level will decrease  2/23/2021 1520 by Mariel Lovell RN  Outcome: Ongoing  Note: Pt unable to verbalize pain level but he has had episodes this shift of moaning, extreme restlessness, holding his abdomen and wincing. Problem: Pain:  Description: Pain management should include both nonpharmacologic and pharmacologic interventions. Goal: Control of acute pain  Description: Control of acute pain  2/23/2021 1520 by Mariel Lovell RN  Outcome: Ongoing  Note: Pain/discomfort being managed with PRN analgesics per MD orders. Pt able to express presence and absence of pain and rate pain appropriately using numerical scale. Problem: Pain:  Description: Pain management should include both nonpharmacologic and pharmacologic interventions. Goal: Control of chronic pain  Description: Control of chronic pain  2/23/2021 1520 by Mariel Lovell RN  Outcome: Ongoing  Note: Pain is surgical in nature.      Problem: Nutrition  Goal: Optimal nutrition therapy  2/23/2021 1520 by Bill Hurley RN  Outcome: Ongoing  Note: Pt remains on clear liquid diet.      Problem: Non-Violent Restraints  Goal: Removal from restraints as soon as assessed to be safe  2/23/2021 1520 by Bill Hurley RN  Outcome: Completed     Problem: Non-Violent Restraints  Goal: No harm/injury to patient while restraints in use  2/23/2021 1520 by Bill Hurley RN  Outcome: Completed     Problem: Non-Violent Restraints  Goal: Patient's dignity will be maintained  2/23/2021 1520 by Bill Hurley RN  Outcome: Completed

## 2021-02-24 NOTE — PLAN OF CARE
Problem: Falls - Risk of:  Goal: Will remain free from falls  Description: Will remain free from falls  2/24/2021 1309 by Marina Villela RN  Outcome: Ongoing  Note: Fall risk assessment completed every shift. All precautions in place. Pt has call light within reach at all times. Room clear of clutter. Pt aware to call for assistance when getting up. Problem: Falls - Risk of:  Goal: Absence of physical injury  Description: Absence of physical injury  2/24/2021 1309 by Marina Villela RN  Outcome: Ongoing  Note: No signs of physical injury. Problem: Confusion - Acute:  Goal: Absence of continued neurological deterioration signs and symptoms  Description: Absence of continued neurological deterioration signs and symptoms  2/24/2021 1309 by Marina Villela RN  Outcome: Ongoing  Note: Pt remains confused. He is more alert today than yesterday. Problem: Confusion - Acute:  Goal: Mental status will be restored to baseline  Description: Mental status will be restored to baseline  2/24/2021 1309 by Marina Villela RN  Outcome: Ongoing  Note: Pt is confused at baseline but this confusion is worse according to his significant other. Problem: Discharge Planning:  Goal: Ability to perform activities of daily living will improve  Description: Ability to perform activities of daily living will improve  2/24/2021 1309 by Marina Villela RN  Outcome: Ongoing  Note: Social service involved to assist with discharge plans. Problem: Discharge Planning:  Goal: Participates in care planning  Description: Participates in care planning  2/24/2021 1309 by Marina Villela RN  Outcome: Ongoing  Note: Family is involved in care planning but pt cognitively is unable to participate. Problem: Injury - Risk of, Physical Injury:  Goal: Will remain free from falls  Description: Will remain free from falls  2/24/2021 1309 by Marina Villela RN  Outcome: Ongoing  Note: Fall risk assessment completed every shift.  All precautions in place. Pt has call light within reach at all times. Room clear of clutter. Pt aware to call for assistance when getting up. Problem: Injury - Risk of, Physical Injury:  Goal: Absence of physical injury  Description: Absence of physical injury  2/24/2021 1309 by Vashti Purvis RN  Outcome: Ongoing  Note: No signs of physical injury. Problem: Mood - Altered:  Goal: Mood stable  Description: Mood stable  2/24/2021 1309 by Vashti Purvis RN  Outcome: Ongoing  Note: . Problem: Mood - Altered:  Goal: Absence of abusive behavior  Description: Absence of abusive behavior  2/24/2021 1309 by Vashti Purvis RN  Outcome: Ongoing  Note: Pt has been restless but not abusive. Problem: Mood - Altered:  Goal: Verbalizations of feeling emotionally comfortable while being cared for will increase  Description: Verbalizations of feeling emotionally comfortable while being cared for will increase  2/24/2021 1309 by Vashti Purvis RN  Outcome: Ongoing  Note: . Problem: Psychomotor Activity - Altered:  Goal: Absence of psychomotor disturbance signs and symptoms  Description: Absence of psychomotor disturbance signs and symptoms  2/24/2021 1309 by Vashti Purvis RN  Outcome: Ongoing  Note: .        Problem: Sensory Perception - Impaired:  Goal: Demonstrations of improved sensory functioning will increase  Description: Demonstrations of improved sensory functioning will increase  2/24/2021 1309 by Vashti Purvis RN  Outcome: Ongoing     Problem: Sensory Perception - Impaired:  Goal: Decrease in sensory misperception frequency  Description: Decrease in sensory misperception frequency  2/24/2021 1309 by Vashti Purvis RN  Outcome: Ongoing     Problem: Sensory Perception - Impaired:  Goal: Able to refrain from responding to false sensory perceptions  Description: Able to refrain from responding to false sensory perceptions  2/24/2021 1309 by Vashti Purvis RN  Outcome: Ongoing     Problem: Sensory Perception - Impaired:  Goal: Demonstrates accurate environmental perceptions  Description: Demonstrates accurate environmental perceptions  2/24/2021 1309 by Miranda Rivero RN  Outcome: Ongoing     Problem: Sensory Perception - Impaired:  Goal: Able to distinguish between reality-based and nonreality-based thinking  Description: Able to distinguish between reality-based and nonreality-based thinking  2/24/2021 1309 by Miranda Rivero RN  Outcome: Ongoing     Problem: Sensory Perception - Impaired:  Goal: Demonstrates accurate environmental perceptions  Description: Demonstrates accurate environmental perceptions  2/24/2021 1309 by Miranda Rivero RN  Outcome: Ongoing     Problem: Sensory Perception - Impaired:  Goal: Able to interrupt nonreality-based thinking  Description: Able to interrupt nonreality-based thinking  2/24/2021 1309 by Miranda Rivero RN  Outcome: Ongoing     Problem: Sleep Pattern Disturbance:  Goal: Appears well-rested  Description: Appears well-rested  2/24/2021 1309 by Miranda Rivero RN  Outcome: Ongoing  Note: Patient does appear more rested today than yesterday. Problem: Skin Integrity:  Goal: Will show no infection signs and symptoms  Description: Will show no infection signs and symptoms  2/24/2021 1309 by Miranda Rivero RN  Outcome: Ongoing  Note: Skin assessment completed every shift. Pt assessed for incontinence, appropriate barrier cream applied prn. Pt encouraged to turn/rotate every 2 hours. Assistance provided if pt unable to do so themselves. Attempt to turn pt every 2 hours but it can be difficult due to his agitation. Problem: Skin Integrity:  Goal: Absence of new skin breakdown  Description: Absence of new skin breakdown  2/24/2021 1309 by Miranda Rivero RN  Outcome: Ongoing  Note: No signs of new skin breakdown. Problem:  Bowel/Gastric:  Goal: Control of bowel function will improve  Description: Control of bowel function will improve  2/24/2021 1309 by Fred Matos Bhavana Simeon RN  Outcome: Ongoing  Note: Pt has been incontinent of multiple small liquid stools. Problem: Bowel/Gastric:  Goal: Ability to achieve a regular elimination pattern will improve  Description: Ability to achieve a regular elimination pattern will improve  2/24/2021 1309 by Nara Walton RN  Outcome: Ongoing  Note: . Problem: Nutritional:  Goal: Ability to follow a diet with enough fiber (20 to 30 grams) for normal bowel function will improve  Description: Ability to follow a diet with enough fiber (20 to 30 grams) for normal bowel function will improve  2/24/2021 1309 by Nara Walton RN  Outcome: Ongoing  Note: Pt is starting to take a little by mouth. Problem: Pain:  Description: Pain management should include both nonpharmacologic and pharmacologic interventions. Goal: Pain level will decrease  Description: Pain level will decrease  2/24/2021 1309 by Nara Walton RN  Outcome: Ongoing  Note: Pt has not been grabbing at his abdomen or moaning. Problem: Pain:  Description: Pain management should include both nonpharmacologic and pharmacologic interventions. Goal: Control of acute pain  Description: Control of acute pain  2/24/2021 1309 by Nara Walton RN  Outcome: Ongoing  Note: Pain/discomfort being managed with PRN analgesics per MD orders. Pt able to express presence and absence of pain and rate pain appropriately using numerical scale. Problem: Pain:  Description: Pain management should include both nonpharmacologic and pharmacologic interventions.   Goal: Control of chronic pain  Description: Control of chronic pain  2/24/2021 1309 by Nara Walton RN  Outcome: Ongoing     Problem: Nutrition  Goal: Optimal nutrition therapy  2/24/2021 1309 by Nara Walton RN  Outcome: Ongoing

## 2021-02-24 NOTE — CARE COORDINATION
Daughter Marah Hooker does want patient to go short term to SNF for rehab prior to returning home. [facility] may be able to accept but would need patient to be sitter free for 24 hours prior to admit. Jadon Sanchez reviewing patient and will follow up with us judson. Home @ [facility] (family's 1st choice) does think they can accept - they also do allow compassionate visitation M-F where his significant other could come and visit during the day. All facilities need patient to be sitter free & restraint free for 24 hours prior to admit. Made charge CHEY Jung aware of needing to be sitter free so we can facilitate patient discharge to SNF. Repeat COVID will be needed 24-48 prior to discharge to SNF.   Electronically signed by Laure Griffin RN Case Management [phone] on 2/24/2021 at 4:25 PM

## 2021-02-24 NOTE — PROGRESS NOTES
Nephrology ([facility]) Progress Note    CC: KEENAN    Subjective:    HPI:  Breathing comfortably. confused sitter at bedside. ROS:  In bed No fever. restless Not much PO intake   PMFSH:  medications reviewed. Good UOP 2900 ml  No family present    Objective:  Blood pressure 96/66, pulse 103, temperature 97.8 °F (36.6 °C), temperature source Oral, resp. rate 20, height 5' 9\" (1.753 m), weight 173 lb 11.6 oz (78.8 kg), SpO2 95 %. Intake/Output Summary (Last 24 hours) at 2/24/2021 1235  Last data filed at 2/24/2021 0752  Gross per 24 hour   Intake 1757 ml   Output 2200 ml   Net -443 ml     General:  NAD, confused  Chest:  CTAB  CVS:  RRR  Abdominal:  surgical site, decreased BS Ileal conduit RLQ   Extremities:  no edema  Skin:  no rash    Labs:  Renal panel:  Lab Results   Component Value Date/Time     (H) 02/24/2021 07:50 AM    K 3.5 02/24/2021 07:50 AM    CO2 22 02/24/2021 07:50 AM    BUN 19 02/24/2021 07:50 AM    CREATININE 1.7 (H) 02/24/2021 07:50 AM    CALCIUM 8.0 (L) 02/24/2021 07:50 AM    PHOS 3.1 12/21/2016 04:30 AM    MG 1.90 02/24/2021 07:50 AM     CBC:  Lab Results   Component Value Date/Time    WBC 7.3 02/24/2021 07:50 AM    HGB 9.3 (L) 02/24/2021 07:50 AM    HCT 28.0 (L) 02/24/2021 07:50 AM     02/24/2021 07:50 AM       Assessment/Plan:  Reviewed old records and labs.     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 Estefania Davila     4) FEN              - hypernatremia Na+ stable at 148 meq Not much oral intake  Change IVF's to DW 1/4 NS with KCL  Encouraged PO free water                              5) dementia

## 2021-02-24 NOTE — PLAN OF CARE
Problem: Falls - Risk of:  Goal: Will remain free from falls  Description: Will remain free from falls  2/24/2021 0140 by Alexandra Wiseman RN  Outcome: Ongoing     Problem: Falls - Risk of:  Goal: Absence of physical injury  Description: Absence of physical injury  2/24/2021 0140 by Alexandra Wiseman RN  Outcome: Ongoing     Problem: Confusion - Acute:  Goal: Absence of continued neurological deterioration signs and symptoms  Description: Absence of continued neurological deterioration signs and symptoms  2/24/2021 0140 by Alexandra Wiseman RN  Outcome: Ongoing     Problem: Confusion - Acute:  Goal: Mental status will be restored to baseline  Description: Mental status will be restored to baseline  2/24/2021 0140 by Alexandra Wiseman RN  Outcome: Ongoing     Problem: Discharge Planning:  Goal: Ability to perform activities of daily living will improve  Description: Ability to perform activities of daily living will improve  2/24/2021 0140 by Alexandra Wiseman RN  Outcome: Ongoing     Problem: Discharge Planning:  Goal: Participates in care planning  Description: Participates in care planning  2/24/2021 0140 by Alexandra Wiseman RN  Outcome: Ongoing     Problem: Injury - Risk of, Physical Injury:  Goal: Will remain free from falls  Description: Will remain free from falls  2/24/2021 0140 by Alexandra Wiseman RN  Outcome: Ongoing     Problem: Injury - Risk of, Physical Injury:  Goal: Absence of physical injury  Description: Absence of physical injury  2/24/2021 0140 by Alexandra Wiseman RN  Outcome: Ongoing     Problem: Mood - Altered:  Goal: Mood stable  Description: Mood stable  2/24/2021 0140 by Alexandra Wiseman RN  Outcome: Ongoing     Problem: Mood - Altered:  Goal: Absence of abusive behavior  Description: Absence of abusive behavior  2/24/2021 0140 by Alexandra Wiseman RN  Outcome: Ongoing     Problem: Mood - Altered:  Goal: Verbalizations of feeling emotionally comfortable while being cared for will increase  Description: Verbalizations of feeling emotionally comfortable while being cared for will increase  2/24/2021 0140 by Mercy Miles RN  Outcome: Ongoing     Problem: Psychomotor Activity - Altered:  Goal: Absence of psychomotor disturbance signs and symptoms  Description: Absence of psychomotor disturbance signs and symptoms  2/24/2021 0140 by Mercy Miles RN  Outcome: Ongoing     Problem: Sensory Perception - Impaired:  Goal: Demonstrations of improved sensory functioning will increase  Description: Demonstrations of improved sensory functioning will increase  2/24/2021 0140 by Mercy Miles RN  Outcome: Ongoing     Problem: Sensory Perception - Impaired:  Goal: Decrease in sensory misperception frequency  Description: Decrease in sensory misperception frequency  2/24/2021 0140 by Mercy Miles RN  Outcome: Ongoing     Problem: Sensory Perception - Impaired:  Goal: Able to refrain from responding to false sensory perceptions  Description: Able to refrain from responding to false sensory perceptions  2/24/2021 0140 by Mercy Miles RN  Outcome: Ongoing     Problem: Sensory Perception - Impaired:  Goal: Demonstrates accurate environmental perceptions  Description: Demonstrates accurate environmental perceptions  2/24/2021 0140 by Mercy Miles RN  Outcome: Ongoing     Problem: Sensory Perception - Impaired:  Goal: Able to distinguish between reality-based and nonreality-based thinking  Description: Able to distinguish between reality-based and nonreality-based thinking  2/24/2021 0140 by Mercy Miles RN  Outcome: Ongoing     Problem: Sensory Perception - Impaired:  Goal: Able to interrupt nonreality-based thinking  Description: Able to interrupt nonreality-based thinking  2/24/2021 0140 by Mercy Miles RN  Outcome: Ongoing     Problem: Sleep Pattern Disturbance:  Goal: Appears well-rested  Description: Appears well-rested  2/24/2021 0140 by Mercy Miles RN  Outcome: Ongoing     Problem: Skin Integrity:  Goal: Will show no infection signs and symptoms  Description: Will show no infection signs and symptoms  2/24/2021 0140 by Cynthia Gomez RN  Outcome: Ongoing     Problem: Skin Integrity:  Goal: Absence of new skin breakdown  Description: Absence of new skin breakdown  2/24/2021 0140 by Cynthia Gomez RN  Outcome: Ongoing     Problem: Bowel/Gastric:  Goal: Control of bowel function will improve  Description: Control of bowel function will improve  2/24/2021 0140 by Cynthia Gomez RN  Outcome: Ongoing     Problem:  Bowel/Gastric:  Goal: Ability to achieve a regular elimination pattern will improve  Description: Ability to achieve a regular elimination pattern will improve  2/24/2021 0140 by Cynthia Gomez RN  Outcome: Ongoing     Problem: Nutritional:  Goal: Ability to follow a diet with enough fiber (20 to 30 grams) for normal bowel function will improve  Description: Ability to follow a diet with enough fiber (20 to 30 grams) for normal bowel function will improve  2/24/2021 0140 by Cynthia Gomez RN  Outcome: Ongoing     Problem: Pain:  Goal: Pain level will decrease  Description: Pain level will decrease  2/24/2021 0140 by Cynthia Gomez RN  Outcome: Ongoing     Problem: Pain:  Goal: Control of acute pain  Description: Control of acute pain  2/24/2021 0140 by Cynthia Gomez RN  Outcome: Ongoing     Problem: Pain:  Goal: Control of chronic pain  Description: Control of chronic pain  2/24/2021 0140 by Cynthia Gomez RN  Outcome: Ongoing     Problem: Nutrition  Goal: Optimal nutrition therapy  2/24/2021 0140 by Cynthia Gomez RN  Outcome: Ongoing

## 2021-02-24 NOTE — PROGRESS NOTES
Occupational Therapy  Facility/Department: [address] PROGRESSIVE CARE  Daily Treatment Note  NAME: Alida Cook  : 1940  MRN: [id number]    Date of Service: 2021    Discharge Recommendations:  3-5 sessions per week, Patient would benefit from continued therapy after discharge    Alida Cook scored a  on the AM-PAC ADL Inpatient form. Current research shows that an AM-PAC score of 17 or less is typically not associated with a discharge to the patient's home setting. Based on the patient's AM-PAC score and their current ADL deficits, it is recommended that the patient have 3-5 sessions per week of Occupational Therapy at d/c to increase the patient's independence. Please see assessment section for further patient specific details. If patient discharges prior to next session this note will serve as a discharge summary. Please see below for the latest assessment towards goals. Assessment   Performance deficits / Impairments: Decreased functional mobility ; Decreased endurance;Decreased coordination;Decreased ADL status; Decreased posture;Decreased ROM; Decreased balance;Decreased strength;Decreased safe awareness;Decreased cognition  Assessment: Discussed with OTR: Today, pt requiring increased assistance for bed mob(Max A x2), standing (Mod/Max A x2) and taking a few step with walker, bed to recliner, Max A x2. Pt needing dependent for ADL's, LB dressing, toileting. Pt less alert, not following commands. Pt is unsafe to return home and will benefit from cont OT at d/c at [address] Cont poc. Treatment Diagnosis: impaired: Cognition, ADL function, mob/transfers, balance, ROM, coord  Prognosis: Fair  History: Pt is an 78 y.o. male admitted with SBO 21 underwent exploratory laparotomy, bowel resection for ischemic bowel. PMH:  Patient with hx of dementia, bladder cancer s/p ileal conduit, hyperkalemia, CKD.  Currently pt requires mod-max A x2 for bed mob, min A x 2 for sit>< stand transfers and to amb a few steps with hand held assist x2 for positioning. Pt very confused, but pleasant, limited ability to stay on task and followed commands inconsistently. Based on mob staus/cognition expect pt dep for all ADL tasks @ this time. Prior living situation/level of function unknown as pt unable to self report, and info not in chart. OT Education: OT Role;Plan of Care;Transfer Training  REQUIRES OT FOLLOW UP: Yes  Activity Tolerance  Activity Tolerance: Patient limited by fatigue;Treatment limited secondary to medical complications (free text); Treatment limited secondary to decreased cognition  Activity Tolerance: HR increasing to 130 Bpm  Safety Devices  Safety Devices in place: Yes  Type of devices: Left in chair;Call light within reach;Nurse notified; Chair alarm in place;Gait belt         Patient Diagnosis(es): The primary encounter diagnosis was Small bowel obstruction ([facility] Diagnoses of Status post ileal conduit ([facility] Urinary obstruction, Acute renal failure, unspecified acute renal failure type ([facility] [address] [facility] Hyperkalemia, SIRS (systemic inflammatory response syndrome) ([facility] and Dementia without behavioral disturbance, unspecified dementia type [facility]) were also pertinent to this visit. has a past medical history of Cancer ([facility] [address] GASTRIC ULCER, Hypertension, and Leukopenia. has a past surgical history that includes Colonoscopy; Cystoscopy; other surgical history (12/14/2016); Tunneled venous port placement (Right, 06/06/2017); and laparotomy (N/A, 2/18/2021). Restrictions  Restrictions/Precautions  Restrictions/Precautions: Fall Risk  Position Activity Restriction  Other position/activity restrictions: general diet; urostomy bag, cental line/port, sitter  Subjective   General  Chart Reviewed: Yes, Orders, Progress Notes, Labs  Patient assessed for rehabilitation services?: Yes  Additional Pertinent Hx: Maggy Weinberg is a 78 y.o. male admitted on 2/18/2021 for abdominal pain. Patient with dementia, poor historian and information is obtained from son. Suspected abdominal pain over the last week. Denies nausea or emesis. Last BM yesterday. History bladder cancer s/p ileal conduit. No UOP until recently while in ED. Hyperkalemia and acute on CKD. \" copied per Mechelle Ace MD 2/18/21 note  Family / Caregiver Present: No  Referring Practitioner: Guillermina TEJEDA  Diagnosis: SBO (2/18) S/p Exploratory laparotomy, bowel resection for ischemic bowel  Subjective  Subjective: Pt met BS, in bed and drowsy,lethargic, opening eyes to sound of name only, inconsistent. Pt unable to follow commands. Pt grabbing onto therapist and requiring assist of another to loosen  (when sitting up to EOB). General Comment  Comments: ok to see per RN      Orientation  Orientation  Overall Orientation Status: Impaired  Objective    ADL  Grooming: (attempts to wash face met with resistance, pt grabbing therapists, hand and not letting go.)  LE Dressing: Dependent/Total(to don briefs, including over hips)  Toileting: Dependent/Total(incontinent of BM. Dep in stance for hygiene, briefs donned.)        Balance  Sitting Balance: Minimal assistance(at EOB)  Standing Balance  Activity: Max A x1 and Mod A x1 for static balance for ADL's. Max A for amb 2ft, bed to chair with RW. Pt's HR to 130Bpm after amb(RN aware).   Bed mobility  Supine to Sit: Maximum assistance;2 Person assistance  Sit to Supine: Unable to assess(up in chair at end of session)  Transfers  Sit to stand: Maximum assistance;2 Person assistance  Stand to sit: Maximum assistance;2 Person assistance  Transfer Comments: to/from walker at bed, to recliner         Cognition  Overall Cognitive Status: Exceptions  Arousal/Alertness: Inconsistent responses to stimuli  Following Commands: Inconsistently follows commands  Attention Span: Difficulty attending to directions  Safety Judgement: Decreased awareness of need for safety;Decreased awareness of need for assistance  Insights: Not aware of deficits  Initiation: Requires cues for all  Sequencing: Requires cues for all  Cognition Comment: Pt confused & resistive at times            Plan   Plan  Times per week: 3-5  Times per day: Daily  Plan weeks: until D/C  Current Treatment Recommendations: Strengthening, Patient/Caregiver Education & Training, Equipment Evaluation, Education, & procurement, Balance Training, Neuromuscular Re-education, Functional Mobility Training, Cognitive Reorientation, Cognitive/Perceptual Training, Safety Education & Training, Self-Care / ADL    AM-PAC Score        AM-PAC Inpatient Daily Activity Raw Score: 7 (02/24/21 0939)  AM-PAC Inpatient ADL T-Scale Score : 20.13 (02/24/21 0939)  ADL Inpatient CMS 0-100% Score: 92.44 (02/24/21 0939)  ADL Inpatient CMS G-Code Modifier : CM (02/24/21 6879)    Goals  Short term goals  Time Frame for Short term goals: until D/C. Status: goals ongoing  Short term goal 1: Pt will feed self with set-up. Short term goal 2: Pt will groom self with min A. Short term goal 3: Pt will perform functional transfers min A x1 using AD as needed. Short term goal 4: Pt will improve cognition to participate in upper body ADL tasks with min A and mod cues. Short term goal 5: Pt will stand for lower body ADL tasks/positioning with CGA with use of AD/rails. Short term goal 6: Pt will participate in functional ADL mob tasks with min A to CGA with use of AD. Long term goals  Time Frame for Long term goals : same as LTG  Patient Goals   Patient goals : Pt unable to express goal at this time.        Therapy Time   Individual Concurrent Group Co-treatment   Time In 0858         Time Out 0942         Minutes [address] HYDE/L,515

## 2021-02-24 NOTE — PROGRESS NOTES
[facility] [phone]                                     Daily Progress Note                                                         Pt Name: Abhilash Shirley Record Number: [id number]  Date of Birth 1940   Today's Date: 2/24/2021    ASSESSMENT/PLAN  - POD #6 (2/18) S/p Exploratory laparotomy, bowel resection for ischemic bowel  -Xrays showed no evidence of persistent SBO  -+flatulence and BMs  -Tolerating liquid diet. Will advance diet to general as tolerated  -Incision site ok. Looking better  - OOB ambulate, PT/OT  -Therapy recommending SNF at D/C. Will ask social work to evaluate   [facility] is about the same. Sleeping, received ativan for agitation  He is tolerating liquids. OBJECTIVE  VITALS:  height is 5' 9\" (1.753 m) and weight is 173 lb 11.6 oz (78.8 kg). His oral temperature is 99.7 °F (37.6 °C). His blood pressure is 152/87 (abnormal) and his pulse is 91. His respiration is 18 and oxygen saturation is 95%. INTAKE/OUTPUT:      Intake/Output Summary (Last 24 hours) at 2/24/2021 1210  Last data filed at 2/24/2021 4464  Gross per 24 hour   Intake 1757 ml   Output 2200 ml   Net -443 ml     GENERAL: alert to person; confused conversation   LUNGS: clear to ausculation, without wheezes, rales or rhonci  HEART: normal rate and regular rhythm  ABDOMEN: Soft, appropriately tendernon distended. EXTREMITY: no cyanosis, clubbing or edema    I/O last 3 completed shifts:   In: 4953 [I.V.:1757]  Out: 2950 [Urine:2950]    LABS  Recent Labs     02/24/21  0750   WBC 7.3   HGB 9.3*   HCT 28.0*      *   K 3.5   *   CO2 22   BUN 19   CREATININE 1.7*   MG 1.90   CALCIUM 8.0*       EDUCATION  Patient educated about Disease Process, Medications, Smoking Cessation, Oxygenation, Incentive Spirometry and Deep Breath and Cough, Diabetes, Hyperlipidemia, Smoking Cessation, Nutrition, Exercise and Hypertension    Electronically signed by DARLIN Viveros on 2/24/2021 at 12:10 PM      [facility] and [facility]   [phone] Office  [phone] Cell

## 2021-02-24 NOTE — CARE COORDINATION
PT/OT recs SNF. Family still deciding SNF vs HHC. Patient's daughter Zion Orona has chosen SNFs in the following order  1.) Home @ [facility]  2.) [facility]  3.) [address]  [facility]  Referrals sent. Will follow up with facilities. Daughter will call me back with [facility] on her break from work in the event they decline SNF.      Electronically signed by Gabe Guy RN Case Management [phone] on 2/24/2021 at 12:06 PM

## 2021-02-24 NOTE — PROGRESS NOTES
Patient is extremely restless. Diet was changed to general diet but he is not interested in eating. He did drink about half of an ensure.  remains at bedside.

## 2021-02-24 NOTE — PROGRESS NOTES
Physical Therapy  Facility/Department: [address] PROGRESSIVE CARE  Daily Treatment Note- COTX with OT  NAME: Joan Castellanos  : 1940  MRN: [id number]    Date of Service: 2021    Discharge Recommendations:  3-5 sessions per week, Patient would benefit from continued therapy after discharge   PT Equipment Recommendations  Equipment Needed: No  Other: defer to next level of care     Joan Castellanos scored a  on the AM-PAC short mobility form. Current research shows that an AM-PAC score of 17 or less is typically not associated with a discharge to the patient's home setting. Based on the patient's AM-PAC score and their current functional mobility deficits, it is recommended that the patient have 3-5 sessions per week of Physical Therapy at d/c to increase the patient's independence. Please see assessment section for further patient specific details. If patient discharges prior to next session this note will serve as a discharge summary. Please see below for the latest assessment towards goals. Assessment   Body structures, Functions, Activity limitations: Decreased functional mobility ; Decreased balance;Decreased strength;Decreased safe awareness;Decreased cognition;Decreased endurance  Assessment: Pt presents with significantly decreased fucntional mobility after admission for Abdominal Pain, Bowel Obstruction, and underwent ExpLap on 21. Pt alsot s/p radical cystectomy and ileal conduit from  with metastatic dsease. Prior to admission, it is not clear to this therapist what pr's full PLOF was but per social work note, he was indep with ADLs PTA. Today, pt not following commands and limited by cognition and poor activity tolerance. He required maxAx2 for bed mobility and to transfer bed to chair with RW. Pt is a high fall risk and unsafe to return home at this time.  after transfer to chair taking several minutes to recover.   Recommend continued skilled inpatient PT at a low-moderate intensity upon d/c to decrease burden of care and address functional mobility and activity tolerance. Specific instructions for Next Treatment: cotx with OT  Prognosis: Guarded; Fair  PT Education: Goals;PT Role;General Safety;Transfer Training  REQUIRES PT FOLLOW UP: Yes  Activity Tolerance  Activity Tolerance: Patient limited by endurance; Patient limited by cognitive status  Activity Tolerance: limited command following     Patient Diagnosis(es): The primary encounter diagnosis was Small bowel obstruction ([facility] Diagnoses of Status post ileal conduit ([facility] Urinary obstruction, Acute renal failure, unspecified acute renal failure type ([facility] Hyperkalemia, SIRS (systemic inflammatory response syndrome) ([facility] and Dementia without behavioral disturbance, unspecified dementia type [facility]) were also pertinent to this visit. has a past medical history of Cancer ([facility] GASTRIC ULCER, Hypertension, and Leukopenia. has a past surgical history that includes Colonoscopy; Cystoscopy; other surgical history (12/14/2016); Tunneled venous port placement (Right, 06/06/2017); and laparotomy (N/A, 2/18/2021). Restrictions  Restrictions/Precautions  Restrictions/Precautions: Fall Risk  Position Activity Restriction  Other position/activity restrictions: general diet; urostomy bag, cental line/port, sitter     Social/Functional History  Lives With: Significant other  Type of Home: House  Home Layout: (one level with basement?)  Home Access: Stairs to enter without rails  Entrance Stairs - Number of Steps: 2  ADL Assistance: Independent  Additional Comments: above info from social work note    Subjective   General  Chart Reviewed: Yes  Additional Pertinent Hx: Per Urology H&P, \" Elbert Triplett is a 78 y.o. who underwent radical cystectomy and ileal conduit by Kishor Wiley at [facility] circa 2016/17. Patient subsequently developed metastatic disease and been under Dr. Jazmin magallon.   Currently on a check point inhibitor. He was emergently hospitalized last evening with abdominal pain and a CT scan that showed mild bilateral hydroureteronephrosis and potential dead bowel. At surgery he was found to have a closed loop small bowel obstruction with a portion of ischemic bowel that was successfully excised. 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 Since surgery, his urine output has picked up nicely but his creatinine has remained elevated at 2.5\"   On 2/18/21, pt underwent \"Exploratory laparotomy, bowel resection for ischemic bowel\". Response To Previous Treatment: Patient unable to report, no changes reported from family or staff  Family / Caregiver Present: No  Subjective  Subjective: Pt in bed upon arrival.  Sitter present. Denies pain. Limited command following and pt grabbing onto therapist with tight  requiring assist of 2nd therapist to release.                 Objective   Bed mobility  Supine to Sit: Maximum assistance;2 Person assistance  Sit to Supine: Unable to assess(up in chair at end of session)     Transfers  Sit to Stand: Maximum Assistance;2 Person Assistance  Stand to sit: Maximum Assistance;2 Person Assistance  Comment: max cues and physical assist for hand placement     Ambulation  Ambulation?: Yes  More Ambulation?: No  Ambulation 1  Surface: level tile  Device: Rolling Walker  Other Apparatus: (therapist managed IV)  Assistance: Maximum assistance;2 Person assistance  Quality of Gait: forward flexed posture, heavy posterior lean, required assist advancing BLEs  Gait Deviations: Slow Daisy;Decreased step length;Decreased step height  Distance: 2' bed to chair  Comments: max distance; moderate MONTANO after transferring to chair with 's to 130 taking several minutes to recover- CHEY Juarez Head notified and came to the room  Stairs/Curb  Stairs?: No     Balance  Posture: Poor  Comments: maxAx1 and modAx1 for standing balance while OT

## 2021-02-25 NOTE — PLAN OF CARE
Problem: Falls - Risk of:  Goal: Will remain free from falls  Description: Will remain free from falls  2/25/2021 1232 by Trinidad Ferrera RN  Outcome: Ongoing     Problem: Falls - Risk of:  Goal: Absence of physical injury  Description: Absence of physical injury  2/25/2021 1232 by Trinidad Ferrera RN  Outcome: Ongoing     Problem: Confusion - Acute:  Goal: Absence of continued neurological deterioration signs and symptoms  Description: Absence of continued neurological deterioration signs and symptoms  2/25/2021 1232 by Trinidad Ferrera RN  Outcome: Ongoing     Problem: Confusion - Acute:  Goal: Mental status will be restored to baseline  Description: Mental status will be restored to baseline  2/25/2021 1232 by Trinidad Ferrera RN  Outcome: Ongoing     Problem: Discharge Planning:  Goal: Ability to perform activities of daily living will improve  Description: Ability to perform activities of daily living will improve  2/25/2021 1232 by Trinidad Ferrera RN  Outcome: Ongoing     Problem: Discharge Planning:  Goal: Participates in care planning  Description: Participates in care planning  2/25/2021 1232 by Trinidad Ferrera RN  Outcome: Ongoing     Problem: Injury - Risk of, Physical Injury:  Goal: Will remain free from falls  Description: Will remain free from falls  2/25/2021 1232 by Trinidad Ferrera RN  Outcome: Ongoing     Problem: Injury - Risk of, Physical Injury:  Goal: Absence of physical injury  Description: Absence of physical injury  2/25/2021 1232 by Trinidad Ferrera RN  Outcome: Ongoing     Problem: Mood - Altered:  Goal: Mood stable  Description: Mood stable  2/25/2021 1232 by Trinidad Ferrera RN  Outcome: Ongoing     Problem: Mood - Altered:  Goal: Absence of abusive behavior  Description: Absence of abusive behavior  2/25/2021 1232 by Trinidad Ferrera RN  Outcome: Ongoing     Problem: Mood - Altered:  Goal: Verbalizations of feeling emotionally comfortable while being cared for will increase  Description: Verbalizations of feeling emotionally comfortable while being cared for will increase  2/25/2021 1232 by Kenny Raines RN  Outcome: Ongoing     Problem: Psychomotor Activity - Altered:  Goal: Absence of psychomotor disturbance signs and symptoms  Description: Absence of psychomotor disturbance signs and symptoms  2/25/2021 1232 by Kenny Raines RN  Outcome: Ongoing     Problem: Sensory Perception - Impaired:  Goal: Demonstrations of improved sensory functioning will increase  Description: Demonstrations of improved sensory functioning will increase  2/25/2021 1232 by Kenny Raines RN  Outcome: Ongoing     Problem: Sensory Perception - Impaired:  Goal: Decrease in sensory misperception frequency  Description: Decrease in sensory misperception frequency  2/25/2021 1232 by Kenny Raines RN  Outcome: Ongoing     Problem: Sensory Perception - Impaired:  Goal: Able to refrain from responding to false sensory perceptions  Description: Able to refrain from responding to false sensory perceptions  2/25/2021 1232 by Kenny Raines RN  Outcome: Ongoing     Problem: Sensory Perception - Impaired:  Goal: Demonstrates accurate environmental perceptions  Description: Demonstrates accurate environmental perceptions  2/25/2021 1232 by Kenny Raines RN  Outcome: Ongoing     Problem: Sensory Perception - Impaired:  Goal: Able to distinguish between reality-based and nonreality-based thinking  Description: Able to distinguish between reality-based and nonreality-based thinking  2/25/2021 1232 by Kenny Raines RN  Outcome: Ongoing     Problem: Sensory Perception - Impaired:  Goal: Able to interrupt nonreality-based thinking  Description: Able to interrupt nonreality-based thinking  2/25/2021 1232 by Kenny Raines RN  Outcome: Ongoing     Problem: Sleep Pattern Disturbance:  Goal: Appears well-rested  Description: Appears well-rested  2/25/2021 1232 by Kenny Raines RN  Outcome: Ongoing     Problem: Skin Integrity:  Goal: Will show no infection signs and symptoms  Description: Will show no infection signs and symptoms  2/25/2021 1232 by Debbie Guadarrama RN  Outcome: Ongoing     Problem: Skin Integrity:  Goal: Absence of new skin breakdown  Description: Absence of new skin breakdown  2/25/2021 1232 by Debbie Guadarrama RN  Outcome: Ongoing     Problem: Bowel/Gastric:  Goal: Control of bowel function will improve  Description: Control of bowel function will improve  2/25/2021 1232 by Debbie Guadarrama RN  Outcome: Ongoing     Problem:  Bowel/Gastric:  Goal: Ability to achieve a regular elimination pattern will improve  Description: Ability to achieve a regular elimination pattern will improve  2/25/2021 1232 by Debbie Guadarrama RN  Outcome: Ongoing     Problem: Nutritional:  Goal: Ability to follow a diet with enough fiber (20 to 30 grams) for normal bowel function will improve  Description: Ability to follow a diet with enough fiber (20 to 30 grams) for normal bowel function will improve  2/25/2021 1232 by Debbie Guadarrama RN  Outcome: Ongoing     Problem: Pain:  Goal: Pain level will decrease  Description: Pain level will decrease  2/25/2021 1232 by Debbie Guadarrama RN  Outcome: Ongoing     Problem: Pain:  Goal: Control of acute pain  Description: Control of acute pain  2/25/2021 1232 by Debbie Guadarrama RN  Outcome: Ongoing     Problem: Pain:  Goal: Control of chronic pain  Description: Control of chronic pain  2/25/2021 1232 by Debbie Guadarrama RN  Outcome: Ongoing     Problem: Nutrition  Goal: Optimal nutrition therapy  2/25/2021 1232 by Debbie Guadarrama RN  Outcome: Ongoing
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
## 2021-02-25 NOTE — CARE COORDINATION
Talked with Salvatore Naqvi NP w/ general sx. She has re-consulted hospitalist this morning. Dr Keke Weathers has already been in to see patient. They are adjusting patient's meds to see if this will help his mentation/agitation. Patient needs to be sitter/restraint free for 24 hours before admit to SNF. Needs repeat covid 24-48 hrs prior to admit. [facility] unable to accept bc out of network w/ ins. Home @ [facility] following. FYI Pre cert for [facility] Medicare waived until 2/28, if discharged after this will need pre cert.      Electronically signed by Stew Beavers RN Case Management [phone] on 2/25/2021 at 12:15 PM

## 2021-02-25 NOTE — PLAN OF CARE
Verbalizations of feeling emotionally comfortable while being cared for will increase  2/25/2021 0026 by Tootie Corado RN  Outcome: Ongoing     Problem: Psychomotor Activity - Altered:  Goal: Absence of psychomotor disturbance signs and symptoms  Description: Absence of psychomotor disturbance signs and symptoms  2/25/2021 0026 by Tootie Corado RN  Outcome: Ongoing     Problem: Sensory Perception - Impaired:  Goal: Demonstrations of improved sensory functioning will increase  Description: Demonstrations of improved sensory functioning will increase  2/25/2021 0026 by Tootie Corado RN  Outcome: Ongoing     Problem: Sensory Perception - Impaired:  Goal: Decrease in sensory misperception frequency  Description: Decrease in sensory misperception frequency  2/25/2021 0026 by Tootie Corado RN  Outcome: Ongoing     Problem: Sensory Perception - Impaired:  Goal: Able to refrain from responding to false sensory perceptions  Description: Able to refrain from responding to false sensory perceptions  2/25/2021 0026 by Tootie Corado RN  Outcome: Ongoing     Problem: Sensory Perception - Impaired:  Goal: Demonstrates accurate environmental perceptions  Description: Demonstrates accurate environmental perceptions  2/25/2021 0026 by Tootie Corado RN  Outcome: Ongoing     Problem: Sensory Perception - Impaired:  Goal: Able to distinguish between reality-based and nonreality-based thinking  Description: Able to distinguish between reality-based and nonreality-based thinking  2/25/2021 0026 by Tootie Corado RN  Outcome: Ongoing     Problem: Sensory Perception - Impaired:  Goal: Able to interrupt nonreality-based thinking  Description: Able to interrupt nonreality-based thinking  2/25/2021 0026 by Tootie Corado RN  Outcome: Ongoing     Problem: Sleep Pattern Disturbance:  Goal: Appears well-rested  Description: Appears well-rested  2/25/2021 0026 by Tootie Corado RN  Outcome: Ongoing     Problem: Skin Integrity:  Goal: Will show no infection signs and symptoms  Description: Will show no infection signs and symptoms  2/25/2021 0026 by Onur Prieto RN  Outcome: Ongoing     Problem: Skin Integrity:  Goal: Absence of new skin breakdown  Description: Absence of new skin breakdown  2/25/2021 0026 by Onur Prieto RN  Outcome: Ongoing     Problem: Bowel/Gastric:  Goal: Control of bowel function will improve  Description: Control of bowel function will improve  2/25/2021 0026 by Onur Prieto RN  Outcome: Ongoing     Problem:  Bowel/Gastric:  Goal: Ability to achieve a regular elimination pattern will improve  Description: Ability to achieve a regular elimination pattern will improve  2/25/2021 0026 by Onur Prieto RN  Outcome: Ongoing     Problem: Nutritional:  Goal: Ability to follow a diet with enough fiber (20 to 30 grams) for normal bowel function will improve  Description: Ability to follow a diet with enough fiber (20 to 30 grams) for normal bowel function will improve  2/25/2021 0026 by Onur Prieto RN  Outcome: Ongoing     Problem: Pain:  Goal: Pain level will decrease  Description: Pain level will decrease  2/25/2021 0026 by Onur Prieto RN  Outcome: Ongoing     Problem: Pain:  Goal: Control of acute pain  Description: Control of acute pain  2/25/2021 0026 by Onur Prieto RN  Outcome: Ongoing     Problem: Pain:  Goal: Control of chronic pain  Description: Control of chronic pain  2/25/2021 0026 by Onur Prieto RN  Outcome: Ongoing     Problem: Nutrition  Goal: Optimal nutrition therapy  2/25/2021 0026 by Onur Prieto RN  Outcome: Ongoing

## 2021-02-25 NOTE — PROGRESS NOTES
Occupational Therapy  Facility/Department: [address] PROGRESSIVE CARE  Daily Treatment Note  NAME: Mo Lopez  : 1940  MRN: [id number]    Date of Service: 2021    Discharge Recommendations:  3-5 sessions per week, Patient would benefit from continued therapy after discharge  OT Equipment Recommendations  Other: defer to next level of care  Mo Lopez scored a  on the AM-PAC ADL Inpatient form. Current research shows that an AM-PAC score of 17 or less is typically not associated with a discharge to the patient's home setting. Based on the patient's AM-PAC score and their current ADL deficits, it is recommended that the patient have 3-5 sessions per week of Occupational Therapy at d/c to increase the patient's independence. Please see assessment section for further patient specific details. If patient discharges prior to next session this note will serve as a discharge summary. Please see below for the latest assessment towards goals. Assessment   Performance deficits / Impairments: Decreased functional mobility ; Decreased endurance;Decreased coordination;Decreased ADL status; Decreased posture;Decreased ROM; Decreased balance;Decreased strength;Decreased safe awareness;Decreased cognition  Assessment: Discussed with OTR: Today, Pt confused disoriented and agitated easily, becoming combative and needing up to 4-5 people to assist with bed mob. Pt initially found incontinent in bed and moving self to sit to EOB. Unable to attempt standing (onto jaya stedy), d/t pt becoming agitated, swinging out with arms. Pt assisted to return to bed, cleaned, with assist to restrain, as kicking out w/LE's as well as arms. Restraints applied by nursing at end of tx. Pt is unsafe to return home and will benefit from cont OT at d/c at [address] Cont poc.   Treatment Diagnosis: impaired: Cognition, ADL function, mob/transfers, balance, ROM, coord  Prognosis: Fair  History: Pt is an 78 y.o. male admitted with SBO 2/18/21 underwent exploratory laparotomy, bowel resection for ischemic bowel. PMH:  Patient with hx of dementia, bladder cancer s/p ileal conduit, hyperkalemia, CKD. Currently pt requires mod-max A x2 for bed mob, min A x 2 for sit>< stand transfers and to amb a few steps with hand held assist x2 for positioning. Pt very confused, but pleasant, limited ability to stay on task and followed commands inconsistently. Based on mob staus/cognition expect pt dep for all ADL tasks @ this time. Prior living situation/level of function unknown as pt unable to self report, and info not in chart. Patient Education: Unable to educate as pt confused, agitated, combative  REQUIRES OT FOLLOW UP: Yes  Activity Tolerance  Activity Tolerance: Treatment limited secondary to decreased cognition;Treatment limited secondary to agitation  Activity Tolerance: HR to 170's. Pt agitated, combative, requiring up to assist 5 to calm and restrain pt (4 point restraints applied)  Safety Devices  Safety Devices in place: Yes  Type of devices: Call light within reach;Nurse notified;Gait belt;Bed alarm in place; Left in bed  Restraints  Initially in place: Yes  Restraints: ankle, wrist restraints applied when pt increasing agitated, combative, with swinging arms out and kicking legs. Patient Diagnosis(es): The primary encounter diagnosis was Small bowel obstruction ([address] Diagnoses of Status post ileal conduit ([address] Urinary obstruction, Acute renal failure, unspecified acute renal failure type ([address] Hyperkalemia, SIRS (systemic inflammatory response syndrome) ([address] and Dementia without behavioral disturbance, unspecified dementia type [facility]) were also pertinent to this visit. has a past medical history of Cancer ([address] GASTRIC ULCER, Hypertension, and Leukopenia. has a past surgical history that includes Colonoscopy; Cystoscopy; other surgical history (12/14/2016);  Tunneled venous port placement (Right, 06/06/2017); and laparotomy (N/A, 2/18/2021). Restrictions  Restrictions/Precautions  Restrictions/Precautions: Fall Risk  Position Activity Restriction  Other position/activity restrictions: general diet; urostomy bag, cental line/port, sitter  Subjective   General  Chart Reviewed: Yes, Orders, Progress Notes  Patient assessed for rehabilitation services?: Yes  Additional Pertinent Hx: Patricia Gonzalez is a 78 y.o. male admitted on 2/18/2021 for abdominal pain. Patient with dementia, poor historian and information is obtained from son. Suspected abdominal pain over the last week. Denies nausea or emesis. Last BM yesterday. History bladder cancer s/p ileal conduit. No UOP until recently while in ED. Hyperkalemia and acute on CKD. \" copied per Clarissa Villar MD 2/18/21 note  Family / Caregiver Present: No  Referring Practitioner: Brandi JUNE-IRAM  Diagnosis: SBO (2/18) S/p Exploratory laparotomy, bowel resection for ischemic bowel  Subjective  Subjective: Pt met BS, sitter present. Pt alert, disoriented and confused, agitated during tx  General Comment  Comments: ok to see per RN. Goes by [employer]"      Orientation  Orientation  Overall Orientation Status: Impaired  Orientation Level: Oriented to person  Objective    ADL  Toileting: Dependent/Total(incontinent of BM.  Dep x4-5 people to clean, change pads, d/t pt combative)        Balance  Sitting Balance: Contact guard assistance(at EOB)  Standing Balance  Activity: Not attetmpted, as pt agitated, combative  Bed mobility  Rolling to Left: Dependent/Total  Rolling to Right: Dependent/Total  Supine to Sit: Dependent/Total  Sit to Supine: Dependent/Total  Scooting: Dependent/Total  Comment: Pt agitated, combative; assist up to 4 for bed mobility  Transfers  Transfer Comments: Uable to attempt d/t pt agitated, combative      Cognition  Overall Cognitive Status: Exceptions  Following Commands: Does not follow commands  Attention Span: Difficulty dividing attention  Memory: Decreased long term memory;Decreased short term memory;Decreased recall of precautions;Decreased recall of recent events;Decreased recall of biographical Information  Safety Judgement: Decreased awareness of need for safety;Decreased awareness of need for assistance  Problem Solving: Decreased awareness of errors;Assistance required to implement solutions;Assistance required to generate solutions;Assistance required to correct errors made;Assistance required to identify errors made  Insights: Not aware of deficits  Cognition Comment: Agitated, combative today (2-25-21)      Plan   Plan  Times per week: 3-5  Times per day: Daily  Plan weeks: until D/C  Current Treatment Recommendations: Strengthening, Patient/Caregiver Education & Training, Equipment Evaluation, Education, & procurement, Balance Training, Neuromuscular Re-education, Functional Mobility Training, Cognitive Reorientation, Cognitive/Perceptual Training, Safety Education & Training, Self-Care / ADL    AM-PAC Score        [facility] Inpatient Daily Activity Raw Score: 6 (02/25/21 0904)  AM-PAC Inpatient ADL T-Scale Score : 17.07 (02/25/21 0904)  ADL Inpatient CMS 0-100% Score: 100 (02/25/21 0904)  ADL Inpatient CMS G-Code Modifier : CN (02/25/21 0904)    Goals  Short term goals  Time Frame for Short term goals: until D/C. Status: goals ongoing  Short term goal 1: Pt will feed self with set-up. Short term goal 2: Pt will groom self with min A. Short term goal 3: Pt will perform functional transfers min A x1 using AD as needed. Short term goal 4: Pt will improve cognition to participate in upper body ADL tasks with min A and mod cues. Short term goal 5: Pt will stand for lower body ADL tasks/positioning with CGA with use of AD/rails. Short term goal 6: Pt will participate in functional ADL mob tasks with min A to CGA with use of AD.   Long term goals  Time Frame for Long term goals : same as LTG  Patient Goals   Patient goals : Pt unable to express goal at this time.        Therapy Time   Individual Concurrent Group Co-treatment   Time In 0817         Time Out 0900         Minutes [facility] HYDE/L,[facility]

## 2021-02-25 NOTE — PROGRESS NOTES
Nephrology ([facility]) Progress Note    CC: KEENAN    Subjective:    HPI:  Breathing comfortably. confused sitter at bedside. ROS:  In bed agitated Had to be restrained  Not much PO intake   PMFSH:  medications reviewed. Good UOP 21 00 ml  Remains in negative fluid balance  No family present    Objective:  Blood pressure (!) 159/76, pulse 100, temperature 99.4 °F (37.4 °C), resp. rate 18, height 5' 9\" (1.753 m), weight 166 lb 7.2 oz (75.5 kg), SpO2 96 %. Intake/Output Summary (Last 24 hours) at 2/25/2021 1439  Last data filed at 2/25/2021 1235  Gross per 24 hour   Intake 250 ml   Output 2175 ml   Net -1925 ml     General:  NAD, confused in restraints  Chest:  CTAB  CVS:  RRR  Abdominal:  surgical site, decreased BS Ileal conduit RLQ   Extremities:  no edema  Skin:  no rash    Labs:  Renal panel:  Lab Results   Component Value Date/Time     (H) 02/25/2021 05:27 AM    K 3.3 (L) 02/25/2021 05:27 AM    CO2 24 02/25/2021 05:27 AM    BUN 19 02/25/2021 05:27 AM    CREATININE 1.6 (H) 02/25/2021 05:27 AM    CALCIUM 8.2 (L) 02/25/2021 05:27 AM    PHOS 3.1 12/21/2016 04:30 AM    MG 1.90 02/25/2021 05:27 AM     CBC:  Lab Results   Component Value Date/Time    WBC 8.7 02/25/2021 05:27 AM    HGB 9.2 (L) 02/25/2021 05:27 AM    HCT 27.2 (L) 02/25/2021 05:27 AM     02/25/2021 05:27 AM       Assessment/Plan:  Reviewed old records and labs.     1) KEENAN              - baseline 09/10/19 Cr 1.8   - ATN              - renal function  Improved at baseline now   -Cr down to 1.6 mg today Good UOP      2) SBO from ischemic bowel s/p exp lap and partial bowel resection 02/19/21              - per surgery     3) baldder cancer and ileal conduit              - per Dr. Nalini Schmitt     4) FEN              - hypernatremia Na+ stable at 148 meq Not much oral intake  Switched to  DW 1/4 NS with KCL yesterday Remains in negative fluid balance Increase IVF rate as no oral intake      5) dementia

## 2021-02-26 NOTE — PROGRESS NOTES
[facility] [phone]                                     Daily Progress Note                                                         Pt Name: Ferny Stevenson   Medical Record Number: [id number]  Date of Birth 1940   Today's Date: 2/26/2021    ASSESSMENT/PLAN  SBO  - POD #8 (2/18) S/p Exploratory laparotomy, bowel resection x 1 for ischemic bowel  -+flatulence and BMs  -General diet but not taking much in d/t confusion  -Incision site still with moderate SS drainage distally, packing changed  - OOB ambulate, PT/OT  -Therapy recommending SNF at D/C. SW following. Will need to be out of restraints/sitter free and covid test 24-48 hours prior to DC.   -Family reports it takes him a long time to return to his normal mental status after anesthesia/procedures. -oral care as pt will allow  -Pt loves hot chocolate, drinks lots of it at home. Encourage it here so hopefully he will take in more PO. Hx bladder CA and ileal conduit in place  -pouch changes as needed. Making adequate urine. KEENAN  -Cr stable 1.7    Delirium  -with baseline dementia  -appreciate hospitalist assistance with care    Cris Mcgee is in bed, in restraints. Daughter and sitter at bedside. Oriented to person only. Bowels working. Denies nausea. Minimally verbal.        OBJECTIVE  VITALS:  height is 5' 9\" (1.753 m) and weight is 164 lb 3.9 oz (74.5 kg). His oral temperature is 98.5 °F (36.9 °C). His blood pressure is 126/71 and his pulse is 90. His respiration is 20 and oxygen saturation is 93%.  INTAKE/OUTPUT:      Intake/Output Summary (Last 24 hours) at 2/26/2021 1222  Last data filed at 2/26/2021 0500  Gross per 24 hour   Intake 0 ml   Output 1600 ml   Net -1600 ml     GENERAL: alert to person; confused conversation   LUNGS: clear to ausculation, without wheezes, rales or rhonci  HEART: normal rate and regular rhythm  ABDOMEN: Soft, appropriately tendernon distended. EXTREMITY: no cyanosis, clubbing or edema    I/O last 3 completed shifts: In: 10 [I.V.:10]  Out: 2175 [Urine:2175]    LABS  Recent Labs     02/25/21  0527 02/26/21  0535   WBC 8.7 9.6   HGB 9.2* 8.5*   HCT 27.2* 25.7*    272   * 146*   K 3.3* 3.7   * 113*   CO2 24 25   BUN 19 17   CREATININE 1.6* 1.7*   MG 1.90  --    CALCIUM 8.2* 8.1*       EDUCATION  Patient educated about Disease Process, Medications, Smoking Cessation, Oxygenation, Incentive Spirometry and Deep Breath and Cough, Diabetes, Hyperlipidemia, Smoking Cessation, Nutrition, Exercise and Hypertension    Electronically signed by SLADE Kate - CNP on 2/26/2021 at 12:22 PM      [facility]   [phone] Office  [phone] Cell        Surgery Staff  I have examined this patient and read and agree with the note by SLADE Buchanan from today. Add flagyl for wound coverage  Diet as able.   Would like to avoid TPN in him given his AMS and likelihood of pulling out PICC    Electronically signed by Trung Alford MD on 2/26/2021 at 3:52 PM

## 2021-02-26 NOTE — PROGRESS NOTES
Comprehensive Nutrition Assessment    Type and Reason for Visit:  Reassess    Nutrition Recommendations/Plan:   1. Continue general diet  2. Continue Glucerna TID  3. Monitor meal/supplement intakes, D5 rate, and mental status    Nutrition Assessment:  Follow-up. Pt is at risk for nutrition compromise AEB poor PO intake. On general diet and is refusing meals. Pt is combative and in restraints. Receiving glucerna TID although unable to determine intake. Pt is receiving D5 @ 100ml/hr providing 408kcals daily. Will continue to monitor PO intakes and POC. Malnutrition Assessment:  Malnutrition Status:  Insufficient data      Estimated Daily Nutrient Needs:  Energy (kcal):  0355-9763 (25-30kcal/75kg); Weight Used for Energy Requirements:  Current     Protein (g):   (1.2-1.4g/75kg); Weight Used for Protein Requirements:  Current        Fluid (ml/day):  1 ml/kcal      Nutrition Related Findings:  -5 liters. Hypoactive BS. Trace BLE edema. Glucose 153. Na+ 146. Wounds:  Surgical Incision       Current Nutrition Therapies:    DIET GENERAL;  Dietary Nutrition Supplements: Diabetic Oral Supplement    Anthropometric Measures:  · Height: 5' 9\" (175.3 cm)  · Current Body Weight: 164 lb (74.4 kg)   · Admission Body Weight: 190 lb (86.2 kg)    · Usual Body Weight: 180 lb (81.6 kg)(per EMR)     · Ideal Body Weight: 160 lbs; % Ideal Body Weight 102.5 %   · BMI: 24.2  · Adjusted Body Weight:  ; No Adjustment     · BMI Categories: Normal Weight (BMI 18.5-24. 9)       Nutrition Diagnosis:   · Inadequate oral intake related to cognitive or neurological impairment, altered GI function as evidenced by intake 0-25%    Nutrition Interventions:   Food and/or Nutrient Delivery:  Continue Current Diet, Continue Oral Nutrition Supplement  Nutrition Education/Counseling:  No recommendation at this time   Coordination of Nutrition Care:  Continue to monitor while inpatient    Goals:  tolerate most appropriate form of nutrition per Md       Nutrition Monitoring and Evaluation:   Behavioral-Environmental Outcomes:  None Identified   Food/Nutrient Intake Outcomes:  Food and Nutrient Intake, Supplement Intake, IVF Intake  Physical Signs/Symptoms Outcomes:  Biochemical Data, GI Status, Fluid Status or Edema, Meal Time Behavior, Weight     Discharge Planning:     Too soon to determine     Electronically signed by Tamara Grajeda RD, LD on 2/26/21 at 11:05 AM EST    Contact: [phone]
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
## 2021-02-26 NOTE — CARE COORDINATION
Spoke to PT/OT who says patient out of restraints and doing better. When I went into room sitter still present with patient. Patient remains confused. Patient keeps saying he wants to leave standing up and down frequently from chair. He is standing well. Sitter and daughter Adithya Hooper both assist patient back to sitting position. Daughter says patient has history of severe confusion after anesthesia but eventually clears up. Jr Arzola RN made aware.  w/ Hallie Mathew @ [place] [facility] (1st choice) & An @ [address] [place] [address] to update and see if I can get confirmation of acceptance. Electronically signed by Rosa Elena Salinas RN Case Management [phone] on 2/26/2021 at 12:36 PM     Talked with RN manager Felicita. Patient currently sitter free & restraint free as of 3:30pm or around there. Tele cam placed in room. Hallie Mathew at [place] [facility] can accept pending patient behaviors. No pre cert needed if admitted this weekend. Would need pre cert starting Monday.  Electronically signed by Rosa Elena Salinas RN on 2/26/2021 at 4:31 PM

## 2021-02-26 NOTE — PLAN OF CARE
Nutrition Problem #1: Inadequate oral intake  Intervention: Food and/or Nutrient Delivery: Continue Current Diet, Continue Oral Nutrition Supplement  Nutritional Goals: tolerate most appropriate form of nutrition per Md

## 2021-02-26 NOTE — PROGRESS NOTES
Nephrology ([facility]) Progress Note    CC: KEENAN    Subjective:    HPI:  Breathing comfortably. confused sitter at bedside. ROS:  In chair today Daughter present remains confused Could not recognize daughter  [address]:  medications reviewed. Good UOP 2100 ml      Objective:  Blood pressure 126/71, pulse 90, temperature 98.5 °F (36.9 °C), temperature source Oral, resp. rate 20, height 5' 9\" (1.753 m), weight 164 lb 3.9 oz (74.5 kg), SpO2 93 %. Intake/Output Summary (Last 24 hours) at 2/26/2021 1246  Last data filed at 2/26/2021 0500  Gross per 24 hour   Intake 0 ml   Output 1600 ml   Net -1600 ml     General:   confused in chair Trying to get up and walk away  Chest:  CTAB  CVS:  RRR  Abdominal:  surgical site, decreased BS Ileal conduit RLQ   Extremities:  no edema  Skin:  no rash    Labs:  Renal panel:  Lab Results   Component Value Date/Time     (H) 02/26/2021 05:35 AM    K 3.7 02/26/2021 05:35 AM    CO2 25 02/26/2021 05:35 AM    BUN 17 02/26/2021 05:35 AM    CREATININE 1.7 (H) 02/26/2021 05:35 AM    CALCIUM 8.1 (L) 02/26/2021 05:35 AM    PHOS 3.1 12/21/2016 04:30 AM    MG 1.90 02/25/2021 05:27 AM     CBC:  Lab Results   Component Value Date/Time    WBC 9.6 02/26/2021 05:35 AM    HGB 8.5 (L) 02/26/2021 05:35 AM    HCT 25.7 (L) 02/26/2021 05:35 AM     02/26/2021 05:35 AM       Assessment/Plan:  Reviewed old records and labs.     1) KEENAN              - baseline 09/10/19 Cr 1.8   - ATN              - renal function  Improved at baseline now   -Cr 1.7 mg today Good UOP      2) SBO from ischemic bowel s/p exp lap and partial bowel resection 02/19/21              - per surgery     3) baldder cancer and ileal conduit              - per Dr. Eustacio Saint     4) FEN              - hypernatremia Na+ improved to 146 meq Not much oral intake  Continue   DW 1/4 NS with KCL at 100 ml/hr Encouraged PO free water    5) dementia    6) Hypokalemia corrected     D/W daughter

## 2021-02-26 NOTE — PLAN OF CARE
Problem: Falls - Risk of:  Goal: Will remain free from falls  Description: Will remain free from falls  Outcome: Ongoing  Goal: Absence of physical injury  Description: Absence of physical injury  Outcome: Ongoing     Problem: Confusion - Acute:  Goal: Absence of continued neurological deterioration signs and symptoms  Description: Absence of continued neurological deterioration signs and symptoms  Outcome: Ongoing  Goal: Mental status will be restored to baseline  Description: Mental status will be restored to baseline  Outcome: Ongoing     Problem: Discharge Planning:  Goal: Ability to perform activities of daily living will improve  Description: Ability to perform activities of daily living will improve  Outcome: Ongoing  Goal: Participates in care planning  Description: Participates in care planning  Outcome: Ongoing     Problem: Injury - Risk of, Physical Injury:  Goal: Will remain free from falls  Description: Will remain free from falls  Outcome: Ongoing  Goal: Absence of physical injury  Description: Absence of physical injury  Outcome: Ongoing     Problem: Mood - Altered:  Goal: Mood stable  Description: Mood stable  Outcome: Ongoing  Goal: Absence of abusive behavior  Description: Absence of abusive behavior  Outcome: Ongoing  Goal: Verbalizations of feeling emotionally comfortable while being cared for will increase  Description: Verbalizations of feeling emotionally comfortable while being cared for will increase  Outcome: Ongoing     Problem: Psychomotor Activity - Altered:  Goal: Absence of psychomotor disturbance signs and symptoms  Description: Absence of psychomotor disturbance signs and symptoms  Outcome: Ongoing     Problem: Sensory Perception - Impaired:  Goal: Demonstrations of improved sensory functioning will increase  Description: Demonstrations of improved sensory functioning will increase  Outcome: Ongoing  Goal: Decrease in sensory misperception frequency  Description: Decrease in sensory misperception frequency  Outcome: Ongoing  Goal: Able to refrain from responding to false sensory perceptions  Description: Able to refrain from responding to false sensory perceptions  Outcome: Ongoing  Goal: Demonstrates accurate environmental perceptions  Description: Demonstrates accurate environmental perceptions  Outcome: Ongoing  Goal: Able to distinguish between reality-based and nonreality-based thinking  Description: Able to distinguish between reality-based and nonreality-based thinking  Outcome: Ongoing  Goal: Able to interrupt nonreality-based thinking  Description: Able to interrupt nonreality-based thinking  Outcome: Ongoing     Problem: Sleep Pattern Disturbance:  Goal: Appears well-rested  Description: Appears well-rested  Outcome: Ongoing     Problem: Skin Integrity:  Goal: Will show no infection signs and symptoms  Description: Will show no infection signs and symptoms  Outcome: Ongoing  Goal: Absence of new skin breakdown  Description: Absence of new skin breakdown  Outcome: Ongoing     Problem:  Bowel/Gastric:  Goal: Control of bowel function will improve  Description: Control of bowel function will improve  Outcome: Ongoing  Goal: Ability to achieve a regular elimination pattern will improve  Description: Ability to achieve a regular elimination pattern will improve  Outcome: Ongoing     Problem: Nutritional:  Goal: Ability to follow a diet with enough fiber (20 to 30 grams) for normal bowel function will improve  Description: Ability to follow a diet with enough fiber (20 to 30 grams) for normal bowel function will improve  Outcome: Ongoing     Problem: Pain:  Goal: Pain level will decrease  Description: Pain level will decrease  Outcome: Ongoing  Goal: Control of acute pain  Description: Control of acute pain  Outcome: Ongoing  Goal: Control of chronic pain  Description: Control of chronic pain  Outcome: Ongoing     Problem: Nutrition  Goal: Optimal nutrition therapy  2/26/2021 1716 by Anders Ibarra RN  Outcome: Ongoing  2/26/2021 1107 by Yusef Vance RD, LD  Outcome: Ongoing

## 2021-02-26 NOTE — PROGRESS NOTES
Q12H    ziprasidone  20 mg Intramuscular Once    And    sterile water  1.2 mL Intramuscular Once    polyethylene glycol  17 g Oral Daily    magnesium cl-calcium carbonate  1 tablet Oral BID    donepezil  5 mg Oral Nightly    sodium chloride flush  10 mL Intravenous 2 times per day    heparin (porcine)  5,000 Units Subcutaneous 3 times per day    famotidine (PEPCID) injection  20 mg Intravenous Daily     PRN Meds: QUEtiapine, bisacodyl, sodium chloride flush, oxyCODONE **OR** oxyCODONE, morphine **OR** morphine, acetaminophen, ondansetron      Intake/Output Summary (Last 24 hours) at 2/26/2021 1415  Last data filed at 2/26/2021 0500  Gross per 24 hour   Intake 0 ml   Output 1600 ml   Net -1600 ml       Physical Exam Performed:    /71   Pulse 90   Temp 98.5 °F (36.9 °C) (Oral)   Resp 20   Ht 5' 9\" (1.753 m)   Wt 164 lb 3.9 oz (74.5 kg)   SpO2 93%   BMI 24.25 kg/m²     General appearance: No apparent distress, appears stated age and cooperative. HEENT: Normal cephalic, atraumatic without obvious deformity. Pupils equal, round, and reactive to light. Extra ocular muscles intact. Conjunctivae/corneas clear. Neck: Supple, with full range of motion. No jugular venous distention. Trachea midline. Respiratory:  Normal respiratory effort. Cardiovascular: Regular rate and rhythm with normal S1/S2 without murmurs, rubs or gallops. Abdomen: Soft, ileal conduit in place  Musculoskeletal: No clubbing, cyanosis or edema bilaterally. Skin: Skin color, texture, turgor normal.  No rashes or lesions. Psychiatric: Alert, not oriented uncooperative and confused.    Capillary Refill: Brisk,< 3 seconds   Peripheral Pulses: +2 palpable, equal bilaterally       Labs:   Recent Labs     02/24/21  0750 02/25/21  0527 02/26/21  0535   WBC 7.3 8.7 9.6   HGB 9.3* 9.2* 8.5*   HCT 28.0* 27.2* 25.7*    264 272     Recent Labs     02/24/21  0750 02/25/21  0527 02/26/21  0535   * 148* 146*   K 3.5 3.3* 3.7   CL 114* 116* 113*   CO2 22 24 25   BUN 19 19 17   CREATININE 1.7* 1.6* 1.7*   CALCIUM 8.0* 8.2* 8.1*     No results for input(s): AST, ALT, BILIDIR, BILITOT, ALKPHOS in the last 72 hours. No results for input(s): INR in the last 72 hours. No results for input(s): Towana Bharat in the last 72 hours. Urinalysis:      Lab Results   Component Value Date    NITRU Negative 02/18/2021    WBCUA 753 02/18/2021    BACTERIA 4+ 02/18/2021    RBCUA 18 02/18/2021    BLOODU LARGE 02/18/2021    SPECGRAV 1.014 02/18/2021    GLUCOSEU Negative 02/18/2021       Radiology:  XR ABDOMEN (KUB) (SINGLE AP VIEW)   Final Result   No evidence of a persistent small-bowel obstruction. XR CHEST PORTABLE   Final Result   1. Interval advancement of the patient's NG tube, now in satisfactory   position within the gastric body. 2. Findings suggestive of mild CHF. XR CHEST PORTABLE   Final Result   Nasogastric tube tip just below the GE junction. Recommend advancing the   tube 10-15 cm and obtaining a follow-up abdomen radiograph centered on the   upper abdomen         CT CHEST ABDOMEN PELVIS WO CONTRAST   Final Result   Dilated loop of small bowel within a peristomal hernia and associated mild   hydroureter and hydronephrosis are concerning for obstruction of the ileal   conduit. Additionally, there are several loops of small bowel in the right   hemiabdomen which are fluid-filled and demonstrate mild wall thickening with   significant inflammatory stranding of the mesentery which has a somewhat   swirled appearance. Findings are somewhat concerning for an internal hernia. Surgical evaluation is suggested. XR CHEST PORTABLE   Final Result   No acute cardiopulmonary disease.                  Assessment/Plan:    Small bowel obstruction  Status post exploratory laparotomy along with bowel resection for ischemic bowel on February 18  Management per general surgery  General diet  Incision clean dry intact  Therapy recommending SNF     Acute on chronic encephalopathy  Underlying Alzheimer dementia, now with likely metabolic as well  Now with location change, surgical intervention with anesthesia, possible urinary tract infection  Continue Cipro  Stop Risperdal     KEENAN  Creatinine continues to improve     Sepsis, POA  Elevated lactic acid, elevated white blood cell count, source abdomen  IV fluids  IV antibiotics    DVT Prophylaxis: Heparin  Diet: DIET GENERAL;  Dietary Nutrition Supplements: Diabetic Oral Supplement  Code Status: Full Code    PT/OT Eval Status: 3-5 sessions per week     Dispo - inpatient    Kerry Borjas MD

## 2021-02-26 NOTE — PROGRESS NOTES
Physical Therapy  Facility/Department: [address] PROGRESSIVE CARE  Daily Treatment Note- COTX with OT  NAME: Domingo Lewis  : 1940  MRN: [id number]    Date of Service: 2021    Discharge Recommendations:  3-5 sessions per week, Patient would benefit from continued therapy after discharge   PT Equipment Recommendations  Equipment Needed: No  Other: defer to next level of care     Domingo Lewis scored a 10/24 on the AM-PAC short mobility form. Current research shows that an AM-PAC score of 17 or less is typically not associated with a discharge to the patient's home setting. Based on the patient's AM-PAC score and their current functional mobility deficits, it is recommended that the patient have 3-5 sessions per week of Physical Therapy at d/c to increase the patient's independence. Please see assessment section for further patient specific details. If patient discharges prior to next session this note will serve as a discharge summary. Please see below for the latest assessment towards goals. Assessment   Body structures, Functions, Activity limitations: Decreased functional mobility ; Decreased balance;Decreased strength;Decreased safe awareness;Decreased cognition;Decreased endurance  Assessment: Pt presents with significantly decreased fucntional mobility after admission for Abdominal Pain, Bowel Obstruction, and underwent ExpLap on 21. Pt alsot s/p radical cystectomy and ileal conduit from  with metastatic disease. Prior to admission, it is not clear to this therapist what pr's full PLOF was but per social work note, he was indep with ADLs PTA. Today, pt more cooperative and not agitated. He was able to stand to jaya stedy with modA x2 but was dependent for toileting. Unable to attempt ambulation due to safety concerns.   Recommend continued skilled inpatient PT at a low-moderate intensity upon d/c once medically stable to decrease burden of care and address functional mobility and activity tolerance. Specific instructions for Next Treatment: cotx with OT  Prognosis: Guarded; Fair  PT Education: Goals;PT Role;General Safety;Orientation;Transfer Training;Family Education;Plan of Care  REQUIRES PT FOLLOW UP: Yes  Activity Tolerance  Activity Tolerance: Patient limited by endurance; Patient limited by cognitive status; Patient Tolerated treatment well  Activity Tolerance: pt with better command following this date and no agitation     Patient Diagnosis(es): The primary encounter diagnosis was Small bowel obstruction ([facility] Diagnoses of Status post ileal conduit ([facility] Urinary obstruction, Acute renal failure, unspecified acute renal failure type [facility] Hyperkalemia, SIRS (systemic inflammatory response syndrome) ([facility] and Dementia without behavioral disturbance, unspecified dementia type [facility]) were also pertinent to this visit. has a past medical history of Cancer ([facility] GASTRIC ULCER, Hypertension, and Leukopenia. has a past surgical history that includes Colonoscopy; Cystoscopy; other surgical history (12/14/2016); Tunneled venous port placement (Right, 06/06/2017); and laparotomy (N/A, 2/18/2021). Restrictions  Restrictions/Precautions  Restrictions/Precautions: Fall Risk  Position Activity Restriction  Other position/activity restrictions: general diet; urostomy bag, cental line/port, sitter     Social/Functional History  Lives With: Significant other  Type of Home: House  Home Layout: (one level with basement?)  Home Access: Stairs to enter without rails  Entrance Stairs - Number of Steps: 2  ADL Assistance: Independent  Additional Comments: above info from social work note    Subjective   General  Chart Reviewed: Yes  Additional Pertinent Hx: Per Urology H&P, \" Gildardo Daine is a 78 y.o. who underwent radical cystectomy and ileal conduit by Jodie Pickett at [facility] circa 2016/17.   Patient subsequently developed metastatic disease and been under  [facility] care. Currently on a check point inhibitor. He was emergently hospitalized last evening with abdominal pain and a CT scan that showed mild bilateral hydroureteronephrosis and potential dead bowel. At surgery he was found to have a closed loop small bowel obstruction with a portion of ischemic bowel that was successfully excised. 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 Since surgery, his urine output has picked up nicely but his creatinine has remained elevated at 2.5\"   On 2/18/21, pt underwent \"Exploratory laparotomy, bowel resection for ischemic bowel\". Response To Previous Treatment: Patient unable to report, no changes reported from family or staff  Family / Caregiver Present: Yes(daughter Rose Mary)  Subjective  Subjective: Pt in bed upon arrival.  Sitter and daughter present and pt in 4 point restraints. RN Kali galicia with removing restraints for PT treatment. Pt incontinent of stool upon arrival.  Pt not agitated today but continues to be confused. C/o being cold. Objective   Bed mobility  Supine to Sit: Minimal assistance; Moderate assistance;2 Person assistance(HOB elevated)  Sit to Supine: Unable to assess(up in chair at end of session)     Transfers  Sit to Stand:  Moderate Assistance;2 Person Assistance(to [facility])  Stand to sit: Moderate Assistance;2 Person Assistance  Bed to Chair: Dependent/Total(bed to restroom to recliner chair via [facility])     Ambulation  Ambulation?: No(safety concerns)     Balance  Posture: Fair  Comments: CGA for sitting balance EOB; Erin for standing balance in jaya [facility]            Other Activities: Other (see comment)  Comment: pt incontient of BM upon arrival and still having BM when stood to the stedy; transferred to restroom via [employer] and had BM on toilet; total assist for pericare from OT while PT provided Erin for standing balance; total assist for donning brief and [facility] pants; pt positioned for comfort in recliner chair at end of session without restraints. Pt cooperative throughout the session and RN okay with keeping restraints off. AM-PAC Score  AM-PAC Inpatient Mobility Raw Score : 10 (02/26/21 1101)  AM-PAC Inpatient T-Scale Score : 32.29 (02/26/21 1101)  Mobility Inpatient CMS 0-100% Score: 76.75 (02/26/21 1101)  Mobility Inpatient CMS G-Code Modifier : CL (02/26/21 1101)          Goals  Short term goals  Time Frame for Short term goals: by acute d/c--goals ongoing as of 2/26  Short term goal 1: bed mobility Min A x 2  Short term goal 2: sit<>stand trasnfers Min A x 1  Short term goal 3: assess ambulation with LRAD if/as needed x 25 ft with Min A  Long term goals  Time Frame for Long term goals : tbd if/as needed. Patient Goals   Patient goals : pt unable to formulate a therapy goal at this time.     Plan    Plan  Times per week: 3-5 x wk in acute setting  Specific instructions for Next Treatment: cotx with OT  Current Treatment Recommendations: Functional Mobility Training  Safety Devices  Type of devices: Sitter present, All fall risk precautions in place, Call light within reach, Nurse notified, Left in chair, Chair alarm in place, Gait belt(RN [facility]-ER notified)  Restraints  Initially in place: Yes  Restraints: pt in restraints at beginning of session but cooperative throughout session and RN [facility]-ER ok with keeping restraints off at end of session     Therapy Time   Individual Concurrent Group Co-treatment   Time In 1025         Time Out 1105         Minutes 40         Timed Code Treatment Minutes: 40 Minutes         Electronically signed by Mendez Tenorio on 2/26/2021 at 11:07 AM

## 2021-02-26 NOTE — PROGRESS NOTES
Occupational Therapy  Facility/Department: [address] PROGRESSIVE CARE  Daily Treatment Note  NAME: Domingo Lewis  : 1940  MRN: [id number]    Date of Service: 2021    Discharge Recommendations:  3-5 sessions per week, Patient would benefit from continued therapy after discharge  OT Equipment Recommendations  Equipment Needed: No  Other: defer to next level of care  Domingo Lewis scored a 10/ on the AM-PAC ADL Inpatient form. Current research shows that an AM-PAC score of 17 or less is typically not associated with a discharge to the patient's home setting. Based on the patient's AM-PAC score and their current ADL deficits, it is recommended that the patient have 3-5 sessions per week of Occupational Therapy at d/c to increase the patient's independence. Please see assessment section for further patient specific details. If patient discharges prior to next session this note will serve as a discharge summary. Please see below for the latest assessment towards goals. Assessment   Performance deficits / Impairments: Decreased functional mobility ; Decreased endurance;Decreased coordination;Decreased ADL status; Decreased posture;Decreased ROM; Decreased balance;Decreased strength;Decreased safe awareness;Decreased cognition  Assessment: Pt tolerated tx session fairly well without any agitation this date. Pt continues to be confused and requires max cueing to participate in therapy. He required min/mod Ax2 for bed mobility. Pt required mod Ax2 for sit<>stand transfers to jaya stedy lift. Pt was dependent to transfer to commode and to recliner chair with jaya stedy lift. Pt required total A for LB dressing and toileting. Pt cooperative throughout and RN ok'd removing restraints at end of session. Sitter and dtr present. Continue to recommend low-mod frequency therapy upon d/c.   Treatment Diagnosis: impaired: Cognition, ADL function, mob/transfers, balance, ROM, coord  Prognosis: Fair  OT Education: OT Role;Plan of Care;Transfer Training;ADL Adaptive Strategies  Barriers to Learning: cognition  REQUIRES OT FOLLOW UP: Yes  Activity Tolerance  Activity Tolerance: Treatment limited secondary to decreased cognition  Safety Devices  Safety Devices in place: Yes  Type of devices: Call light within reach;Nurse notified;Gait belt; Chair alarm in place; Left in chair  Restraints  Initially in place: Yes  Restraints: RN ok to remove restraints at end of session. Sitter and dtr present         Patient Diagnosis(es): The primary encounter diagnosis was Small bowel obstruction ([facility] Diagnoses of Status post ileal conduit ([facility] Urinary obstruction, Acute renal failure, unspecified [facility] Hyperkalemia, SIRS (systemic inflammatory response syndrome) ([facility] and Dementia without behavioral disturbance, unspecified dementia type [facility]) were also pertinent to this visit. has a past medical history of Cancer ([facility] GASTRIC ULCER, Hypertension, and Leukopenia. has a past surgical history that includes Colonoscopy; Cystoscopy; other surgical history (12/14/2016); Tunneled venous port placement (Right, 06/06/2017); and laparotomy (N/A, 2/18/2021). Restrictions  Restrictions/Precautions  Restrictions/Precautions: Fall Risk  Position Activity Restriction  Other position/activity restrictions: general diet; urostomy bag, cental line/port, sitter  Subjective   General  Chart Reviewed: Yes  Patient assessed for rehabilitation services?: Yes  Additional Pertinent Hx: Stew Mike is a 78 y.o. male admitted on 2/18/2021 for abdominal pain. Patient with dementia, poor historian and information is obtained from son. Suspected abdominal pain over the last week. Denies nausea or emesis. Last BM yesterday. History bladder cancer s/p ileal conduit. No UOP until recently while in ED. Hyperkalemia and acute on CKD.   \" copied per Daniel Greer MD 2/18/21 note  Family / Caregiver Present: Decreased awareness of errors;Assistance required to implement solutions;Assistance required to generate solutions;Assistance required to correct errors made;Assistance required to identify errors made  Insights: Not aware of deficits  Cognition Comment: Pt confused but no longer combative           Plan   Plan  Times per week: 3-5  Times per day: Daily  Plan weeks: until D/C  Current Treatment Recommendations: Strengthening, Patient/Caregiver Education & Training, Equipment Evaluation, Education, & procurement, Balance Training, Neuromuscular Re-education, Functional Mobility Training, Cognitive Reorientation, Cognitive/Perceptual Training, Safety Education & Training, Self-Care / ADL    AM-PAC Score  AM-PAC Inpatient Daily Activity Raw Score: 10 (02/26/21 1106)  AM-PAC Inpatient ADL T-Scale Score : 27.31 (02/26/21 1106)  ADL Inpatient CMS 0-100% Score: 74.7 (02/26/21 1106)  ADL Inpatient CMS G-Code Modifier : CL (02/26/21 1106)    Goals  Short term goals  Time Frame for Short term goals: until D/C. Status: goals ongoing  Short term goal 1: Pt will feed self with set-up. Short term goal 2: Pt will groom self with min A. Short term goal 3: Pt will perform functional transfers min A x1 using AD as needed. Short term goal 4: Pt will improve cognition to participate in upper body ADL tasks with min A and mod cues. Short term goal 5: Pt will stand for lower body ADL tasks/positioning with CGA with use of AD/rails. Short term goal 6: Pt will participate in functional ADL mob tasks with min A to CGA with use of AD. Long term goals  Time Frame for Long term goals : same as LTG  Patient Goals   Patient goals : Pt unable to express goal at this time.        Therapy Time   Individual Concurrent Group Co-treatment   Time In 1025         Time Out 1105         Minutes [address], [place] [address]

## 2021-02-26 NOTE — PLAN OF CARE
Problem: Falls - Risk of:  Goal: Will remain free from falls  Description: Will remain free from falls  2/25/2021 2325 by Verito Austin RN  Outcome: Ongoing     Problem: Confusion - Acute:  Goal: Absence of continued neurological deterioration signs and symptoms  Description: Absence of continued neurological deterioration signs and symptoms  2/25/2021 2325 by Verito Austin RN  Outcome: Ongoing     Problem: Discharge Planning:  Goal: Ability to perform activities of daily living will improve  Description: Ability to perform activities of daily living will improve  2/25/2021 2325 by Verito Austin RN  Outcome: Ongoing     Problem: Injury - Risk of, Physical Injury:  Goal: Will remain free from falls  Description: Will remain free from falls  2/25/2021 2325 by Verito Austin RN  Outcome: Ongoing     Problem: Injury - Risk of, Physical Injury:  Goal: Absence of physical injury  Description: Absence of physical injury  2/25/2021 2325 by Verito Austin RN  Outcome: Ongoing     Problem: Psychomotor Activity - Altered:  Goal: Absence of psychomotor disturbance signs and symptoms  Description: Absence of psychomotor disturbance signs and symptoms  2/25/2021 2325 by Verito Austin RN  Outcome: Ongoing

## 2021-02-27 NOTE — PROGRESS NOTES
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 Confusion is a little better today  -Incision site still with moderate SS drainage distally, packing changed  - OOB ambulate, PT/OT  -Therapy recommending SNF at D/C. SW following. Will need to be out of restraints/sitter free and covid test 24-48 hours prior to DC.   -Family reports it takes him a long time to return to his normal mental status after anesthesia/procedures. -oral care as pt will allow  -Pt loves hot chocolate, drinks lots of it at home. Encourage it here so hopefully he will take in more PO. Hx bladder CA and ileal conduit in place  -pouch changes as needed. Making adequate urine. KEENAN  -Cr stable 1.7    Delirium  -with baseline dementia  -appreciate hospitalist assistance with care    Destiney Hameed is in bed, in restraints. Bowels working. Denies nausea. OBJECTIVE  VITALS:  height is 5' 9\" (1.753 m) and weight is 165 lb 2 oz (74.9 kg). His axillary temperature is 97.9 °F (36.6 °C). His blood pressure is 108/65 and his pulse is 69. His respiration is 18 and oxygen saturation is 97%. INTAKE/OUTPUT:      Intake/Output Summary (Last 24 hours) at 2/27/2021 0952  Last data filed at 2/27/2021 0929  Gross per 24 hour   Intake 2118 ml   Output 1251 ml   Net 867 ml     GENERAL: More verbal today. Less confused  LUNGS: clear to ausculation, without wheezes, rales or rhonci  HEART: normal rate and regular rhythm  ABDOMEN: Soft, appropriately tendernon distended.

## 2021-02-27 NOTE — PLAN OF CARE
Problem: Falls - Risk of:  Goal: Will remain free from falls  Description: Will remain free from falls  2/26/2021 1716 by Lonnie Tanner RN  Outcome: Ongoing  Goal: Absence of physical injury  Description: Absence of physical injury  2/26/2021 1716 by Lonnie Tanner RN  Outcome: Ongoing     Problem: Confusion - Acute:  Goal: Absence of continued neurological deterioration signs and symptoms  Description: Absence of continued neurological deterioration signs and symptoms  2/26/2021 1716 by oLnnie Tanner RN  Outcome: Ongoing  Goal: Mental status will be restored to baseline  Description: Mental status will be restored to baseline  2/26/2021 1716 by Lonnie Tanner RN  Outcome: Ongoing     Problem: Discharge Planning:  Goal: Ability to perform activities of daily living will improve  Description: Ability to perform activities of daily living will improve  2/26/2021 1716 by Lonnie Tanner RN  Outcome: Ongoing  Goal: Participates in care planning  Description: Participates in care planning  2/26/2021 1716 by Lonnie Tanner RN  Outcome: Ongoing     Problem: Injury - Risk of, Physical Injury:  Goal: Will remain free from falls  Description: Will remain free from falls  2/26/2021 1716 by Lonnie Tanner RN  Outcome: Ongoing  Goal: Absence of physical injury  Description: Absence of physical injury  2/26/2021 1716 by Lonnie Tanner RN  Outcome: Ongoing     Problem: Mood - Altered:  Goal: Mood stable  Description: Mood stable  2/26/2021 1716 by Lonnie Tanner RN  Outcome: Ongoing  Goal: Absence of abusive behavior  Description: Absence of abusive behavior  2/26/2021 1716 by Lonnie Tanner RN  Outcome: Ongoing  Goal: Verbalizations of feeling emotionally comfortable while being cared for will increase  Description: Verbalizations of feeling emotionally comfortable while being cared for will increase  2/26/2021 1716 by Lonnie Tanner RN  Outcome: Ongoing     Problem: Psychomotor Activity - Altered:  Goal: Absence of psychomotor disturbance signs and symptoms  Description: Absence of psychomotor disturbance signs and symptoms  2/26/2021 1716 by Dennis Martin RN  Outcome: Ongoing     Problem: Sensory Perception - Impaired:  Goal: Demonstrations of improved sensory functioning will increase  Description: Demonstrations of improved sensory functioning will increase  2/26/2021 1716 by Dennis Martin RN  Outcome: Ongoing  Goal: Decrease in sensory misperception frequency  Description: Decrease in sensory misperception frequency  2/26/2021 1716 by Dennis Martin RN  Outcome: Ongoing  Goal: Able to refrain from responding to false sensory perceptions  Description: Able to refrain from responding to false sensory perceptions  2/26/2021 1716 by Dennis Martin RN  Outcome: Ongoing  Goal: Demonstrates accurate environmental perceptions  Description: Demonstrates accurate environmental perceptions  2/26/2021 1716 by Dennis Martin RN  Outcome: Ongoing  Goal: Able to distinguish between reality-based and nonreality-based thinking  Description: Able to distinguish between reality-based and nonreality-based thinking  2/26/2021 1716 by Dennis Martin RN  Outcome: Ongoing  Goal: Able to interrupt nonreality-based thinking  Description: Able to interrupt nonreality-based thinking  2/26/2021 1716 by Dennis Martin RN  Outcome: Ongoing     Problem: Sleep Pattern Disturbance:  Goal: Appears well-rested  Description: Appears well-rested  2/26/2021 1716 by Dennis Martin RN  Outcome: Ongoing     Problem: Skin Integrity:  Goal: Will show no infection signs and symptoms  Description: Will show no infection signs and symptoms  2/26/2021 1716 by Dennis Martin RN  Outcome: Ongoing  Goal: Absence of new skin breakdown  Description: Absence of new skin breakdown  2/26/2021 1716 by Dennis Martin RN  Outcome: Ongoing     Problem:  Bowel/Gastric:  Goal: Control of bowel function will improve  Description: Control of bowel function will improve  2/26/2021 1716 by January Copeland RN  Outcome: Ongoing  Goal: Ability to achieve a regular elimination pattern will improve  Description: Ability to achieve a regular elimination pattern will improve  2/26/2021 1716 by January Copeland RN  Outcome: Ongoing     Problem: Nutritional:  Goal: Ability to follow a diet with enough fiber (20 to 30 grams) for normal bowel function will improve  Description: Ability to follow a diet with enough fiber (20 to 30 grams) for normal bowel function will improve  2/26/2021 1716 by January Copeland RN  Outcome: Ongoing     Problem: Pain:  Goal: Pain level will decrease  Description: Pain level will decrease  2/26/2021 1716 by January Copeland RN  Outcome: Ongoing  Goal: Control of acute pain  Description: Control of acute pain  2/26/2021 1716 by January Copeland RN  Outcome: Ongoing  Goal: Control of chronic pain  Description: Control of chronic pain  2/26/2021 1716 by January Copeland RN  Outcome: Ongoing
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
## 2021-02-27 NOTE — PROGRESS NOTES
Pt has remained out of four point restraints since 1237. Pt has not been combative or agitated. Pt pleasantly confused. Pt A&O to person and place, remains disoriented to situation and time. Pt resting with eyes closed in bed. Bed alarm on. 3/4 bed rails engaged.      Electronically signed by Sarita Bishop RN on 2/27/2021 at 5:06 PM

## 2021-02-28 NOTE — PROGRESS NOTES
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 Had to be placed back into restraints last evening  -diet as able. OK for family to bring in food  -continue abx for wound infection, UTI      Delirium  -with baseline dementia  -appreciate hospitalist assistance with care    Kell Mc is in bed, in restraints. Bowels working. Denies nausea. Does not want to eat breakfast     OBJECTIVE  VITALS:  height is 5' 9\" (1.753 m) and weight is 165 lb 12.6 oz (75.2 kg). His oral temperature is 97.8 °F (36.6 °C). His blood pressure is 136/73 and his pulse is 80. His respiration is 14 and oxygen saturation is 94%. INTAKE/OUTPUT:      Intake/Output Summary (Last 24 hours) at 2/28/2021 1003  Last data filed at 2/28/2021 0600  Gross per 24 hour   Intake 2652 ml   Output 1075 ml   Net 1577 ml     GENERAL: More verbal today. Less confused  ABDOMEN: Soft, appropriately tender, non-distended. EXTREMITY: no cyanosis, clubbing or edema    I/O last 3 completed shifts: In: 2652 [P.O.:240;  I.V.:2012; IV Piggyback:400]  Out: 1425 [Urine:1425]    LABS  Recent Labs     02/27/21  0505 02/28/21  0553   WBC 8.3 8.1   HGB 8.4* 8.2*   HCT 25.5* 24.3*    311    139   K 3.4* 3.6   * 110   CO2 24 21   BUN 15 13   CREATININE 1.7* 1.6*   MG 1.80  --    CALCIUM 8.0* 7.6*       Electronically signed by Georgette Escobar MD on 2/28/2021 at 10:04 AM

## 2021-02-28 NOTE — PROGRESS NOTES
Pt progressively became more confused and agitated within the last hour. 4 point restraints reapplied. Night shift NP notified of changes.      Electronically signed by Pat Lima RN on 2/27/2021 at 7:16 PM

## 2021-02-28 NOTE — PROGRESS NOTES
Nephrology Progress Note   [email or web address]      This patient is a 78year old male whom we are following for KEENAN. Subjective: The patient was seen and examined. Still confused but said he feels okay. BP stable. Good urine output. Family History: Son at bedside and updated  ROS: No fever or chills      Vitals:  /68   Pulse 98   Temp 98.4 °F (36.9 °C) (Oral)   Resp 16   Ht 5' 9\" (1.753 m)   Wt 165 lb 12.6 oz (75.2 kg)   SpO2 98%   BMI 24.48 kg/m²   I/O last 3 completed shifts: In: 2652 [P.O.:240; I.V.:2012; IV Piggyback:400]  Out: 5206 [Urine:1575]  No intake/output data recorded. Physical Exam:  Physical Exam  Vitals signs reviewed. Constitutional:       General: He is not in acute distress. HENT:      Head: Normocephalic and atraumatic. Eyes:      General: No scleral icterus. Conjunctiva/sclera: Conjunctivae normal.   Cardiovascular:      Rate and Rhythm: Normal rate. Heart sounds: No friction rub. Pulmonary:      Effort: Pulmonary effort is normal. No respiratory distress. Breath sounds: No wheezing. Abdominal:      General: Bowel sounds are normal. There is no distension. Tenderness: There is no abdominal tenderness. Musculoskeletal:      Right lower leg: No edema. Left lower leg: No edema. Neurological:      Mental Status: He is alert.            Medications:   risperiDONE  1 mg Oral BID    magnesium oxide  400 mg Oral BID    cefTRIAXone (ROCEPHIN) IV  1,000 mg Intravenous Q24H    metroNIDAZOLE  500 mg Intravenous Q8H    potassium chloride  20 mEq Oral Once    ziprasidone  20 mg Intramuscular Once    And    sterile water  1.2 mL Intramuscular Once    polyethylene glycol  17 g Oral Daily    donepezil  5 mg Oral Nightly    sodium chloride flush  10 mL Intravenous 2 times per day    heparin (porcine)  5,000 Units Subcutaneous 3 times per day    famotidine (PEPCID) injection  20 mg Intravenous Daily         Labs:  Recent Labs 02/26/21  0535 02/27/21  0505 02/28/21  0553   WBC 9.6 8.3 8.1   HGB 8.5* 8.4* 8.2*   HCT 25.7* 25.5* 24.3*   MCV 93.4 93.1 92.8    298 311     Recent Labs     02/26/21  0535 02/27/21  0505 02/28/21  0553   * 143 139   K 3.7 3.4* 3.6   * 112* 110   CO2 25 24 21   GLUCOSE 153* 134* 142*   MG  --  1.80  --    BUN 17 15 13   CREATININE 1.7* 1.7* 1.6*   LABGLOM 39* 39* 41*   GFRAA 47* 47* 50*           Assessment/Plan:    1) KEENAN on CKD: non-oliguric              - baseline 09/10/19 Cr 1.8              - ATN              - renal function improved at baseline now              -Cr 1.6 mg today. Good UO.     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 Chun Brochure     4) FEN:  hypernatremia-improving. continue hypotonic fluids till PO intake improves, rate decreased today to 50cc/hr. Hypokalemia: improved with replacement.     5) dementia    Please do not hesitate to contact me at [phone] if with questions. Thank you!     Juwan Bermeo MD  2/28/2021  [facility]

## 2021-02-28 NOTE — PLAN OF CARE
Pt is a high fall risk. He is a 2 person assist using the [employer] to get OOB. Pt overestimates his abilities. He has a tele-sitter at the bedside and is in 4 point soft restraints for his safety. Pt is confused and not redirectable. He becomes agitated and at times combative with staff when he is trying to leave. Pt is oriented to self only. Pt frequently re-oriented to surroundings and situation but continues to be confused and states he wants to leave the hospital to run various errands. Pt has pulled apart his urostomy and coleman bag multiple times causing urine to leak onto his linens/floor. RN capped urostomy bag for intermittent emptying to help avoid problem. Efforts to disguise medical equipment have been challenging because pt grabs and pulls at anything he can get his hands on. IV tubing for PAC kept towards top of bed along with telemetry monitoring that has been laced through the top of his gown. Pt has gotten hold of the CMU box a couple of times and disconnected himself. He pulls off his blankets, gown, and under pads. Bed in locked, low position with side rails up X 3 and an active bed alarm. Fall risk bracelet, sign, and socks in place. Pt has denied pain t/o shift. Pt has had 2 bowel movements over night. When RN went to give pt his HS medications, the [facility] was raised at 30 degrees. Pt had a medication that could not be crushed (extended release magnesium) so RN administered the one pill with water. Pt had to be instructed to sip water through the straw but did so. Pt said he felt like the pill was \"stuck\" so RN gave him more water to drink. Pt started coughing/spitting the water out but continued complaining that the pill was stuck. RN had crushed all of his other medications and put them in applesauce so RN began to give pt spoonfuls of apple sauce. Pt then started coughing/spitting the applesauce up and asking for more water.  RN explained to pt that she was concerned about the pt's swallowing. Medication administration not attempted a second time. Sky NP made aware of what happened. Pt's VS stable on RA after the incident.

## 2021-02-28 NOTE — PROGRESS NOTES
Hospitalist Progress Note      PCP: Victor Manuel Farias MD    Date of Admission: 2/18/2021    Chief Complaint: [address] Course:  78 y. o. male who we are asked to see/evaluate by Carlitos Hunter MD for medical management of agitation with baseline dementia. The pt was placed in four point restraints this morning due to agitation and attempting to kick/hit a therapist. He is not currently agitated, but will not allow anyone to. Pt cannot answer the time, place, age or name. He is mumbling and unable to verbalize coherent responses. Hx of dementia being treated with Aricept. Family states this is far from his baseline though.     Pt has an ileal conduit due to known bladder cancer. UA was abnormal and culture positive for E. coli for which he is being treated with Cipro. Subjective: Patient seen and examined. Patient continues to improve on a daily basis but at nighttime he continues to have aggressive behaviors. I have increased his risperidone to 1 mg twice daily. Patient may also need some Haldol at night if he becomes agitated.       Medications:  Reviewed    Infusion Medications    dextrose 5% and 0.2% NaCl with KCl 20 mEq 100 mL/hr at 02/27/21 8086     Scheduled Medications    metroNIDAZOLE  500 mg Intravenous Q8H    potassium chloride  20 mEq Oral Once    risperiDONE  0.5 mg Oral BID    ciprofloxacin  400 mg Intravenous Q12H    ziprasidone  20 mg Intramuscular Once    And    sterile water  1.2 mL Intramuscular Once    polyethylene glycol  17 g Oral Daily    magnesium cl-calcium carbonate  1 tablet Oral BID    donepezil  5 mg Oral Nightly    sodium chloride flush  10 mL Intravenous 2 times per day    heparin (porcine)  5,000 Units Subcutaneous 3 times per day    famotidine (PEPCID) injection  20 mg Intravenous Daily     PRN Meds: QUEtiapine, bisacodyl, sodium chloride flush, oxyCODONE **OR** oxyCODONE, morphine **OR** morphine, acetaminophen, ondansetron      Intake/Output Summary (Last 24 hours) at 2/28/2021 0850  Last data filed at 2/28/2021 0600  Gross per 24 hour   Intake 2652 ml   Output 1425 ml   Net 1227 ml       Physical Exam Performed:    /73   Pulse 80   Temp 97.8 °F (36.6 °C) (Oral)   Resp 14   Ht 5' 9\" (1.753 m)   Wt 165 lb 12.6 oz (75.2 kg)   SpO2 93%   BMI 24.48 kg/m²     General appearance: No apparent distress, appears stated age and cooperative. HEENT: Pupils equal, round, and reactive to light. Conjunctivae/corneas clear. Neck: Supple, with full range of motion. No jugular venous distention. Trachea midline. Respiratory:  Normal respiratory effort. Clear to auscultation, bilaterally without Rales/Wheezes/Rhonchi. Cardiovascular: Regular rate and rhythm with normal S1/S2 without murmurs  Abdomen: Soft, non-tender, non-distended with normal bowel sounds. Musculoskeletal: No clubbing, cyanosis or edema bilaterally. Full range of motion without deformity. Skin: Skin color, texture, turgor normal.  No rashes or lesions. Neurologic:  Neurovascularly intact without any focal sensory/motor deficits. Cranial nerves: II-XII intact, grossly non-focal.  Psychiatric: Alert and oriented, thought content and insight poor, mood labile  Capillary Refill: Brisk,< 3 seconds   Peripheral Pulses: +2 palpable, equal bilaterally       Labs:   Recent Labs     02/26/21  0535 02/27/21  0505 02/28/21  0553   WBC 9.6 8.3 8.1   HGB 8.5* 8.4* 8.2*   HCT 25.7* 25.5* 24.3*    298 311     Recent Labs     02/26/21  0535 02/27/21  0505 02/28/21  0553   * 143 139   K 3.7 3.4* 3.6   * 112* 110   CO2 25 24 21   BUN 17 15 13   CREATININE 1.7* 1.7* 1.6*   CALCIUM 8.1* 8.0* 7.6*     No results for input(s): AST, ALT, BILIDIR, BILITOT, ALKPHOS in the last 72 hours. No results for input(s): INR in the last 72 hours. No results for input(s): Geofm Squibb in the last 72 hours.     Urinalysis:      Lab Results   Component Value Date    NITRU Negative 02/18/2021    WBCUA 753 02/18/2021    BACTERIA 4+ 02/18/2021    RBCUA 18 02/18/2021    BLOODU LARGE 02/18/2021    SPECGRAV 1.014 02/18/2021    GLUCOSEU Negative 02/18/2021       Radiology:  XR ABDOMEN (KUB) (SINGLE AP VIEW)   Final Result   No evidence of a persistent small-bowel obstruction. XR CHEST PORTABLE   Final Result   1. Interval advancement of the patient's NG tube, now in satisfactory   position within the gastric body. 2. Findings suggestive of mild CHF. XR CHEST PORTABLE   Final Result   Nasogastric tube tip just below the GE junction. Recommend advancing the   tube 10-15 cm and obtaining a follow-up abdomen radiograph centered on the   upper abdomen         CT CHEST ABDOMEN PELVIS WO CONTRAST   Final Result   Dilated loop of small bowel within a peristomal hernia and associated mild   hydroureter and hydronephrosis are concerning for obstruction of the ileal   conduit. Additionally, there are several loops of small bowel in the right   hemiabdomen which are fluid-filled and demonstrate mild wall thickening with   significant inflammatory stranding of the mesentery which has a somewhat   swirled appearance. Findings are somewhat concerning for an internal hernia. Surgical evaluation is suggested. XR CHEST PORTABLE   Final Result   No acute cardiopulmonary disease.                  Assessment/Plan:    Small bowel obstruction  S/P exploratory laparotomy w/ bowel resection   General Surgery managing  General diet  surgery February 18     Acute on Chronic encephalopathy  Known Alzheimer dementia - continue Aricept  Hx of poor response to anesthesia   Possible UTI - being treated w/ Cipro, switch to Rocephin  Slowly improving     KEENAN  Cr improving  Continue IV fluid resuscitation   At baseline     Sepsis  Elevated lactic acid   Continue IV fluids and Abx  Resolved       DVT Prophylaxis: Heparin  Diet: DIET GENERAL;  Dietary Nutrition Supplements: Diabetic Oral Supplement  Code Status: Full Code    PT/OT Eval Status: 3-5 sessions a week    Sanchez Ware MD

## 2021-02-28 NOTE — PLAN OF CARE
comfortable while being cared for will increase  2/28/2021 1052 by Torrey Moura RN  Outcome: Ongoing     Problem: Psychomotor Activity - Altered:  Goal: Absence of psychomotor disturbance signs and symptoms  Description: Absence of psychomotor disturbance signs and symptoms  2/28/2021 1052 by Torrey Moura RN  Outcome: Ongoing     Problem: Sensory Perception - Impaired:  Goal: Demonstrations of improved sensory functioning will increase  Description: Demonstrations of improved sensory functioning will increase  2/28/2021 1052 by Torrey Moura RN  Outcome: Ongoing     Problem: Sensory Perception - Impaired:  Goal: Decrease in sensory misperception frequency  Description: Decrease in sensory misperception frequency  2/28/2021 1052 by Torrey Moura RN  Outcome: Ongoing     Problem: Sensory Perception - Impaired:  Goal: Able to refrain from responding to false sensory perceptions  Description: Able to refrain from responding to false sensory perceptions  2/28/2021 1052 by Torrey Moura RN  Outcome: Ongoing     Problem: Sensory Perception - Impaired:  Goal: Demonstrates accurate environmental perceptions  Description: Demonstrates accurate environmental perceptions  2/28/2021 1052 by Torrey Moura RN  Outcome: Ongoing     Problem: Sensory Perception - Impaired:  Goal: Able to distinguish between reality-based and nonreality-based thinking  Description: Able to distinguish between reality-based and nonreality-based thinking  2/28/2021 1052 by Torrey Moura RN  Outcome: Ongoing     Problem: Sensory Perception - Impaired:  Goal: Able to interrupt nonreality-based thinking  Description: Able to interrupt nonreality-based thinking  2/28/2021 1052 by Torrey Moura RN  Outcome: Ongoing     Problem: Sleep Pattern Disturbance:  Goal: Appears well-rested  Description: Appears well-rested  2/28/2021 1052 by Torrey Moura RN  Outcome: Ongoing     Problem: Skin Integrity:  Goal: Will show no infection signs and symptoms  Description: Will show no infection signs and symptoms  2/28/2021 1052 by Pat Lima RN  Outcome: Ongoing     Problem: Skin Integrity:  Goal: Absence of new skin breakdown  Description: Absence of new skin breakdown  2/28/2021 1052 by Pat Lima RN  Outcome: Ongoing     Problem: Bowel/Gastric:  Goal: Control of bowel function will improve  Description: Control of bowel function will improve  2/28/2021 1052 by Pat Lima RN  Outcome: Ongoing     Problem:  Bowel/Gastric:  Goal: Ability to achieve a regular elimination pattern will improve  Description: Ability to achieve a regular elimination pattern will improve  2/28/2021 1052 by Pat Lima RN  Outcome: Ongoing     Problem: Nutritional:  Goal: Ability to follow a diet with enough fiber (20 to 30 grams) for normal bowel function will improve  Description: Ability to follow a diet with enough fiber (20 to 30 grams) for normal bowel function will improve  2/28/2021 1052 by Pat Lima RN  Outcome: Ongoing     Problem: Non-Violent Restraints  Goal: Removal from restraints as soon as assessed to be safe  2/28/2021 1052 by Pat Lima RN  Outcome: Ongoing     Problem: Non-Violent Restraints  Goal: No harm/injury to patient while restraints in use  2/28/2021 1052 by Pat Lima RN  Outcome: Ongoing     Problem: Non-Violent Restraints  Goal: Patient's dignity will be maintained  2/28/2021 1052 by Pat Lima RN  Outcome: Ongoing    Problem: Nutrition  Goal: Optimal nutrition therapy  2/28/2021 1052 by Pat Lima RN  Outcome: Ongoing

## 2021-03-01 NOTE — PROGRESS NOTES
Patient transferred from from room 5262 to 3126 via bed. Nurse transferred with patient due to restraints. Belongings sent with patient and placed on counter in 3126. Meds placed in safe. Nurse and therapists at bedside with call light in reach. Telemetry returned to [facility].   Electronically signed by Matt Man RN on 3/1/2021 at 10:58 AM

## 2021-03-01 NOTE — PROGRESS NOTES
Nephrology Progress Note   [email or web address]      This patient is a 78year old male whom we are following for KEENAN. Subjective: The patient was seen and examined. Still confused but said he feels okay. BP stable. Good urine output. Family History: Son at bedside and updated  ROS: No fever or chills      Vitals:  /62   Pulse 80   Temp 98.6 °F (37 °C) (Oral)   Resp 17   Ht 5' 9\" (1.753 m)   Wt 164 lb 0.4 oz (74.4 kg)   SpO2 96%   BMI 24.22 kg/m²   I/O last 3 completed shifts: In: 1100 [P.O.:100; I.V.:800; IV Piggyback:200]  Out: 1475 [Urine:1475]  No intake/output data recorded. Physical Exam:  Physical Exam  Vitals signs reviewed. Constitutional:       General: He is not in acute distress. HENT:      Head: Normocephalic and atraumatic. Eyes:      General: No scleral icterus. Conjunctiva/sclera: Conjunctivae normal.   Cardiovascular:      Rate and Rhythm: Normal rate. Heart sounds: No friction rub. Pulmonary:      Effort: Pulmonary effort is normal. No respiratory distress. Breath sounds: No wheezing. Abdominal:      General: Bowel sounds are normal. There is no distension. Tenderness: There is no abdominal tenderness. Musculoskeletal:      Right lower leg: No edema. Left lower leg: No edema. Neurological:      Mental Status: He is alert.            Medications:   risperiDONE  1 mg Oral BID    magnesium oxide  400 mg Oral BID    cefTRIAXone (ROCEPHIN) IV  1,000 mg Intravenous Q24H    metroNIDAZOLE  500 mg Intravenous Q8H    potassium chloride  20 mEq Oral Once    ziprasidone  20 mg Intramuscular Once    And    sterile water  1.2 mL Intramuscular Once    polyethylene glycol  17 g Oral Daily    donepezil  5 mg Oral Nightly    sodium chloride flush  10 mL Intravenous 2 times per day    heparin (porcine)  5,000 Units Subcutaneous 3 times per day    famotidine (PEPCID) injection  20 mg Intravenous Daily         Labs:  Recent Labs     02/27/21  0505 02/28/21  0553 03/01/21  0545   WBC 8.3 8.1 7.0   HGB 8.4* 8.2* 8.5*   HCT 25.5* 24.3* 25.6*   MCV 93.1 92.8 93.1    311 348     Recent Labs     02/27/21  0505 02/28/21  0553 03/01/21  0545    139 139   K 3.4* 3.6 4.3   * 110 109   CO2 24 21 22   GLUCOSE 134* 142* 132*   MG 1.80  --   --    BUN 15 13 15   CREATININE 1.7* 1.6* 1.9*   LABGLOM 39* 41* 34*   GFRAA 47* 50* 41*           Assessment/Plan:    1) KEENAN on CKD: non-oliguric              - baseline 09/10/19 Cr 1.8              - ATN              - renal function improved at baseline now              -Cr 1.9 mg today. Good UO. Monitor     2) SBO from ischemic bowel s/p exp lap and partial bowel resection 02/19/21              - per surgery     3) bladder cancer and ileal conduit              - per Dr. Viola Keyes     4) FEN:  Hypernatremia Na+ stable 139 . continue hypotonic fluids till PO intake improves, rate decreased  to 50cc/hr  Hypokalemia: improved with replacement.     5) dementia    Please do not hesitate to contact me at [phone] if with questions. Thank you!     Alissa Chavarria MD  3/1/2021  [facility]

## 2021-03-01 NOTE — PROGRESS NOTES
Abdominal dressing is saturated with serous fluid and has leaked through on to gown. Changed abdominal wet to dry dressing as ordered. Pt tolerated well. Will continue to monitor.

## 2021-03-01 NOTE — PROGRESS NOTES
Pt transferred from 5 to 4126 via bed. Oriented Pt to room. B wrist and ankle restraints in place. Tele camera at bedside. Will continue to monitor.

## 2021-03-01 NOTE — PROGRESS NOTES
Pt's nabeel Schmitz at bedside. Pt is pleasant, cooperative and following instrtuctions. Discontinued bilateral wrist and ankle restraints. Made Dr. Don Chatterjee aware via Perfect Serve. Will continue to monitor.

## 2021-03-01 NOTE — PROGRESS NOTES
Hospitalist Progress Note      PCP: Santiago Maldonado MD    Date of Admission: 2/18/2021    Chief Complaint: [address] Course:  78 y. o. male who we are asked to see/evaluate by Carlitos Hunter MD for medical management of agitation with baseline dementia. The pt was placed in four point restraints this morning due to agitation and attempting to kick/hit a therapist. He is not currently agitated, but will not allow anyone to assist or clean him up. Pt cannot answer the time, place, age or name. He is mumbling and unable to verbalize coherent responses. Hx of dementia being treated with Aricept. Family states this is far from his baseline though.     Pt has an ileal conduit due to known bladder cancer. UA was abnormal and culture positive for E. coli and on Rocephin. Subjective: Patient seen and examined. He ate some of his breakfast, but not much. Patient seems more aware this morning and is speaking more/clearer. Still confused, but improving. Is now on risperidone 1 mg BID and will continue.   Add on Haldol as needed for agitation as the risperidone is not an at the moment           Medications:  Reviewed    Infusion Medications    dextrose 5% and 0.2% NaCl with KCl 20 mEq 50 mL/hr at 02/28/21 1640     Scheduled Medications    risperiDONE  1 mg Oral BID    magnesium oxide  400 mg Oral BID    cefTRIAXone (ROCEPHIN) IV  1,000 mg Intravenous Q24H    metroNIDAZOLE  500 mg Intravenous Q8H    potassium chloride  20 mEq Oral Once    ziprasidone  20 mg Intramuscular Once    And    sterile water  1.2 mL Intramuscular Once    polyethylene glycol  17 g Oral Daily    donepezil  5 mg Oral Nightly    sodium chloride flush  10 mL Intravenous 2 times per day    heparin (porcine)  5,000 Units Subcutaneous 3 times per day    famotidine (PEPCID) injection  20 mg Intravenous Daily     PRN Meds: haloperidol lactate, QUEtiapine, bisacodyl, sodium chloride flush, oxyCODONE **OR** oxyCODONE, morphine **OR** morphine, acetaminophen, ondansetron      Intake/Output Summary (Last 24 hours) at 3/1/2021 1732  Last data filed at 3/1/2021 0600  Gross per 24 hour   Intake 1100 ml   Output 975 ml   Net 125 ml       Physical Exam Performed:    /77   Pulse 110   Temp 97.6 °F (36.4 °C) (Oral)   Resp 17   Ht 5' 9\" (1.753 m)   Wt 164 lb 0.4 oz (74.4 kg)   SpO2 96%   BMI 24.22 kg/m²     General appearance: No apparent distress, appears stated age and cooperative. HEENT: Pupils equal, round, and reactive to light. Conjunctivae/corneas clear. Neck: Supple, with full range of motion. No jugular venous distention. Trachea midline. Respiratory:  Normal respiratory effort. Clear to auscultation, bilaterally without Rales/Wheezes/Rhonchi. Cardiovascular: Regular rate and rhythm with normal S1/S2 without murmurs  Abdomen: Soft, non-tender, non-distended with normal bowel sounds. Musculoskeletal: No clubbing, cyanosis or edema bilaterally. Full range of motion without deformity. Skin: Skin color, texture, turgor normal.  No rashes or lesions. Neurologic:  Neurovascularly intact without any focal sensory/motor deficits. Cranial nerves: II-XII intact, grossly non-focal.  Psychiatric: Alert and oriented, thought content and insight poor, mood labile  Capillary Refill: Brisk,< 3 seconds   Peripheral Pulses: +2 palpable, equal bilaterally       Labs:   Recent Labs     02/27/21  0505 02/28/21  0553 03/01/21  0545   WBC 8.3 8.1 7.0   HGB 8.4* 8.2* 8.5*   HCT 25.5* 24.3* 25.6*    311 348     Recent Labs     02/27/21  0505 02/28/21  0553 03/01/21  0545    139 139   K 3.4* 3.6 4.3   * 110 109   CO2 24 21 22   BUN 15 13 15   CREATININE 1.7* 1.6* 1.9*   CALCIUM 8.0* 7.6* 8.3     No results for input(s): AST, ALT, BILIDIR, BILITOT, ALKPHOS in the last 72 hours. No results for input(s): INR in the last 72 hours. No results for input(s): [place] in the last 72 hours.     Urinalysis:      Lab Results Component Value Date    NITRU Negative 02/18/2021    WBCUA 753 02/18/2021    BACTERIA 4+ 02/18/2021    RBCUA 18 02/18/2021    BLOODU LARGE 02/18/2021    SPECGRAV 1.014 02/18/2021    GLUCOSEU Negative 02/18/2021       Radiology:  XR ABDOMEN (KUB) (SINGLE AP VIEW)   Final Result   No evidence of a persistent small-bowel obstruction. XR CHEST PORTABLE   Final Result   1. Interval advancement of the patient's NG tube, now in satisfactory   position within the gastric body. 2. Findings suggestive of mild CHF. XR CHEST PORTABLE   Final Result   Nasogastric tube tip just below the GE junction. Recommend advancing the   tube 10-15 cm and obtaining a follow-up abdomen radiograph centered on the   upper abdomen         CT CHEST ABDOMEN PELVIS WO CONTRAST   Final Result   Dilated loop of small bowel within a peristomal hernia and associated mild   hydroureter and hydronephrosis are concerning for obstruction of the ileal   conduit. Additionally, there are several loops of small bowel in the right   hemiabdomen which are fluid-filled and demonstrate mild wall thickening with   significant inflammatory stranding of the mesentery which has a somewhat   swirled appearance. Findings are somewhat concerning for an internal hernia. Surgical evaluation is suggested. XR CHEST PORTABLE   Final Result   No acute cardiopulmonary disease.                  Assessment/Plan:    Small bowel obstruction  S/P exploratory laparotomy w/ bowel resection   General Surgery managing  General diet  Surgery 2/18/2021     Acute on Chronic encephalopathy  Known Alzheimer dementia - continue Aricept  Hx of poor response to anesthesia   Possible UTI - being treated w/ Rochepin  Slowly improving  Continue Risperidone 1 mg BID  Add on Haldol every 6 as needed     KEENAN  Cr stable at 1.9  Continue IV fluid resuscitation   At baseline     Sepsis  Elevated lactic acid   Continue IV fluids and Abx  Resolved    DVT Prophylaxis: Heparin  Diet: DIET GENERAL;  Dietary Nutrition Supplements: Diabetic Oral Supplement  Code Status: Full Code     PT/OT Eval Status: 3-5 sessions a week     Dispo - inpatient    Pilar Willis MD PA-S3

## 2021-03-01 NOTE — PROGRESS NOTES
Pt refusing to follow commands and becoming increasingly agitated and verbally and physically aggressive towards staff. Pt is convinced that staff has stolen his wallet and keys despite his fiance at bedside confirming both items are at home. 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 Removed vest restraint. Applied bilateral wrist and ankle restraints. Made Dr. Breaux aware via Perfect Serve. Will continue to monitor.

## 2021-03-01 NOTE — PROGRESS NOTES
Called placed to son Ryan Ramey to update on new room number. States he will update the rest of the family.   Electronically signed by Anup Neumann RN on 3/1/2021 at 11:36 AM

## 2021-03-01 NOTE — PROGRESS NOTES
Occupational Therapy  Facility/Department: [address] ORTHOPEDICS  Daily Treatment Note  Should patient be discharged prior to another treatment session, this note shall serve as the discharge summary. NAME: Marci Martínez  : 1940  MRN: [id number]    Date of Service: 3/1/2021    Discharge Recommendations:  3-5 sessions per week, Patient would benefit from continued therapy after discharge  OT Equipment Recommendations  Other: defer to next level of care    Assessment   Performance deficits / Impairments: Decreased functional mobility ; Decreased endurance;Decreased coordination;Decreased ADL status; Decreased posture;Decreased ROM; Decreased balance;Decreased strength;Decreased safe awareness;Decreased cognition  Assessment: Seen in room with PT, transferred to ortho floor. Pt moved to EOB with min A of 2 and sat with close SBA. Pt stood with min A of 2 in jaya stedy and OT completed post pericare, bed linen change. Ret to supine with min A of 2 and 4 point restraints reapplied. Pt would benefit from cont OT to increase independence in self care and mobility. Prognosis: Fair  OT Education: OT Role;Plan of Care;Transfer Training;ADL Adaptive Strategies  Barriers to Learning: cognition  REQUIRES OT FOLLOW UP: Yes  Activity Tolerance  Activity Tolerance: Treatment limited secondary to decreased cognition  Activity Tolerance: Pt still with 4 point restraint and telesitter; Safety Devices  Safety Devices in place: Yes  Type of devices: Call light within reach;Nurse notified; Left in bed;Bed alarm in place  Restraints  Initially in place: Yes  Restraints: returned to 4 point restraint         Patient Diagnosis(es): The primary encounter diagnosis was Small bowel obstruction ([address]  Diagnoses of Status post ileal conduit ([address] Urinary obstruction, Acute renal failure, unspecified acute renal failure type ([address] Hyperkalemia, SIRS (systemic inflammatory response syndrome) ([address] and Dementia without behavioral assistance  Comment: Pt cooperative and following directions this date but still impulsive and required close assist to monitor lines and keep him from getting up without assist  Transfers  Sit to stand: Minimal assistance;2 Person assistance  Stand to sit: Minimal assistance;2 Person assistance  Transfer Comments: <> jaya arrieta                                                                 Plan   Plan  Times per week: 3-5  Times per day: Daily  Plan weeks: until D/C  Current Treatment Recommendations: Strengthening, Patient/Caregiver Education & Training, Equipment Evaluation, Education, & procurement, Balance Training, Neuromuscular Re-education, Functional Mobility Training, Cognitive Reorientation, Cognitive/Perceptual Training, Safety Education & Training, Self-Care / ADL, Endurance Training     OutComes Score    Nancy Blair scored a 11/24 on the AM-PAC ADL Inpatient form. Current research shows that an AM-PAC score of 17 or less is typically not associated with a discharge to the patient's home setting. Based on the patient's AM-PAC score and their current ADL deficits, it is recommended that the patient have 3-5 sessions per week of Occupational Therapy at d/c to increase the patient's independence. Please see assessment section for further patient specific details. If patient discharges prior to next session this note will serve as a discharge summary. Please see below for the latest assessment towards goals. AM-PAC Score        AM-PAC Inpatient Daily Activity Raw Score: 11 (03/01/21 1151)  AM-PAC Inpatient ADL T-Scale Score : 29.04 (03/01/21 1151)  ADL Inpatient CMS 0-100% Score: 70.42 (03/01/21 1151)  ADL Inpatient CMS G-Code Modifier : CL (03/01/21 1151)    Goals  Short term goals  Time Frame for Short term goals: until D/C. Status: goals ongoing  Short term goal 1: Pt will feed self with set-up.   Short term goal 2: Pt will groom self with min A.  Short term goal 3: Pt will perform functional transfers min A x1 using AD as needed. Short term goal 4: Pt will improve cognition to participate in upper body ADL tasks with min A and mod cues. Short term goal 5: Pt will stand for lower body ADL tasks/positioning with CGA with use of AD/rails. Short term goal 6: Pt will participate in functional ADL mob tasks with min A to CGA with use of AD. Long term goals  Time Frame for Long term goals : same as LTG  Patient Goals   Patient goals : Pt unable to express goal at this time.        Therapy Time   Individual Concurrent Group Co-treatment   Time In 0756         Time Out 1115         Minutes 40         Timed Code Treatment Minutes: [address], OT

## 2021-03-01 NOTE — PROGRESS NOTES
successfully excised. 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 Since surgery, his urine output has picked up nicely but his creatinine has remained elevated at 2.5\"   On 2/18/21, pt underwent \"Exploratory laparotomy, bowel resection for ischemic bowel\". Response To Previous Treatment: Patient unable to report, no changes reported from family or staff  Family / Caregiver Present: No  Subjective  Subjective: Pt in bed upon arrival, pt in 4 point restraints. RN okay with removing restraints for PT treatment. Pt incontinent of small amuont of stool upon arrival.  Pt not agitated today but continues to be confused. C/o being hungry. Pain Screening  Patient Currently in Pain: No  Vital Signs  Patient Currently in Pain: No       Orientation  Orientation  Overall Orientation Status: Impaired  Orientation Level: Oriented to person;Disoriented to place; Disoriented to time;Disoriented to situation  Cognition      Objective   Bed mobility  Rolling to Right: Minimal assistance;2 Person assistance  Supine to Sit: Minimal assistance;2 Person assistance  Sit to Supine: Minimal assistance;2 Person assistance  Scooting: Minimal assistance;2 Person assistance  Transfers  Sit to Stand: Minimal Assistance;2 Person Assistance  Stand to sit: Minimal Assistance;2 Person Assistance  Comment: Pt impulsive, sit to stand on Stedy while pt and bed changes  Ambulation  Ambulation?: No(pt impulsive and unsafe)   Comment: Pt incont small amount of stool in bed, stood to stedy for cleaning and changing bedding, pt impulsive and unsafe to stay up in chair so returned to bed with 4 point restraints     AM-PAC Score  AM-PAC Inpatient Mobility Raw Score : 12 (03/01/21 1111)  AM-PAC Inpatient T-Scale Score : 35.33 (03/01/21 1111)  Mobility Inpatient CMS 0-100% Score: 68.66 (03/01/21 1111)  Mobility Inpatient CMS G-Code Modifier : CL (03/01/21 1111)     Goals  Short term goals  Time Frame for Short term goals: by acute d/c--goals ongoing as of 3/1  Short term goal 1: bed mobility Min A x 2 met: new goal min A x 1  Short term goal 2: sit<>stand trasnfers Min A x 1  Short term goal 3: assess ambulation with LRAD if/as needed x 25 ft with Min A  Long term goals  Time Frame for Long term goals : tbd if/as needed. Patient Goals   Patient goals : pt unable to formulate a therapy goal at this time. Plan    Plan  Times per week: 3-5 x wk in acute setting  Specific instructions for Next Treatment: cotx with OT  Current Treatment Recommendations: Functional Mobility Training  Safety Devices  Type of devices: All fall risk precautions in place, Call light within reach, Nurse notified, Gait belt, Bed alarm in place, Left in bed  Restraints  Initially in place: Yes  Restraints: pt in restraints at beginning of session but cooperative throughout session and Julian ok with keeping restraints off at end of session     Therapy Time   Individual Concurrent Group Co-treatment   Time In 1035         Time Out 1115         Minutes 40         Timed Code Treatment Minutes: 40 Minutes     If pt is discharged before subsequent therapy sessions, this note will serve as the discharge summary.     Mercedes Herrera, [address]

## 2021-03-01 NOTE — PROGRESS NOTES
this interval not displayed.        Electronically signed by Juvencio Arvizu PA-C on 3/1/2021 at 10:14 AM

## 2021-03-01 NOTE — PROGRESS NOTES
Pt has become argumentative and agitated with fiance and staff at bedside. Pt attempting to get out of bed and \"find my car keys\". Pt unable or unwilling to follow commands. Applied Vest restraint. Made Dr. Leigh Rene aware via Perfect Serve. Will continue to monitor.

## 2021-03-02 NOTE — PROGRESS NOTES
Nephrology Progress Note   [email or web address]      This patient is a 78year old male whom we are following for KEENAN. Subjective: The patient was seen and examined. Still confused but said he feels okay. BP stable. Oral intake remains low  Family History: no family at bedside  ROS: No fever or chills      Vitals:  BP (!) 146/53   Pulse 84   Temp 98.1 °F (36.7 °C) (Oral)   Resp 17   Ht 5' 9\" (1.753 m)   Wt 165 lb 2 oz (74.9 kg)   SpO2 94%   BMI 24.38 kg/m²   I/O last 3 completed shifts: In: 120 [P.O.:120]  Out: 550 [Urine:550]  I/O this shift:  In: 300 [P.O.:300]  Out: 250 [Urine:250]    Physical Exam:  Physical Exam  Vitals signs reviewed. Constitutional:       General: He is not in acute distress. HENT:      Head: Normocephalic and atraumatic. Eyes:      General: No scleral icterus. Conjunctiva/sclera: Conjunctivae normal.   Cardiovascular:      Rate and Rhythm: Normal rate. Heart sounds: No friction rub. Pulmonary:      Effort: Pulmonary effort is normal. No respiratory distress. Breath sounds: No wheezing. Abdominal:      General: Bowel sounds are normal. There is no distension. Tenderness: There is no abdominal tenderness. Musculoskeletal:      Right lower leg: No edema. Left lower leg: No edema. Neurological:      Mental Status: He is alert.            Medications:   risperiDONE  1 mg Oral BID    magnesium oxide  400 mg Oral BID    cefTRIAXone (ROCEPHIN) IV  1,000 mg Intravenous Q24H    metroNIDAZOLE  500 mg Intravenous Q8H    potassium chloride  20 mEq Oral Once    ziprasidone  20 mg Intramuscular Once    And    sterile water  1.2 mL Intramuscular Once    polyethylene glycol  17 g Oral Daily    donepezil  5 mg Oral Nightly    sodium chloride flush  10 mL Intravenous 2 times per day    heparin (porcine)  5,000 Units Subcutaneous 3 times per day    famotidine (PEPCID) injection  20 mg Intravenous Daily         Labs:  Recent Labs     02/28/21  0525 03/01/21  0545 03/02/21  0530   WBC 8.1 7.0 8.5   HGB 8.2* 8.5* 9.0*   HCT 24.3* 25.6* 26.5*   MCV 92.8 93.1 92.9    348 377     Recent Labs     02/28/21  0553 03/01/21  0545 03/02/21  0530    139 143   K 3.6 4.3 3.9    109 110   CO2 21 22 21   GLUCOSE 142* 132* 136*   BUN 13 15 13   CREATININE 1.6* 1.9* 1.8*   LABGLOM 41* 34* 36*   GFRAA 50* 41* 44*           Assessment/Plan:    1) KEENAN on CKD: non-oliguric              - baseline 09/10/19 Cr 1.8              - ATN              - renal function improved at baseline now              -Cr 1.8 mg today.      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 Rupal Dumont     4) FEN:  Hypernatremia Na+ increased to 143 meq  Increase IVF rate Oral intake remains poor   Hypokalemia: improved with replacement.     5) dementia        Irma Glass MD  3/2/2021  [facility]

## 2021-03-02 NOTE — PROGRESS NOTES
Occupational Therapy  Jenna York  [id number]  [id number]    Upon arrival to room with PT. Patient very confused and difficult to redirect. Patient assisted to eat a few bites of food and beverage management. Patient asking where he is, re-oriented to hospital setting. Offered to assist patient is sit in recliner chair or edge of bed. Patient stated \"NO\". Did not attempt further at this time as patient has at times been combative and easily agitated. Patient left supine in bed with needs in reach and bed alarm active. If discharged prior to next OT session please see last daily note for discharge status.     Electronically signed by LOULOU Acosta on 3/2/2021 at 2:09 PM    Therapy Time     Individual Co-treatment   Time In 1330     Time Out 1345     Minutes 15

## 2021-03-02 NOTE — PROGRESS NOTES
Pt calm and cooperative this morning. Dr. Mortimer Fredrickson at bedside and ordered for restraints to be removed. 4 point restraints removed. Pt calm with eyes closed. Will continue to monitor.   Electronically signed by Nessa Xavier RN on 3/2/2021 at 9:33 AM

## 2021-03-02 NOTE — PROGRESS NOTES
Radha Ochoa [facility] Surgery [phone]                                     Daily Progress Note                                                         Pt Name: Ferny Stevenson   Medical Record Number: [id number]  Date of Birth 1940   Today's Date: 3/2/2021    ASSESSMENT/PLAN  SBO  - (2/18) S/p Exploratory laparotomy, bowel resection x 1 for ischemic bowel  -+flatulence and BMs  -Still with confusion this AM.  Not oriented to place, but did know his date of birth. -diet as able. OK for family to bring in food  -continue abx for wound infection, UTI.  -Lower abdominal wound site healing well. No purulent drainage and surrounding erythema nearly resolved. Delirium  -with baseline dementia  -appreciate hospitalist assistance with care    Cris Mcgee is in bed  [address] [facility] working. Denies nausea. OBJECTIVE  VITALS:  height is 5' 9\" (1.753 m) and weight is 165 lb 2 oz (74.9 kg). His oral temperature is 98.1 °F (36.7 °C). His blood pressure is 146/53 (abnormal) and his pulse is 84. His respiration is 17 and oxygen saturation is 94%. INTAKE/OUTPUT:      Intake/Output Summary (Last 24 hours) at 3/2/2021 1136  Last data filed at 3/2/2021 3468  Gross per 24 hour   Intake 420 ml   Output 800 ml   Net -380 ml     GENERAL: Some confusion this AM, but I woke him up from sleep  ABDOMEN: Soft, appropriately tender, non-distended. EXTREMITY: no cyanosis, clubbing or edema    I/O last 3 completed shifts: In: 120 [P.O.:120]  Out: 550 [Urine:550]    LABS  Recent Labs     03/02/21  0530   WBC 8.5   HGB 9.0*   HCT 26.5*         K 3.9      CO2 21   BUN 13   CREATININE 1.8*   CALCIUM 8.4       Electronically signed by Jessica Calixto PA-C on 3/2/2021 at 11:36 AM        Surgery Staff  I have examined this patient and read and agree with the note by Rebecca Chen PA-C from today.   Awaiting improvement in dementia/delirium      Electronically signed by Deepak Vick MD on 3/2/2021 at 1:33 PM

## 2021-03-02 NOTE — CARE COORDINATION
Sw reviewed chart. Patient restraints removed 3/2/21 at 9:33am per RN note. Family prefers [facility] or [address] [place] [address]. Sw left messages for above facilities to see if they can review patient for admit. Sw also re-sent referrals through [employer]. Patient will need new COVID test prior to snf admit. Electronically signed by CINTIA Lassiter, AVELINA, Case Management on 3/2/2021 at 12:54 PM  [place] [id number]    2:49 PM  Sw received call from [facility] do not have any beds and do not expect any openings.     Electronically signed by CINTIA Lassiter, AVELINA, Case Management on 3/2/2021 at 2:50 PM  Robb Pacheco [phone]

## 2021-03-02 NOTE — PLAN OF CARE
cared for will increase  Description: Verbalizations of feeling emotionally comfortable while being cared for will increase  3/2/2021 1138 by Glenn Lee RN  Outcome: Ongoing     Problem: Psychomotor Activity - Altered:  Goal: Absence of psychomotor disturbance signs and symptoms  Description: Absence of psychomotor disturbance signs and symptoms  3/2/2021 1138 by Glenn Lee RN  Outcome: Ongoing     Problem: Sensory Perception - Impaired:  Goal: Demonstrations of improved sensory functioning will increase  Description: Demonstrations of improved sensory functioning will increase  3/2/2021 1138 by Glenn Lee RN  Outcome: Ongoing     Problem: Sensory Perception - Impaired:  Goal: Decrease in sensory misperception frequency  Description: Decrease in sensory misperception frequency  3/2/2021 1138 by Glenn Lee RN  Outcome: Ongoing     Problem: Sensory Perception - Impaired:  Goal: Able to refrain from responding to false sensory perceptions  Description: Able to refrain from responding to false sensory perceptions  3/2/2021 1138 by Glenn Lee RN  Outcome: Ongoing     Problem: Sensory Perception - Impaired:  Goal: Demonstrates accurate environmental perceptions  Description: Demonstrates accurate environmental perceptions  3/2/2021 1138 by Glenn Lee RN  Outcome: Ongoing     Problem: Sensory Perception - Impaired:  Goal: Able to distinguish between reality-based and nonreality-based thinking  Description: Able to distinguish between reality-based and nonreality-based thinking  3/2/2021 1138 by Glenn Lee RN  Outcome: Ongoing     Problem: Sensory Perception - Impaired:  Goal: Able to interrupt nonreality-based thinking  Description: Able to interrupt nonreality-based thinking  3/2/2021 1138 by Glenn Lee RN  Outcome: Ongoing     Problem: Sleep Pattern Disturbance:  Goal: Appears well-rested  Description: Appears well-rested  3/2/2021 1138 by Cecily Schlatter, RN  Outcome: Ongoing     Problem: Skin Integrity:  Goal: Will show no infection signs and symptoms  Description: Will show no infection signs and symptoms  3/2/2021 1138 by Cecily Schlatter, RN  Outcome: Ongoing     Problem: Skin Integrity:  Goal: Absence of new skin breakdown  Description: Absence of new skin breakdown  3/2/2021 1138 by Cecily Schlatter, RN  Outcome: Ongoing     Problem: Bowel/Gastric:  Goal: Control of bowel function will improve  Description: Control of bowel function will improve  3/2/2021 1138 by Cecily Schlatter, RN  Outcome: Ongoing     Problem:  Bowel/Gastric:  Goal: Ability to achieve a regular elimination pattern will improve  Description: Ability to achieve a regular elimination pattern will improve  3/2/2021 1138 by Cecily Schlatter, RN  Outcome: Ongoing     Problem: Nutritional:  Goal: Ability to follow a diet with enough fiber (20 to 30 grams) for normal bowel function will improve  Description: Ability to follow a diet with enough fiber (20 to 30 grams) for normal bowel function will improve  3/2/2021 1138 by Cecily Schlatter, RN  Outcome: Ongoing     Problem: Non-Violent Restraints  Goal: Removal from restraints as soon as assessed to be safe  3/2/2021 1138 by Cecily Schlatter, RN  Outcome: Ongoing     Problem: Non-Violent Restraints  Goal: No harm/injury to patient while restraints in use  3/2/2021 1138 by Cecily Schlatter, RN  Outcome: Ongoing     Problem: Non-Violent Restraints  Goal: Patient's dignity will be maintained  3/2/2021 1138 by Cecily Schlatter, RN  Outcome: Ongoing     Problem: Pain:  Goal: Pain level will decrease  Description: Pain level will decrease  3/2/2021 1138 by Cecily Schlatter, RN  Outcome: Ongoing     Problem: Pain:  Goal: Control of acute pain  Description: Control of acute pain  3/2/2021 1138 by Cecily Schlatter, RN  Outcome: Ongoing     Problem: Pain:  Goal: Control of chronic pain  Description: Control of chronic pain  3/2/2021 1138 by Noel Habermann, RN  Outcome: Ongoing     Problem: Nutrition  Goal: Optimal nutrition therapy  3/2/2021 1138 by Noel Habermann, RN  Outcome: Ongoing

## 2021-03-02 NOTE — PROGRESS NOTES
Comprehensive Nutrition Assessment    Type and Reason for Visit:  Reassess    Nutrition Recommendations/Plan:   1. Continue general diet  2. Continue Glucerna TID  3. Document meal and supplement intakes in flowsheet    Nutrition Assessment:  Follow-up. Pt is agitated and aggressive in four pt restriaints. Pt continues to be at risk for nutrition compromise d/t varied PO intake and wt loss since admission. On general diet. Recorded intakes 51-75% at some meals although refusing others. Receiving glucerna TID. Unable to assess ONS intakes at this time. D5 @ 50ml/hr provides 204 kcals daily. Pt has lost 20lbs (11%) since admission. Will continue to send ONS and monitor mental status. Malnutrition Assessment:  Malnutrition Status:  Insufficient data    Context:  Acute Illness     Findings of the 6 clinical characteristics of malnutrition:  Energy Intake:  Mild decrease in energy intake (Comment)  Weight Loss:  7 - Greater than 2% over 1 week     Body Fat Loss:  Unable to assess     Muscle Mass Loss:  Unable to assess    Fluid Accumulation:  1 - Mild Extremities   Strength:  Not Performed    Estimated Daily Nutrient Needs:  Energy (kcal):  9541-3784 (25-30kcal/75kg); Weight Used for Energy Requirements:  Current     Protein (g):   (1.2-1.4g/75kg); Weight Used for Protein Requirements:  Current        Fluid (ml/day):  1 mL/kcal; Method Used for Fluid Requirements:  1 ml/kcal      Nutrition Related Findings:  -3.5 liters. Hypoactive BS. +1 BLE edema.  Oriented to self only - baseline dementia      Wounds:  Surgical Incision       Current Nutrition Therapies:    DIET GENERAL;  Dietary Nutrition Supplements: Diabetic Oral Supplement    Anthropometric Measures:  · Height: 5' 9\" (175.3 cm)  · Current Body Weight: 165 lb (74.8 kg)   · Admission Body Weight: 185 lb (83.9 kg)    · Usual Body Weight: 180 lb (81.6 kg)(per EMR)     · Ideal Body Weight: 160 lbs; % Ideal Body Weight 103.1 %   · BMI: 24.4  · Adjusted Body Weight:  ; No Adjustment   · BMI Categories: Normal Weight (BMI 18.5-24. 9)       Nutrition Diagnosis:   · Inadequate oral intake related to cognitive or neurological impairment, altered GI function as evidenced by intake 0-25%      Nutrition Interventions:   Food and/or Nutrient Delivery:  Continue Current Diet, Continue Oral Nutrition Supplement  Nutrition Education/Counseling:  No recommendation at this time   Coordination of Nutrition Care:  Continue to monitor while inpatient    Goals:  tolerate most appropriate form of nutrition per Md       Nutrition Monitoring and Evaluation:   Behavioral-Environmental Outcomes:  None Identified   Food/Nutrient Intake Outcomes:  Food and Nutrient Intake, Supplement Intake, IVF Intake  Physical Signs/Symptoms Outcomes:  Biochemical Data, GI Status, Fluid Status or Edema, Meal Time Behavior, Weight     Discharge Planning:     Too soon to determine     Electronically signed by Sandra Scherer RD, LD on 3/2/21 at 8:50 AM EST    Contact: [phone]
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
## 2021-03-02 NOTE — PROGRESS NOTES
Pt resting in bed. Oriented to self only. Pt in 4pt restraints overnight. Tele camera in place. No complaints overnight. Fall precautions in place, call light and bedside table within reach.

## 2021-03-02 NOTE — DISCHARGE INSTR - COC
Continuity of Care Form    Patient Name: Destiny Ramon   :  1940  MRN:  [id number]    Admit date:  2021  Discharge date:  3/9/21    Code Status Order: Full Code   Advance Directives:   [facility] Documentation       Date/Time Healthcare Directive Type of Healthcare Directive Copy in [address] Agent's Name Healthcare Agent's Phone Number    21  Yes, patient has an advance directive for healthcare treatment  Durable power of  for health care  No, copy requested from family  Adult Children  --  --            Admitting Physician:  Karen Valentin MD  PCP: Jo Ann Xiao MD    Discharging Nurse: Jeremy Jason Unit/Room#: S2K-9537/3126-01  Discharging Unit Phone Number: [phone]    Emergency Contact:   Extended Emergency Contact Information  Primary Emergency Contact: Jonny Pearson   [address] Phone: [phone]  Relation: Child  Secondary Emergency Contact: Anastacio Wooten[address] Phone: [phone]  Relation: Child    Past Surgical History:  Past Surgical History:   Procedure Laterality Date    COLONOSCOPY      colo , Dr Hugo Bose MD.   Melodie Conception N/A 2021    EXPLORATORY LAPAROTOMY, BOWEL RESECTION performed by Karen Valentin MD at [address] [place] [address]  2016    RADICAL CYSTECTOMY, PROSTECTOMY ILEAL CONDUIT URINARY    TUNNELED VENOUS PORT PLACEMENT Right 2017    DR. BOND/IR POWER PORT       Immunization History:   Immunization History   Administered Date(s) Administered    Influenza, High Dose (Fluzone 65 yrs and older) 2017, 2018, 09/10/2019    Influenza, Quadv, adjuvanted, 65 yrs +, IM, PF (Fluad) 2020    Pneumococcal Conjugate 13-valent ([id number]) 2017    Pneumococcal Polysaccharide ([id number]) 09/10/2019    Tdap (Boostrix, Adacel) 2017       Active Problems:  Patient Active Problem List   Diagnosis Code    Hypertension I10    Leukopenia D72.819    Lightheadedness R42    Rectal bleed K62.5    Bladder cancer (HCC) C67.9    S/P ileal conduit (HCC) Z93.6    Rectal injury S36.60XA    Pelvic mass in male R19.00    Right adrenal mass (HCC) E27.8    Bladder cancer metastasized to bone (HCC) C67.9, C79.51    Cancer associated pain G89.3    Chronic fatigue R53.82    Benign prostatic hyperplasia without urinary obstruction N40.0    Small bowel obstruction (HCC) K56.609    Lactic acidosis E87.2    KEENAN (acute kidney injury) ([facility] [address] N17.9       Isolation/Infection:   Isolation            No Isolation          Patient Infection Status       Infection Onset Added Last Indicated Last Indicated By Review Planned Expiration Resolved Resolved By    None active    Resolved    COVID-19 Rule Out 02/18/21 02/18/21 02/18/21 SARS-CoV-2 NAAT (Rapid) (Ordered)   02/18/21 Rule-Out Test Resulted            Nurse Assessment:  Last Vital Signs: BP (!) 146/53   Pulse 84   Temp 98.1 °F (36.7 °C) (Oral)   Resp 17   Ht 5' 9\" (1.753 m)   Wt 165 lb 2 oz (74.9 kg)   SpO2 94%   BMI 24.38 kg/m²     Last documented pain score (0-10 scale): Pain Level: 0  Last Weight:   Wt Readings from Last 1 Encounters:   03/02/21 165 lb 2 oz (74.9 kg)     Mental Status:  oriented and alert    IV Access:  - None    Nursing Mobility/ADLs:  Walking   Dependent  Transfer  Dependent  Bathing  Dependent  Dressing  Dependent  Toileting  Dependent  Feeding  Dependent  Med Admin  Dependent  Med Delivery   prefers mixed with applesauce    Wound Care Documentation and Therapy:  Incision 06/06/17 Chest Right;Upper (Active)   Number of days: 9135        Elimination:  Continence:   · Bowel: No  · Bladder: No  Urinary Catheter: None   Colostomy/Ileostomy/Ileal Conduit: Yes  Urostomy Ileal conduit RLQ-Stomal Appliance: 1 piece  Urostomy Ileal conduit RLQ-Stoma  Assessment: Moist, Red  Urostomy Ileal conduit RLQ-Mucocutaneous Junction: Intact  Urostomy Ileal conduit RLQ-Peristomal Assessment: Clean, Intact  Urostomy Ileal conduit RLQ-Treatment: Bag change, Site care, Liquid skin barrier    Date of Last BM: 3/9/21    Intake/Output Summary (Last 24 hours) at 3/2/2021 1244  Last data filed at 3/2/2021 0909  Gross per 24 hour   Intake 420 ml   Output 800 ml   Net -380 ml     I/O last 3 completed shifts: In: 120 [P.O.:120]  Out: 550 [Urine:550]    Safety Concerns: At Risk for Falls    Impairments/Disabilities:      None    Nutrition Therapy:  Current Nutrition Therapy:   - Oral Diet:  General    Routes of Feeding: Oral  Liquids: No Restrictions  Daily Fluid Restriction: no  Last Modified Barium Swallow with Video (Video Swallowing Test): not done    Treatments at the Time of Hospital Discharge:   Respiratory Treatments: none  Oxygen Therapy:  is not on home oxygen therapy. Ventilator:    - No ventilator support    Rehab Therapies: Physical Therapy and Occupational Therapy  Weight Bearing Status/Restrictions: non weight bearing right wrist  Other Medical Equipment (for information only, NOT a DME order):  wheelchair and walker  Other Treatments: none    Patient's personal belongings (please select all that are sent with patient):  Glasses    RN SIGNATURE:  Electronically signed by Nicol Cuellar RN on 3/9/21 at 4:05 PM EST    CASE MANAGEMENT/SOCIAL WORK SECTION    Inpatient Status Date: 2/18/21    Readmission Risk Assessment Score:  Readmission Risk              Risk of Unplanned Readmission:        26           Discharging to Facility/ Agency   · Name: [facility] [place] [place]  · Address: [address] [place] [address]  · Phone:[phone]  · Fax:[phone]    / signature: Electronically signed by GRANT Marvin on 3/9/21 at 12:50 PM EST    PHYSICIAN SECTION    Prognosis: Fair    Condition at Discharge: Stable    Rehab Potential (if transferring to Rehab):  Fair    Recommended Labs or Other Treatments After Discharge:

## 2021-03-02 NOTE — PLAN OF CARE
Problem: Falls - Risk of:  Goal: Will remain free from falls  Description: Will remain free from falls  Outcome: Ongoing  Note: Fall risk assessment completed. Fall precautions in place. Call light within reach. Pt educated on calling for assistance before getting up. Walkway free of clutter. Will continue to monitor. Goal: Absence of physical injury  Description: Absence of physical injury  Outcome: Ongoing  Note: Pt is free of injury. No injury noted. Fall precautions in place. Call light within reach. Will monitor. Problem: Confusion - Acute:  Goal: Absence of continued neurological deterioration signs and symptoms  Description: Absence of continued neurological deterioration signs and symptoms  Outcome: Ongoing  Goal: Mental status will be restored to baseline  Description: Mental status will be restored to baseline  Outcome: Ongoing     Problem: Discharge Planning:  Goal: Ability to perform activities of daily living will improve  Description: Ability to perform activities of daily living will improve  Outcome: Ongoing  Goal: Participates in care planning  Description: Participates in care planning  Outcome: Ongoing     Problem: Injury - Risk of, Physical Injury:  Goal: Will remain free from falls  Description: Will remain free from falls  Outcome: Ongoing  Note: Fall risk assessment completed. Fall precautions in place. Call light within reach. Pt educated on calling for assistance before getting up. Walkway free of clutter. Will continue to monitor. Goal: Absence of physical injury  Description: Absence of physical injury  Outcome: Ongoing  Note: Pt is free of injury. No injury noted. Fall precautions in place. Call light within reach. Will monitor.        Problem: Mood - Altered:  Goal: Mood stable  Description: Mood stable  Outcome: Ongoing  Goal: Absence of abusive behavior  Description: Absence of abusive behavior  Outcome: Ongoing  Goal: Verbalizations of feeling emotionally comfortable while being cared for will increase  Description: Verbalizations of feeling emotionally comfortable while being cared for will increase  Outcome: Ongoing     Problem: Psychomotor Activity - Altered:  Goal: Absence of psychomotor disturbance signs and symptoms  Description: Absence of psychomotor disturbance signs and symptoms  Outcome: Ongoing     Problem: Sensory Perception - Impaired:  Goal: Demonstrations of improved sensory functioning will increase  Description: Demonstrations of improved sensory functioning will increase  Outcome: Ongoing  Goal: Decrease in sensory misperception frequency  Description: Decrease in sensory misperception frequency  Outcome: Ongoing  Goal: Able to refrain from responding to false sensory perceptions  Description: Able to refrain from responding to false sensory perceptions  Outcome: Ongoing  Goal: Demonstrates accurate environmental perceptions  Description: Demonstrates accurate environmental perceptions  Outcome: Ongoing  Goal: Able to distinguish between reality-based and nonreality-based thinking  Description: Able to distinguish between reality-based and nonreality-based thinking  Outcome: Ongoing  Goal: Able to interrupt nonreality-based thinking  Description: Able to interrupt nonreality-based thinking  Outcome: Ongoing     Problem: Sleep Pattern Disturbance:  Goal: Appears well-rested  Description: Appears well-rested  Outcome: Ongoing     Problem: Skin Integrity:  Goal: Will show no infection signs and symptoms  Description: Will show no infection signs and symptoms  Outcome: Ongoing  Goal: Absence of new skin breakdown  Description: Absence of new skin breakdown  Outcome: Ongoing     Problem:  Bowel/Gastric:  Goal: Control of bowel function will improve  Description: Control of bowel function will improve  Outcome: Ongoing  Goal: Ability to achieve a regular elimination pattern will improve  Description: Ability to achieve a regular elimination pattern will improve  Outcome: Ongoing     Problem: Nutritional:  Goal: Ability to follow a diet with enough fiber (20 to 30 grams) for normal bowel function will improve  Description: Ability to follow a diet with enough fiber (20 to 30 grams) for normal bowel function will improve  Outcome: Ongoing     Problem: Non-Violent Restraints  Goal: Removal from restraints as soon as assessed to be safe  Outcome: Ongoing  Goal: No harm/injury to patient while restraints in use  Outcome: Ongoing  Goal: Patient's dignity will be maintained  Outcome: Ongoing     Problem: Pain:  Goal: Pain level will decrease  Description: Pain level will decrease  Outcome: Ongoing  Goal: Control of acute pain  Description: Control of acute pain  Outcome: Ongoing  Goal: Control of chronic pain  Description: Control of chronic pain  Outcome: Ongoing     Problem: Nutrition  Goal: Optimal nutrition therapy  Outcome: Ongoing

## 2021-03-03 NOTE — PLAN OF CARE
Problem: Falls - Risk of:  Goal: Will remain free from falls  Description: Will remain free from falls  3/3/2021 0251 by Lea Beckford RN  Outcome: Ongoing  Note: Fall risk assessment completed . Fall precautions in place, bed/ chair alarm on, side rails 2/4 up, call light in reach, educated pt on calling for assistance when needed, room clear of clutter. Pt verbalized understanding. Problem: Falls - Risk of:  Goal: Absence of physical injury  Description: Absence of physical injury  3/3/2021 0251 by Lea Beckford RN  Outcome: Ongoing  Note: Patient remains free from physical injury. Patient educated on safety precautions. Will continue to monitor to ensure patient remains free from physical injury throughout remainder of shift. Problem: Confusion - Acute:  Goal: Absence of continued neurological deterioration signs and symptoms  Description: Absence of continued neurological deterioration signs and symptoms  3/3/2021 0251 by Lea Beckford RN  Outcome: Ongoing     Problem: Confusion - Acute:  Goal: Mental status will be restored to baseline  Description: Mental status will be restored to baseline  3/3/2021 0251 by Lea Beckford RN  Outcome: Ongoing     Problem: Confusion - Acute:  Goal: Mental status will be restored to baseline  Description: Mental status will be restored to baseline  3/3/2021 0251 by Lea Beckford RN  Outcome: Ongoing     Problem: Discharge Planning:  Goal: Ability to perform activities of daily living will improve  Description: Ability to perform activities of daily living will improve  Outcome: Ongoing     Problem: Discharge Planning:  Goal: Participates in care planning  Description: Participates in care planning  Outcome: Ongoing     Problem: Injury - Risk of, Physical Injury:  Goal: Will remain free from falls  Description: Will remain free from falls  3/3/2021 0251 by Lea Beckford RN  Outcome: Ongoing  Note: Fall risk assessment completed . Fall precautions in place, bed/ chair alarm on, side rails 2/4 up, call light in reach, educated pt on calling for assistance when needed, room clear of clutter. Pt verbalized understanding. Problem: Injury - Risk of, Physical Injury:  Goal: Absence of physical injury  Description: Absence of physical injury  3/3/2021 0251 by Kourtney Cary RN  Outcome: Ongoing  Note: Patient remains free from physical injury. Patient educated on safety precautions. Will continue to monitor to ensure patient remains free from physical injury throughout remainder of shift.        Problem: Mood - Altered:  Goal: Mood stable  Description: Mood stable  Outcome: Ongoing     Problem: Mood - Altered:  Goal: Absence of abusive behavior  Description: Absence of abusive behavior  Outcome: Ongoing     Problem: Mood - Altered:  Goal: Verbalizations of feeling emotionally comfortable while being cared for will increase  Description: Verbalizations of feeling emotionally comfortable while being cared for will increase  Outcome: Ongoing     Problem: Psychomotor Activity - Altered:  Goal: Absence of psychomotor disturbance signs and symptoms  Description: Absence of psychomotor disturbance signs and symptoms  Outcome: Ongoing     Problem: Psychomotor Activity - Altered:  Goal: Absence of psychomotor disturbance signs and symptoms  Description: Absence of psychomotor disturbance signs and symptoms  Outcome: Ongoing     Problem: Sensory Perception - Impaired:  Goal: Demonstrations of improved sensory functioning will increase  Description: Demonstrations of improved sensory functioning will increase  Outcome: Ongoing     Problem: Sensory Perception - Impaired:  Goal: Decrease in sensory misperception frequency  Description: Decrease in sensory misperception frequency  Outcome: Ongoing     Problem: Sensory Perception - Impaired:  Goal: Able to refrain from responding to false sensory perceptions  Description: Able to refrain from responding to false sensory perceptions  Outcome: Ongoing     Problem: Sensory Perception - Impaired:  Goal: Demonstrates accurate environmental perceptions  Description: Demonstrates accurate environmental perceptions  Outcome: Ongoing     Problem: Sensory Perception - Impaired:  Goal: Able to refrain from responding to false sensory perceptions  Description: Able to refrain from responding to false sensory perceptions  Outcome: Ongoing     Problem: Sensory Perception - Impaired:  Goal: Demonstrates accurate environmental perceptions  Description: Demonstrates accurate environmental perceptions  Outcome: Ongoing     Problem: Sensory Perception - Impaired:  Goal: Able to distinguish between reality-based and nonreality-based thinking  Description: Able to distinguish between reality-based and nonreality-based thinking  Outcome: Ongoing     Problem: Sensory Perception - Impaired:  Goal: Able to interrupt nonreality-based thinking  Description: Able to interrupt nonreality-based thinking  Outcome: Ongoing     Problem: Sleep Pattern Disturbance:  Goal: Appears well-rested  Description: Appears well-rested  Outcome: Ongoing     Problem: Skin Integrity:  Goal: Will show no infection signs and symptoms  Description: Will show no infection signs and symptoms  Outcome: Ongoing     Problem: Skin Integrity:  Goal: Absence of new skin breakdown  Description: Absence of new skin breakdown  Outcome: Ongoing     Problem: Bowel/Gastric:  Goal: Control of bowel function will improve  Description: Control of bowel function will improve  Outcome: Ongoing     Problem:  Bowel/Gastric:  Goal: Ability to achieve a regular elimination pattern will improve  Description: Ability to achieve a regular elimination pattern will improve  Outcome: Ongoing     Problem: Nutritional:  Goal: Ability to follow a diet with enough fiber (20 to 30 grams) for normal bowel function will improve  Description: Ability to follow a diet with enough fiber (20 to 30 grams) for normal bowel function will improve  Outcome: Ongoing     Problem: Non-Violent Restraints  Goal: Removal from restraints as soon as assessed to be safe  Outcome: Ongoing     Problem: Non-Violent Restraints  Goal: No harm/injury to patient while restraints in use  Outcome: Ongoing     Problem: Non-Violent Restraints  Goal: Patient's dignity will be maintained  Outcome: Ongoing     Problem: Pain:  Goal: Pain level will decrease  Description: Pain level will decrease  Outcome: Ongoing     Problem: Pain:  Goal: Control of acute pain  Description: Control of acute pain  Outcome: Ongoing     Problem: Pain:  Goal: Control of chronic pain  Description: Control of chronic pain  Outcome: Ongoing

## 2021-03-03 NOTE — PLAN OF CARE
Problem: Falls - Risk of:  Goal: Will remain free from falls  Description: Will remain free from falls  3/3/2021 1245 by Javier Rosa RN  Outcome: Ongoing   Will remain free from falls. Fall precautions are in place. Call light, telephone and bedside table are within reach.    Problem: Falls - Risk of:  Goal: Absence of physical injury  Description: Absence of physical injury  3/3/2021 1245 by Javier Rosa RN  Outcome: Ongoing     Problem: Confusion - Acute:  Goal: Absence of continued neurological deterioration signs and symptoms  Description: Absence of continued neurological deterioration signs and symptoms  3/3/2021 1245 by Javier Rosa RN  Outcome: Ongoing     Problem: Confusion - Acute:  Goal: Mental status will be restored to baseline  Description: Mental status will be restored to baseline  3/3/2021 1245 by Javier Rosa RN  Outcome: Ongoing     Problem: Discharge Planning:  Goal: Ability to perform activities of daily living will improve  Description: Ability to perform activities of daily living will improve  3/3/2021 1245 by Javier Rosa RN  Outcome: Ongoing     Problem: Discharge Planning:  Goal: Participates in care planning  Description: Participates in care planning  3/3/2021 1245 by Javier Rosa RN  Outcome: Ongoing     Problem: Injury - Risk of, Physical Injury:  Goal: Will remain free from falls  Description: Will remain free from falls  3/3/2021 1245 by Javier Rosa RN  Outcome: Ongoing     Problem: Injury - Risk of, Physical Injury:  Goal: Absence of physical injury  Description: Absence of physical injury  3/3/2021 1245 by Javier Rosa RN  Outcome: Ongoing     Problem: Mood - Altered:  Goal: Mood stable  Description: Mood stable  3/3/2021 1245 by Javier Rosa RN  Outcome: Ongoing     Problem: Mood - Altered:  Goal: Absence of abusive behavior  Description: Absence of abusive behavior  3/3/2021 1245 by Javier Rosa RN  Outcome: Ongoing Problem: Mood - Altered:  Goal: Verbalizations of feeling emotionally comfortable while being cared for will increase  Description: Verbalizations of feeling emotionally comfortable while being cared for will increase  3/3/2021 1245 by Jose Maria Rosales RN  Outcome: Ongoing     Problem: Psychomotor Activity - Altered:  Goal: Absence of psychomotor disturbance signs and symptoms  Description: Absence of psychomotor disturbance signs and symptoms  3/3/2021 1245 by Jose Maria Rosales RN  Outcome: Ongoing     Problem: Sensory Perception - Impaired:  Goal: Demonstrations of improved sensory functioning will increase  Description: Demonstrations of improved sensory functioning will increase  3/3/2021 1245 by Jose Maria Rosales RN  Outcome: Ongoing     Problem: Sensory Perception - Impaired:  Goal: Decrease in sensory misperception frequency  Description: Decrease in sensory misperception frequency  3/3/2021 1245 by Jose Maria Rosales RN  Outcome: Ongoing     Problem: Sensory Perception - Impaired:  Goal: Able to refrain from responding to false sensory perceptions  Description: Able to refrain from responding to false sensory perceptions  3/3/2021 1245 by Jose Maria Rosales RN  Outcome: Ongoing     Problem: Sensory Perception - Impaired:  Goal: Demonstrates accurate environmental perceptions  Description: Demonstrates accurate environmental perceptions  3/3/2021 1245 by Jose Maria Rosales RN  Outcome: Ongoing     Problem: Sensory Perception - Impaired:  Goal: Able to distinguish between reality-based and nonreality-based thinking  Description: Able to distinguish between reality-based and nonreality-based thinking  3/3/2021 1245 by Jose Maria Rosales RN  Outcome: Ongoing     Problem: Sleep Pattern Disturbance:  Goal: Appears well-rested  Description: Appears well-rested  3/3/2021 1245 by Jose Maria Rosales RN  Outcome: Ongoing     Problem: Skin Integrity:  Goal: Will show no infection signs and symptoms  Description: Will show no infection signs and symptoms  3/3/2021 1245 by Damaris Baker RN  Outcome: Ongoing     Problem: Skin Integrity:  Goal: Absence of new skin breakdown  Description: Absence of new skin breakdown  3/3/2021 1245 by Damaris Baker RN  Outcome: Ongoing     Problem: Bowel/Gastric:  Goal: Control of bowel function will improve  Description: Control of bowel function will improve  3/3/2021 1245 by Damaris Baker RN  Outcome: Ongoing     Problem:  Bowel/Gastric:  Goal: Ability to achieve a regular elimination pattern will improve  Description: Ability to achieve a regular elimination pattern will improve  3/3/2021 1245 by Damaris Baker RN  Outcome: Ongoing     Problem: Nutritional:  Goal: Ability to follow a diet with enough fiber (20 to 30 grams) for normal bowel function will improve  Description: Ability to follow a diet with enough fiber (20 to 30 grams) for normal bowel function will improve  3/3/2021 1245 by Damaris Baker RN  Outcome: Ongoing

## 2021-03-03 NOTE — PROGRESS NOTES
Hospitalist Progress Note      PCP: Jerry Lewis MD    Date of Admission: 2/18/2021    Chief Complaint: [address] Course:  78 y. o. male who we are asked to see/evaluate by Carlitos Hunter MD for medical management of agitation with baseline dementia. The pt was placed in four point restraints this morning due to agitation and attempting to kick/hit a therapist. He is not currently agitated, but will not allow anyone to assist or clean him up. Pt cannot answer the time, place, age or name. He is mumbling and unable to verbalize coherent responses. Hx of dementia being treated with Aricept. Family states this is far from his baseline though.     Pt has an ileal conduit due to known bladder cancer. UA was abnormal and culture positive for E. coli and on Rocephin. Subjective: Patient seen and examined. Patient with no acute issues overnight and was tolerating being out of restraints without issue. Rapid response was called due to the patient being unresponsive after my initial visit. He was awakening to sternal rub. Work-up negative for CT head, chest x-ray, ABG. Patient was agitated during the rapid response so restraints were placed back on the patient. At this point the patient needs a psychiatry consult for further management of his continued delirium and/or agitated state.        Medications:  Reviewed    Infusion Medications    dextrose 5% and 0.2% NaCl with KCl 20 mEq 80 mL/hr at 03/02/21 6253     Scheduled Medications    risperiDONE  1 mg Oral BID    magnesium oxide  400 mg Oral BID    cefTRIAXone (ROCEPHIN) IV  1,000 mg Intravenous Q24H    metroNIDAZOLE  500 mg Intravenous Q8H    potassium chloride  20 mEq Oral Once    ziprasidone  20 mg Intramuscular Once    And    sterile water  1.2 mL Intramuscular Once    polyethylene glycol  17 g Oral Daily    donepezil  5 mg Oral Nightly    sodium chloride flush  10 mL Intravenous 2 times per day    heparin (porcine)  5,000 Units Subcutaneous 3 times per day    famotidine (PEPCID) injection  20 mg Intravenous Daily     PRN Meds: haloperidol lactate, QUEtiapine, bisacodyl, sodium chloride flush, oxyCODONE **OR** oxyCODONE, morphine **OR** morphine, acetaminophen, ondansetron      Intake/Output Summary (Last 24 hours) at 3/3/2021 1312  Last data filed at 3/3/2021 1253  Gross per 24 hour   Intake 800 ml   Output 1050 ml   Net -250 ml       Physical Exam Performed:    /83   Pulse 80   Temp 97.6 °F (36.4 °C) (Oral)   Resp 14   Ht 5' 9\" (1.753 m)   Wt 165 lb 2 oz (74.9 kg)   SpO2 100%   BMI 24.38 kg/m²     General appearance: No apparent distress, appears stated age and cooperative. HEENT: Pupils equal, round, and reactive to light. Conjunctivae/corneas clear. Neck: Supple, with full range of motion. No jugular venous distention. Trachea midline. Respiratory:  Normal respiratory effort. Clear to auscultation, bilaterally without Rales/Wheezes/Rhonchi. Cardiovascular: Regular rate and rhythm with normal S1/S2 without murmurs  Abdomen: Soft, non-tender, non-distended with normal bowel sounds. Musculoskeletal: No clubbing, cyanosis or edema bilaterally. Full range of motion without deformity. Skin: Skin color, texture, turgor normal.  No rashes or lesions. Neurologic:  Neurovascularly intact without any focal sensory/motor deficits.  Cranial nerves: II-XII intact, grossly non-focal.  Psychiatric: Alert and oriented, thought content and insight poor, mood labile  Capillary Refill: Brisk,< 3 seconds   Peripheral Pulses: +2 palpable, equal bilaterally       Labs:   Recent Labs     03/01/21  0545 03/02/21  0530 03/03/21  1047 03/03/21  1054   WBC 7.0 8.5  --   --    HGB 8.5* 9.0* 9.2* 9.5*   HCT 25.6* 26.5*  --   --     377  --   --      Recent Labs     03/01/21  0545 03/02/21  0530 03/03/21  0725    143 139   K 4.3 3.9 4.3    110 107   CO2 22 21 23   BUN 15 13 10   CREATININE 1.9* 1.8* 1.6*   CALCIUM 8.3 8.4 8.4 obstruction  S/P exploratory laparotomy w/ bowel resection   General Surgery managing  General diet  Surgery 2/18/2021     Acute on Chronic encephalopathy  Known Alzheimer dementia - continue Aricept  Hx of poor response to anesthesia   Possible UTI - being treated w/ Rochepin: Finished 5 days on March 7  Slowly improving  Continue Risperidone 1 mg BID  Add on Haldol every 6 as needed  Consult psychiatry for further intervention     KEENAN  Cr stable at 1.8  Continue IV fluid resuscitation   At baseline     Sepsis  Elevated lactic acid   Continue IV fluids and Abx  Resolved    DVT Prophylaxis: Heparin  Diet: DIET GENERAL;  Dietary Nutrition Supplements: Diabetic Oral Supplement  Code Status: Full Code     PT/OT Eval Status: 3-5 sessions a week     Dispo - inpatient    Brice Rios MD

## 2021-03-03 NOTE — PROGRESS NOTES
Patient was working with therapy when this RN was called to patient's room. Patient was on the stedy with the assist of two therapists at the sink. Patient laid head down on the stedy with eyes closed. Patient assisted back to bed. VSS taken. Patient's SpO2 was 75% on room air. Patient placed on  nonrebreather 15 L and oxygen was 88%. Charge RN, Jamee Quiñones was called to patient's room by Janice Cowan occupational therapist. Patient was not responding to name and eyes remained closed. Rapid called by Jamee Quiñones RN. This RN did sternal rub and patient opened eyes, but not responding. Rapid response team came to patient's room.  Electronically signed by Jyoti Gooden RN on 3/3/2021 at 2:27 PM

## 2021-03-03 NOTE — CODE DOCUMENTATION
Paged the calling service to inform the patient's hospitalist of the patient's RR. Phone number given for the floor nurse. Patient is in restraints currently because he was trying to get out of bed.

## 2021-03-03 NOTE — PROGRESS NOTES
[facility] [phone]                                     Daily Progress Note                                                         Pt Name: Carine Del Toro Sr  Medical Record Number: [id number]  Date of Birth 1940   Today's Date: 3/3/2021    ASSESSMENT/PLAN  SBO  - (2/18) S/p Exploratory laparotomy, bowel resection x 1 for ischemic bowel  -+flatulence and BMs  -Still with confusion this AM.    -diet as able. OK for family to bring in food  -continue abx for wound infection, UTI.  -Lower abdominal wound site healing well. No purulent drainage and surrounding erythema nearly resolved.   -Has been out of restraints since yesterday  -Possible D/C later today. Delirium  -with baseline dementia  -appreciate hospitalist assistance with care    Cata Acuna is in bed  [facility] working. Denies nausea. OBJECTIVE  VITALS:  height is 5' 9\" (1.753 m) and weight is 165 lb 2 oz (74.9 kg). His oral temperature is 97.6 °F (36.4 °C). His blood pressure is 154/94 (abnormal) and his pulse is 80. His respiration is 14 and oxygen saturation is 97%. INTAKE/OUTPUT:      Intake/Output Summary (Last 24 hours) at 3/3/2021 1007  Last data filed at 3/3/2021 0942  Gross per 24 hour   Intake 560 ml   Output 1050 ml   Net -490 ml     GENERAL: Some confusion this AM  ABDOMEN: Soft, appropriately tender, non-distended. EXTREMITY: no cyanosis, clubbing or edema    I/O last 3 completed shifts:   In: 620 [P.O.:620]  Out: 1200 [Urine:1200]    LABS  Recent Labs     03/02/21  0530 03/03/21  0725   WBC 8.5  --    HGB 9.0*  --    HCT 26.5*  --      --     139   K 3.9 4.3    107   CO2 21 23   BUN 13 10   CREATININE 1.8* 1.6*   PHOS  --  3.0   CALCIUM 8.4 8.4       Electronically signed by Ravi Ventura PA-C on 3/3/2021 at 10:07 AM        Surgery Staff  I have examined this patient and read and agree with the note by Nadya Ingram PA-C from today.  Out of restraints. Tolerating some PO  Wound clean. D/C when arrangements made with [facility].   D/W Daughter Joseline Cline      Electronically signed by Thony Hylton MD on 3/3/2021 at 10:51 AM

## 2021-03-03 NOTE — CARE COORDINATION
REQUEST FOR SKILLED FACILITY AUTHORIZATION SUBMITTED TO [employer]:    Patient name:  Lindsey Bryan Sr    Current Location: [facility]    Admit date to hospital: 2/18/2021    Requested Setting:  SNf-- facility TBD    Anticipated Admit Date to SNF Level of Care:      ORDERING MD: Dr Speedy Cruiel     NPI NUMBER: [id number]    Electronically signed by Aneta Link on 3/3/2021 at 4:21 PM

## 2021-03-03 NOTE — PROGRESS NOTES
patient specific details. If patient discharges prior to next session this note will serve as a discharge summary. Please see below for the latest assessment towards goals. Specific instructions for Next Treatment: cotx with OT  Prognosis: Guarded; Fair  Barriers to Learning: cognition/some agitation  REQUIRES PT FOLLOW UP: Yes  Activity Tolerance  Activity Tolerance: Patient limited by endurance; Patient limited by cognitive status  Activity Tolerance: Limited by change in medical status this date/rapid response called. Patient Diagnosis(es): The primary encounter diagnosis was Small bowel obstruction ([address] Diagnoses of Status post ileal conduit ([address] Urinary obstruction, Acute renal failure, unspecified acute renal failure type ([address] Hyperkalemia, SIRS (systemic inflammatory response syndrome) ([address] Dementia without behavioral disturbance, unspecified dementia type ([address] and Pityriasis rosea were also pertinent to this visit. has a past medical history of Cancer ([address] GASTRIC ULCER, Hypertension, and Leukopenia. has a past surgical history that includes Colonoscopy; Cystoscopy; other surgical history (12/14/2016); Tunneled venous port placement (Right, 06/06/2017); and laparotomy (N/A, 2/18/2021). Restrictions  Restrictions/Precautions  Restrictions/Precautions: Fall Risk  Position Activity Restriction  Other position/activity restrictions: general diet; urostomy bag, central line/port, telesitter  Subjective   General  Additional Pertinent Hx: Per Urology H&P, \" Alida Cook is a 78 y.o. who underwent radical cyst[facility]. Patient subsequently developed metastatic disease and been under Dr. Jenny Casillas care. Currently on a check point inhibitor. He was emergently hospitalized last evening with abdominal pain and a CT scan that showed mild bilateral hydroureteronephrosis and potential dead bowel.   At surgery he was found to have a closed loop small bowel obstruction with a portion of ischemic bowel that was successfully excised. 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 Since surgery, his urine output has picked up nicely but his creatinine has remained elevated at 2.5\"   On 2/18/21, pt underwent \"Exploratory laparotomy, bowel resection for ischemic bowel\". Response To Previous Treatment: Patient unable to report, no changes reported from family or staff  Family / Caregiver Present: No  Subjective  Subjective: PT arrived with OT in room, pt awake and alret, asking for something to use to clean up the table!!.  Therapy assisted pt, and pt agreed to sitting up to get to table. Orientation  Orientation  Orientation Level: Oriented to person;Disoriented to place; Disoriented to time;Disoriented to situation     Objective   Bed mobility  Rolling to Left: 2 Person assistance  Rolling to Right: 2 Person assistance  Supine to Sit: Minimal assistance(to come to eob,)  Sit to Supine: 2 Person assistance  Comment: THerapy intitally assited pt up to eob and Stedy, however, as session progressed pt with becomes less responsive and ended up needing 2 person assist for return back into bed/all repositioning  Transfers  Sit to Stand: Minimal Assistance;2 Person Assistance  Stand to sit: 2 Person Assistance;Minimal Assistance  Bed to Chair: Unable to assess  Stand Pivot Transfers: Unable to assess  Comment: Several sit<>stands with Stedy support and Min A x 1 with cues for use. Pt able to intially  Stedy x 1-2 minutes each stance, with brief seated rests at times. In stand, pt needed Min A for balance, and was able to wipe off the table top. After, this, pt wanted to wash the disches at the sink. Pt was taken to the sink area where after sit<>stand 2 times, pt leaned his head onto the [place] and became more unresonsive.   Nursing was there to assist therapists, taking pt back to bed, and assisted pt into supine. .  Nursing then called Rapid Response. After assited into supine, pt left with nursing/rapid team for care. Ambulation  Ambulation?: No     Balance  Sitting - Static: Good;-  Sitting - Dynamic: Good;-  Comments: CGA for sitting balance EOB; Erin for standing balance in jaya [place]--A x 2 at times due to poor safety and impulsive. AM-PAC Score  AM-PAC Inpatient Mobility Raw Score : 12 (03/03/21 1130)  AM-PAC Inpatient T-Scale Score : 35.33 (03/03/21 1130)  Mobility Inpatient CMS 0-100% Score: 68.66 (03/03/21 1130)  Mobility Inpatient CMS G-Code Modifier : CL (03/03/21 1130)     Goals  Short term goals  Time Frame for Short term goals: by acute d/c--goals ongoing as of 3/3/21 with limited progress. Short term goal 1: bed mobility Min A x 2 met: new goal min A x 1  Short term goal 2: sit<>stand trasnfers Min A x 1  Short term goal 3: assess ambulation with LRAD if/as needed x 25 ft with Min A  Long term goals  Time Frame for Long term goals : tbd if/as needed. Patient Goals   Patient goals : pt unable to formulate a therapy goal at this time. Plan    Plan  Times per week: 3-5 x wk in acute setting  Specific instructions for Next Treatment: cotx with OT  Current Treatment Recommendations: Functional Mobility Training  Safety Devices  Type of devices:  All fall risk precautions in place, Call light within reach, Nurse notified, Gait belt, Bed alarm in place, Left in bed  Restraints  Initially in place: Yes  Restraints: pt in restraints at beginning of session but cooperative throughout session and RN Laurence Gill ok with keeping restraints off at end of session   Therapy Time   Individual Concurrent Group Co-treatment   Time In 1020         Time Out 1115         Minutes [address] Electronically signed by Luis Shane PT on 3/3/2021 at 11:31 AM

## 2021-03-03 NOTE — PROGRESS NOTES
Nephrology Progress Note   [email or web address]      This patient is a 78year old male whom we are following for KEENAN. Subjective: The patient was seen and examined. Had a rapid response called this AM for being unresponsive and low O2 sat Placed on NRB   Awake alert now on 4 L O2    Still confused but able to tell me he is in a hospital in [place] . BP stable. Oral intake remains low  Family History: no family at bedside  ROS: No fever or chills      Vitals:  /83   Pulse 80   Temp 97.6 °F (36.4 °C) (Oral)   Resp 14   Ht 5' 9\" (1.753 m)   Wt 165 lb 2 oz (74.9 kg)   SpO2 100%   BMI 24.38 kg/m²   I/O last 3 completed shifts: In: 620 [P.O.:620]  Out: 1200 [Urine:1200]  I/O this shift: In: 480 [P.O.:480]  Out: 100 [Urine:100]    Physical Exam:  Physical Exam  Vitals signs reviewed. Constitutional:       General: He is not in acute distress. HENT:      Head: Normocephalic and atraumatic. Eyes:      General: No scleral icterus. Conjunctiva/sclera: Conjunctivae normal.   Cardiovascular:      Rate and Rhythm: Normal rate. Heart sounds: No friction rub. Pulmonary:      Effort: Pulmonary effort is normal. No respiratory distress. Breath sounds: No wheezing. Abdominal:      General: Bowel sounds are normal. There is no distension. Tenderness: There is no abdominal tenderness. Musculoskeletal:      Right lower leg: No edema. Left lower leg: No edema. Neurological:      Mental Status: He is alert.            Medications:   risperiDONE  1 mg Oral BID    magnesium oxide  400 mg Oral BID    cefTRIAXone (ROCEPHIN) IV  1,000 mg Intravenous Q24H    metroNIDAZOLE  500 mg Intravenous Q8H    potassium chloride  20 mEq Oral Once    ziprasidone  20 mg Intramuscular Once    And    sterile water  1.2 mL Intramuscular Once    polyethylene glycol  17 g Oral Daily    donepezil  5 mg Oral Nightly    sodium chloride flush  10 mL Intravenous 2 times per day    heparin (porcine) 5,000 Units Subcutaneous 3 times per day    famotidine (PEPCID) injection  20 mg Intravenous Daily         Labs:  Recent Labs     03/01/21  0545 03/02/21  0530 03/03/21  1047 03/03/21  1054   WBC 7.0 8.5  --   --    HGB 8.5* 9.0* 9.2* 9.5*   HCT 25.6* 26.5*  --   --    MCV 93.1 92.9  --   --     377  --   --      Recent Labs     03/01/21  0545 03/02/21  0530 03/03/21  0725    143 139   K 4.3 3.9 4.3    110 107   CO2 22 21 23   GLUCOSE 132* 136* 142*   PHOS  --   --  3.0   BUN 15 13 10   CREATININE 1.9* 1.8* 1.6*   LABGLOM 34* 36* 41*   GFRAA 41* 44* 50*           Assessment/Plan:    1) KEENAN on CKD: non-oliguric              - baseline 09/10/19 Cr 1.8              - ATN              - renal function improved at baseline now              -Cr 1.6 mg today.      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 Lindsay Madrigal     4) FEN:  Hypernatremia Na+ improved to 139  meq Will stop IVF's and monitor ENcourage po fLUIDS  Hypokalemia: improved with replacement.     5) dementia Un responsive episode this AM CT head unremarkable         Ben Polo MD  3/3/2021  [facility]

## 2021-03-03 NOTE — PROGRESS NOTES
Responded to Rapid Response call; found patient to be on NRB with SpO2=88% but hands were cold and there was no waveform; ABG drawn and run on istat; Dr. Jason Yanes ordered continuous pulse ox with ear probe to monitor; placed on patient at 1100.

## 2021-03-03 NOTE — PROGRESS NOTES
Progress Note/rapid response note  Admit Date: 2/18/2021      PCP: Tammy Saede MD     CC: Good response called for altered mental status    Days in hospital:  13    SUBJECTIVE / Interval History:  Patient lethargic minimally responsive to sternal rub        Allergies  Patient has no known allergies. Medications    Scheduled Meds:   risperiDONE  1 mg Oral BID    magnesium oxide  400 mg Oral BID    cefTRIAXone (ROCEPHIN) IV  1,000 mg Intravenous Q24H    metroNIDAZOLE  500 mg Intravenous Q8H    potassium chloride  20 mEq Oral Once    ziprasidone  20 mg Intramuscular Once    And    sterile water  1.2 mL Intramuscular Once    polyethylene glycol  17 g Oral Daily    donepezil  5 mg Oral Nightly    sodium chloride flush  10 mL Intravenous 2 times per day    heparin (porcine)  5,000 Units Subcutaneous 3 times per day    famotidine (PEPCID) injection  20 mg Intravenous Daily     Continuous Infusions:   dextrose 5% and 0.2% NaCl with KCl 20 mEq 80 mL/hr at 03/02/21 2145       PRN Meds:  haloperidol lactate, QUEtiapine, bisacodyl, sodium chloride flush, oxyCODONE **OR** oxyCODONE, morphine **OR** morphine, acetaminophen, ondansetron    Vitals    /83   Pulse 80   Temp 97.6 °F (36.4 °C) (Oral)   Resp 14   Ht 5' 9\" (1.753 m)   Wt 165 lb 2 oz (74.9 kg)   SpO2 100%   BMI 24.38 kg/m²     Exam:    Gen: No distress. Eyes: PERRL. No sclera icterus. No conjunctival injection. ENT: No discharge. Pharynx clear. External appearance of ears and nose normal.  Neck: Trachea midline. No obvious mass. Resp: No accessory muscle use. No crackles. No wheezes. No rhonchi. No dullness on percussion. CV: Regular rate. Regular rhythm. No murmur or rub. No edema. GI: Non-tender. Non-distended. No hernia. Skin: Warm, dry, normal texture and turgor. No nodule on exposed extremities. Lymph: No cervical LAD. No supraclavicular LAD. M/S: No cyanosis. No clubbing. No joint deformity.     Neuro: Unable to assess due to altered mental status  Psych: Unable to assess due to altered mental status    Data    LABS  CBC:   Recent Labs     03/01/21  0545 03/02/21  0530 03/03/21  1047 03/03/21  1054   WBC 7.0 8.5  --   --    HGB 8.5* 9.0* 9.2* 9.5*   HCT 25.6* 26.5*  --   --    MCV 93.1 92.9  --   --     377  --   --      BMP:   Recent Labs     03/01/21  0545 03/02/21  0530 03/03/21  0725    143 139   K 4.3 3.9 4.3    110 107   CO2 22 21 23   PHOS  --   --  3.0   BUN 15 13 10   CREATININE 1.9* 1.8* 1.6*   GLUCOSE 132* 136* 142*     POC GLUCOSE:    Recent Labs     03/03/21  1045 03/03/21  1047 03/03/21  1054   POCGLU 124* 140* 134*     LIVER PROFILE:   Recent Labs     03/03/21  0725   LABALBU 2.5*     PT/INR: No results for input(s): PROTIME, INR in the last 72 hours. APTT: No results for input(s): APTT in the last 72 hours. UA:No results for input(s): NITRITE, COLORU, PHUR, LABCAST, WBCUA, RBCUA, MUCUS, TRICHOMONAS, YEAST, BACTERIA, CLARITYU, SPECGRAV, LEUKOCYTESUR, UROBILINOGEN, BILIRUBINUR, BLOODU, GLUCOSEU, KETUA, AMORPHOUS in the last 72 hours. Microbiology:  Wound Culture: No results for input(s): WNDABS, ORG in the last 72 hours. Invalid input(s):  LABGRAM  Nasal Culture: No results for input(s): ORG, MRSAPCR in the last 72 hours. Blood Culture: No results for input(s): BC, BLOODCULT2 in the last 72 hours. Fungal Culture:   No results for input(s): FUNGSM in the last 72 hours. No results for input(s): FUNCXBLD in the last 72 hours. CSF Culture:  No results for input(s): COLORCSF, APPEARCSF, CFTUBE, CLOTCSF, WBCCSF, RBCCSF, NEUTCSF, NUMCELLSCSF, LYMPHSCSF, MONOCSF, GLUCCSF, VOLCSF in the last 72 hours. Respiratory Culture:  No results for input(s): iRsa Ferrari in the last 72 hours. AFB:No results for input(s): AFBSMEAR in the last 72 hours. Urine Culture  No results for input(s): LABURIN in the last 72 hours.     RADIOLOGY:    CT HEAD WO CONTRAST   Final Result      XR CHEST PORTABLE Final Result   No evidence of acute cardiopulmonary disease. Improved appearance the chest   with clearing of the previous vascular congestion, and no evidence of   pulmonary edema at this time. XR ABDOMEN (KUB) (SINGLE AP VIEW)   Final Result   No evidence of a persistent small-bowel obstruction. XR CHEST PORTABLE   Final Result   1. Interval advancement of the patient's NG tube, now in satisfactory   position within the gastric body. 2. Findings suggestive of mild CHF. XR CHEST PORTABLE   Final Result   Nasogastric tube tip just below the GE junction. Recommend advancing the   tube 10-15 cm and obtaining a follow-up abdomen radiograph centered on the   upper abdomen         CT CHEST ABDOMEN PELVIS WO CONTRAST   Final Result   Dilated loop of small bowel within a peristomal hernia and associated mild   hydroureter and hydronephrosis are concerning for obstruction of the ileal   conduit. Additionally, there are several loops of small bowel in the right   hemiabdomen which are fluid-filled and demonstrate mild wall thickening with   significant inflammatory stranding of the mesentery which has a somewhat   swirled appearance. Findings are somewhat concerning for an internal hernia. Surgical evaluation is suggested. XR CHEST PORTABLE   Final Result   No acute cardiopulmonary disease. CONSULTS:    IP CONSULT TO GENERAL SURGERY  IP CONSULT TO UROLOGY  IP CONSULT TO NEPHROLOGY  IP CONSULT TO HOSPITALIST  IP CONSULT TO SOCIAL WORK  IP CONSULT TO HOSPITALIST  IP CONSULT TO PSYCHIATRY    ASSESSMENT AND PLAN:      Principal Problem:    SBO (small bowel obstruction) (HCC)  Active Problems:    S/P ileal conduit (HCC)    Hypertension    Lactic acidosis    KEENAN (acute kidney injury) ([facility]  Resolved Problems:    * No resolved hospital problems.  *    Patient is 59-year-old male who was admitted with small bowel obstruction and has been agitated and delirious in the hospital. Patient has been punching people. At present has some baseline dementia. Patient was agitated earlier and was working with therapy when he suddenly became unresponsive. Was placed back in bed. Vitals at that time showed a normal pulse and blood pressure. Patient's oxygen saturation was in the 70s but the waveform was very poor. Patient got risperidone earlier during the day and Seroquel overnight. He has not received any pain medications. On exam patient moans to sternal rub and mumbles occasionally. Respiratory exam was unremarkable. His hypoxia seems most likely due to poor waveform hence an ABG was obtained which showed a normal pH and O2 sat of greater than 300. This rules out any acute hypoxic respiratory failure. Chest x-ray was also obtained which was unremarkable. CT chest was obtained to rule out any intracranial hemorrhage which was negative. Unclear what caused patient's change in mentation but he did get some risperidone. It could be a combination of lack of sleep agitation along with risperidone. Will need a close watch    Attending on chart informed        Diet: DIET GENERAL;  Dietary Nutrition Supplements: Diabetic Oral Supplement  Code Status: Full Code  Due to the immediate potential for life-threatening deterioration due to acute metabolic encephalopathy, I spent >35 minutes providing critical care. This time is excluding time spent performing procedures. The patient and / or the family were informed of the results of any tests, a time was given to answer questions, a plan was proposed and they agreed with plan. Discussed with consulting physicians, nursing and social work     The note was completed using EMR. Every effort was made to ensure accuracy; however, inadvertent computerized transcription errors may be present.        Angelika Salazar MD

## 2021-03-03 NOTE — CODE DOCUMENTATION
Patient was working with therapy and became very agitated and restless and then multiple people had to help get patient in bed and then the patient became unresponsive so RR called.

## 2021-03-03 NOTE — PROGRESS NOTES
Patient remains agitated in bed, attempting to climb out of bed. Patient educated on using call light when needing assistance with ADLs. Patient verbalized understanding, but remains confused. Call light, telephone and bedside table are within reach. Fall precautions are in place. Tele camera in room. Will continue to monitor patient per unit protocols.  Electronically signed by Mirza Daley RN on 3/3/2021 at 6:54 PM

## 2021-03-03 NOTE — PROGRESS NOTES
Patient is alert to self only; disoriented to time, place and situation. Patient is resting in bed, awake and quiet. Room air. Side rails are up x3. Fall precautions are in place. Bed alarm on. Bed is in lowest position. Call light, telephone and bedside table are within reach. Telecamera in room. VSS taken. AM meds given. Shift assessment completed. Will continue to monitor patient per unit protocols.  Electronically signed by Andrse Edwards RN on 3/3/2021 at 2:19 PM

## 2021-03-03 NOTE — PROGRESS NOTES
Occupational Therapy  Facility/Department: [address] ORTHOPEDICS  Daily Treatment Note  NAME: Jenna York  : 1940  MRN: [id number]    Date of Service: 3/3/2021    Discharge Recommendations:  3-5 sessions per week, Patient would benefit from continued therapy after discharge       Jenna York scored a 11 on the AM-PAC ADL Inpatient form. Current research shows that an AM-PAC score of 17 or less is typically not associated with a discharge to the patient's home setting. Based on the patient's AM-PAC score and their current ADL deficits, it is recommended that the patient have 3-5 sessions per week of Occupational Therapy at d/c to increase the patient's independence. Please see assessment section for further patient specific details. If patient discharges prior to next session this note will serve as a discharge summary. Please see below for the latest assessment towards goals. Assessment: Discussed with OTR am pac score is 11 which indicates need for continued skilled OT to increase [place] and decrease caregiver burden. Patient initially awake and attempting to get OOB, becomes less responsive and requires RR called and total assit to return to bed. Supine to sit with Min A. Sit<>stand from EOB to jaya stedy to seat of jaya stedy with Min A. Stood for 1-2 minutes to clean table and wash drinking glasses. Patient is unable to return home at this time due to assist level requirements. Cont with POC. Patient Diagnosis(es): The primary encounter diagnosis was Small bowel obstruction ([address] Diagnoses of Status post ileal conduit ([address] Urinary obstruction, Acute renal failure, unspecified acute renal failure type ([address] Hyperkalemia, SIRS (systemic inflammatory response syndrome) ([address] Dementia without behavioral disturbance, unspecified dementia type ([address] and Pityriasis rosea were also pertinent to this visit.       has a past medical history of Cancer ([address] GASTRIC ULCER, Hypertension, and Leukopenia. has a past surgical history that includes Colonoscopy; Cystoscopy; other surgical history (12/14/2016); Tunneled venous port placement (Right, 06/06/2017); and laparotomy (N/A, 2/18/2021). Restrictions  Restrictions/Precautions  Restrictions/Precautions: Fall Risk  Position Activity Restriction  Other position/activity restrictions: general diet; urostomy bag, central line/port, telesitter  Subjective   General  Chart Reviewed: Yes  Patient assessed for rehabilitation services?: Yes  Additional Pertinent Hx: Lonny Palafox is a 78 y.o. male admitted on 2/18/2021 for abdominal pain. Patient with dementia, poor historian and information is obtained from son. Suspected abdominal pain over the last week. Denies nausea or emesis. Last BM yesterday. History bladder cancer s/p ileal conduit. No UOP until recently while in ED. Hyperkalemia and acute on CKD. \" copied per Danika Leary MD 2/18/21 note  Response to previous treatment: Patient with no complaints from previous session  Family / Caregiver Present: No  Referring Practitioner: Man Hernández APRN-CPN  Diagnosis: SBO (2/18) S/p Exploratory laparotomy, bowel resection for ischemic bowel  Subjective  Subjective: Patient attempting to get OOB. This writer arrives to room, patient awake, alert and confused. Patient states \"I want clean the table and clean up\"  General Comment  Comments: ok to see per RN.  Goes by [employer]"      Orientation  Orientation  Overall Orientation Status: Impaired  Orientation Level: Oriented to person  Objective             Balance  Sitting Balance: Contact guard assistance  Standing Balance: Minimal assistance  Standing Balance  Time: 1-2 minutes each time  Activity: Static standing in jaya My 1%dy to wash table and wash drinking glasses  Comment: Several stands  Functional Mobility  Assist Level: Dependent/Total  Functional Mobility Comments: Functional mobility completed in room per jaya martinez safety  Bed mobility  Rolling to Left: 2 Person assistance  Rolling to Right: 2 Person assistance  Supine to Sit: Minimal assistance(to come to eob,)  Sit to Supine: 2 Person assistance  Comment: THerapy intitally assited pt up to eob and Stedy, however, as session progressed pt with becomes less responsive and ended up needing 2 person assist for return back into bed/all repositioning  Transfers  Sit to stand: Minimal assistance  Stand to sit: Minimal assistance  Transfer Comments: Min A for sit<>stand from EOB to jaya stedy several times for standing tasks. Patient impulsive during transfers and standing tasks. Patient becomes less responsive and requires assist back to bed. RN in room and RR called. Cognition  Overall Cognitive Status: Exceptions  Arousal/Alertness: Inconsistent responses to stimuli  Following Commands: Follows one step commands with increased time; Follows one step commands with repetition  Attention Span: Difficulty dividing attention  Memory: Decreased long term memory;Decreased short term memory;Decreased recall of precautions;Decreased recall of recent events;Decreased recall of biographical Information  Safety Judgement: Decreased awareness of need for safety;Decreased awareness of need for assistance  Problem Solving: Decreased awareness of errors;Assistance required to implement solutions;Assistance required to generate solutions;Assistance required to correct errors made;Assistance required to identify errors made  Insights: Not aware of deficits  Initiation: Requires cues for all  Sequencing: Requires cues for all  Cognition Comment: Patient becomes less responsive during therapy session. Assessment   Performance deficits / Impairments: Decreased functional mobility ; Decreased endurance;Decreased coordination;Decreased ADL status; Decreased posture;Decreased ROM; Decreased balance;Decreased strength;Decreased safe awareness;Decreased cognition  Assessment: Discussed with OTR participate in upper body ADL tasks with min A and mod cues. Short term goal 5: Pt will stand for lower body ADL tasks/positioning with CGA with use of AD/rails. Short term goal 6: Pt will participate in functional ADL mob tasks with min A to CGA with use of AD. Long term goals  Time Frame for Long term goals : same as LTG  Patient Goals   Patient goals : Pt unable to express goal at this time.        Therapy Time   Individual Concurrent Group Co-treatment   Time In 5601         Time Out 1115         Minutes 60                 Electronically signed by Ana Chavarria, [id number] on 3/3/2021 at 11:30 AM

## 2021-03-03 NOTE — PROGRESS NOTES
Patient remains agitated in bed, attempting to climb out of bed. Patient educated on using call light when needing assistance with ADLs. Patient verbalized understanding, but remains confused. Call light, telephone and bedside table are within reach. Fall precautions are in place. Tele camera in room. Will continue to monitor patient per unit protocols.

## 2021-03-04 NOTE — PROGRESS NOTES
Nephrology Progress Note   [email or web address]      This patient is a 78year old male whom we are following for KEENAN. Subjective:  Clam and resting today   Still confused but able to tell me he is in a hospital in [address] [place] [address] . BP stable. Oral intake remains low    Family History: no family at bedside  ROS: No fever or chills      Vitals:  /67   Pulse 70   Temp 97.5 °F (36.4 °C) (Axillary)   Resp 16   Ht 5' 9\" (1.753 m)   Wt 165 lb 2 oz (74.9 kg)   SpO2 95%   BMI 24.38 kg/m²   I/O last 3 completed shifts: In: 5 [P.O.:720]  Out: 500 [Urine:500]  I/O this shift:  In: 240 [P.O.:240]  Out: 150 [Urine:150]    Physical Exam:  Physical Exam  Vitals signs reviewed. Constitutional:       General: He is not in acute distress. HENT:      Head: Normocephalic and atraumatic. Eyes:      General: No scleral icterus. Conjunctiva/sclera: Conjunctivae normal.   Cardiovascular:      Rate and Rhythm: Normal rate. Heart sounds: No friction rub. Pulmonary:      Effort: Pulmonary effort is normal. No respiratory distress. Breath sounds: No wheezing. Abdominal:      General: Bowel sounds are normal. There is no distension. Tenderness: There is no abdominal tenderness. Musculoskeletal:      Right lower leg: No edema. Left lower leg: No edema. Neurological:      Mental Status: He is alert.            Medications:   risperiDONE  1 mg Oral BID    magnesium oxide  400 mg Oral BID    cefTRIAXone (ROCEPHIN) IV  1,000 mg Intravenous Q24H    metroNIDAZOLE  500 mg Intravenous Q8H    potassium chloride  20 mEq Oral Once    ziprasidone  20 mg Intramuscular Once    And    sterile water  1.2 mL Intramuscular Once    polyethylene glycol  17 g Oral Daily    donepezil  5 mg Oral Nightly    sodium chloride flush  10 mL Intravenous 2 times per day    heparin (porcine)  5,000 Units Subcutaneous 3 times per day    famotidine (PEPCID) injection  20 mg Intravenous Daily         Labs:  Recent Labs 03/02/21  0530 03/03/21  1047 03/03/21  1054   WBC 8.5  --   --    HGB 9.0* 9.2* 9.5*   HCT 26.5*  --   --    MCV 92.9  --   --      --   --      Recent Labs     03/02/21  0530 03/03/21  0725 03/04/21  0721    139 139   K 3.9 4.3 4.2    107 109   CO2 21 23 23   GLUCOSE 136* 142* 132*   PHOS  --  3.0 3.4   BUN 13 10 11   CREATININE 1.8* 1.6* 1.7*   LABGLOM 36* 41* 39*   GFRAA 44* 50* 47*           Assessment/Plan:    1) KEENAN on CKD: non-oliguric              - baseline 09/10/19 Cr 1.8              - ATN              - renal function improved at baseline               -Cr 1.7 mg today.      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 Madhavi Mora     4) FEN:  Hypernatremia Na+ improved to 139  Meq and stable IVF's at [facility] Encouraged po fluids  Hypokalemia: improved with replacement.     5) dementia  CT head unremarkable calm today Remains confused        Radha Zaidi MD  3/4/2021  [facility]

## 2021-03-04 NOTE — PROGRESS NOTES
Pt agitated since shift change. Pulling off continuous pulse ox monitor, getting out of bed unassisted frequently. Pulled off his urostomy bag. Pt has been reoriented several times but to no avial. Bed alarm in placed. Pt given seroquel to assist with agitation but also to no avail. Pt therefore placed in bilateral wrist soft limb restraints 2254. Message sent to Ms. Rosita Cruz NP for restraint orders. Response from MARCIA Marin was: Place orders and I will sign them. Non- Violent Restraint for bilateral wrist orders placed per protocol co-sign required at 2313. Ms. Madhavi Dutton notified for signature.

## 2021-03-04 NOTE — PROGRESS NOTES
Patient is A&O to self and place; disoriented to time and situation. Patient is resting in bed with eyes closed. Patient is on 2L of O2 NC. Side rails are up x3. Fall precautions are in place. Bed alarm on. Bed is in lowest position. Call light, telephone and bedside table are within reach. Tele camera in room. VSS taken. AM meds given. Shift assessment completed. Needs met. Will continue to monitor patient per unit protocols.  Electronically signed by Keisha Pierre RN on 3/4/2021 at 10:47 AM

## 2021-03-04 NOTE — CARE COORDINATION
Received call from Home at [facility] yesterday; can admit but need to know long term plan from family. Today received precert for transfer to home at [facility] however d/c is now pending psych consult (may need to adjust dates). No restraints since this am.   Awaiting call back from Home at [facility].      Electronically signed by Price Ugarte on 3/4/2021 at 5:33 PM

## 2021-03-04 NOTE — PROGRESS NOTES
[facility] [phone]                                     Daily Progress Note                                                         Pt Name: David Hernandez Record Number: [id number]  Date of Birth 1940   Today's Date: 3/4/2021    ASSESSMENT/PLAN  SBO  - (2/18) POD #14 S/p Exploratory laparotomy, bowel resection x 1 for ischemic bowel  -+flatulence and BMs  -SS drainage distal incision-keep covered with dry gauze, paper tape. -IS, cough and deep breathe-not really being done d/t delirium  -heparin DVT prophy  -not taking in much PO d/t mentation but tolerates what he does    KEENAN  -nephro following, appreciate assistance    Hx bladder cancer, ileal conduit in place  -ripped off appliance a couple times overnight d/t delirium  -adequate urine output    Delirium  -with baseline dementia  -appreciate hospitalist assistance with care  -Was working with therapy 3/3 11 am initially doing really well, awake, more alert, then throughout his treatment became less responsive and had to be assisted back to bed. Rapid response called. CT head, CXR and ABG negative. -Medicine recommending psychiatry consult which they ordered, f/u recommendations. Kell Mc is in bed, back in bilateral wrist restraints. Bowels working. Opens eyes when abdomen touched, but not to voice or command. No distress. On 2L NC, 100% SpO2 continuous pulse ox at bedside. OBJECTIVE  VITALS:  height is 5' 9\" (1.753 m) and weight is 165 lb 2 oz (74.9 kg). His axillary temperature is 97.7 °F (36.5 °C). His blood pressure is 129/81 and his pulse is 76. His respiration is 16 and oxygen saturation is 100%. INTAKE/OUTPUT:      Intake/Output Summary (Last 24 hours) at 3/4/2021 1021  Last data filed at 3/4/2021 0952  Gross per 24 hour   Intake 720 ml   Output 550 ml   Net 170 ml     GENERAL: sleeping but awakes to touch, no acute distress.  In bilateral wrist restraints and camera sitter in room. ABDOMEN: Soft, appropriately tender, non-distended. Staples to midline incision. Distal aspect small opening with SS drainage. EXTREMITY: no cyanosis, clubbing or edema    I/O last 3 completed shifts: In: 720 [P.O.:720]  Out: 500 [Urine:500]    LABS  Recent Labs     03/02/21  0530 03/02/21  0530 03/03/21  1054 03/04/21  0721   WBC 8.5  --   --   --    HGB 9.0*   < > 9.5*  --    HCT 26.5*  --   --   --      --   --   --       < >  --  139   K 3.9   < >  --  4.2      < >  --  109   CO2 21   < >  --  23   BUN 13   < >  --  11   CREATININE 1.8*   < >  --  1.7*   PHOS  --    < >  --  3.4   CALCIUM 8.4   < >  --  8.3    < > = values in this interval not displayed.        Electronically signed by SLADE Duval CNP on 3/4/2021 at 10:21 AM

## 2021-03-04 NOTE — PLAN OF CARE
Problem: Falls - Risk of:  Goal: Will remain free from falls  Description: Will remain free from falls  Outcome: Ongoing     Problem: Confusion - Acute:  Goal: Absence of continued neurological deterioration signs and symptoms  Description: Absence of continued neurological deterioration signs and symptoms  Outcome: Ongoing     Problem: Injury - Risk of, Physical Injury:  Goal: Will remain free from falls  Description: Will remain free from falls  Outcome: Ongoing     Problem: Mood - Altered:  Goal: Mood stable  Description: Mood stable  Outcome: Ongoing     Problem: Skin Integrity:  Goal: Will show no infection signs and symptoms  Description: Will show no infection signs and symptoms  Outcome: Ongoing     Problem: Non-Violent Restraints  Goal: Removal from restraints as soon as assessed to be safe  Outcome: Ongoing  Goal: No harm/injury to patient while restraints in use  Outcome: Ongoing  Goal: Patient's dignity will be maintained  Outcome: Ongoing     Problem: Pain:  Goal: Pain level will decrease  Description: Pain level will decrease  Outcome: Ongoing

## 2021-03-04 NOTE — PROGRESS NOTES
Pts daughter, Erasmo Delgado called and made aware of placement of non violent bilateral wrist restraints. All questions answered.

## 2021-03-04 NOTE — PROGRESS NOTES
This RN received verbal order to discontinue order for non-violent bilateral wrist restraints per Dr. Penny Hollingsworth MD. Will continue to monitor patient per unit protocols.  Electronically signed by Rachael Vargas RN on 3/4/2021 at 11:39 AM

## 2021-03-04 NOTE — CARE COORDINATION
May Mer[employer] Medicare Authorization      Authorization ID  [id number]     Start date   3/4/21    Next review date  3/8/21    Fax update to   Danay Maher @[phone]      PDPM    PT/OT    NTA    SLP    NSG

## 2021-03-04 NOTE — PROGRESS NOTES
Patient is now on 2L of O2 NC sat. 95%. Patient is tolerating well and is more responsive. Fall precautions are in place. Will continue to monitor patient per unit protocols.

## 2021-03-04 NOTE — PROGRESS NOTES
Hospitalist Progress Note      PCP: Viola Roldan MD    Date of Admission: 2/18/2021    Subjective: pt cont to be on restraints, but appears very sleepy today    Medications:  Reviewed    Infusion Medications    dextrose 5% and 0.2% NaCl with KCl 20 mEq 20 mL/hr at 03/04/21 0933     Scheduled Medications    risperiDONE  1 mg Oral BID    magnesium oxide  400 mg Oral BID    cefTRIAXone (ROCEPHIN) IV  1,000 mg Intravenous Q24H    metroNIDAZOLE  500 mg Intravenous Q8H    potassium chloride  20 mEq Oral Once    ziprasidone  20 mg Intramuscular Once    And    sterile water  1.2 mL Intramuscular Once    polyethylene glycol  17 g Oral Daily    donepezil  5 mg Oral Nightly    sodium chloride flush  10 mL Intravenous 2 times per day    heparin (porcine)  5,000 Units Subcutaneous 3 times per day    famotidine (PEPCID) injection  20 mg Intravenous Daily     PRN Meds: haloperidol lactate, bisacodyl, sodium chloride flush, oxyCODONE **OR** oxyCODONE, morphine **OR** morphine, acetaminophen, ondansetron      Intake/Output Summary (Last 24 hours) at 3/4/2021 1547  Last data filed at 3/4/2021 0952  Gross per 24 hour   Intake 480 ml   Output 550 ml   Net -70 ml       Physical Exam Performed:    /67   Pulse 70   Temp 97.5 °F (36.4 °C) (Axillary)   Resp 16   Ht 5' 9\" (1.753 m)   Wt 165 lb 2 oz (74.9 kg)   SpO2 95%   BMI 24.38 kg/m²     General appearance: on restraints, lethargic  HEENT: Pupils equal, round, and reactive to light. Conjunctivae/corneas clear. Neck: Supple, with full range of motion. No jugular venous distention. Trachea midline. Respiratory:  Normal respiratory effort. Clear to auscultation, bilaterally without Rales/Wheezes/Rhonchi. Cardiovascular: Regular rate and rhythm with normal S1/S2 without murmurs  Abdomen: Soft, non-tender, non-distended with normal bowel sounds.   Musculoskeletal: No clubbing, cyanosis or edema bilaterally.  Full range of motion without peristomal hernia and associated mild   hydroureter and hydronephrosis are concerning for obstruction of the ileal   conduit. Additionally, there are several loops of small bowel in the right   hemiabdomen which are fluid-filled and demonstrate mild wall thickening with   significant inflammatory stranding of the mesentery which has a somewhat   swirled appearance. Findings are somewhat concerning for an internal hernia. Surgical evaluation is suggested. XR CHEST PORTABLE   Final Result   No acute cardiopulmonary disease.          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 [address] [N17.9]    Hypertension [I10]         Small bowel obstruction  S/P exploratory laparotomy w/ bowel resection   General Surgery managing  General diet  Surgery 2/18/2021     Acute on Chronic encephalopathy  Known Alzheimer dementia - continue Aricept  Hx of poor response to anesthesia   Possible UTI - being treated w/ Rochepin: Finish a total of 5 days   Slowly improving  Continue Risperidone 1 mg BID  Add on Haldol every 6 as needed  Consulted psychiatry for further intervention, will dc seroquel  Currently on restraints, will try off restraints today     KEENAN vs CKD  Cr stable at 1.8-->1.7 today  Continue IV fluid resuscitation   At baseline     Sepsis - Resolved  Elevated lactic acid   Continue IV fluids and Abx         DVT Prophylaxis: Heparin  Diet: DIET GENERAL;  Dietary Nutrition Supplements: Diabetic Oral Supplement  Code Status: Full Code    PT/OT Eval Status: ordered    Caryle Dibbles care Charlee Skates, MD

## 2021-03-04 NOTE — PLAN OF CARE
disturbance signs and symptoms  Outcome: Ongoing     Problem: Sensory Perception - Impaired:  Goal: Demonstrations of improved sensory functioning will increase  Description: Demonstrations of improved sensory functioning will increase  Outcome: Ongoing     Problem: Sensory Perception - Impaired:  Goal: Decrease in sensory misperception frequency  Description: Decrease in sensory misperception frequency  Outcome: Ongoing     Problem: Sensory Perception - Impaired:  Goal: Able to refrain from responding to false sensory perceptions  Description: Able to refrain from responding to false sensory perceptions  Outcome: Ongoing     Problem: Sensory Perception - Impaired:  Goal: Demonstrates accurate environmental perceptions  Description: Demonstrates accurate environmental perceptions  Outcome: Ongoing     Problem: Sensory Perception - Impaired:  Goal: Able to distinguish between reality-based and nonreality-based thinking  Description: Able to distinguish between reality-based and nonreality-based thinking  Outcome: Ongoing     Problem: Sensory Perception - Impaired:  Goal: Able to interrupt nonreality-based thinking  Description: Able to interrupt nonreality-based thinking  Outcome: Ongoing     Problem: Sleep Pattern Disturbance:  Goal: Appears well-rested  Description: Appears well-rested  Outcome: Ongoing

## 2021-03-04 NOTE — CONSULTS
 Leukopenia       Principal Problem:    SBO (small bowel obstruction) ([facility])  Active Problems:    S/P ileal conduit ([facility])    Hypertension    Lactic acidosis    KEENAN (acute kidney injury) ([facility] [address]  Resolved Problems:    * No resolved hospital problems. *       [place] IV  Medical issues       risperiDONE  1 mg Oral BID    magnesium oxide  400 mg Oral BID    cefTRIAXone (ROCEPHIN) IV  1,000 mg Intravenous Q24H    metroNIDAZOLE  500 mg Intravenous Q8H    potassium chloride  20 mEq Oral Once    ziprasidone  20 mg Intramuscular Once    And    sterile water  1.2 mL Intramuscular Once    polyethylene glycol  17 g Oral Daily    donepezil  5 mg Oral Nightly    sodium chloride flush  10 mL Intravenous 2 times per day    heparin (porcine)  5,000 Units Subcutaneous 3 times per day    famotidine (PEPCID) injection  20 mg Intravenous Daily     haloperidol lactate, QUEtiapine, bisacodyl, sodium chloride flush, oxyCODONE **OR** oxyCODONE, morphine **OR** morphine, acetaminophen, ondansetron    Examination  Review of Systems - neg but for pain.     Recent Results (from the past 168 hour(s))   CBC Auto Differential    Collection Time: 02/26/21  5:35 AM   Result Value Ref Range    WBC 9.6 4.0 - 11.0 K/uL    RBC 2.76 (L) 4.20 - 5.90 M/uL    Hemoglobin 8.5 (L) 13.5 - 17.5 g/dL    Hematocrit 25.7 (L) 40.5 - 52.5 %    MCV 93.4 80.0 - 100.0 fL    MCH 31.0 26.0 - 34.0 pg    MCHC 33.2 31.0 - 36.0 g/dL    RDW 14.6 12.4 - 15.4 %    Platelets 062 538 - 693 K/uL    MPV 9.2 5.0 - 10.5 fL    Neutrophils % 69.9 %    Lymphocytes % 18.9 %    Monocytes % 7.4 %    Eosinophils % 3.0 %    Basophils % 0.8 %    Neutrophils Absolute 6.7 1.7 - 7.7 K/uL    Lymphocytes Absolute 1.8 1.0 - 5.1 K/uL    Monocytes Absolute 0.7 0.0 - 1.3 K/uL    Eosinophils Absolute 0.3 0.0 - 0.6 K/uL    Basophils Absolute 0.1 0.0 - 0.2 K/uL   Basic Metabolic Panel w/ Reflex to MG    Collection Time: 02/26/21  5:35 AM   Result Value Ref Range    Sodium 146 (H) 136 - 145 mmol/L Potassium reflex Magnesium 3.7 3.5 - 5.1 mmol/L    Chloride 113 (H) 99 - 110 mmol/L    CO2 25 21 - 32 mmol/L    Anion Gap 8 3 - 16    Glucose 153 (H) 70 - 99 mg/dL    BUN 17 7 - 20 mg/dL    CREATININE 1.7 (H) 0.8 - 1.3 mg/dL    GFR Non-African American 39 (A) >60    GFR  47 (A) >60    Calcium 8.1 (L) 8.3 - 10.6 mg/dL   CBC Auto Differential    Collection Time: 02/27/21  5:05 AM   Result Value Ref Range    WBC 8.3 4.0 - 11.0 K/uL    RBC 2.74 (L) 4.20 - 5.90 M/uL    Hemoglobin 8.4 (L) 13.5 - 17.5 g/dL    Hematocrit 25.5 (L) 40.5 - 52.5 %    MCV 93.1 80.0 - 100.0 fL    MCH 30.7 26.0 - 34.0 pg    MCHC 33.0 31.0 - 36.0 g/dL    RDW 15.1 12.4 - 15.4 %    Platelets 450 988 - 709 K/uL    MPV 8.8 5.0 - 10.5 fL    Neutrophils % 72.1 %    Lymphocytes % 16.6 %    Monocytes % 6.9 %    Eosinophils % 3.6 %    Basophils % 0.8 %    Neutrophils Absolute 6.0 1.7 - 7.7 K/uL    Lymphocytes Absolute 1.4 1.0 - 5.1 K/uL    Monocytes Absolute 0.6 0.0 - 1.3 K/uL    Eosinophils Absolute 0.3 0.0 - 0.6 K/uL    Basophils Absolute 0.1 0.0 - 0.2 K/uL   Basic Metabolic Panel w/ Reflex to MG    Collection Time: 02/27/21  5:05 AM   Result Value Ref Range    Sodium 143 136 - 145 mmol/L    Potassium reflex Magnesium 3.4 (L) 3.5 - 5.1 mmol/L    Chloride 112 (H) 99 - 110 mmol/L    CO2 24 21 - 32 mmol/L    Anion Gap 7 3 - 16    Glucose 134 (H) 70 - 99 mg/dL    BUN 15 7 - 20 mg/dL    CREATININE 1.7 (H) 0.8 - 1.3 mg/dL    GFR Non-African American 39 (A) >60    GFR  47 (A) >60    Calcium 8.0 (L) 8.3 - 10.6 mg/dL   Magnesium    Collection Time: 02/27/21  5:05 AM   Result Value Ref Range    Magnesium 1.80 1.80 - 2.40 mg/dL   CBC Auto Differential    Collection Time: 02/28/21  5:53 AM   Result Value Ref Range    WBC 8.1 4.0 - 11.0 K/uL    RBC 2.61 (L) 4.20 - 5.90 M/uL    Hemoglobin 8.2 (L) 13.5 - 17.5 g/dL    Hematocrit 24.3 (L) 40.5 - 52.5 %    MCV 92.8 80.0 - 100.0 fL    MCH 31.4 26.0 - 34.0 pg    MCHC 33.8 31.0 - 36.0 g/dL RDW 14.6 12.4 - 15.4 %    Platelets 794 061 - 567 K/uL    MPV 8.6 5.0 - 10.5 fL    Neutrophils % 77.1 %    Lymphocytes % 12.6 %    Monocytes % 7.2 %    Eosinophils % 2.3 %    Basophils % 0.8 %    Neutrophils Absolute 6.2 1.7 - 7.7 K/uL    Lymphocytes Absolute 1.0 1.0 - 5.1 K/uL    Monocytes Absolute 0.6 0.0 - 1.3 K/uL    Eosinophils Absolute 0.2 0.0 - 0.6 K/uL    Basophils Absolute 0.1 0.0 - 0.2 K/uL   Basic Metabolic Panel w/ Reflex to MG    Collection Time: 02/28/21  5:53 AM   Result Value Ref Range    Sodium 139 136 - 145 mmol/L    Potassium reflex Magnesium 3.6 3.5 - 5.1 mmol/L    Chloride 110 99 - 110 mmol/L    CO2 21 21 - 32 mmol/L    Anion Gap 8 3 - 16    Glucose 142 (H) 70 - 99 mg/dL    BUN 13 7 - 20 mg/dL    CREATININE 1.6 (H) 0.8 - 1.3 mg/dL    GFR Non- 41 (A) >60    GFR  50 (A) >60    Calcium 7.6 (L) 8.3 - 10.6 mg/dL   CBC Auto Differential    Collection Time: 03/01/21  5:45 AM   Result Value Ref Range    WBC 7.0 4.0 - 11.0 K/uL    RBC 2.75 (L) 4.20 - 5.90 M/uL    Hemoglobin 8.5 (L) 13.5 - 17.5 g/dL    Hematocrit 25.6 (L) 40.5 - 52.5 %    MCV 93.1 80.0 - 100.0 fL    MCH 30.9 26.0 - 34.0 pg    MCHC 33.2 31.0 - 36.0 g/dL    RDW 14.9 12.4 - 15.4 %    Platelets 006 944 - 290 K/uL    MPV 8.9 5.0 - 10.5 fL    Neutrophils % 67.3 %    Lymphocytes % 18.8 %    Monocytes % 9.6 %    Eosinophils % 3.2 %    Basophils % 1.1 %    Neutrophils Absolute 4.7 1.7 - 7.7 K/uL    Lymphocytes Absolute 1.3 1.0 - 5.1 K/uL    Monocytes Absolute 0.7 0.0 - 1.3 K/uL    Eosinophils Absolute 0.2 0.0 - 0.6 K/uL    Basophils Absolute 0.1 0.0 - 0.2 K/uL   Basic Metabolic Panel w/ Reflex to MG    Collection Time: 03/01/21  5:45 AM   Result Value Ref Range    Sodium 139 136 - 145 mmol/L    Potassium reflex Magnesium 4.3 3.5 - 5.1 mmol/L    Chloride 109 99 - 110 mmol/L    CO2 22 21 - 32 mmol/L    Anion Gap 8 3 - 16    Glucose 132 (H) 70 - 99 mg/dL    BUN 15 7 - 20 mg/dL    CREATININE 1.9 (H) 0.8 - 1.3 mg/dL GFR Non- 34 (A) >60    GFR  41 (A) >60    Calcium 8.3 8.3 - 10.6 mg/dL   CBC Auto Differential    Collection Time: 03/02/21  5:30 AM   Result Value Ref Range    WBC 8.5 4.0 - 11.0 K/uL    RBC 2.85 (L) 4.20 - 5.90 M/uL    Hemoglobin 9.0 (L) 13.5 - 17.5 g/dL    Hematocrit 26.5 (L) 40.5 - 52.5 %    MCV 92.9 80.0 - 100.0 fL    MCH 31.4 26.0 - 34.0 pg    MCHC 33.8 31.0 - 36.0 g/dL    RDW 14.9 12.4 - 15.4 %    Platelets 985 848 - 848 K/uL    MPV 8.4 5.0 - 10.5 fL    Neutrophils % 70.9 %    Lymphocytes % 16.0 %    Monocytes % 10.1 %    Eosinophils % 2.0 %    Basophils % 1.0 %    Neutrophils Absolute 6.0 1.7 - 7.7 K/uL    Lymphocytes Absolute 1.4 1.0 - 5.1 K/uL    Monocytes Absolute 0.9 0.0 - 1.3 K/uL    Eosinophils Absolute 0.2 0.0 - 0.6 K/uL    Basophils Absolute 0.1 0.0 - 0.2 K/uL   Basic Metabolic Panel w/ Reflex to MG    Collection Time: 03/02/21  5:30 AM   Result Value Ref Range    Sodium 143 136 - 145 mmol/L    Potassium reflex Magnesium 3.9 3.5 - 5.1 mmol/L    Chloride 110 99 - 110 mmol/L    CO2 21 21 - 32 mmol/L    Anion Gap 12 3 - 16    Glucose 136 (H) 70 - 99 mg/dL    BUN 13 7 - 20 mg/dL    CREATININE 1.8 (H) 0.8 - 1.3 mg/dL    GFR Non- 36 (A) >60    GFR  44 (A) >60    Calcium 8.4 8.3 - 10.6 mg/dL   Renal Function Panel    Collection Time: 03/03/21  7:25 AM   Result Value Ref Range    Sodium 139 136 - 145 mmol/L    Potassium 4.3 3.5 - 5.1 mmol/L    Chloride 107 99 - 110 mmol/L    CO2 23 21 - 32 mmol/L    Anion Gap 9 3 - 16    Glucose 142 (H) 70 - 99 mg/dL    BUN 10 7 - 20 mg/dL    CREATININE 1.6 (H) 0.8 - 1.3 mg/dL    GFR Non- 41 (A) >60    GFR  50 (A) >60    Calcium 8.4 8.3 - 10.6 mg/dL    Phosphorus 3.0 2.5 - 4.9 mg/dL    Albumin 2.5 (L) 3.4 - 5.0 g/dL   POCT Glucose    Collection Time: 03/03/21 10:45 AM   Result Value Ref Range    POC Glucose 124 (H) 70 - 99 mg/dl    Performed on ACCU-CHEK    POCT Venous Collection Time: 03/03/21 10:47 AM   Result Value Ref Range    POC Sodium 140 136 - 145 mmol/L    POC Potassium 3.7 3.5 - 5.1 mmol/L    POC Glucose 140 (H) 70 - 99 mg/dl    Calcium, Ion 1.16 1.12 - 1.32 mmol/L    pH, Denia 7.405 7.350 - 7.450    pCO2, Denia 34.0 (L) 40.0 - 50.0 mm Hg    pO2, Denia 43 Not Established mm Hg    HCO3, Venous 21.3 (L) 23.0 - 29.0 mmol/L    Base Excess, Denia -3 -3 - 3    O2 Sat, Denia 79 Not Established %    TC02 (Calc), Denia 22 Not Established mmol/L    Lactate 1.56 0.40 - 2.00 mmol/L    Hemoglobin 9.2 (L) 13.5 - 17.5 gm/dL    POC Hematocrit 27.0 (L) 40.5 - 52.5 %    Sample Type DENIA     Performed on SEE BELOW    POCT Arterial    Collection Time: 03/03/21 10:54 AM   Result Value Ref Range    POC Sodium 141 136 - 145 mmol/L    POC Potassium 3.6 3.5 - 5.1 mmol/L    POC Glucose 134 (H) 70 - 99 mg/dl    Calcium, Ion 1.18 1.12 - 1.32 mmol/L    pH, Arterial 7.460 (H) 7.350 - 7.450    pCO2, Arterial 30.5 (L) 35.0 - 45.0 mm Hg    pO2, Arterial 372.8 (H) 75.0 - 108.0 mm Hg    HCO3, Arterial 21.7 21.0 - 29.0 mmol/L    Base Excess, Arterial -2 -3 - 3    O2 Sat, Arterial 100 93 - 100 %    TCO2, Arterial 23 Not Established mmol/L    Lactate 1.42 0.40 - 2.00 mmol/L    POC Hematocrit 28.0 (L) 40.5 - 52.5 %    Hemoglobin 9.5 (L) 13.5 - 17.5 gm/dL    Sample Type ART     Performed on SEE BELOW    Renal Function Panel    Collection Time: 03/04/21  7:21 AM   Result Value Ref Range    Sodium 139 136 - 145 mmol/L    Potassium 4.2 3.5 - 5.1 mmol/L    Chloride 109 99 - 110 mmol/L    CO2 23 21 - 32 mmol/L    Anion Gap 7 3 - 16    Glucose 132 (H) 70 - 99 mg/dL    BUN 11 7 - 20 mg/dL    CREATININE 1.7 (H) 0.8 - 1.3 mg/dL    GFR Non-African American 39 (A) >60    GFR  47 (A) >60    Calcium 8.3 8.3 - 10.6 mg/dL    Phosphorus 3.4 2.5 - 4.9 mg/dL    Albumin 2.5 (L) 3.4 - 5.0 g/dL     Cxr, 2/18/21:  FINDINGS:   The cardiomediastinal silhouette is unremarkable.  Aortic vascular   calcification.  The lungs are clear.  No infiltrate, pleural fluid or   evidence of overt failure.  Right-sided venous access port.  Degenerative   changes at the shoulders bilaterally.           Impression   No acute cardiopulmonary disease.         CT head 3/3/21:  Vital Signs /81   Pulse 76   Temp 97.7 °F (36.5 °C) (Axillary)   Resp 16   Ht 5' 9\" (1.753 m)   Wt 165 lb 2 oz (74.9 kg)   SpO2 100%   BMI 24.38 kg/m²     Appearance    In gowns, good eC though eyes at 1/2 mast at first.  Speech   Dec tone at times. Mood   \"not good\" from being in hospital  Affect    restricted  Thought Content   As per below  Thought Process    Sort circumstantial, returns many times to something his father told him as a young man. Associations    As above. Insight    poor  Judgment   poor  No abnormal movements, tics or mannerisms. Orientation   Drowsy but arousable, oriented to self only. Memory    global memory impairment noted  Attention/Concentration    impaired  Language    No obvious aphasia  Fund of Knowledge    Hard to tell, because of delirium        History:      I have reviewed recent documentation for this patient:  Bhavana Benson is a 78 y.o. male  who  has a past medical history of Cancer ([facility] [address] GASTRIC ULCER, Hypertension, and Leukopenia. CC:    Chief Complaint   Patient presents with    Abdominal Pain     Pt states hes having tenderness around stoma site. Context:  78 y.o. male c hx of bladder CA s/p colectomy and urostomy, hx of dementia, now to [facility] for abd pain. 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 Some runs of vtach at times. Tx'd for uti discovered here about 1 week ago. Suddenly became unresponsive yesterday, maybe in response to risperdal?  Consult is for \"persistent confusion. \"      Assc Sx:  Persistent confusion, disorientation. Takes off clothes, takes tubes, ivs.  At times tried to kick and punch staff. Requiring restraints at times.   Getting ativan initially for agitation, but hasn't for some time. Duration:  Days to weeks    Severity:  Severe at times    Modifiers:  Illness isn't helping    Timing:  Subacute. Past Medical History:   Diagnosis Date    Cancer ([facility] [address]     bladder    GASTRIC ULCER     Hypertension     Leukopenia        No Known Allergies    PPsyHx:  As. Above. On aricept in past.  Got risperdal geodon, seroquel here for agitation. CD Hx:    Social History     Socioeconomic History    Marital status:      Spouse name: None    Number of children: 3    Years of education: None    Highest education level: None   Occupational History    Occupation: [employer]    Financial resource strain: None    Food insecurity     Worry: None     Inability: None    Transportation needs     Medical: None     Non-medical: None   Tobacco Use    Smoking status: Never Smoker    Smokeless tobacco: Never Used   Substance and Sexual Activity    Alcohol use: No    Drug use: No    Sexual activity: None   Lifestyle    Physical activity     Days per week: None     Minutes per session: None    Stress: None   Relationships    Social connections     Talks on phone: None     Gets together: None     Attends Confucianism service: None     Active member of club or organization: None     Attends meetings of clubs or organizations: None     Relationship status: None    Intimate partner violence     Fear of current or ex partner: None     Emotionally abused: None     Physically abused: None     Forced sexual activity: None   Other Topics Concern    None   Social History Narrative    None       History reviewed. No pertinent family history.

## 2021-03-05 NOTE — PROGRESS NOTES
Physical Therapy    Facility/Department: [address] ORTHOPEDICS  Daily Treatment Note    This note serves as patient discharge summary if pt discharges prior to next PT visit      NAME: Deric Gonzales  : 1940  MRN: [id number]    Date of Service: 3/5/2021    Discharge Recommendations:  3-5 sessions per week, Patient would benefit from continued therapy after discharge   Deric Gonzales scored a 14/ on the AM-PAC short mobility form. Current research shows that an AM-PAC score of 17 or less is typically not associated with a discharge to the patient's home setting. Based on the patient's AM-PAC score and their current functional mobility deficits, it is recommended that the patient have 3-5 sessions per week of Physical Therapy at d/c to increase the patient's independence. Please see assessment section for further patient specific details. PT Equipment Recommendations  Other: defer to next level of care    Assessment   Body structures, Functions, Activity limitations: Decreased functional mobility ; Decreased balance;Decreased strength;Decreased safe awareness;Decreased cognition;Decreased endurance  Assessment: Pt presents with significantly decreased fucntional mobility after admission for Abdominal Pain, Bowel Obstruction, and underwent ExpLap on 21. 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 Prior to admission, it is not clear to this therapist what pr's full PLOF was but per social work note, he was indep with ADLs PTA. Status 3-5-2021: Pt was initially cooperative but with impulsivity as well. Bed mobility SBA, Transfers sit to stand CGA-SBA. Gait 4-5' Min A with fair safety and quality. Patient not oriented and without insight to recent surgery/current physical/functional impairments. He remains unsafe to return home at this time. Ongoing skilled PT at a low-moderate intensity upon d/c is recommeded, once medically stable.   Will cont to follow and see as able. Specific instructions for Next Treatment: cotx with OT  Prognosis: Guarded; Fair  History: as noted  PT Education: Goals;PT Role;General Safety;Orientation;Transfer Training;Family Education;Plan of Care  Barriers to Learning: cognition/some agitation/impulsivity  REQUIRES PT FOLLOW UP: Yes  Activity Tolerance  Activity Tolerance: Patient limited by endurance; Patient limited by cognitive status       Patient Diagnosis(es): The primary encounter diagnosis was Small bowel obstruction ([facility] [address] Diagnoses of Status post ileal conduit ([address] Urinary obstruction, Acute renal failure, unspecified acute renal failure type ([address] Hyperkalemia, SIRS (systemic inflammatory response syndrome) ([address] Dementia without behavioral disturbance, unspecified dementia type ([address] and Pityriasis rosea were also pertinent to this visit. has a past medical history of Cancer ([facility] [address] GASTRIC ULCER, Hypertension, and Leukopenia. has a past surgical history that includes Colonoscopy; Cystoscopy; other surgical history (12/14/2016); Tunneled venous port placement (Right, 06/06/2017); and laparotomy (N/A, 2/18/2021). Restrictions  Restrictions/Precautions  Restrictions/Precautions: Fall Risk  Position Activity Restriction  Other position/activity restrictions: general diet; urostomy bag, central line/port, telesitter  Vision/Hearing  Glasses    Subjective  General  Chart Reviewed: Yes  Additional Pertinent Hx: Per Urology H&P, \" Nerissa Orellana is a 78 y.o. who [facility] radical cyst[facility]. Patient subsequently developed metastatic disease and been under Dr. Juan Shafer care. Currently on a check point inhibitor. He was emergently hospitalized last evening with abdominal pain and a CT scan that showed mild bilateral hydroureteronephrosis and potential dead bowel.   At surgery he was found to have a closed loop small bowel obstruction with a portion of ischemic bowel that was successfully excised. 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 Since surgery, his urine output has picked up nicely but his creatinine has remained elevated at 2.5\"   On 2/18/21, pt underwent \"Exploratory laparotomy, bowel resection for ischemic bowel\". Response To Previous Treatment: Patient unable to report, no changes reported from family or staff  Family / Caregiver Present: No  Subjective  Subjective: Patient in bed. Restless. Talking about going to work, going to the [employer] on [address]. \" Going to find his shorts/clothes, etc.  Denies pain. Mobilizes with therapist cues; doesn't necessarily realize this to be \"therapy. \"  Pain Screening  Patient Currently in Pain: No     Orientation  Orientation  Overall Orientation Status: Impaired  Orientation Level: Oriented to person;Disoriented to place; Disoriented to time;Disoriented to situation  Social/Functional History  Social/Functional History  Lives With: Significant other  Type of Home: House  Home Layout: (one level with basement?)  Home Access: Stairs to enter without rails  Entrance Stairs - Number of Steps: 2  ADL Assistance: Independent  Additional Comments: above info from social work note  Cognition   Cognition  Overall Cognitive Status: Exceptions  Arousal/Alertness: Inconsistent responses to stimuli  Following Commands: Follows one step commands with increased time; Follows one step commands with repetition  Attention Span: Difficulty dividing attention; Unable to maintain attention  Memory: Decreased long term memory;Decreased short term memory;Decreased recall of precautions;Decreased recall of recent events;Decreased recall of biographical Information  Safety Judgement: Decreased awareness of need for safety;Decreased awareness of need for assistance  Problem Solving: Decreased awareness of errors;Assistance required to implement solutions;Assistance required to generate solutions;Assistance required to correct errors made;Assistance required to identify errors made  Insights: Not aware of deficits  Initiation: Requires cues for all  Sequencing: Requires cues for all    Objective  Bed mobility  Rolling to Left: Supervision(use of bedrail)  Rolling to Right: Supervision(use of bedrail)  Supine to Sit: Stand by assistance  Sit to Supine: Stand by assistance  Transfers  Sit to Stand: Contact guard assistance;Minimal Assistance  Stand to sit: Contact guard assistance;Minimal Assistance  Stand Pivot Transfers: Minimal Assistance(Bed<>BSC, CGA-Min A, and cues/reminders of task at hand.)  Ambulation  Ambulation?: Yes  Ambulation 1  Surface: level tile  Device: No Device(Patient holding to IV pole with R UE; Min A support at L UE.)  Quality of Gait: Forward flexed trunk, B hips and knees flexed ~20 degrees. Possibly decreased stane time on R LE, with decreased stance stability R LE. Min posterior lean. Distance: 4', 5'. This includes stand pivot bed <>BSC, and side stepping L and R. Comments: Patient distractible throughout. Requiring verbal and tactile cues to participate and stay on task. Patient incontinent of stool. CGA steadying assist as patient stands to EOB, and therapist performs concepcion care. Stairs/Curb  Stairs?: No  Gait Deviations  Gait Deviations: Decreased step height;Decreased step length  Balance  Posture: Fair  Sitting - Static: Good;-  Sitting - Dynamic: Good;-  Standing - Static: Fair;-  Standing - Dynamic: Fair;-  Exercises  Hip Flexion: EOB seated marches x 10. Fair technique, despite cues. Knee Long Arc Quad: x 15 B, at EOB sitting. Cues to perform with control vs fast.  Ankle Pumps: x 10 B. B ankle DF to neutral.  Comments: Sit to stand from EOB to stance, x 15, CGA, but not always achieving full stance. Cues to perform correctly, with patient inconsistent following of directions.      Plan   Plan  Times per week: 3-5  Specific instructions for Next Treatment: cotx with OT  Current Treatment Recommendations: Functional Mobility Training  Safety Devices  Type of devices: All fall risk precautions in place, Call light within reach, Nurse notified, Gait belt, Bed alarm in place, Left in bed, Telesitter in use, Patient at risk for falls(CHEY Medina in room and aware)  Restraints  Initially in place: Yes  Restraints: pt in restraints at beginning of session but cooperative throughout session and [place] ok with keeping restraints off at end of session    AM-PAC Score  AM-PAC Inpatient Mobility Raw Score : 14 (03/05/21 0924)  AM-PAC Inpatient T-Scale Score : 38.1 (03/05/21 0924)  Mobility Inpatient CMS 0-100% Score: 61.29 (03/05/21 0924)  Mobility Inpatient CMS G-Code Modifier : CL (03/05/21 9446)     Goals  Short term goals  Time Frame for Short term goals: by acute d/c--goals ongoing as of 3/5/21 with limited progress. Short term goal 1: bed mobility Min A x 2 met: new goal min A x 1  Short term goal 2: sit<>stand trasnfers Min A x 1  Short term goal 3: assess ambulation with LRAD if/as needed x 25 ft with Min A  Long term goals  Time Frame for Long term goals : tbd if/as needed. Patient Goals   Patient goals : pt unable to formulate a therapy goal at this time.        Therapy Time   Individual Concurrent Group Co-treatment   Time In 0845         Time Out 0940         Minutes [id number] [facility], PT  Electronically signed by Lorne Forrester, [address] [place] [address] [phone])  on 3/5/2021 at 4:56 PM

## 2021-03-05 NOTE — PROGRESS NOTES
Psych Consult Progress Note    03/05/21    James Mclean   [id number]  Chief Complaint   Patient presents with    Abdominal Pain     Pt states hes having tenderness around stoma site. I have reviewed recent documentation. Saverio Fleischer is a 78 y.o. male  With  has a past medical history of Cancer ([address] GASTRIC ULCER, Hypertension, and Leukopenia. Subjective/Interval Hx:  Some agitation last night. And I gather this AM as well. Required some zyprexa. 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 Quality:  Delirium, agitation  Severity:  Severe at times  Duration:  weeks  Context:  As above.   Location:  Brain    Objective:  Vitals:    03/05/21 0748   BP: 136/70   Pulse: 79   Resp: 17   Temp: 98.1 °F (36.7 °C)   SpO2: 97%     Recent Results (from the past 168 hour(s))   CBC Auto Differential    Collection Time: 02/27/21  5:05 AM   Result Value Ref Range    WBC 8.3 4.0 - 11.0 K/uL    RBC 2.74 (L) 4.20 - 5.90 M/uL    Hemoglobin 8.4 (L) 13.5 - 17.5 g/dL    Hematocrit 25.5 (L) 40.5 - 52.5 %    MCV 93.1 80.0 - 100.0 fL    MCH 30.7 26.0 - 34.0 pg    MCHC 33.0 31.0 - 36.0 g/dL    RDW 15.1 12.4 - 15.4 %    Platelets 451 005 - 592 K/uL    MPV 8.8 5.0 - 10.5 fL    Neutrophils % 72.1 %    Lymphocytes % 16.6 %    Monocytes % 6.9 %    Eosinophils % 3.6 %    Basophils % 0.8 %    Neutrophils Absolute 6.0 1.7 - 7.7 K/uL    Lymphocytes Absolute 1.4 1.0 - 5.1 K/uL    Monocytes Absolute 0.6 0.0 - 1.3 K/uL    Eosinophils Absolute 0.3 0.0 - 0.6 K/uL    Basophils Absolute 0.1 0.0 - 0.2 K/uL   Basic Metabolic Panel w/ Reflex to MG    Collection Time: 02/27/21  5:05 AM   Result Value Ref Range    Sodium 143 136 - 145 mmol/L    Potassium reflex Magnesium 3.4 (L) 3.5 - 5.1 mmol/L    Chloride 112 (H) 99 - 110 mmol/L    CO2 24 21 - 32 mmol/L    Anion Gap 7 3 - 16    Glucose 134 (H) 70 - 99 mg/dL    BUN 15 7 - 20 mg/dL    CREATININE 1.7 (H) 0.8 - 1.3 mg/dL    GFR 5.1 mmol/L    Chloride 107 99 - 110 mmol/L    CO2 23 21 - 32 mmol/L    Anion Gap 9 3 - 16    Glucose 142 (H) 70 - 99 mg/dL    BUN 10 7 - 20 mg/dL    CREATININE 1.6 (H) 0.8 - 1.3 mg/dL    GFR Non- 41 (A) >60    GFR  50 (A) >60    Calcium 8.4 8.3 - 10.6 mg/dL    Phosphorus 3.0 2.5 - 4.9 mg/dL    Albumin 2.5 (L) 3.4 - 5.0 g/dL   POCT Glucose    Collection Time: 03/03/21 10:45 AM   Result Value Ref Range    POC Glucose 124 (H) 70 - 99 mg/dl    Performed on ACCU-CHEK    POCT Venous    Collection Time: 03/03/21 10:47 AM   Result Value Ref Range    POC Sodium 140 136 - 145 mmol/L    POC Potassium 3.7 3.5 - 5.1 mmol/L    POC Glucose 140 (H) 70 - 99 mg/dl    Calcium, Ion 1.16 1.12 - 1.32 mmol/L    pH, Denia 7.405 7.350 - 7.450    pCO2, Denia 34.0 (L) 40.0 - 50.0 mm Hg    pO2, Denia 43 Not Established mm Hg    HCO3, Venous 21.3 (L) 23.0 - 29.0 mmol/L    Base Excess, Denia -3 -3 - 3    O2 Sat, Denia 79 Not Established %    TC02 (Calc), Denia 22 Not Established mmol/L    Lactate 1.56 0.40 - 2.00 mmol/L    Hemoglobin 9.2 (L) 13.5 - 17.5 gm/dL    POC Hematocrit 27.0 (L) 40.5 - 52.5 %    Sample Type DENIA     Performed on SEE BELOW    POCT Arterial    Collection Time: 03/03/21 10:54 AM   Result Value Ref Range    POC Sodium 141 136 - 145 mmol/L    POC Potassium 3.6 3.5 - 5.1 mmol/L    POC Glucose 134 (H) 70 - 99 mg/dl    Calcium, Ion 1.18 1.12 - 1.32 mmol/L    pH, Arterial 7.460 (H) 7.350 - 7.450    pCO2, Arterial 30.5 (L) 35.0 - 45.0 mm Hg    pO2, Arterial 372.8 (H) 75.0 - 108.0 mm Hg    HCO3, Arterial 21.7 21.0 - 29.0 mmol/L    Base Excess, Arterial -2 -3 - 3    O2 Sat, Arterial 100 93 - 100 %    TCO2, Arterial 23 Not Established mmol/L    Lactate 1.42 0.40 - 2.00 mmol/L    POC Hematocrit 28.0 (L) 40.5 - 52.5 %    Hemoglobin 9.5 (L) 13.5 - 17.5 gm/dL    Sample Type ART     Performed on SEE BELOW    Renal Function Panel    Collection Time: 03/04/21  7:21 AM   Result Value Ref Range    Sodium 139 136 - 145 mmol/L Potassium 4.2 3.5 - 5.1 mmol/L    Chloride 109 99 - 110 mmol/L    CO2 23 21 - 32 mmol/L    Anion Gap 7 3 - 16    Glucose 132 (H) 70 - 99 mg/dL    BUN 11 7 - 20 mg/dL    CREATININE 1.7 (H) 0.8 - 1.3 mg/dL    GFR Non-African American 39 (A) >60    GFR  47 (A) >60    Calcium 8.3 8.3 - 10.6 mg/dL    Phosphorus 3.4 2.5 - 4.9 mg/dL    Albumin 2.5 (L) 3.4 - 5.0 g/dL   TSH with Reflex to FT4    Collection Time: 03/04/21 12:50 PM   Result Value Ref Range    TSH Reflex FT4 0.63 0.27 - 4.20 uIU/mL   Urinalysis    Collection Time: 03/04/21 12:55 PM   Result Value Ref Range    Color, UA DK YELLOW Straw/Yellow    Clarity, UA TURBID (A) Clear    Glucose, Ur Negative Negative mg/dL    Bilirubin Urine Negative Negative    Ketones, Urine Negative Negative mg/dL    Specific Gravity, UA 1.012 1.005 - 1.030    Blood, Urine Negative Negative    pH, UA 6.0 5.0 - 8.0    Protein, UA Negative Negative mg/dL    Urobilinogen, Urine 0.2 <2.0 E.U./dL    Nitrite, Urine Negative Negative    Leukocyte Esterase, Urine SMALL (A) Negative    Microscopic Examination YES     Urine Type NotGiven    Microscopic Urinalysis    Collection Time: 03/04/21 12:55 PM   Result Value Ref Range    Urinalysis Comments see below     WBC, UA 15 (H) 0 - 5 /HPF    RBC, UA 2 0 - 4 /HPF    Epithelial Cells, UA 17 (H) 0 - 5 /HPF   Renal Function Panel    Collection Time: 03/05/21  5:11 AM   Result Value Ref Range    Sodium 141 136 - 145 mmol/L    Potassium 4.4 3.5 - 5.1 mmol/L    Chloride 110 99 - 110 mmol/L    CO2 24 21 - 32 mmol/L    Anion Gap 7 3 - 16    Glucose 122 (H) 70 - 99 mg/dL    BUN 12 7 - 20 mg/dL    CREATININE 1.8 (H) 0.8 - 1.3 mg/dL    GFR Non- 36 (A) >60    GFR  44 (A) >60    Calcium 8.6 8.3 - 10.6 mg/dL    Phosphorus 3.0 2.5 - 4.9 mg/dL    Albumin 2.6 (L) 3.4 - 5.0 g/dL       Current Facility-Administered Medications   Medication Dose Route Frequency Provider Last Rate Last Admin    OLANZapine (ZYPREXA) tablet 5 mg  5 mg Oral Nightly Anat Linares MD        OLANZapine [facility]) injection 10 mg  10 mg Intramuscular BID PRN Anat Linares MD   10 mg at 03/04/21 2040    magnesium oxide (MAG-OX) tablet 400 mg  400 mg Oral BID Karan Peters MD   400 mg at 03/05/21 0801    cefTRIAXone (ROCEPHIN) 1000 mg IVPB in 50 mL D5W minibag  1,000 mg Intravenous Q24H Karan Petesr MD   Stopped at 03/04/21 1439    metronidazole (FLAGYL) 500 mg in NaCl 100 mL IVPB premix  500 mg Intravenous Q8H Mejia Arevalo  mL/hr at 03/05/21 0801 500 mg at 03/05/21 0801    potassium chloride (KLOR-CON M) extended release tablet 20 mEq  20 mEq Oral Once Ferd Half, APRN - CNP        dextrose 5 % and 0.2 % sodium chloride with KCl 20 mEq/L infusion   Intravenous Continuous Gabe Lanes, MD 20 mL/hr at 03/04/21 0933 New Bag at 03/04/21 0933    bisacodyl (DULCOLAX) suppository 10 mg  10 mg Rectal Daily PRN Ferd Half, APRN - CNP   10 mg at 02/25/21 0515    polyethylene glycol (GLYCOLAX) packet 17 g  17 g Oral Daily Mejia Arevalo MD   17 g at 03/04/21 0933    donepezil (ARICEPT) tablet 5 mg  5 mg Oral Nightly Mejia Arevalo MD   5 mg at 03/03/21 2136    sodium chloride flush 0.9 % injection 10 mL  10 mL Intravenous 2 times per day Mejia Arevalo MD   10 mL at 03/04/21 0947    sodium chloride flush 0.9 % injection 10 mL  10 mL Intravenous PRN Mejia Arevalo MD        oxyCODONE (ROXICODONE) immediate release tablet 5 mg  5 mg Oral Q4H PRN Mejia Arevalo MD        Or   Asuncion Munoz oxyCODONE HCl (OXY-IR) immediate release tablet 10 mg  10 mg Oral Q4H PRN Mejia Arevalo MD        morphine (PF) injection 2 mg  2 mg Intravenous Q2H PRN Mejia Arevalo MD   2 mg at 02/25/21 1749    Or    morphine (PF) injection 4 mg  4 mg Intravenous Q2H PRN Mejia Arevalo MD   4 mg at 02/23/21 0803    ondansetron [facility]) injection 4 mg  4 mg Intravenous Q6H PRN Mejia Arevalo MD   4 mg at 03/05/21 0801    heparin (porcine) injection 5,000 Units  5,000 Units Subcutaneous 3 times per day Kimberlyn Moses MD   5,000 Units at 03/04/21 1331    famotidine (PEPCID) injection 20 mg  20 mg Intravenous Daily Kimberlyn Moses MD   20 mg at 03/05/21 0801     MRI=>  Impression   Mild cerebral atrophy. Mild chronic small vessel ischemic disease. Previous   right frontal area of injury or infarct. There are no areas restricted   diffusion to suggest an acute ischemic event. ROS:  No tremor    MSE:  A-in gowns, good EC, pleasant and engageable  A-full  M-agitated at times. S-alert, oriented to person only, essentially. I/J-poor/poor  T-a bit more spontaneous and linear today. Speech c nl r/t/v/a. No resp to int stim. Recs:  1. Delirium, dementia-UA much improved. MRI is reassuring that we're not missing worse cva, tumor, etc.  TSH nl.  I suspect the best we can do is standard delirium tx strategies until this pt returns to baseline. No new recs. I'll sign off, please feel free to reconsult prn.

## 2021-03-05 NOTE — PROGRESS NOTES
Hospitalist Progress Note      PCP: Ceferino Peres MD    Date of Admission: 2/18/2021    Subjective: tried off restraints but pt became more agitated today, received zyprexa - sleepy now but will need restraints if he cont to be agitated    Medications:  Reviewed    Infusion Medications    dextrose 5% and 0.2% NaCl with KCl 20 mEq 20 mL/hr at 03/04/21 0933     Scheduled Medications    OLANZapine  5 mg Oral Nightly    magnesium oxide  400 mg Oral BID    potassium chloride  20 mEq Oral Once    polyethylene glycol  17 g Oral Daily    donepezil  5 mg Oral Nightly    sodium chloride flush  10 mL Intravenous 2 times per day    heparin (porcine)  5,000 Units Subcutaneous 3 times per day    famotidine (PEPCID) injection  20 mg Intravenous Daily     PRN Meds: OLANZapine, bisacodyl, sodium chloride flush, oxyCODONE **OR** oxyCODONE, morphine **OR** morphine, ondansetron      Intake/Output Summary (Last 24 hours) at 3/5/2021 1648  Last data filed at 3/5/2021 0818  Gross per 24 hour   Intake 480 ml   Output 200 ml   Net 280 ml       Physical Exam Performed:    /70   Pulse 79   Temp 98.1 °F (36.7 °C) (Oral)   Resp 17   Ht 5' 9\" (1.753 m)   Wt 165 lb 2 oz (74.9 kg)   SpO2 97%   BMI 24.38 kg/m²     General appearance: lethargic  HEENT: Pupils equal, round, and reactive to light. Conjunctivae/corneas clear. Neck: Supple, with full range of motion. No jugular venous distention. Trachea midline. Respiratory:  Normal respiratory effort. Clear to auscultation, bilaterally without Rales/Wheezes/Rhonchi. Cardiovascular: Regular rate and rhythm with normal S1/S2 without murmurs  Abdomen: Soft, non-tender, non-distended with normal bowel sounds. Musculoskeletal: No clubbing, cyanosis or edema bilaterally.  Full range of motion without deformity. Skin: Skin color, texture, turgor normal.  No rashes or lesions.   Neurologic:  appears grossly non-focal.  Psychiatric: lethargic  Capillary Refill: Brisk,< 3 seconds   Peripheral Pulses: +2 palpable, equal bilaterally        Labs:   Recent Labs     03/03/21  1047 03/03/21  1054   HGB 9.2* 9.5*     Recent Labs     03/03/21  0725 03/04/21  0721 03/05/21  0511    139 141   K 4.3 4.2 4.4    109 110   CO2 23 23 24   BUN 10 11 12   CREATININE 1.6* 1.7* 1.8*   CALCIUM 8.4 8.3 8.6   PHOS 3.0 3.4 3.0     No results for input(s): AST, ALT, BILIDIR, BILITOT, ALKPHOS in the last 72 hours. No results for input(s): INR in the last 72 hours. No results for input(s): Larry Sidney in the last 72 hours. Urinalysis:      Lab Results   Component Value Date    NITRU Negative 03/04/2021    WBCUA 15 03/04/2021    BACTERIA 4+ 02/18/2021    RBCUA 2 03/04/2021    BLOODU Negative 03/04/2021    SPECGRAV 1.012 03/04/2021    GLUCOSEU Negative 03/04/2021       Radiology:  MRI BRAIN WO CONTRAST   Final Result   Mild cerebral atrophy. Mild chronic small vessel ischemic disease. Previous   right frontal area of injury or infarct. There are no areas restricted   diffusion to suggest an acute ischemic event. CT HEAD WO CONTRAST   Final Result      XR CHEST PORTABLE   Final Result   No evidence of acute cardiopulmonary disease. Improved appearance the chest   with clearing of the previous vascular congestion, and no evidence of   pulmonary edema at this time. XR ABDOMEN (KUB) (SINGLE AP VIEW)   Final Result   No evidence of a persistent small-bowel obstruction. XR CHEST PORTABLE   Final Result   1. Interval advancement of the patient's NG tube, now in satisfactory   position within the gastric body. 2. Findings suggestive of mild CHF. XR CHEST PORTABLE   Final Result   Nasogastric tube tip just below the GE junction.   Recommend advancing the   tube 10-15 cm and obtaining a follow-up abdomen radiograph centered on the   upper abdomen         CT CHEST ABDOMEN PELVIS WO CONTRAST   Final Result   Dilated loop of small bowel within a peristomal hernia and associated mild   hydroureter and hydronephrosis are concerning for obstruction of the ileal   conduit. Additionally, there are several loops of small bowel in the right   hemiabdomen which are fluid-filled and demonstrate mild wall thickening with   significant inflammatory stranding of the mesentery which has a somewhat   swirled appearance. Findings are somewhat concerning for an internal hernia. Surgical evaluation is suggested. XR CHEST PORTABLE   Final Result   No acute cardiopulmonary disease.                  Assessment/Plan:    Active Hospital Problems    Diagnosis    S/P ileal conduit ([facility]) [Z93.6]     Priority: Medium     Class: Acute    Delirium [R41.0]    SBO (small bowel obstruction) ([facility]) [K56.609]    Lactic acidosis [E87.2]    KEENAN (acute kidney injury) ([facility] [N17.9]    Hypertension [I10]         Small bowel obstruction  S/P exploratory laparotomy w/ bowel resection   General Surgery managing  General diet  Surgery 2/18/2021     Acute on Chronic encephalopathy  Known Alzheimer dementia - continue Aricept  Hx of poor response to anesthesia   Possible UTI - being treated w/ Rochepin: Finish a total of 5 days   Slowly improving  Started on Risperidone 1 mg BID/haldol prn-->switched to scheduled zyprexa and prn, MRI brain with no acute ischemia  Consulted psychiatry for further intervention, dced seroquel  Will place on restraints as needed, consider palliative care consult  ?worsening dementia vs medications     KEENAN vs CKD  Cr stable at 1.8-->1.7-->1.8 today  Continue IV fluid resuscitation   At baseline     Sepsis - Resolved           DVT Prophylaxis: Heparin  Diet: DIET GENERAL;  Dietary Nutrition Supplements: Diabetic Oral Supplement  Code Status: Full Code    PT/OT Eval Status: ordered    Burton Heredia MD

## 2021-03-05 NOTE — PROGRESS NOTES
Pt extremely agitated since shift change. Continuously setting off tele camera by trying to get out of bed unassisted. Pt became increasingly aggressive towards staff. IM zyprexa 10mg given. 2100: Pt continuing to try to get out of bed. Pt also became more verbally and physically aggressive towards staff. Bilateral wrist restraints placed. 2255: Pt continuing to try and get out of bed unassisted, but no longer aggressive towards staff. Bilateral wrist restraints removed.

## 2021-03-05 NOTE — PROGRESS NOTES
[facility] [phone]                                     Daily Progress Note                                                         Pt Name: Bhargav Myers Record Number: [id number]  Date of Birth 1940   Today's Date: 3/5/2021    ASSESSMENT/PLAN  SBO  - (2/18) POD #14 S/p Exploratory laparotomy, bowel resection x 1 for ischemic bowel  -+flatulence and BMs  -SS drainage distal incision-keep covered with dry gauze, paper tape. -diet as tolerated  -will ask RN supervisor if family can stay at night to assist with reorienting patient. Daughter also questioning coming home with 24 hour supervision with children. PT/OT to see    KEENAN  -nephro following, appreciate assistance. Cr back to baseline    Delirium  -with baseline dementia  -appreciate hospitalist and psych assistance    Koffi Escoto is awake today. Drinking chocolate milk. Denies pain     OBJECTIVE  VITALS:  height is 5' 9\" (1.753 m) and weight is 165 lb 2 oz (74.9 kg). His oral temperature is 98.1 °F (36.7 °C). His blood pressure is 136/70 and his pulse is 79. His respiration is 17 and oxygen saturation is 97%. INTAKE/OUTPUT:      Intake/Output Summary (Last 24 hours) at 3/5/2021 1027  Last data filed at 3/5/2021 0818  Gross per 24 hour   Intake 600 ml   Output 200 ml   Net 400 ml     GENERAL: sleeping but awakes to touch, no acute distress. In bilateral wrist restraints and camera sitter in room. ABDOMEN: Soft, appropriately tender, non-distended. Staples to midline incision. Distal aspect small opening with SS drainage. EXTREMITY: no cyanosis, clubbing or edema    I/O last 3 completed shifts:   In: 600 [P.O.:600]  Out: [facility]     03/03/21  1054 03/03/21  1054 03/04/21  1255 03/05/21  0511   HGB 9.5*  --   --   --    NA  --    < >  --  141   K  --    < >  --  4.4   CL  --    < >  --  110   CO2  --    < >  --  24   BUN  -- < >  --  12   CREATININE  --    < >  --  1.8*   PHOS  --    < >  --  3.0   CALCIUM  --    < >  --  8.6   NITRU  --   --  Negative  --    COLORU  --   --  DK YELLOW  --     < > = values in this interval not displayed.        Electronically signed by Kimberlyn Moses MD on 3/5/2021 at 10:27 AM

## 2021-03-05 NOTE — PROGRESS NOTES
Nephrology Progress Note   [email or web address]      This patient is a 78year old male whom we are following for KEENAN. Subjective:  Clam and resting today   Still confused but able to tell me he is in a hospital in [place] . BP stable. Fiancee at bedside trying to eat lunch     Family History: Fiancee at bedside  ROS: No fever or chills      Vitals:  /70   Pulse 79   Temp 98.1 °F (36.7 °C) (Oral)   Resp 17   Ht 5' 9\" (1.753 m)   Wt 165 lb 2 oz (74.9 kg)   SpO2 97%   BMI 24.38 kg/m²   I/O last 3 completed shifts: In: 600 [P.O.:600]  Out: 350 [Urine:350]  I/O this shift:  In: 240 [P.O.:240]  Out: -     Physical Exam:  Physical Exam  Vitals signs reviewed. Constitutional:       General: He is not in acute distress. HENT:      Head: Normocephalic and atraumatic. Eyes:      General: No scleral icterus. Conjunctiva/sclera: Conjunctivae normal.   Cardiovascular:      Rate and Rhythm: Normal rate. Heart sounds: No friction rub. Pulmonary:      Effort: Pulmonary effort is normal. No respiratory distress. Breath sounds: No wheezing. Abdominal:      General: Bowel sounds are normal. There is no distension. Tenderness: There is no abdominal tenderness. Musculoskeletal:      Right lower leg: No edema. Left lower leg: No edema. Neurological:      Mental Status: He is alert.            Medications:   OLANZapine  5 mg Oral Nightly    magnesium oxide  400 mg Oral BID    potassium chloride  20 mEq Oral Once    polyethylene glycol  17 g Oral Daily    donepezil  5 mg Oral Nightly    sodium chloride flush  10 mL Intravenous 2 times per day    heparin (porcine)  5,000 Units Subcutaneous 3 times per day    famotidine (PEPCID) injection  20 mg Intravenous Daily         Labs:  Recent Labs     03/03/21  1047 03/03/21  1054   HGB 9.2* 9.5*     Recent Labs     03/03/21  0725 03/04/21  0721 03/05/21  0511    139 141   K 4.3 4.2 4.4    109 110   CO2 23 23 24   GLUCOSE 142* 132* 122*   PHOS 3.0 3.4 3.0   BUN 10 11 12   CREATININE 1.6* 1.7* 1.8*   LABGLOM 41* 39* 36*   GFRAA 50* 47* 44*           Assessment/Plan:    1) KEENAN on CKD: non-oliguric              - baseline 09/10/19 Cr 1.8              - ATN              - renal function improved at baseline               -Cr 1.8 mg today.      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 Bradley Hou     4) FEN:  Hypernatremia Na+ 141   meq and stable IVF's at [facility] Encouraged po fluids D/W Fiancee     Hypokalemia: improved with replacement.     5) dementia/Delerium  Psych note reviewed        Florence Becerra MD  3/5/2021  [facility]

## 2021-03-05 NOTE — PROGRESS NOTES
Patient arrived back from MRI. Patient is resting in bed, awake and agitated. This RN instructed patient to call for assistance. Needs met. Meals and elimination offered. Will continue to monitor per unit protocols.  Electronically signed by Diamante Pool RN on 3/4/2021 at 7:30 PM

## 2021-03-06 NOTE — PROGRESS NOTES
Patient is alert to self only; disoriented to time, place and situation. Patient is resting in bed with eyes closed. Room air. Side rails are up x3. Fall precautions are in place. Bed alarm on. Bed is in lowest position. Call light, telephone and bedside table are within reach. VSS taken. AM meds given. Shift assessment completed. Needs met. Surgical incision to mid abdomen is clean, dry and intact. Will continue to monitor patient per unit protocols.  Electronically signed by Lisa Saucedo RN on 3/6/2021 at 10:27 AM

## 2021-03-06 NOTE — PROGRESS NOTES
[facility] [phone]                                     Daily Progress Note                                                         Pt Name: Akbar Deal Record Number: [id number]  Date of Birth 1940   Today's Date: 3/6/2021    ASSESSMENT/PLAN  SBO  - (2/18) POD #15 S/p Exploratory laparotomy, bowel resection x 1 for ischemic bowel  -+flatulence and BMs  -SS drainage distal incision scant-keep covered with dry gauze, paper tape. -diet as tolerated  -continue PT/OT  -family would prefer DC to home rather than SNF  -hopefully home soon        KEENAN  -nephro following, appreciate assistance. Cr back to baseline    Delirium  -with baseline dementia  -appreciate hospitalist and psych assistance    Leny Mueller is more alert and oriented than he has been all admission. Seems to be almost back to baseline. OBJECTIVE  VITALS:  height is 5' 9\" (1.753 m) and weight is 165 lb 5.5 oz (75 kg). His oral temperature is 99 °F (37.2 °C). His blood pressure is 121/70 and his pulse is 94. His respiration is 16 and oxygen saturation is 96%. INTAKE/OUTPUT:      Intake/Output Summary (Last 24 hours) at 3/6/2021 0943  Last data filed at 3/6/2021 1133  Gross per 24 hour   Intake 360 ml   Output 575 ml   Net -215 ml     GENERAL: sleeping but awakes to touch, no acute distress. In bilateral wrist restraints and camera sitter in room. ABDOMEN: Soft, appropriately tender, non-distended. Staples to midline incision. Distal aspect small opening with SS drainage. EXTREMITY: no cyanosis, clubbing or edema    I/O last 3 completed shifts:   In: 480 [P.O.:480]  Out: 125 [Urine:125]    LABS  Recent Labs     03/03/21  1054 03/03/21  1054 03/04/21  1255 03/04/21  1255 03/06/21  0557   HGB 9.5*  --   --   --   --    NA  --    < >  --    < > 140   K  --    < >  --    < > 4.1   CL  --    < >  --    < > 107   CO2  --    < >  --    < > 24   BUN  -- < >  --    < > 12   CREATININE  --    < >  --    < > 1.8*   PHOS  --    < >  --    < > 3.0   CALCIUM  --    < >  --    < > 8.8   NITRU  --   --  Negative  --   --    COLORU  --   --  DK YELLOW  --   --     < > = values in this interval not displayed. Electronically signed by SLADE Koenig CNP on 3/6/2021 at 9:43 AM    Agree with above note. The patient was personally seen and examined. Destiny Ramon is doing better today. Denies abdominal pain. He is confused but communicating.      Abd soft, ND, NT, incision with minimal drainage, no erythema    A/P: s/p ex lap, CARLOS, SBR, with post op delirium in addition to baseline dementia    Improving  Continue regular diet  Hopeful discharge from hospital soon    Aelx Manzo MD

## 2021-03-06 NOTE — PROGRESS NOTES
Pt daughter at bedside. Pt increasingly agitated and continuing to try and climb out of bed. Pt hard to redirect.  IM zyprexa 10mg given per pt daughter request.

## 2021-03-06 NOTE — PROGRESS NOTES
Patient is resting in bed, awake with family at bedside. Room air. Side rails are up x3. Fall precautions are in place. Bed alarm on. Bed is in lowest position. Call light, telephone and bedside table are within reach. Needs met. Will continue to monitor patient per unit protocols.  Electronically signed by Barbara Fan RN on 3/6/2021 at 4:25 PM

## 2021-03-06 NOTE — PLAN OF CARE
Problem: Falls - Risk of:  Goal: Will remain free from falls  Description: Will remain free from falls  3/6/2021 0754 by Keisha Pierre RN  Outcome: Ongoing   Will remain free from falls. Fall precautions are in place. Call light, telephone and bedside table are within reach.    Problem: Falls - Risk of:  Goal: Absence of physical injury  Description: Absence of physical injury  3/6/2021 0754 by Keisha Pierre RN  Outcome: Ongoing     Problem: Confusion - Acute:  Goal: Absence of continued neurological deterioration signs and symptoms  Description: Absence of continued neurological deterioration signs and symptoms  3/6/2021 0754 by Keisha Pierre RN  Outcome: Ongoing     Problem: Confusion - Acute:  Goal: Mental status will be restored to baseline  Description: Mental status will be restored to baseline  3/6/2021 0754 by Keisha Pierre RN  Outcome: Ongoing     Problem: Discharge Planning:  Goal: Ability to perform activities of daily living will improve  Description: Ability to perform activities of daily living will improve  3/6/2021 0754 by Keisha Pierre RN  Outcome: Ongoing     Problem: Discharge Planning:  Goal: Participates in care planning  Description: Participates in care planning  3/6/2021 0754 by Keisha Pierre RN  Outcome: Ongoing     Problem: Injury - Risk of, Physical Injury:  Goal: Will remain free from falls  Description: Will remain free from falls  3/6/2021 0754 by Keisha Pierre RN  Outcome: Ongoing     Problem: Injury - Risk of, Physical Injury:  Goal: Absence of physical injury  Description: Absence of physical injury  3/6/2021 0754 by Keisha Pierre RN  Outcome: Ongoing     Problem: Mood - Altered:  Goal: Mood stable  Description: Mood stable  3/6/2021 0754 by Keisha Pierre RN  Outcome: Ongoing     Problem: Mood - Altered:  Goal: Absence of abusive behavior  Description: Absence of abusive behavior  3/6/2021 0754 by Keisha Pierre RN  Outcome: Ongoing Problem: Mood - Altered:  Goal: Verbalizations of feeling emotionally comfortable while being cared for will increase  Description: Verbalizations of feeling emotionally comfortable while being cared for will increase  3/6/2021 0754 by Amarilis Escobedo RN  Outcome: Ongoing     Problem: Psychomotor Activity - Altered:  Goal: Absence of psychomotor disturbance signs and symptoms  Description: Absence of psychomotor disturbance signs and symptoms  3/6/2021 0754 by Amarilis Escobedo RN  Outcome: Ongoing     Problem: Sensory Perception - Impaired:  Goal: Demonstrations of improved sensory functioning will increase  Description: Demonstrations of improved sensory functioning will increase  3/6/2021 0754 by Amarilis Escobedo RN  Outcome: Ongoing     Problem: Sensory Perception - Impaired:  Goal: Decrease in sensory misperception frequency  Description: Decrease in sensory misperception frequency  3/6/2021 0754 by Amarilis Escobedo RN  Outcome: Ongoing     Problem: Sensory Perception - Impaired:  Goal: Able to refrain from responding to false sensory perceptions  Description: Able to refrain from responding to false sensory perceptions  3/6/2021 0754 by Amarilis Escobedo RN  Outcome: Ongoing     Problem: Sensory Perception - Impaired:  Goal: Demonstrates accurate environmental perceptions  Description: Demonstrates accurate environmental perceptions  3/6/2021 0754 by Amarilis Escobedo RN  Outcome: Ongoing     Problem: Sensory Perception - Impaired:  Goal: Able to distinguish between reality-based and nonreality-based thinking  Description: Able to distinguish between reality-based and nonreality-based thinking  3/6/2021 0754 by Amarilis Escobedo RN  Outcome: Ongoing     Problem: Sensory Perception - Impaired:  Goal: Able to interrupt nonreality-based thinking  Description: Able to interrupt nonreality-based thinking  3/6/2021 0754 by Amarilis Escobedo RN  Outcome: Ongoing     Problem: Sleep Pattern Disturbance:  Goal: Appears well-rested  Description: Appears well-rested  3/6/2021 0754 by Esther Ahn RN  Outcome: Ongoing     Problem: Skin Integrity:  Goal: Will show no infection signs and symptoms  Description: Will show no infection signs and symptoms  3/6/2021 0754 by Esther Ahn RN  Outcome: Ongoing     Problem: Skin Integrity:  Goal: Absence of new skin breakdown  Description: Absence of new skin breakdown  3/6/2021 0754 by Esther Ahn RN  Outcome: Ongoing     Problem: Bowel/Gastric:  Goal: Control of bowel function will improve  Description: Control of bowel function will improve  3/6/2021 0754 by Esther Ahn RN  Outcome: Ongoing     Problem:  Bowel/Gastric:  Goal: Ability to achieve a regular elimination pattern will improve  Description: Ability to achieve a regular elimination pattern will improve  3/6/2021 0754 by Esther Ahn RN  Outcome: Ongoing     Problem: Nutritional:  Goal: Ability to follow a diet with enough fiber (20 to 30 grams) for normal bowel function will improve  Description: Ability to follow a diet with enough fiber (20 to 30 grams) for normal bowel function will improve  3/6/2021 0754 by Esther Ahn RN  Outcome: Ongoing     Problem: Non-Violent Restraints  Goal: Removal from restraints as soon as assessed to be safe  3/6/2021 0754 by Esther Ahn RN  Outcome: Ongoing     Problem: Non-Violent Restraints  Goal: No harm/injury to patient while restraints in use  3/6/2021 0754 by Esther Ahn RN  Outcome: Ongoing     Problem: Non-Violent Restraints  Goal: Patient's dignity will be maintained  3/6/2021 0754 by Esther Ahn RN  Outcome: Ongoing     Problem: Pain:  Goal: Pain level will decrease  Description: Pain level will decrease  3/6/2021 0754 by Esther Ahn RN  Outcome: Ongoing   Pain level will decrease  Problem: Pain:  Goal: Control of acute pain  Description: Control of acute pain  3/6/2021 0754 by Esther Ahn RN  Outcome: Ongoing   Patient educated on acute pain. Taught patient to use call light to ask for pain medication. PRN pain medication given for acute pain. Will continue to monitor pain per unit protocol.     Problem: Pain:  Goal: Control of chronic pain  Description: Control of chronic pain  3/6/2021 0754 by Damaris Baker, RN  Outcome: Ongoing     Problem: Nutrition  Goal: Optimal nutrition therapy  3/6/2021 0754 by Damaris Baker, RN  Outcome: Ongoing

## 2021-03-06 NOTE — PROGRESS NOTES
Hospitalist Progress Note      PCP: Victor Manuel Farias MD    Date of Admission: 2/18/2021    Subjective:   Off restraints but still confused. Was putting a blanket over his head thinking it was a T-shirt  Oriented to person only. Denies any symptoms when questioned. Medications:  Reviewed    Infusion Medications    dextrose 5% and 0.2% NaCl with KCl 20 mEq 20 mL/hr at 03/04/21 0933     Scheduled Medications    OLANZapine  5 mg Oral Nightly    magnesium oxide  400 mg Oral BID    potassium chloride  20 mEq Oral Once    polyethylene glycol  17 g Oral Daily    donepezil  5 mg Oral Nightly    sodium chloride flush  10 mL Intravenous 2 times per day    heparin (porcine)  5,000 Units Subcutaneous 3 times per day    famotidine (PEPCID) injection  20 mg Intravenous Daily     PRN Meds: OLANZapine, bisacodyl, sodium chloride flush, oxyCODONE **OR** oxyCODONE, morphine **OR** morphine, ondansetron      Intake/Output Summary (Last 24 hours) at 3/6/2021 1508  Last data filed at 3/6/2021 1237  Gross per 24 hour   Intake 480 ml   Output 775 ml   Net -295 ml       Physical Exam Performed:    /70   Pulse 94   Temp 99 °F (37.2 °C) (Oral)   Resp 16   Ht 5' 9\" (1.753 m)   Wt 165 lb 5.5 oz (75 kg)   SpO2 96%   BMI 24.42 kg/m²     General appearance: Alert and oriented x1. GCS 15/15  HEENT: Pupils equal, round, and reactive to light. Conjunctivae/corneas clear. Neck: Supple, with full range of motion. No jugular venous distention. Trachea midline. Respiratory:  Normal respiratory effort. Clear to auscultation, bilaterally without Rales/Wheezes/Rhonchi. Cardiovascular: Regular rate and rhythm with normal S1/S2 without murmurs  Abdomen: Soft, non-tender, non-distended with normal bowel sounds. Musculoskeletal: No clubbing, cyanosis or edema bilaterally.  Full range of motion without deformity. Skin: Skin color, texture, turgor normal.  No rashes or lesions.   Neurologic:  appears grossly CONTRAST   Final Result   Dilated loop of small bowel within a peristomal hernia and associated mild   hydroureter and hydronephrosis are concerning for obstruction of the ileal   conduit. Additionally, there are several loops of small bowel in the right   hemiabdomen which are fluid-filled and demonstrate mild wall thickening with   significant inflammatory stranding of the mesentery which has a somewhat   swirled appearance. Findings are somewhat concerning for an internal hernia. Surgical evaluation is suggested. XR CHEST PORTABLE   Final Result   No acute cardiopulmonary disease. Assessment/Plan:      Small bowel obstruction  S/P exploratory laparotomy w/ bowel resection   General Surgery on board   General diet  Surgery 2/18/2021     Acute on Chronic encephalopathy. Slowly improving.   MRI brain showed no acute ischemic changes  Known Alzheimer dementia and poor response to anesthesia- continue Aricept  No evidence of UTI -discontinue antibiotics   Continue scheduled zyprexa and prn, MRI brain with no acute ischemia  Appreciate psychiatry advice  Restraints as needed to aid care  ?worsening dementia vs medications     KEENAN vs CKD with hydroureter and hydronephrosis on imaging-to rule out obstructive uropathy  Cr stable at 1.8-->1.7-->1.8  Continue IV fluid resuscitation   Consult urology     Sepsis - Resolved           DVT Prophylaxis: Heparin  Diet: DIET GENERAL;  Dietary Nutrition Supplements: Diabetic Oral Supplement  Code Status: Full Code    PT/OT Eval Status: ordered    Stew Lozano MD

## 2021-03-06 NOTE — PROGRESS NOTES
Nephrology Progress Note   [email or web address]      This patient is a 78year old male whom we are following for KEENAN. Subjective:  Clam and resting today. Awake but confused . ROS No CP/SOB , info limited     No Family at  Bed side       Vitals:  /70   Pulse 94   Temp 99 °F (37.2 °C) (Oral)   Resp 16   Ht 5' 9\" (1.753 m)   Wt 165 lb 5.5 oz (75 kg)   SpO2 96%   BMI 24.42 kg/m²   I/O last 3 completed shifts: In: 480 [P.O.:480]  Out: 125 [Urine:125]  I/O this shift:  In: 120 [P.O.:120]  Out: 450 [Urine:450]    Physical Exam:  Physical Exam  Vitals signs reviewed. Constitutional:       General: He is not in acute distress. HENT:      Head: Normocephalic and atraumatic. Eyes:      General: No scleral icterus. Conjunctiva/sclera: Conjunctivae normal.   Cardiovascular:      Rate and Rhythm: Normal rate. Heart sounds: No friction rub. Pulmonary:      Effort: Pulmonary effort is normal. No respiratory distress. Breath sounds: No wheezing or rhonchi. Abdominal:      General: Bowel sounds are normal. There is no distension. Palpations: There is no mass. Tenderness: There is no abdominal tenderness. There is no guarding or rebound. Hernia: No hernia is present. Musculoskeletal:      Right lower leg: No edema. Left lower leg: No edema. Neurological:      Mental Status: He is alert. Medications:   OLANZapine  5 mg Oral Nightly    magnesium oxide  400 mg Oral BID    potassium chloride  20 mEq Oral Once    polyethylene glycol  17 g Oral Daily    donepezil  5 mg Oral Nightly    sodium chloride flush  10 mL Intravenous 2 times per day    heparin (porcine)  5,000 Units Subcutaneous 3 times per day    famotidine (PEPCID) injection  20 mg Intravenous Daily         Labs:  No results for input(s): WBC, HGB, HCT, MCV, PLT in the last 72 hours.   Recent Labs     03/04/21  0721 03/05/21  0511 03/06/21  0557    141 140   K 4.2 4.4 4.1    110 107 CO2 23 24 24   GLUCOSE 132* 122* 130*   PHOS 3.4 3.0 3.0   BUN 11 12 12   CREATININE 1.7* 1.8* 1.8*   LABGLOM 39* 36* 36*   GFRAA 47* 44* 44*           Assessment/Plan:    1) KEENAN on CKD: non-oliguric              - baseline 09/10/19 Cr 1.8              - ATN              - renal function improved at baseline               -Cr 1.8 mg today.      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 Claudia Winston     4) FEN:  Hypernatremia resolving     Hypokalemia: improved with replacement.     5) dementia/Delerium  Psych note reviewed        Gretchen Ho MD. Leny Leary   3/6/2021  [facility]

## 2021-03-06 NOTE — PROGRESS NOTES
This RN called to patient's room. Patient was found trying to get out of bed and had pulled out chest port. Patient assisted back in bed. Call light, telephone and bedside table are within reach. Needs met. Will continue to monitor patient per unit protocols.  Electronically signed by Cooper Castro RN on 3/6/2021 at 4:29 PM

## 2021-03-07 NOTE — PROGRESS NOTES
Patient resting in bed. Alert only to self. Patient remains agitated and restless. Daughter Susana Dye arrived to stay overnight with the patient. Tele cam remains in place for safety. Midline incision is well approximated patient will not leave dressing on. Urostomy remains in place with good output. Patient denies pain at this time. Patient was incontinent of 2 BMs this evening. Assessment complete. All patient needs are met at this time. Fall precautions are in place. Call light is in reach. Will continue to monitor.    Electronically signed by Joan Hilliard RN on 3/6/2021 at 11:55 PM

## 2021-03-07 NOTE — PROGRESS NOTES
Patient remains agitated while in bed, climbing out of bed and continues being combative towards staff. This RN spoke with Dr. Lauryn Villegas MD to inform that patient is not able to be redirected. This RN received an verbal order for non-violent posey vet restraint. This RN called Kirby Grijalva, patient's daughter to inform that patient has been placed in a non-violent posey vest. Will continue to monitor patient per unit protocols.  Electronically signed by Damaris Baker RN on 3/7/2021 at 2:20 PM

## 2021-03-07 NOTE — PROGRESS NOTES
Patient is alert to self only; disoriented to place, time and situation. Patient is awake in bed. Room air. Side rails are up x3. Fall precautions are in place. Bed alarm on. Bed is in lowest position. Call light, telephone and bedside table are within reach. VSS taken. AM meds given. Shift assessment completed. Needs met. Surgical incision to mid abdomen is clean, dry and intact. Will continue to monitor patient per unit protocols.  Electronically signed by Javier Rosa RN on 3/7/2021 at 10:41 AM

## 2021-03-07 NOTE — PLAN OF CARE
Problem: Falls - Risk of:  Goal: Will remain free from falls  Description: Will remain free from falls  3/7/2021 0747 by Tashia Hinton RN  Outcome: Ongoing     Problem: Falls - Risk of:  Goal: Absence of physical injury  Description: Absence of physical injury  3/7/2021 0747 by Tashia Hinton RN  Outcome: Ongoing     Problem: Confusion - Acute:  Goal: Absence of continued neurological deterioration signs and symptoms  Description: Absence of continued neurological deterioration signs and symptoms  3/7/2021 0747 by Tashia Hinton RN  Outcome: Ongoing     Problem: Confusion - Acute:  Goal: Mental status will be restored to baseline  Description: Mental status will be restored to baseline  3/7/2021 0747 by Tashia Hinton RN  Outcome: Ongoing     Problem: Discharge Planning:  Goal: Ability to perform activities of daily living will improve  Description: Ability to perform activities of daily living will improve  3/7/2021 0747 by Tashia Hinton RN  Outcome: Ongoing     Problem: Discharge Planning:  Goal: Participates in care planning  Description: Participates in care planning  3/7/2021 0747 by Tashia Hinton RN  Outcome: Ongoing     Problem: Injury - Risk of, Physical Injury:  Goal: Will remain free from falls  Description: Will remain free from falls  3/7/2021 0747 by Tashia Hinton RN  Outcome: Ongoing   Will remain free from falls. Fall precautions are in place. Call light, telephone and bedside table are within reach.    Problem: Injury - Risk of, Physical Injury:  Goal: Absence of physical injury  Description: Absence of physical injury  3/7/2021 0747 by Tashia Hinton RN  Outcome: Ongoing     Problem: Mood - Altered:  Goal: Mood stable  Description: Mood stable  3/7/2021 0747 by Tashia Hinton RN  Outcome: Ongoing     Problem: Mood - Altered:  Goal: Absence of abusive behavior  Description: Absence of abusive behavior  3/7/2021 0747 by Tashia Hinton RN  Outcome: Ongoing Problem: Mood - Altered:  Goal: Verbalizations of feeling emotionally comfortable while being cared for will increase  Description: Verbalizations of feeling emotionally comfortable while being cared for will increase  3/7/2021 0747 by Damaris Baker RN  Outcome: Ongoing     Problem: Psychomotor Activity - Altered:  Goal: Absence of psychomotor disturbance signs and symptoms  Description: Absence of psychomotor disturbance signs and symptoms  3/7/2021 0747 by Damaris Baker RN  Outcome: Ongoing     Problem: Sensory Perception - Impaired:  Goal: Demonstrations of improved sensory functioning will increase  Description: Demonstrations of improved sensory functioning will increase  3/7/2021 0747 by Damaris Baker RN  Outcome: Ongoing     Problem: Sensory Perception - Impaired:  Goal: Decrease in sensory misperception frequency  Description: Decrease in sensory misperception frequency  3/7/2021 0747 by Damaris Baker RN  Outcome: Ongoing     Problem: Sensory Perception - Impaired:  Goal: Able to refrain from responding to false sensory perceptions  Description: Able to refrain from responding to false sensory perceptions  3/7/2021 0747 by Damaris Baker RN  Outcome: Ongoing     Problem: Sensory Perception - Impaired:  Goal: Demonstrates accurate environmental perceptions  Description: Demonstrates accurate environmental perceptions  3/7/2021 0747 by Damaris Baker RN  Outcome: Ongoing     Problem: Sensory Perception - Impaired:  Goal: Able to distinguish between reality-based and nonreality-based thinking  Description: Able to distinguish between reality-based and nonreality-based thinking  3/7/2021 0747 by Damaris Baker RN  Outcome: Ongoing     Problem: Sensory Perception - Impaired:  Goal: Able to interrupt nonreality-based thinking  Description: Able to interrupt nonreality-based thinking  3/7/2021 0747 by Damaris Baker RN  Outcome: Ongoing     Problem: Sleep Pattern Disturbance:  Goal: Appears well-rested  Description: Appears well-rested  3/7/2021 0747 by Mirza Daley RN  Outcome: Ongoing     Problem: Skin Integrity:  Goal: Will show no infection signs and symptoms  Description: Will show no infection signs and symptoms  3/7/2021 0747 by Mirza Daley RN  Outcome: Ongoing     Problem: Skin Integrity:  Goal: Absence of new skin breakdown  Description: Absence of new skin breakdown  3/7/2021 0747 by Mirza Daley RN  Outcome: Ongoing     Problem: Bowel/Gastric:  Goal: Control of bowel function will improve  Description: Control of bowel function will improve  3/7/2021 0747 by Mirza Daley RN  Outcome: Ongoing     Problem:  Bowel/Gastric:  Goal: Ability to achieve a regular elimination pattern will improve  Description: Ability to achieve a regular elimination pattern will improve  3/7/2021 0747 by Mirza Daley RN  Outcome: Ongoing     Problem: Nutritional:  Goal: Ability to follow a diet with enough fiber (20 to 30 grams) for normal bowel function will improve  Description: Ability to follow a diet with enough fiber (20 to 30 grams) for normal bowel function will improve  3/7/2021 0747 by Mirza Daley RN  Outcome: Ongoing     Problem: Non-Violent Restraints  Goal: Removal from restraints as soon as assessed to be safe  3/7/2021 0747 by Mirza Daley RN  Outcome: Ongoing     Problem: Non-Violent Restraints  Goal: No harm/injury to patient while restraints in use  3/7/2021 0747 by Mirza Daley RN  Outcome: Ongoing     Problem: Non-Violent Restraints  Goal: Patient's dignity will be maintained  3/7/2021 0747 by Mirza Daley RN  Outcome: Ongoing     Problem: Pain:  Goal: Pain level will decrease  Description: Pain level will decrease  3/7/2021 0747 by Mirza Daley RN  Outcome: Ongoing   Pain level will decrease  Problem: Pain:  Goal: Control of acute pain  Description: Control of acute pain  3/7/2021 0747 by Mirza Daley RN  Outcome: Ongoing   Patient educated on acute pain. Taught patient to use call light to ask for pain medication. PRN pain medication given for acute pain. Will continue to monitor pain per unit protocol.     Problem: Pain:  Goal: Control of chronic pain  Description: Control of chronic pain  3/7/2021 0747 by Rachael Vargas, RN  Outcome: Ongoing     Problem: Nutrition  Goal: Optimal nutrition therapy  3/7/2021 0747 by Rachael Vargas, RN  Outcome: Ongoing

## 2021-03-07 NOTE — PROGRESS NOTES
Hospitalist Progress Note      PCP: Tammy Saeed MD    Date of Admission: 2/18/2021    Subjective:   Off restraints but still confused. Still oriented to person only. Denies any symptoms when questioned. Tolerating a diet and passing BMs     Medications:  Reviewed    Infusion Medications    dextrose 5% and 0.2% NaCl with KCl 20 mEq 20 mL/hr at 03/04/21 0933     Scheduled Medications    OLANZapine  5 mg Oral Nightly    magnesium oxide  400 mg Oral BID    potassium chloride  20 mEq Oral Once    polyethylene glycol  17 g Oral Daily    donepezil  5 mg Oral Nightly    sodium chloride flush  10 mL Intravenous 2 times per day    heparin (porcine)  5,000 Units Subcutaneous 3 times per day    famotidine (PEPCID) injection  20 mg Intravenous Daily     PRN Meds: OLANZapine, bisacodyl, sodium chloride flush, oxyCODONE **OR** oxyCODONE, morphine **OR** morphine, ondansetron      Intake/Output Summary (Last 24 hours) at 3/7/2021 1325  Last data filed at 3/7/2021 1324  Gross per 24 hour   Intake 410 ml   Output 1200 ml   Net -790 ml       Physical Exam Performed:    /70   Pulse 98   Temp 98.4 °F (36.9 °C) (Oral)   Resp 16   Ht 5' 9\" (1.753 m)   Wt 166 lb 3.6 oz (75.4 kg)   SpO2 95%   BMI 24.55 kg/m²     General appearance: Alert and oriented x1. GCS 15/15  HEENT: Pupils equal, round, and reactive to light. Conjunctivae/corneas clear. Neck: Supple, with full range of motion. No jugular venous distention. Trachea midline. Respiratory:  Normal respiratory effort. Clear to auscultation, bilaterally without Rales/Wheezes/Rhonchi. Cardiovascular: Regular rate and rhythm with normal S1/S2 without murmurs  Abdomen: Soft, non-tender, non-distended with normal bowel sounds. Musculoskeletal: No clubbing, cyanosis or edema bilaterally.  Full range of motion without deformity. Skin: Skin color, texture, turgor normal.  No rashes or lesions.   Neurologic:  appears grossly non-focal.  Psychiatric:  Oriented only to person. Capillary Refill: Brisk,< 3 seconds   Peripheral Pulses: +2 palpable, equal bilaterally        Labs:   No results for input(s): WBC, HGB, HCT, PLT in the last 72 hours. Recent Labs     03/05/21  0511 03/06/21  0557    140   K 4.4 4.1    107   CO2 24 24   BUN 12 12   CREATININE 1.8* 1.8*   CALCIUM 8.6 8.8   PHOS 3.0 3.0     No results for input(s): AST, ALT, BILIDIR, BILITOT, ALKPHOS in the last 72 hours. No results for input(s): INR in the last 72 hours. No results for input(s): Neldon Docker in the last 72 hours. Urinalysis:      Lab Results   Component Value Date    NITRU Negative 03/04/2021    WBCUA 15 03/04/2021    BACTERIA 4+ 02/18/2021    RBCUA 2 03/04/2021    BLOODU Negative 03/04/2021    SPECGRAV 1.012 03/04/2021    GLUCOSEU Negative 03/04/2021       Radiology:  MRI BRAIN WO CONTRAST   Final Result   Mild cerebral atrophy. Mild chronic small vessel ischemic disease. Previous   right frontal area of injury or infarct. There are no areas restricted   diffusion to suggest an acute ischemic event. CT HEAD WO CONTRAST   Final Result      XR CHEST PORTABLE   Final Result   No evidence of acute cardiopulmonary disease. Improved appearance the chest   with clearing of the previous vascular congestion, and no evidence of   pulmonary edema at this time. XR ABDOMEN (KUB) (SINGLE AP VIEW)   Final Result   No evidence of a persistent small-bowel obstruction. XR CHEST PORTABLE   Final Result   1. Interval advancement of the patient's NG tube, now in satisfactory   position within the gastric body. 2. Findings suggestive of mild CHF. XR CHEST PORTABLE   Final Result   Nasogastric tube tip just below the GE junction.   Recommend advancing the   tube 10-15 cm and obtaining a follow-up abdomen radiograph centered on the   upper abdomen         CT CHEST ABDOMEN PELVIS WO CONTRAST   Final Result   Dilated loop of small bowel within a peristomal hernia and associated mild   hydroureter and hydronephrosis are concerning for obstruction of the ileal   conduit. Additionally, there are several loops of small bowel in the right   hemiabdomen which are fluid-filled and demonstrate mild wall thickening with   significant inflammatory stranding of the mesentery which has a somewhat   swirled appearance. Findings are somewhat concerning for an internal hernia. Surgical evaluation is suggested. XR CHEST PORTABLE   Final Result   No acute cardiopulmonary disease. Assessment/Plan:      Small bowel obstruction  S/P exploratory laparotomy w/ bowel resection 2/18/2021  Tolerating diet . Having bowel motions and passing flatus now .       Acute on Chronic encephalopathy. Slow to  resolve .   MRI brain showed no acute ischemic changes  Known Alzheimer dementia and poor response to anesthesia- continue Aricept  No evidence of UTI -discontinue antibiotics   Continue scheduled zyprexa and prn, MRI brain with no acute ischemia  Appreciate psychiatry advice  Restraints as needed to aid care  ?worsening dementia vs medications     KEENAN vs CKD with hydroureter and hydronephrosis on imaging-to rule out obstructive uropathy  Cr stable at 1.8-->1.7-->1.8  Continue IV fluid resuscitation   Consult urology     Sepsis - Resolved           DVT Prophylaxis: Heparin  Diet: DIET GENERAL;  Dietary Nutrition Supplements: Diabetic Oral Supplement  Code Status: Full Code    PT/OT Eval Status: ordered    Kisha Hand MD

## 2021-03-07 NOTE — PROGRESS NOTES
Patient continues to remain agitated while in bed. Patient continues several attempts of trying to climb out of bed. Patient is not redirectable at this time. PRN Olanzapine (Zyprexa) injection 10mg given for increased agitation. See eMAR. Will continue to monitor patient per unit protocols. Electronically signed by Zhang Sneed RN on 3/7/2021 at 12:57 PM    Patient continues to remain agitated, trying to climb out of bed and hit at staff. Patient is not redirectable at this time. Call light, telephone and bedside table are within reach. Bed alarm on. Bed is in lowest position. Will continue to monitor per unit protocols.  Electronically signed by Zhang Sneed RN on 3/7/2021 at 1:32 PM

## 2021-03-07 NOTE — PROGRESS NOTES
[facility] [phone]                                     Daily Progress Note                                                         Pt Name: Ashley Whitehead Record Number: [id number]  Date of Birth 1940   Today's Date: 3/7/2021    ASSESSMENT/PLAN  SBO  - (2/18) POD #16 S/p Exploratory laparotomy, bowel resection x 1 for ischemic bowel  -Tolerating general diet. +flatulence and BMs  -incision c/d/i  -continue PT/OT  -family would prefer DC to home rather than SNF  -hopefully home soon      KEENAN  -nephro following, appreciate assistance. Cr back to baseline    Delirium  -with baseline dementia. Improving.  -appreciate hospitalist and psych assistance    Scottie Hernandez is doing well. Denies any abdominal pain. OBJECTIVE  VITALS:  height is 5' 9\" (1.753 m) and weight is 166 lb 3.6 oz (75.4 kg). His oral temperature is 98.4 °F (36.9 °C). His blood pressure is 116/70 and his pulse is 98. His respiration is 16 and oxygen saturation is 96%. INTAKE/OUTPUT:      Intake/Output Summary (Last 24 hours) at 3/7/2021 1126  Last data filed at 3/7/2021 0949  Gross per 24 hour   Intake 470 ml   Output 1200 ml   Net -730 ml     GENERAL: NAD, appears stated age, oriented to person  ABDOMEN: Soft, NT, ND, incision c/d/i  EXTREMITY: no cyanosis, clubbing or edema    I/O last 3 completed shifts: In: 530 [P.O.:530]  Out: 1450 [Urine:1450]    LABS  Recent Labs     03/04/21  1255 03/04/21  1255 03/06/21  0557   NA  --    < > 140   K  --    < > 4.1   CL  --    < > 107   CO2  --    < > 24   BUN  --    < > 12   CREATININE  --    < > 1.8*   PHOS  --    < > 3.0   CALCIUM  --    < > 8.8   NITRU Negative  --   --    COLORU DK YELLOW  --   --     < > = values in this interval not displayed.        Electronically signed by Ro Barrientos MD on 3/7/2021 at 11:26 AM

## 2021-03-07 NOTE — PROGRESS NOTES
Patient is resting in bed, awake and quiet. Family is at bedside. Room air. Side rails are up x3. Fall precautions are in place. Bed alarm on. Bed is in lowest position. Call light, telephone and bedside table are within reach. Needs met. Will continue to monitor patient per unit protocols.  Electronically signed by Lisbeth Pérez RN on 3/7/2021 at 4:06 PM

## 2021-03-07 NOTE — PROGRESS NOTES
Nephrology Progress Note   [email or web address]      This patient is a 78year old male whom we are following for KEENAN. Subjective:  Looks comfortable . ROS No CP/SOB , info limited     No Family at  Bed side       Vitals:  /70   Pulse 98   Temp 98.4 °F (36.9 °C) (Oral)   Resp 16   Ht 5' 9\" (1.753 m)   Wt 166 lb 3.6 oz (75.4 kg)   SpO2 96%   BMI 24.55 kg/m²   I/O last 3 completed shifts: In: 530 [P.O.:530]  Out: 1450 [Urine:1450]  I/O this shift:  In: 60 [P.O.:60]  Out: 200 [Urine:200]    Physical Exam:  Physical Exam  Vitals signs reviewed. Constitutional:       General: He is not in acute distress. HENT:      Head: Normocephalic and atraumatic. Eyes:      General: No scleral icterus. Conjunctiva/sclera: Conjunctivae normal.   Cardiovascular:      Rate and Rhythm: Normal rate. Heart sounds: No friction rub. Pulmonary:      Effort: Pulmonary effort is normal. No respiratory distress. Breath sounds: No wheezing or rhonchi. Abdominal:      General: Bowel sounds are normal. There is no distension. Palpations: There is no mass. Tenderness: There is no abdominal tenderness. There is no guarding or rebound. Hernia: No hernia is present. Musculoskeletal:      Right lower leg: No edema. Left lower leg: No edema. Neurological:      Mental Status: He is alert. Medications:   OLANZapine  5 mg Oral Nightly    magnesium oxide  400 mg Oral BID    potassium chloride  20 mEq Oral Once    polyethylene glycol  17 g Oral Daily    donepezil  5 mg Oral Nightly    sodium chloride flush  10 mL Intravenous 2 times per day    heparin (porcine)  5,000 Units Subcutaneous 3 times per day    famotidine (PEPCID) injection  20 mg Intravenous Daily         Labs:  No results for input(s): WBC, HGB, HCT, MCV, PLT in the last 72 hours.   Recent Labs     03/05/21  0511 03/06/21  0557    140   K 4.4 4.1    107   CO2 24 24   GLUCOSE 122* 130*   PHOS 3.0 3.0 BUN 12 12   CREATININE 1.8* 1.8*   LABGLOM 36* 36*   GFRAA 44* 44*           Assessment/Plan:    1) KEENAN on CKD: non-oliguric              - baseline 09/10/19 Cr 1.8              - ATN              - renal function improved at baseline                    2) SBO from ischemic bowel s/p exp lap and partial bowel resection 02/19/21              - per surgery     3) bladder cancer and ileal conduit              - per Dr. Jerrica Ly     4) FEN:  Hypernatremia resolving     Hypokalemia: improved with replacement.     5) dementia/Delerium  Psych note reviewed        Isac Trent MD. [facility]   3/7/2021  [facility]

## 2021-03-08 NOTE — PROGRESS NOTES
Nephrology Progress Note   [email or web address]      This patient is a 78year old male whom we are following for KEENAN. on CKD. Subjective:    HPI:  Breathing comfortably. No CP. Renal function stable. ROS:  In bed. Out of restraints. [address]:  medications reviewed. Vitals:  BP (!) 146/69   Pulse 127   Temp 97.8 °F (36.6 °C) (Oral)   Resp 14   Ht 5' 9\" (1.753 m)   Wt 166 lb 14.2 oz (75.7 kg)   SpO2 (!) 77%   BMI 24.65 kg/m²       Intake/Output Summary (Last 24 hours) at 3/8/2021 1619  Last data filed at 3/8/2021 1238  Gross per 24 hour   Intake 840 ml   Output 625 ml   Net 215 ml       Physical Exam:  Physical Exam  Vitals signs reviewed. Constitutional:       General: He is not in acute distress. HENT:      Head: Normocephalic and atraumatic. Eyes:      General: No scleral icterus. Conjunctiva/sclera: Conjunctivae normal.   Cardiovascular:      Rate and Rhythm: Normal rate. Heart sounds: No friction rub. Pulmonary:      Effort: Pulmonary effort is normal. No respiratory distress. Breath sounds: No wheezing or rhonchi. Abdominal:      General: Bowel sounds are normal. There is no distension. Palpations: There is no mass. Tenderness: There is no abdominal tenderness. There is no guarding or rebound. Hernia: No hernia is present. Musculoskeletal:      Right lower leg: No edema. Left lower leg: No edema. Neurological:      Mental Status: He is alert.            Labs:  Recent Labs     03/08/21  0423   WBC 8.5   HGB 10.3*   HCT 30.7*   MCV 94.2        Recent Labs     03/06/21  0557 03/08/21  0423    142   K 4.1 4.3    108   CO2 24 23   GLUCOSE 130* 124*   PHOS 3.0 3.0   MG  --  2.00   BUN 12 15   CREATININE 1.8* 1.7*   LABGLOM 36* 39*   GFRAA 44* 47*           Assessment/Plan:    1) KEENAN on CKD: non-oliguric              - baseline 09/10/19 Cr 1.8              - ATN              - renal function improved at baseline      2) SBO from ischemic bowel s/p exp lap and partial bowel resection 02/19/21              - per surgery     3) bladder cancer and ileal conduit              - per Dr. Jorge Fernandez     4) FEN:   Hypernatremia    - resolved   Hypokalemia    - WNL     5) dementia/Delerium     - Psych note reviewed   - evidently better today

## 2021-03-08 NOTE — PROGRESS NOTES
Hospitalist Progress Note      PCP: Katarzyna Bravo MD    Date of Admission: 2/18/2021        Subjective:   Patient had increased confusion yesterday afternoon was started placed on restraints. . He is more calm today, restraints removed this morning by RN      Medications:  Reviewed    Infusion Medications    dextrose 5% and 0.2% NaCl with KCl 20 mEq 20 mL/hr at 03/04/21 0933     Scheduled Medications    OLANZapine  5 mg Oral Nightly    magnesium oxide  400 mg Oral BID    potassium chloride  20 mEq Oral Once    polyethylene glycol  17 g Oral Daily    donepezil  5 mg Oral Nightly    sodium chloride flush  10 mL Intravenous 2 times per day    heparin (porcine)  5,000 Units Subcutaneous 3 times per day    famotidine (PEPCID) injection  20 mg Intravenous Daily     PRN Meds: OLANZapine, bisacodyl, sodium chloride flush, oxyCODONE **OR** oxyCODONE, morphine **OR** morphine, ondansetron      Intake/Output Summary (Last 24 hours) at 3/8/2021 1530  Last data filed at 3/7/2021 1840  Gross per 24 hour   Intake 120 ml   Output 225 ml   Net -105 ml       Exam:    BP (!) 146/69   Pulse 127   Temp 97.8 °F (36.6 °C) (Oral)   Resp 14   Ht 5' 9\" (1.753 m)   Wt 166 lb 14.2 oz (75.7 kg)   SpO2 (!) 77%   BMI 24.65 kg/m²     General appearance: Alert, appropriate but confused  HEENT: Pupils equal, round, and reactive to light. Conjunctivae/corneas clear. Neck: Supple, with full range of motion. No jugular venous distention. Trachea midline. Respiratory:  Normal respiratory effort. Clear to auscultation, bilaterally without Rales/Wheezes/Rhonchi. Cardiovascular: Regular rate and rhythm with normal S1/S2 without murmurs, rubs or gallops. Abdomen: Soft, non-tender, non-distended with normal bowel sounds. Musculoskeletal: No clubbing, cyanosis or edema bilaterally. Full range of motion without deformity. Skin: Skin color, texture, turgor normal.  No rashes or lesions.   Neurologic:  Neurovascularly intact without

## 2021-03-08 NOTE — PROGRESS NOTES
[facility] [phone]                                     Daily Progress Note                                                         Pt Name: David Hernandez Record Number: [id number]  Date of Birth 1940   Today's Date: 3/8/2021    ASSESSMENT/PLAN  SBO  - (2/18) POD #17 S/p Exploratory laparotomy, bowel resection x 1 for ischemic bowel  -Tolerating general diet. +flatulence and BMs  -incision c/d/i  -continue PT/OT  -family would prefer DC to home rather than SNF  -Ok to D/C from a general surgery standpoint when medically ok    KEENAN  -nephro following, appreciate assistance. Cr back to baseline    Delirium  -with baseline dementia. Improving.  -appreciate hospitalist and psych assistance    Kell Mc is doing well. Denies any abdominal pain. OBJECTIVE  VITALS:  height is 5' 9\" (1.753 m) and weight is 166 lb 14.2 oz (75.7 kg). His axillary temperature is 97.7 °F (36.5 °C). His blood pressure is 130/74 and his pulse is 77. His respiration is 20 and oxygen saturation is 93%. INTAKE/OUTPUT:      Intake/Output Summary (Last 24 hours) at 3/8/2021 1120  Last data filed at 3/7/2021 1840  Gross per 24 hour   Intake 180 ml   Output 425 ml   Net -245 ml     GENERAL: NAD, appears stated age, oriented to person  ABDOMEN: Soft, NT, ND, incision c/d/i  EXTREMITY: no cyanosis, clubbing or edema    I/O last 3 completed shifts: In: 240 [P.O.:240]  Out: 625 [Urine:625]    LABS  Recent Labs     03/08/21  0423   WBC 8.5   HGB 10.3*   HCT 30.7*         K 4.3      CO2 23   BUN 15   CREATININE 1.7*   MG 2.00   PHOS 3.0   CALCIUM 9.2       Electronically signed by Jv Dolan PA-C on 3/8/2021 at 11:20 AM      Surgery Staff  I have examined this patient and read and agree with the note by Trey More PA-C from today. Back in posey vest last evening.     Pleasantly confused this afternoon, out of restraints  Disp planning.   I think he will do better at home rather than [facility]      Electronically signed by Speedy Curiel MD on 3/8/2021 at 1:33 PM

## 2021-03-08 NOTE — PROGRESS NOTES
Patient resting in bed. Pt is confused, alert to self only, son at bed side. Pt son informed of patient condition, his questions were answered to his satisfaction. Pt wearing posey restraint, bed alarm activated, tele camera in room. Assessment done and charted. Will continue to monitor.

## 2021-03-08 NOTE — PROGRESS NOTES
Occupational Therapy  Facility/Department: [address] ORTHOPEDICS  Daily Treatment Note  NAME: Merrick Kearney  : 1940  MRN: [id number]    Date of Service: 3/8/2021    Discharge Recommendations:  3-5 sessions per week, Patient would benefit from continued therapy after discharge       Merrick Kearney scored a 11/24 on the AM-PAC ADL Inpatient form. Current research shows that an AM-PAC score of 17 or less is typically not associated with a discharge to the patient's home setting. Based on the patient's AM-PAC score and their current ADL deficits, it is recommended that the patient have 3-5 sessions per week of Occupational Therapy at d/c to increase the patient's independence. Please see assessment section for further patient specific details. If patient discharges prior to next session this note will serve as a discharge summary. Please see below for the latest assessment towards goals. Assessment: Discussed with OTR am pac score is 11 which indicates need for continued skilled OT to increase [place] and decrease caregiver burden. Patient able to complete functioanl mobility with RW with Min A of 2 with assist for walker safety and mobility. Min/Mod A of 2 for sit<>stand from EOB to RW to commode to EOB. Patient comes less responsive during therapy session. Patient is unsafe to retrurn home at this time due to assist level requirements. Patient is a high fall risk and at risk for injury. Cont with POC. Patient Diagnosis(es): The primary encounter diagnosis was Small bowel obstruction ([address] Diagnoses of Status post ileal conduit ([address] Urinary obstruction, Acute renal failure, unspecified acute renal failure type ([address] Hyperkalemia, SIRS (systemic inflammatory response syndrome) ([address] Dementia without behavioral disturbance, unspecified dementia type ([address] and Pityriasis rosea were also pertinent to this visit.       has a past medical history of Cancer ([address] GASTRIC ULCER, Hypertension, and Leukopenia. has a past surgical history that includes Colonoscopy; Cystoscopy; other surgical history (12/14/2016); Tunneled venous port placement (Right, 06/06/2017); and laparotomy (N/A, 2/18/2021). Restrictions  Restrictions/Precautions  Restrictions/Precautions: Fall Risk  Position Activity Restriction  Other position/activity restrictions: general diet; urostomy bag, central line/port, telesitter  Subjective   General  Chart Reviewed: Yes  Patient assessed for rehabilitation services?: Yes  Additional Pertinent Hx: Stephania Parma Annalee Gilford is a 78 y.o. male admitted on 2/18/2021 for abdominal pain. Patient with dementia, poor historian and information is obtained from son. Suspected abdominal pain over the last week. Denies nausea or emesis. Last BM yesterday. History bladder cancer s/p ileal conduit. No UOP until recently while in ED. Hyperkalemia and acute on CKD. \" copied per Tio Desir MD 2/18/21 note  Response to previous treatment: Patient unable to report, no changes reported from family or staff  Family / Caregiver Present: No  Referring Practitioner: Hemanth Mattson APRN-CPN  Diagnosis: SBO (2/18) S/p Exploratory laparotomy, bowel resection for ischemic bowel  Subjective  Subjective: Patient supine in bed upon arrival to room with PT. Patient alert and confused. Patient agreeable to therapy. General Comment  Comments: ok to see per RN. Goes by monEchelle"      Orientation  Orientation  Orientation Level: Oriented to person  Objective    ADL  LE Dressing: Dependent/Total(dependent to don/doff briefs)  Toileting: Dependent/Total(dependent for clothing management and clean buttocks)        Balance  Sitting Balance: Contact guard assistance  Standing Balance: Moderate assistance(Mod A)  Standing Balance  Activity: Static standing with RW for ADL tasks  Functional Mobility  Functional - Mobility Device: Rolling Walker  Activity: To/from bathroom  Assist Level:  Moderate assistance(Min/Mod A of 1-2)  Functional Mobility Comments: Functional mobility with RW with Min/Mod A of 1-2, assist for walker safety and management  Toilet Transfers  Toilet - Technique: Ambulating  Equipment Used: Grab bars  Toilet Transfer: Minimal assistance;2 Person assistance  Toilet Transfers Comments: cues for hand placement  Bed mobility  Rolling to Left: Minimal assistance  Rolling to Right: Minimal assistance  Supine to Sit: Minimal assistance; Moderate assistance;2 Person assistance  Sit to Supine: Minimal assistance; Moderate assistance;2 Person assistance  Transfers  Sit to stand: Minimal assistance;2 Person assistance  Stand to sit: Minimal assistance;2 Person assistance  Transfer Comments: Min A of 2 for sit<>stand from EOB to RW to commode to EOB with cues for hand placement. Patient quickly fatigues with mobility/transfers     Cognition  Overall Cognitive Status: Exceptions  Arousal/Alertness: Delayed responses to stimuli(fatigues quickly and less responsive)  Following Commands: Follows one step commands with increased time; Follows one step commands with repetition  Attention Span: Difficulty dividing attention; Unable to maintain attention  Memory: Decreased long term memory;Decreased short term memory;Decreased recall of precautions;Decreased recall of recent events;Decreased recall of biographical Information  Safety Judgement: Decreased awareness of need for safety;Decreased awareness of need for assistance  Problem Solving: Decreased awareness of errors;Assistance required to implement solutions;Assistance required to generate solutions;Assistance required to correct errors made;Assistance required to identify errors made  Insights: Not aware of deficits  Initiation: Requires cues for all  Sequencing: Requires cues for all     Assessment   Performance deficits / Impairments: Decreased functional mobility ; Decreased endurance;Decreased coordination;Decreased ADL status; Decreased posture;Decreased ROM; Decreased balance;Decreased strength;Decreased safe awareness;Decreased cognition  Assessment: Discussed with OTR am pac score is 11 which indicates need for continued skilled OT to increase [place] and decrease caregiver burden. Patient able to complete functioanl mobility with RW with Min A of 2 with assist for walker safety and mobility. Min/Mod A of 2 for sit<>stand from EOB to RW to commode to EOB. Patient comes less responsive during therapy session. Patient is unsafe to retrurn home at this time due to assist level requirements. Patient is a high fall risk and at risk for injury. Cont with POC. OT Education: OT Role;Plan of Care;Transfer Training;ADL Adaptive Strategies  Activity Tolerance  Activity Tolerance: Patient limited by fatigue  Safety Devices  Safety Devices in place: Yes  Type of devices: Bed alarm in place;Call light within reach; Left in bed;Nurse notified        Plan   Plan  Times per week: 3-5  Times per day: Daily  Plan weeks: until D/C  Current Treatment Recommendations: Strengthening, Patient/Caregiver Education & Training, Equipment Evaluation, Education, & procurement, Balance Training, Neuromuscular Re-education, Functional Mobility Training, Cognitive Reorientation, Cognitive/Perceptual Training, Safety Education & Training, Self-Care / ADL, Endurance Training    AM-PAC Score        AM-[facility] Inpatient Daily Activity Raw Score: 11 (03/08/21 1422)  AM-PAC Inpatient ADL T-Scale Score : 29.04 (03/08/21 1422)  ADL Inpatient CMS 0-100% Score: 70.42 (03/08/21 1422)  ADL Inpatient CMS G-Code Modifier : CL (03/08/21 1422)    Goals  Short term goals  Time Frame for Short term goals: until D/C. Status: goals ongoing  Short term goal 1: Pt will feed self with set-up. Short term goal 2: Pt will groom self with min A. Short term goal 3: Pt will perform functional transfers min A x1 using AD as needed.   Short term goal 4: Pt will improve cognition to participate in upper body ADL tasks with min A and mod cues. Short term goal 5: Pt will stand for lower body ADL tasks/positioning with CGA with use of AD/rails. Short term goal 6: Pt will participate in functional ADL mob tasks with min A to CGA with use of AD. Long term goals  Time Frame for Long term goals : same as LTG  Patient Goals   Patient goals : Pt unable to express goal at this time.        Therapy Time   Individual Concurrent Group Co-treatment   Time In 1400         Time Out 1430         Minutes 30                 Electronically signed by Shannon Marks, [id number] on 3/8/2021 at 2:39 PM

## 2021-03-08 NOTE — CARE COORDINATION
3/8 call placed to Cierra Roblero in admissions at [facility]  # [phone] (x2) to check if able to accept at dc- left messages--await return call. Electronically signed by Leeanna Rolon on 3/8/2021 at 4:30 PM  [phone]    3/8 received call from Cierra Roblero at [facility]- they will accept at dc - [facility]   Electronically signed by Leeanna Rolon on 3/8/2021 at 4:36 PM

## 2021-03-08 NOTE — PLAN OF CARE
Problem: Falls - Risk of:  Goal: Will remain free from falls  Description: Will remain free from falls  3/8/2021 1548 by Guillermo Sutton RN  Outcome: Met This Shift  3/8/2021 0320 by Wayne Colon RN  Outcome: Ongoing  Note: Fall risk assessment completed . Fall precautions in place, bed/ chair alarm on, side rails 2/4 up, call light in reach, educated pt on calling for assistance when needed, room clear of clutter. Pt verbalized understanding. Goal: Absence of physical injury  Description: Absence of physical injury  3/8/2021 1548 by Guillermo Sutton RN  Outcome: Met This Shift  3/8/2021 0320 by Wayne Colon RN  Outcome: Ongoing  Note: Patient remains free from physical injury. Patient educated on safety precautions. Will continue to monitor to ensure patient remains free from physical injury throughout remainder of shift.        Problem: Confusion - Acute:  Goal: Absence of continued neurological deterioration signs and symptoms  Description: Absence of continued neurological deterioration signs and symptoms  3/8/2021 1548 by Guillermo Sutton RN  Outcome: Ongoing  3/8/2021 0320 by Wayne Colon RN  Outcome: Ongoing  Goal: Mental status will be restored to baseline  Description: Mental status will be restored to baseline  3/8/2021 1548 by Guillermo Sutton RN  Outcome: Ongoing  3/8/2021 0320 by Wayne Colon RN  Outcome: Ongoing     Problem: Discharge Planning:  Goal: Ability to perform activities of daily living will improve  Description: Ability to perform activities of daily living will improve  3/8/2021 1548 by Guillermo Sutton RN  Outcome: Ongoing  3/8/2021 0320 by Wayne Colon RN  Outcome: Ongoing  Goal: Participates in care planning  Description: Participates in care planning  3/8/2021 1548 by Guillermo Sutton RN  Outcome: Ongoing  3/8/2021 0320 by Wayne Colon RN  Outcome: Ongoing     Problem: Injury - Risk of, Physical Injury:  Goal: Will remain free from falls  Description: Will remain free from falls  3/8/2021 1548 by Mary Beth Davis RN  Outcome: Met This Shift  3/8/2021 0320 by Miguel Ángel Chiu RN  Outcome: Ongoing  Note: Fall risk assessment completed . Fall precautions in place, bed/ chair alarm on, side rails 2/4 up, call light in reach, educated pt on calling for assistance when needed, room clear of clutter. Pt verbalized understanding. Goal: Absence of physical injury  Description: Absence of physical injury  3/8/2021 1548 by Mary Beth Davis RN  Outcome: Met This Shift  3/8/2021 0320 by Miguel Ángel Chiu RN  Outcome: Ongoing  Note: Patient remains free from physical injury. Patient educated on safety precautions. Will continue to monitor to ensure patient remains free from physical injury throughout remainder of shift. Problem: Mood - Altered:  Goal: Mood stable  Description: Mood stable  3/8/2021 1548 by Mary Beth Davis RN  Outcome: Met This Shift  3/8/2021 0320 by Miguel Ángel Chiu RN  Outcome: Ongoing  Note: Pt impulsive, pleasant. Will continue to monitor. Goal: Absence of abusive behavior  Description: Absence of abusive behavior  3/8/2021 1548 by Mary Beth Davis RN  Outcome: Met This Shift  3/8/2021 0320 by Miguel Ángel Chiu RN  Outcome: Ongoing  Note: Pt has had no abusive behavior up to this point in care.   Goal: Verbalizations of feeling emotionally comfortable while being cared for will increase  Description: Verbalizations of feeling emotionally comfortable while being cared for will increase  3/8/2021 1548 by Mary Beth Davis RN  Outcome: Met This Shift  3/8/2021 0320 by Miguel Ángel Chiu RN  Outcome: Ongoing     Problem: Psychomotor Activity - Altered:  Goal: Absence of psychomotor disturbance signs and symptoms  Description: Absence of psychomotor disturbance signs and symptoms  3/8/2021 1548 by Mary Beth Davis RN  Outcome: Met This Shift  3/8/2021 0320 by Miguel Ángel Chiu RN  Outcome: Ongoing     Problem: Sensory Perception - Impaired:  Goal: Demonstrations of improved sensory functioning will increase  Description: Demonstrations of improved sensory functioning will increase  3/8/2021 1548 by Alicia Mccoy RN  Outcome: Met This Shift  3/8/2021 0320 by Janell Childs RN  Outcome: Ongoing  Goal: Decrease in sensory misperception frequency  Description: Decrease in sensory misperception frequency  3/8/2021 1548 by Alicia Mccoy RN  Outcome: Met This Shift  3/8/2021 0320 by Janell Childs RN  Outcome: Ongoing  Goal: Able to refrain from responding to false sensory perceptions  Description: Able to refrain from responding to false sensory perceptions  3/8/2021 1548 by Alicia Mccoy RN  Outcome: Met This Shift  3/8/2021 0320 by Janell Childs RN  Outcome: Ongoing  Goal: Demonstrates accurate environmental perceptions  Description: Demonstrates accurate environmental perceptions  3/8/2021 1548 by Alicia Mccoy RN  Outcome: Met This Shift  3/8/2021 0320 by Janell Childs RN  Outcome: Ongoing  Goal: Able to distinguish between reality-based and nonreality-based thinking  Description: Able to distinguish between reality-based and nonreality-based thinking  3/8/2021 1548 by Alicia Mccoy RN  Outcome: Ongoing  3/8/2021 0320 by Janell Childs RN  Outcome: Ongoing  Goal: Able to interrupt nonreality-based thinking  Description: Able to interrupt nonreality-based thinking  3/8/2021 1548 by Alicia Mccoy RN  Outcome: Ongoing  3/8/2021 0320 by Janell Childs RN  Outcome: Ongoing     Problem: Sleep Pattern Disturbance:  Goal: Appears well-rested  Description: Appears well-rested  3/8/2021 1548 by Alicia Mccoy RN  Outcome: Met This Shift  3/8/2021 0320 by Janell Childs RN  Outcome: Ongoing     Problem: Skin Integrity:  Goal: Will show no infection signs and symptoms  Description: Will show no infection signs and symptoms  3/8/2021 1548 by Alicia Mccoy RN  Outcome: Met This Shift  3/8/2021 0320 by Ankit Cruz RN  Outcome: Ongoing  Goal: Absence of new skin breakdown  Description: Absence of new skin breakdown  3/8/2021 1548 by Jimmy Roblero RN  Outcome: Met This Shift  3/8/2021 0320 by Ankit Cruz RN  Outcome: Ongoing     Problem:  Bowel/Gastric:  Goal: Control of bowel function will improve  Description: Control of bowel function will improve  3/8/2021 1548 by Jimmy Roblero RN  Outcome: Ongoing  3/8/2021 0320 by Anikt Cruz RN  Outcome: Ongoing  Goal: Ability to achieve a regular elimination pattern will improve  Description: Ability to achieve a regular elimination pattern will improve  3/8/2021 1548 by Jimmy Roblero RN  Outcome: Ongoing  3/8/2021 0320 by Ankit Cruz RN  Outcome: Ongoing     Problem: Nutritional:  Goal: Ability to follow a diet with enough fiber (20 to 30 grams) for normal bowel function will improve  Description: Ability to follow a diet with enough fiber (20 to 30 grams) for normal bowel function will improve  3/8/2021 1548 by Jimmy Roblero RN  Outcome: Ongoing  3/8/2021 0320 by Ankit Cruz RN  Outcome: Ongoing     Problem: Non-Violent Restraints  Goal: Removal from restraints as soon as assessed to be safe  3/8/2021 1548 by Jimmy Roblero RN  Outcome: Met This Shift  3/8/2021 0320 by Ankit Cruz RN  Outcome: Ongoing  Goal: No harm/injury to patient while restraints in use  3/8/2021 1548 by Jimmy Roblero RN  Outcome: Met This Shift  3/8/2021 0320 by Ankit Cruz RN  Outcome: Ongoing  Goal: Patient's dignity will be maintained  3/8/2021 1548 by Jimmy Roblero RN  Outcome: Met This Shift  3/8/2021 0320 by Ankit Cruz RN  Outcome: Ongoing     Problem: Pain:  Goal: Pain level will decrease  Description: Pain level will decrease  3/8/2021 1548 by Jimmy Roblero RN  Outcome: Met This Shift  3/8/2021 0320 by Ankit Cruz RN  Outcome: Ongoing  Goal: Control of acute pain  Description: Control of acute pain  3/8/2021 1548 by Davidson Yip RN  Outcome: Met This Shift  3/8/2021 0320 by Terrell Lennon RN  Outcome: Ongoing  Goal: Control of chronic pain  Description: Control of chronic pain  3/8/2021 1548 by Davidson Yip RN  Outcome: Met This Shift  3/8/2021 0320 by Terrell Lennon RN  Outcome: Ongoing     Problem: Nutrition  Goal: Optimal nutrition therapy  3/8/2021 0320 by Terrell Lennon RN  Outcome: Ongoing

## 2021-03-08 NOTE — PROGRESS NOTES
Patient continues to be in bed, he is alert and confused. He is oriented to person only. Telly camera remains in the room to provide safety as he is a fall risk. He continues not to be in restraints. He is a total care. Vitals are stable.  Call light in reach, bed alarm set, will monitor

## 2021-03-08 NOTE — PROGRESS NOTES
Physical Therapy    Facility/Department: [address] ORTHOPEDICS  Treatment note    NAME: Jovon Chaparro  : 1940  MRN: [id number]    Date of Service: 3/8/2021    Assessment / Discharge Recommendations:  -remains with need for continued PT OT and nursing care in a skilled setting  -requires assist of 2 to ambulate   Saad Mora Sr scored a 13/24 on the AM-PAC short mobility form. Current research shows that an AM-PAC score of 17 or less is typically not associated with a discharge to the patient's home setting. Based on the patient's AM-PAC score and their current functional mobility deficits, it is recommended that the patient have 3-5 sessions per week of Physical Therapy at d/c to increase the patient's independence. Activity Tolerance: Patient limited by endurance; Patient limited by cognitive status       Patient Diagnosis(es): The primary encounter diagnosis was Small bowel obstruction ([address] Diagnoses of Status post ileal conduit ([address] Urinary obstruction, Acute renal failure, unspecified acute renal failure type ([address] Hyperkalemia, SIRS (systemic inflammatory response syndrome) ([address] Dementia without behavioral disturbance, unspecified dementia type ([address] and Pityriasis rosea were also pertinent to this visit. has a past medical history of Cancer ([address] GASTRIC ULCER, Hypertension, and Leukopenia. has a past surgical history that includes Colonoscopy; Cystoscopy; other surgical history (2016); Tunneled venous port placement (Right, 2017); and laparotomy (N/A, 2021). Restrictions  Restrictions/Precautions  Restrictions/Precautions: Fall Risk  Position Activity Restriction  Other position/activity restrictions: general diet; urostomy bag, central line/port, telesitter  Subjective  General  Chart Reviewed:  Yes  Additional Pertinent Hx: Per Urology H&P, \" Jovon Chaparro is a 78 y.o. who underwent radical cystectomy and ileal conduit by Carlo Orona at [facility] circa 2016/17. Patient subsequently developed metastatic disease and been under Dr. Alex magallon. Currently on a check point inhibitor. He was emergently hospitalized last evening with abdominal pain and a CT scan that showed mild bilateral hydroureteronephrosis and potential dead bowel. At surgery he was found to have a closed loop small bowel obstruction with a portion of ischemic bowel that was successfully excised. 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 Since surgery, his urine output has picked up nicely but his creatinine has remained elevated at 2.5\"   On 2/18/21, pt underwent \"Exploratory laparotomy, bowel resection for ischemic bowel\". Response To Previous Treatment: Not applicable;Patient unable to report, no changes reported from family or staff  Family / Caregiver Present: No  Subjective  Subjective: arrived to room along with OT to patient resting in bed -awake- agreeable to PT OT to get to bathroom- confused but cooperative  Pain Screening  Patient Currently in Pain: Denies  Social/Functional History  Social/Functional History  Lives With: Significant other  Type of Home: House  Home Layout: (one level with basement?)  Home Access: Stairs to enter without rails  Entrance Stairs - Number of Steps: 2  ADL Assistance: Independent  Additional Comments: above info from social work note  Objective  Bed mobility  Supine to Sit: Minimal assistance; Moderate assistance;2 Person assistance  Sit to Supine: Minimal assistance; Moderate assistance;2 Person assistance  Transfers  Sit to Stand: Contact guard assistance;Minimal Assistance  Stand to sit: Contact guard assistance;Minimal Assistance  Ambulation  Ambulation?: Yes  Ambulation 1  Surface: level tile  Device: Rolling Walker  Assistance: Minimal assistance; Moderate assistance;2 Person assistance  Quality of Gait: walker too far ahead and flexed at trunk, hips and knees  --narrow base of support  Distance: bed to toilet and return for ~10 feet each way  Stairs/Curb  Stairs?: No     Balance  Comments: sitting midline with close cga for safety   - static stance is fair and dynamic is poor        Plan   Plan  Times per week: 3-5  Specific instructions for Next Treatment: cotx with OT  Current Treatment Recommendations: Functional Mobility Training  Safety Devices  Type of devices: All fall risk precautions in place, Call light within reach, Chair alarm in place, Bed alarm in place, Nurse notified, Left in bed  AM-PAC Score  AM-PAC Inpatient Mobility Raw Score : 13 (03/08/21 1554)  AM-PAC Inpatient T-Scale Score : 36.74 (03/08/21 1554)  Mobility Inpatient CMS 0-100% Score: 64.91 (03/08/21 1554)  Mobility Inpatient CMS G-Code Modifier : CL (03/08/21 1554)    Goals  Short term goals  Time Frame for Short term goals: by acute d/c--goals ongoing as of 3/5/21 with limited progress. Short term goal 1: bed mobility Min A x 2 met: new goal min A x 1  Short term goal 2: sit<>stand trasnfers Min A x 1  Short term goal 3: assess ambulation with LRAD if/as needed x 25 ft with Min A  Long term goals  Time Frame for Long term goals : tbd if/as needed. Patient Goals   Patient goals : pt unable to formulate a therapy goal at this time.        Therapy Time   Individual Concurrent Group Co-treatment   Time In 1400         Time Out 1430         Minutes [address] [facility],[address] Grecia Haq, PT

## 2021-03-08 NOTE — PLAN OF CARE
Problem: Falls - Risk of:  Goal: Will remain free from falls  Description: Will remain free from falls  Outcome: Ongoing  Note: Fall risk assessment completed . Fall precautions in place, bed/ chair alarm on, side rails 2/4 up, call light in reach, educated pt on calling for assistance when needed, room clear of clutter. Pt verbalized understanding. Problem: Falls - Risk of:  Goal: Absence of physical injury  Description: Absence of physical injury  Outcome: Ongoing  Note: Patient remains free from physical injury. Patient educated on safety precautions. Will continue to monitor to ensure patient remains free from physical injury throughout remainder of shift. Problem: Confusion - Acute:  Goal: Absence of continued neurological deterioration signs and symptoms  Description: Absence of continued neurological deterioration signs and symptoms  Outcome: Ongoing     Problem: Confusion - Acute:  Goal: Mental status will be restored to baseline  Description: Mental status will be restored to baseline  Outcome: Ongoing     Problem: Confusion - Acute:  Goal: Mental status will be restored to baseline  Description: Mental status will be restored to baseline  Outcome: Ongoing     Problem: Discharge Planning:  Goal: Ability to perform activities of daily living will improve  Description: Ability to perform activities of daily living will improve  Outcome: Ongoing     Problem: Discharge Planning:  Goal: Participates in care planning  Description: Participates in care planning  Outcome: Ongoing     Problem: Injury - Risk of, Physical Injury:  Goal: Will remain free from falls  Description: Will remain free from falls  Outcome: Ongoing  Note: Fall risk assessment completed . Fall precautions in place, bed/ chair alarm on, side rails 2/4 up, call light in reach, educated pt on calling for assistance when needed, room clear of clutter. Pt verbalized understanding.        Problem: Injury - Risk of, Physical Injury:  Goal: Ongoing     Problem: Sensory Perception - Impaired:  Goal: Demonstrates accurate environmental perceptions  Description: Demonstrates accurate environmental perceptions  Outcome: Ongoing     Problem: Sensory Perception - Impaired:  Goal: Able to distinguish between reality-based and nonreality-based thinking  Description: Able to distinguish between reality-based and nonreality-based thinking  Outcome: Ongoing     Problem: Sensory Perception - Impaired:  Goal: Able to interrupt nonreality-based thinking  Description: Able to interrupt nonreality-based thinking  Outcome: Ongoing     Problem: Sleep Pattern Disturbance:  Goal: Appears well-rested  Description: Appears well-rested  Outcome: Ongoing     Problem: Skin Integrity:  Goal: Will show no infection signs and symptoms  Description: Will show no infection signs and symptoms  Outcome: Ongoing     Problem: Skin Integrity:  Goal: Absence of new skin breakdown  Description: Absence of new skin breakdown  Outcome: Ongoing     Problem: Bowel/Gastric:  Goal: Control of bowel function will improve  Description: Control of bowel function will improve  Outcome: Ongoing     Problem:  Bowel/Gastric:  Goal: Ability to achieve a regular elimination pattern will improve  Description: Ability to achieve a regular elimination pattern will improve  Outcome: Ongoing     Problem: Nutritional:  Goal: Ability to follow a diet with enough fiber (20 to 30 grams) for normal bowel function will improve  Description: Ability to follow a diet with enough fiber (20 to 30 grams) for normal bowel function will improve  Outcome: Ongoing     Problem: Non-Violent Restraints  Goal: Removal from restraints as soon as assessed to be safe  Outcome: Ongoing     Problem: Non-Violent Restraints  Goal: No harm/injury to patient while restraints in use  Outcome: Ongoing     Problem: Non-Violent Restraints  Goal: Patient's dignity will be maintained  Outcome: Ongoing     Problem: Pain:  Goal: Pain level will decrease  Description: Pain level will decrease  Outcome: Ongoing     Problem: Pain:  Goal: Control of acute pain  Description: Control of acute pain  Outcome: Ongoing     Problem: Pain:  Goal: Control of chronic pain  Description: Control of chronic pain  Outcome: Ongoing

## 2021-03-08 NOTE — PROGRESS NOTES
Posy vest discontinued, patient is calm, cooperative and following some commands. Telly camera remains in place. He is not eating much. Urostomy emptied, stoma is red, urine is yellow, clear, without odor, vitals are stable. Call light in reach, bed alarm set, will monitor .

## 2021-03-08 NOTE — CONSULTS
[facility]  Palliative Care   Consult Note    NAME:  Nolan Heredia Sr  MEDICAL RECORD NUMBER:  [id number]  AGE: 78 y.o. GENDER: male  : 1940  TODAY'S DATE:  3/8/2021    Subjective     Reason for Consult:  goals of care and code status  Visit Type: Initial Consult      Gildardo Diane is a 78 y.o. 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   Peter Davis N/A 2021    EXPLORATORY LAPAROTOMY, BOWEL RESECTION performed by Alex Ugalde MD at [address]  2016    RADICAL CYSTECTOMY, PROSTECTOMY ILEAL CONDUIT URINARY    TUNNELED VENOUS PORT PLACEMENT Right 2017    DR. BOND/MARY POWER PORT       FAMILY HISTORY  History reviewed. No pertinent family history. SOCIAL HISTORY  Social History     Tobacco Use    Smoking status: Never Smoker    Smokeless tobacco: Never Used   Substance Use Topics    Alcohol use: No    Drug use: No       ALLERGIES  No Known Allergies    MEDICATIONS  No current facility-administered medications on file prior to encounter. Current Outpatient Medications on File Prior to Encounter   Medication Sig Dispense Refill    furosemide (LASIX) 20 MG tablet Take 1 tablet by mouth daily 90 tablet 1    potassium chloride (KLOR-CON M20) 20 MEQ extended release tablet Take 1 tablet by mouth daily Go to lab and hospital were orders are waiting for blood tests.  Can get 90 day supply after blood tests 14 tablet 0    donepezil (ARICEPT) 5 MG tablet Take 1 tablet by mouth nightly 30 tablet 3       Objective         BP (!) 146/69   Pulse 127   Temp 97.8 °F (36.6 °C) (Oral)   Resp 14   Ht 5' 9\" (1.753 m)   Wt 166 lb 14.2 oz (75.7 kg)   SpO2 (!) 77%   BMI 24.65 kg/m² Code Status: Full Code    Advanced Directives: not completed has daughter and 2 sons    Assessment        Management and Education    Persons available for education:       [] Self     [] Caregiver       [] Spouse       [x] Other Family Member   []  Other    Spiritual History:  notified: Yes,     Does the patient have a Primary Care Physician? Yes    Level of patient/caregiver understanding able to:        [x] Verbalize Understanding   [] Demonstrate Understanding       [] Teach Back       [] Needs Reinforcement     []  Other:      Teaching Time:  1hours  0 minutes     Plan        Social Service Consult Made:  Yes  Assistance filling out Living Will/HPOA:  No      Discharge Plan:  Education/support to family  Providing support for coping/adaptation/distress of family  Code status clarified: Full Code  Palliative care orders introduced    Discharge Environment:    [x] [facility] skilled care     Discussion: Patient admitted from home for abd pain, has had emergent surgery for bowel resection. I have called his daughter Danna Milan to discuss goals of care. She gives a history of him living with his girlfriend he is able to care for himself, do simple household chores his daughter pays bills. He is oriented to self only, currently cooperative with staff and eating lunch. He is currently out of restraints. Danna Milan plans for him to go ECF when ready for discharge. She wants to continue with full code at this time. I will continue to follow Mr. Dom magallon as needed.       Thank you for allowing me to participate in the care of Mr. Marian Gonzalez .     Electronically signed by Dorothy Montelongo RN, [facility] on 3/8/2021 at 1:56 PM  [address] [place] [address]  Office: [phone]

## 2021-03-09 NOTE — CARE COORDINATION
Precert obtained. Martha at [facility] did full PAS and will call family about dropping off belongings. Covid pending.    Electronically signed by David Shah on 3/9/2021 at 4:00 PM

## 2021-03-09 NOTE — PROGRESS NOTES
Patient restless, trying to get out of bed. Full bed change done, PRN xyprexa given, patient tolerated well. Bed alarm on, tele cam on, patient in bed awake drinking water, will continue to monitor.

## 2021-03-09 NOTE — PROGRESS NOTES
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 +flatulence and BMs  -incision c/d/i, staples removed today  -continue PT/OT  -Ok to D/C from a general surgery standpoint when medically ok. Plan is for DC home today with home care. KEENAN  -nephro following, appreciate assistance. Cr back to baseline    Delirium  -with baseline dementia. Improving.  -appreciate hospitalist and psych assistance    Sheyla Walton is doing well. Denies any abdominal pain. OBJECTIVE  VITALS:  height is 5' 9\" (1.753 m) and weight is 152 lb 5.4 oz (69.1 kg). His axillary temperature is 97.3 °F (36.3 °C). His blood pressure is 134/79 and his pulse is 85. His respiration is 16 and oxygen saturation is 96%. INTAKE/OUTPUT:      Intake/Output Summary (Last 24 hours) at 3/9/2021 1145  Last data filed at 3/8/2021 1930  Gross per 24 hour   Intake 720 ml   Output 250 ml   Net 470 ml     GENERAL: NAD, appears stated age, oriented to person  ABDOMEN: Soft, NT, ND, incision c/d/i  EXTREMITY: no cyanosis, clubbing or edema    I/O last 3 completed shifts:   In: 720 [P.O.:720]  Out: 650 [Urine:650]    LABS  Recent Labs     03/08/21  0423 03/09/21  0441   WBC 8.5  --    HGB 10.3*  --    HCT 30.7*  --      --     137   K 4.3 4.5    102   CO2 23 25   BUN 15 21*   CREATININE 1.7* 1.9*   MG 2.00  --    PHOS 3.0 3.3   CALCIUM 9.2 9.4       Electronically signed by SLADE Duval 3/9/2021 at 11:45 AM        Surgery Staff  I have examined this patient and read and agree with the note by SLADE Alarcon from today.  D/C home when medically appropriate  Staples removed today  F/U 2 weeks    Electronically signed by Alia Barton MD on 3/9/2021 at 2:35 PM

## 2021-03-09 NOTE — PLAN OF CARE
Problem: Falls - Risk of:  Goal: Will remain free from falls  Description: Will remain free from falls  3/9/2021 0732 by Mary Saab RN  Outcome: Ongoing  Note: Fall risk assessment completed . Fall precautions in place, bed alarm on, side rails 2/4 up, call light in reach, educated pt on calling for assistance when needed, room clear of clutter. Pt verbalized understanding. 3/8/2021 2148 by Stephanie Jimenez RN  Outcome: Ongoing  Goal: Absence of physical injury  Description: Absence of physical injury  3/9/2021 0732 by Mary Saab RN  Outcome: Ongoing  3/8/2021 2148 by Stephanie Jimenez RN  Outcome: Ongoing     Problem: Confusion - Acute:  Goal: Absence of continued neurological deterioration signs and symptoms  Description: Absence of continued neurological deterioration signs and symptoms  3/9/2021 0732 by Mary Saab RN  Outcome: Ongoing  Note: Patient will be alert and oriented this shift. 3/8/2021 2148 by Stephanie Jimenez RN  Outcome: Ongoing  Goal: Mental status will be restored to baseline  Description: Mental status will be restored to baseline  3/9/2021 0732 by Mary Saab RN  Outcome: Ongoing  3/8/2021 2148 by Stephanie Jimenez RN  Outcome: Ongoing     Problem: Discharge Planning:  Goal: Ability to perform activities of daily living will improve  Description: Ability to perform activities of daily living will improve  3/9/2021 0732 by Mary Saab RN  Outcome: Ongoing  Note: Patient will actively participate in ADL's this shift.    3/8/2021 2148 by Stephanie Jimenez RN  Outcome: Ongoing  Goal: Participates in care planning  Description: Participates in care planning  3/9/2021 0732 by Mary Saab RN  Outcome: Ongoing  3/8/2021 2148 by Stephanie Jimenez RN  Outcome: Ongoing     Problem: Injury - Risk of, Physical Injury:  Goal: Will remain free from falls  Description: Will remain free from falls  3/9/2021 0732 by Mary Saab RN  Outcome: Ongoing  Note: Fall risk assessment completed . Fall precautions in place, bed alarm on, side rails 2/4 up, call light in reach, educated pt on calling for assistance when needed, room clear of clutter. Pt verbalized understanding.      3/8/2021 2148 by Norm Alves RN  Outcome: Ongoing  Goal: Absence of physical injury  Description: Absence of physical injury  3/9/2021 0732 by Eleazar Archer RN  Outcome: Ongoing  3/8/2021 2148 by Norm Alves RN  Outcome: Ongoing     Problem: Mood - Altered:  Goal: Mood stable  Description: Mood stable  3/9/2021 0732 by Eleazar Archer RN  Outcome: Ongoing  3/8/2021 2148 by Norm Alves RN  Outcome: Ongoing  Goal: Absence of abusive behavior  Description: Absence of abusive behavior  3/9/2021 0732 by Eleazar Archer RN  Outcome: Ongoing  3/8/2021 2148 by Norm Alves RN  Outcome: Ongoing  Goal: Verbalizations of feeling emotionally comfortable while being cared for will increase  Description: Verbalizations of feeling emotionally comfortable while being cared for will increase  3/9/2021 0732 by Eleazar Archer RN  Outcome: Ongoing  3/8/2021 2148 by Norm Alves RN  Outcome: Ongoing     Problem: Psychomotor Activity - Altered:  Goal: Absence of psychomotor disturbance signs and symptoms  Description: Absence of psychomotor disturbance signs and symptoms  3/9/2021 0732 by Eleazar Archer RN  Outcome: Ongoing  3/8/2021 2148 by Norm Alves RN  Outcome: Ongoing     Problem: Sensory Perception - Impaired:  Goal: Demonstrations of improved sensory functioning will increase  Description: Demonstrations of improved sensory functioning will increase  3/9/2021 0732 by Eleazar Archer RN  Outcome: Ongoing  3/8/2021 2148 by Norm Alves RN  Outcome: Ongoing  Goal: Decrease in sensory misperception frequency  Description: Decrease in sensory misperception frequency  3/9/2021 0732 by Eleazar Archer RN  Outcome: Ongoing  3/8/2021 2148 by Efrain uClp achieve a regular elimination pattern will improve  Description: Ability to achieve a regular elimination pattern will improve  3/9/2021 0732 by Saul Church RN  Outcome: Ongoing  3/8/2021 2148 by Jose Luis Cortes RN  Outcome: Ongoing     Problem: Nutritional:  Goal: Ability to follow a diet with enough fiber (20 to 30 grams) for normal bowel function will improve  Description: Ability to follow a diet with enough fiber (20 to 30 grams) for normal bowel function will improve  3/9/2021 0732 by Saul Church RN  Outcome: Ongoing  3/8/2021 2148 by Jose Luis Cortes RN  Outcome: Ongoing     Problem: Non-Violent Restraints  Goal: Removal from restraints as soon as assessed to be safe  3/9/2021 0732 by Saul Church RN  Outcome: Ongoing  3/8/2021 2148 by Jose Luis Cortes RN  Outcome: Ongoing  Goal: No harm/injury to patient while restraints in use  3/9/2021 0732 by Saul Church RN  Outcome: Ongoing  3/8/2021 2148 by Jose Luis Cortes RN  Outcome: Ongoing  Goal: Patient's dignity will be maintained  3/9/2021 0732 by Saul Church RN  Outcome: Ongoing  3/8/2021 2148 by Jose Luis Cortes RN  Outcome: Ongoing     Problem: Pain:  Goal: Pain level will decrease  Description: Pain level will decrease  3/9/2021 0732 by Saul Church RN  Outcome: Ongoing  3/8/2021 2148 by Jose Luis Cortes RN  Outcome: Ongoing  Goal: Control of acute pain  Description: Control of acute pain  3/9/2021 0732 by Saul Church RN  Outcome: Ongoing  3/8/2021 2148 by Jose Luis Cortes RN  Outcome: Ongoing  Goal: Control of chronic pain  Description: Control of chronic pain  3/9/2021 0732 by Saul Church RN  Outcome: Ongoing  3/8/2021 2148 by Jose Luis Cortes RN  Outcome: Ongoing     Problem: Nutrition  Goal: Optimal nutrition therapy  3/9/2021 0732 by Saul Church RN  Outcome: Ongoing  3/8/2021 2148 by Jose Luis Cortes RN  Outcome: Ongoing

## 2021-03-09 NOTE — CARE COORDINATION
Ileana Renteria Medicare Authorization      Authorization ID   [id number]     Start date  3/8/21    Next review date   3/11/21    Fax update to   Priscila Gusman @ [phone]      PDPM    PT/OT    NTA    SLP    NSG

## 2021-03-09 NOTE — CARE COORDINATION
REQUEST FOR SKILLED FACILITY AUTHORIZATION SUBMITTED TO [employer] MEDICARE:    Patient name:  Sagrario FLORES  :  [birth year]    Current Location: [facility] [place] [facility]    Admit date to hospital: 2021    Requested Setting:  HOME AT [facility]    Anticipated Admit Date to SNF Level of Care:  3/9/2021      ORDERING MD:  Elaina Ogden M.D.    NPI NUMBER:  [id number]    Leonardo Peralta Sr.  Administrative Assist, Case Management  320   Electronically signed by Leonardo Peralta on 3/9/2021 at 10:42 AM

## 2021-03-09 NOTE — CARE COORDINATION
RODOLFO noted dc order. Called Tiarra at home at [facility] and NILES. [facility] transport for 8pm [facility].   HENS not done    Home at [facility] skilled today  Report number [phone]    Electronically signed by [id number] GRANT Singh on 3/9/2021 at 12:55 PM

## 2021-03-09 NOTE — PROGRESS NOTES
Report called to Home at [facility]. Patient given night medications at [facility] per family request.  Facility updated. Patient continuing to attempt to get out of bed.

## 2021-03-09 NOTE — PLAN OF CARE
Problem: Nutrition  Goal: Optimal nutrition therapy  3/9/2021 1323 by Traci So  Outcome: Ongoing  3/9/2021 0732 by Cayla Hayward RN  Outcome: Ongoing     Nutrition Problem #1: Inadequate oral intake  Intervention: Food and/or Nutrient Delivery: Continue Current Diet, Continue Oral Nutrition Supplement  Nutritional Goals: Consume >50% of meals and ONS

## 2021-03-09 NOTE — PLAN OF CARE
Problem: Falls - Risk of:  Goal: Will remain free from falls  Description: Will remain free from falls  3/8/2021 2148 by Dylan Rodriguez RN  Outcome: Ongoing  3/8/2021 1548 by Alma Delia Merritt RN  Outcome: Met This Shift  Goal: Absence of physical injury  Description: Absence of physical injury  3/8/2021 2148 by Dylan Rodriguez RN  Outcome: Ongoing  3/8/2021 1548 by Alma Delia Merritt RN  Outcome: Met This Shift     Problem: Confusion - Acute:  Goal: Absence of continued neurological deterioration signs and symptoms  Description: Absence of continued neurological deterioration signs and symptoms  3/8/2021 2148 by Dylan Rodriguez RN  Outcome: Ongoing  3/8/2021 1548 by Alma Delia Merritt RN  Outcome: Ongoing  Goal: Mental status will be restored to baseline  Description: Mental status will be restored to baseline  3/8/2021 2148 by Dylan Rodriguez RN  Outcome: Ongoing  3/8/2021 1548 by Alma Delia Merritt RN  Outcome: Ongoing     Problem: Discharge Planning:  Goal: Ability to perform activities of daily living will improve  Description: Ability to perform activities of daily living will improve  3/8/2021 2148 by Dylan Rodriguez RN  Outcome: Ongoing  3/8/2021 1548 by Alma Delia Merritt RN  Outcome: Ongoing  Goal: Participates in care planning  Description: Participates in care planning  3/8/2021 2148 by Dylan Rodriguez RN  Outcome: Ongoing  3/8/2021 1548 by Alma Delia Merritt RN  Outcome: Ongoing     Problem: Injury - Risk of, Physical Injury:  Goal: Will remain free from falls  Description: Will remain free from falls  3/8/2021 2148 by Dylan Rodriguez RN  Outcome: Ongoing  3/8/2021 1548 by Alma Delia Merritt RN  Outcome: Met This Shift  Goal: Absence of physical injury  Description: Absence of physical injury  3/8/2021 2148 by Dylan Rodriguez RN  Outcome: Ongoing  3/8/2021 1548 by Alma Delia Merritt RN  Outcome: Met This Shift     Problem: Mood - Altered:  Goal: Mood stable  Description: Mood stable  3/8/2021 2148 by Panda Jaime RN  Outcome: Ongoing  3/8/2021 1548 by Vivianne Klinefelter, RN  Outcome: Met This Shift  Goal: Absence of abusive behavior  Description: Absence of abusive behavior  3/8/2021 2148 by Panda Jaime RN  Outcome: Ongoing  3/8/2021 1548 by Vivianne Klinefelter, RN  Outcome: Met This Shift  Goal: Verbalizations of feeling emotionally comfortable while being cared for will increase  Description: Verbalizations of feeling emotionally comfortable while being cared for will increase  3/8/2021 2148 by Panda Jaime RN  Outcome: Ongoing  3/8/2021 1548 by Vivianne Klinefelter, RN  Outcome: Met This Shift     Problem: Psychomotor Activity - Altered:  Goal: Absence of psychomotor disturbance signs and symptoms  Description: Absence of psychomotor disturbance signs and symptoms  3/8/2021 2148 by Panda Jaime RN  Outcome: Ongoing  3/8/2021 1548 by Vivianne Klinefelter, RN  Outcome: Met This Shift     Problem: Sensory Perception - Impaired:  Goal: Demonstrations of improved sensory functioning will increase  Description: Demonstrations of improved sensory functioning will increase  3/8/2021 2148 by Panda Jaime RN  Outcome: Ongoing  3/8/2021 1548 by Vivianne Klinefelter, RN  Outcome: Met This Shift  Goal: Decrease in sensory misperception frequency  Description: Decrease in sensory misperception frequency  3/8/2021 2148 by Panda Jaime RN  Outcome: Ongoing  3/8/2021 1548 by Vivianne Klinefelter, RN  Outcome: Met This Shift  Goal: Able to refrain from responding to false sensory perceptions  Description: Able to refrain from responding to false sensory perceptions  3/8/2021 2148 by Panda Jaime RN  Outcome: Ongoing  3/8/2021 1548 by Vivianne Klinefelter, RN  Outcome: Met This Shift  Goal: Demonstrates accurate environmental perceptions  Description: Demonstrates accurate environmental perceptions  3/8/2021 2148 by Panda Jaime RN  Outcome: Ongoing  3/8/2021 1548 by Ayaz Mcintyre RN  Outcome: Met This Shift  Goal: Able to distinguish between reality-based and nonreality-based thinking  Description: Able to distinguish between reality-based and nonreality-based thinking  3/8/2021 2148 by Ani Alfred RN  Outcome: Ongoing  3/8/2021 1548 by Ayaz Mcintyre RN  Outcome: Ongoing  Goal: Able to interrupt nonreality-based thinking  Description: Able to interrupt nonreality-based thinking  3/8/2021 2148 by Ani Alfred RN  Outcome: Ongoing  3/8/2021 1548 by Ayaz Mcintyre RN  Outcome: Ongoing     Problem: Sleep Pattern Disturbance:  Goal: Appears well-rested  Description: Appears well-rested  3/8/2021 2148 by Ani Alfred RN  Outcome: Ongoing  3/8/2021 1548 by Ayaz Mcintyre RN  Outcome: Met This Shift     Problem: Skin Integrity:  Goal: Will show no infection signs and symptoms  Description: Will show no infection signs and symptoms  3/8/2021 2148 by Ani Alfred RN  Outcome: Ongoing  3/8/2021 1548 by Ayaz Mcintyre RN  Outcome: Met This Shift  Goal: Absence of new skin breakdown  Description: Absence of new skin breakdown  3/8/2021 2148 by Ani Alfred RN  Outcome: Ongoing  3/8/2021 1548 by Ayaz Mcintyre RN  Outcome: Met This Shift     Problem:  Bowel/Gastric:  Goal: Control of bowel function will improve  Description: Control of bowel function will improve  3/8/2021 2148 by Ani Alfred RN  Outcome: Ongoing  3/8/2021 1548 by Ayaz Mcintyre RN  Outcome: Ongoing  Goal: Ability to achieve a regular elimination pattern will improve  Description: Ability to achieve a regular elimination pattern will improve  3/8/2021 2148 by Ani Alfred RN  Outcome: Ongoing  3/8/2021 1548 by Ayaz Mcintyre RN  Outcome: Ongoing     Problem: Nutritional:  Goal: Ability to follow a diet with enough fiber (20 to 30 grams) for normal bowel function will improve  Description: Ability to follow a diet with enough fiber (20 to 30 grams) for normal bowel function will improve  3/8/2021 2148 by Fatuma Murphy RN  Outcome: Ongoing  3/8/2021 1548 by Guillermo Sutton RN  Outcome: Ongoing     Problem: Non-Violent Restraints  Goal: Removal from restraints as soon as assessed to be safe  3/8/2021 2148 by Fatuma Murphy RN  Outcome: Ongoing  3/8/2021 1548 by Guillermo Sutton RN  Outcome: Met This Shift  Goal: No harm/injury to patient while restraints in use  3/8/2021 2148 by Fatuma Murphy RN  Outcome: Ongoing  3/8/2021 1548 by Guillermo Sutton RN  Outcome: Met This Shift  Goal: Patient's dignity will be maintained  3/8/2021 2148 by Fatuma Murphy RN  Outcome: Ongoing  3/8/2021 1548 by Guillermo Sutton RN  Outcome: Met This Shift     Problem: Pain:  Goal: Pain level will decrease  Description: Pain level will decrease  3/8/2021 2148 by Fatuma Murphy RN  Outcome: Ongoing  3/8/2021 1548 by Guillermo Sutton RN  Outcome: Met This Shift  Goal: Control of acute pain  Description: Control of acute pain  3/8/2021 2148 by Fatuma Murphy RN  Outcome: Ongoing  3/8/2021 1548 by Guillermo Sutton RN  Outcome: Met This Shift  Goal: Control of chronic pain  Description: Control of chronic pain  3/8/2021 2148 by Fatuma Murphy RN  Outcome: Ongoing  3/8/2021 1548 by Guillermo Sutton RN  Outcome: Met This Shift     Problem: Nutrition  Goal: Optimal nutrition therapy  Outcome: Ongoing

## 2021-03-09 NOTE — PROGRESS NOTES
Comprehensive Nutrition Assessment    Type and Reason for Visit:  Reassess    Nutrition Recommendations/Plan:   Continue general diet with Glucerna TID. Nutrition Assessment:  Follow-up. Pt was very confused during visit and was unable to answer questions coherently; no family at bedside. Information gathered through EMR. Intakes recorded at 1-25%. Pt currently receiving assistance during some meals per nursing. Wt status continues to decline per EMR. ONS intakes not recorded. Pt stated he is drinking ONS, but with AMS, answer may not have been accurate. Currently on general diet with Glucerna TID. Will continue to monitor intakes while inpatient. Malnutrition Assessment:  Malnutrition Status:  Insufficient data    Context:  Acute Illness     Findings of the 6 clinical characteristics of malnutrition:  Energy Intake:  Mild decrease in energy intake (Comment)  Weight Loss:  7 - Greater than 2% over 1 week     Body Fat Loss:  Unable to assess     Muscle Mass Loss:  Unable to assess    Fluid Accumulation:  1 - Mild Extremities   Strength:  Not Performed    Estimated Daily Nutrient Needs:  Energy (kcal):  1725-2070kcal/kg (25-30kcal/kg CBW); Weight Used for Energy Requirements:  Current     Protein (g):  82-103g/d (1.2-1.5g/kg CBW);  Weight Used for Protein Requirements:  Current        Fluid (ml/day):  1 mL/kcal; Method Used for Fluid Requirements:  1 ml/kcal      Nutrition Related Findings:  BLE edema trace; oriented only to self; -3.1 liters      Wounds:  Surgical Incision(Abdomen)       Current Nutrition Therapies:    DIET GENERAL;  Dietary Nutrition Supplements: Diabetic Oral Supplement    Anthropometric Measures:  · Height: 5' 9\" (175.3 cm)  · Current Body Weight: 152 lb (68.9 kg)   · Admission Body Weight: 185 lb (83.9 kg)    · Usual Body Weight: 180 lb (81.6 kg)(per EMR)     · Ideal Body Weight: 160 lbs; % Ideal Body Weight 95 %   · BMI: 22.4  · BMI Categories: Normal Weight (BMI 22.0 to 24.9) age over 72       Nutrition Diagnosis:   · Inadequate oral intake related to cognitive or neurological impairment, altered GI function as evidenced by intake 0-25%, weight loss      Nutrition Interventions:   Food and/or Nutrient Delivery:  Continue Current Diet, Continue Oral Nutrition Supplement  Nutrition Education/Counseling:  No recommendation at this time   Coordination of Nutrition Care:  Continue to monitor while inpatient    Goals:  Consume >50% of meals and ONS       Nutrition Monitoring and Evaluation:   Behavioral-Environmental Outcomes:  None Identified   Food/Nutrient Intake Outcomes:  Food and Nutrient Intake, Supplement Intake  Physical Signs/Symptoms Outcomes:  Biochemical Data, Nutrition Focused Physical Findings, Skin, Weight, Meal Time Behavior, Fluid Status or Edema     Discharge Planning:     Too soon to determine     Electronically signed by Felipe Larry on 3/9/21 at 12:49 PM EST    Contact: [phone]

## 2021-03-09 NOTE — PROGRESS NOTES
Checking on patient Q2H for nutrition needs, hygiene needs, comfort measures, mobility, fall risk interventions, and safe environment. All precautions and interventions in place. Educated patient on use of call light and telephone. Patient verbalizes understanding. Call light/telephone in reach.   Electronically signed by Marizol rFeeman RN on 3/9/2021 at 7:33 AM

## 2021-03-09 NOTE — DISCHARGE SUMMARY
Hospital Discharge Summary    Patient's PCP: Lord Washington MD  Admit Date: 2/18/2021   Discharge Date: 3/9/2021    Admitting Physician: Dr. Danika Leary MD  [facility]   Consults: nephrology, psychiatry and urology    Brief HPI/hospital course:     79 y/o M who presented with abdominal pain x 1 week. Patient was found to have a small bowel obstruction as well as blockage of his ileal conduit. He was taken to the OR and underwent an exploratory laparotomy with lysis of adhesions and small bowel resection on 2/18. Postop hospital course was complicated by acute delirium leading to prolonged hospitalization about 19 days. Vira Libman He was treated for the following:    -Small bowel obstruction-S/P exploratory laparotomy w/ bowel resection 7/78/1234  -Acute metabolic encephalopathy with delirium--required restraints for acute agitation-removed 3/8  -Dementia-on Aricept  -KEENAN on CKD stage III. 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 *      Physical Exam: /79   Pulse 85   Temp 97.3 °F (36.3 °C) (Axillary)   Resp 16   Ht 5' 9\" (1.753 m)   Wt 152 lb 5.4 oz (69.1 kg)   SpO2 96%   BMI 22.50 kg/m²   Gen/overall appearance: Not in acute distress. Alert. Head: Normocephalic, atraumatic  Eyes: EOMI, good acuity  ENT:- Oral mucosa moist  Neck: No JVD, thyromegaly  CVS: Nml S1S2, no MRG, RRR  Pulm: Clear bilaterally.  No crackles/wheezes  Gastrointestinal: Soft, NT/ND, +BS  Musculoskeletal: No edema. Warm  Neuro: No focal deficit. Moves extremity spontaneously. Psychiatry: Appropriate affect. Not agitated. Skin: Warm, dry with normal turgor. No rash        Significant Diagnostic Studies:    See above        Treatments: As above. Discharge Medications:     Medication List      START taking these medications    haloperidol lactate 5 MG/ML injection  Commonly known as: HALDOL  Inject 1 mL into the muscle every 6 hours as needed for Agitation     risperiDONE 1 MG tablet  Commonly known as: RISPERDAL  Take 1 tablet by mouth 2 times daily        CONTINUE taking these medications    donepezil 5 MG tablet  Commonly known as: Aricept  Take 1 tablet by mouth nightly     furosemide 20 MG tablet  Commonly known as: LASIX  Take 1 tablet by mouth daily     potassium chloride 20 MEQ extended release tablet  Commonly known as: Klor-Con M20  Take 1 tablet by mouth daily Go to lab and hospital were orders are waiting for blood tests. Can get 90 day supply after blood tests           Where to Get Your Medications      You can get these medications from any pharmacy    Bring a paper prescription for each of these medications  · risperiDONE 1 MG tablet     Information about where to get these medications is not yet available    Ask your nurse or doctor about these medications  · haloperidol lactate 5 MG/ML injection         Activity: activity as tolerated  Diet: DIET GENERAL;  Dietary Nutrition Supplements: Diabetic Oral Supplement      Disposition: home  Discharged Condition: Stable  Follow Up:   Kisha Arriaga MD  [address]  [place] [address]  [phone]    On 3/10/2021  10:00 am. post operative follow up and staple removal.         Code status:  Full Code         Total time spent on discharge, finalizing medications, referrals and arranging outpatient follow up was more than 45 minutes      Thank you Dr. Santiago Maldonado MD for the opportunity to be involved in this patients care.

## 2021-03-09 NOTE — PROGRESS NOTES
Nephrology Progress Note   [email or web address]      This patient is a 78year old male whom we are following for KEENAN. on CKD. Subjective:    HPI:  Breathing comfortably. No CP. Renal function stable. ROS:  In bed. [address]:  medications reviewed. Vitals:  /79   Pulse 85   Temp 97.3 °F (36.3 °C) (Axillary)   Resp 16   Ht 5' 9\" (1.753 m)   Wt 152 lb 5.4 oz (69.1 kg)   SpO2 96%   BMI 22.50 kg/m²       Intake/Output Summary (Last 24 hours) at 3/9/2021 1420  Last data filed at 3/8/2021 1930  Gross per 24 hour   Intake --   Output 250 ml   Net -250 ml       Physical Exam:  Physical Exam  Vitals signs reviewed. Constitutional:       General: He is not in acute distress. HENT:      Head: Normocephalic and atraumatic. Eyes:      General: No scleral icterus. Conjunctiva/sclera: Conjunctivae normal.   Cardiovascular:      Rate and Rhythm: Normal rate. Heart sounds: No friction rub. Pulmonary:      Effort: Pulmonary effort is normal. No respiratory distress. Breath sounds: No wheezing or rhonchi. Abdominal:      General: Bowel sounds are normal. There is no distension. Palpations: There is no mass. Tenderness: There is no abdominal tenderness. There is no guarding or rebound. Hernia: No hernia is present. Musculoskeletal:      Right lower leg: No edema. Left lower leg: No edema. Neurological:      Mental Status: He is alert.            Labs:  Recent Labs     03/08/21  0423   WBC 8.5   HGB 10.3*   HCT 30.7*   MCV 94.2        Recent Labs     03/08/21  0423 03/09/21  0441    137   K 4.3 4.5    102   CO2 23 25   GLUCOSE 124* 127*   PHOS 3.0 3.3   MG 2.00  --    BUN 15 21*   CREATININE 1.7* 1.9*   LABGLOM 39* 34*   GFRAA 47* 41*           Assessment/Plan:    1) KEENAN on CKD: non-oliguric              - baseline 09/10/19 Cr 1.8              - ATN              - renal function improved at baseline    - not a dialysis candidate due to dementia     2) SBO from ischemic bowel s/p exp lap and partial bowel resection 02/19/21              - per surgery     3) bladder cancer and ileal conduit              - per Dr. Claudia Winston     4) FEN:   Hypernatremia    - resolved   Hypokalemia    - WNL     5) dementia   - Psych note reviewed    No specific renal follow-up at this time due to dementia.

## 2021-03-10 NOTE — PROGRESS NOTES
? Patient transported with [facility] via stretcher in destination to [facility]. Patient's belongings with transporter. Discharge instructions in place.  Electronically signed by Jose Bettencourt RN on 3/9/2021 at 8:51 PM

## 2021-03-12 PROBLEM — G93.40 ACUTE ENCEPHALOPATHY: Status: ACTIVE | Noted: 2021-01-01

## 2021-03-12 NOTE — CONSULTS
significant other is in agreement    2) ventilated   - pulmonary to be consulted    3) ID   - CT reviewed   - question of aspiration pneumonia   - on empiric unasyn and vancomycin    4) SBO from ischemic bowel s/p exp lap and partial bowel resection 02/19/21              - consult Dr. Irma Krishnan for follow-up     5) bladder cancer and ileal conduit              - previously seen by Dr. Florin Marin     6) FEN:              Hypernatremia                          - will assess response to volume resusctiation              Hypokalemia                          - WNL   metabolic acidosis    - monitor     7) dementia              - poor prognosis   - will ask palliative care to see patient on Monday    critical care:  30+ min

## 2021-03-12 NOTE — ED NOTES
Pt being prepped for intubation by Dr. Isaac Wesley MD. Pharmacy at bedside to draw up medications. Respiratory at [facility]  03/12/21 7219

## 2021-03-12 NOTE — ED NOTES
Bed: A-16  Expected date:   Expected time:   Means of arrival:   Comments:  Bed [address]  03/12/21 131

## 2021-03-12 NOTE — H&P
Hospital Medicine History & Physical      PCP: Joleen Moritz, MD    Date of Admission: 3/12/2021    Date of Service: Pt seen/examined on 3/12/2021    Chief Complaint:      Chief Complaint   Patient presents with    Altered Mental Status    Fatigue     and renal failure        History Of Present Illness:     18-year-old male recent past medical history of small bowel obstruction status post exploratory laparotomy with bowel resection, CKD stage III, dementia presented from the [facility] due to altered mental status. At the time assessment patient was already intubated and was unable to provide any history. Apparently at the [facility] he has been getting more lethargic over the past 1 to 2 days and was mostly nonresponsive this morning and had to be transferred to the hospital.  Lab work showed acute kidney injury on CKD with creatinine of 5.8. White count of 12.4. Chest x-ray showed possible aspiration pneumonia. Patient was intubated for airway protection and was admitted to the hospital for further management. Past Medical History:        Diagnosis Date    Cancer [facility])     bladder    GASTRIC ULCER     Hypertension     Leukopenia        Past Surgical History:        Procedure Laterality Date    COLONOSCOPY      colo 2007, Dr Stefani Klein MD.   Miranda Docker N/A 2/18/2021    EXPLORATORY LAPAROTOMY, BOWEL RESECTION performed by Mahogany Joy MD at [address] [place] [address] Drive  12/14/2016    RADICAL CYSTECTOMY, PROSTECTOMY ILEAL CONDUIT URINARY    TUNNELED VENOUS PORT PLACEMENT Right 06/06/2017    DR. BOND/MARY POWER PORT       Medications Prior to Admission:    Prior to Admission medications    Medication Sig Start Date End Date Taking? Authorizing Provider   LORazepam (ATIVAN) 0.5 MG tablet Take 0.5 mg by mouth every 8 hours as needed for Anxiety.    Yes Historical Provider, MD   divalproex (DEPAKOTE SPRINKLE) 125 MG capsule Take 125 mg by mouth 2 times daily   Yes Historical Provider, MD   acetaminophen (TYLENOL) 500 MG tablet Take 500 mg by mouth 3 times daily as needed for Pain   Yes Historical Provider, MD   risperiDONE (RISPERDAL) 1 MG tablet Take 1 tablet by mouth 2 times daily 3/3/21  Yes Renaye Esters, APRN - CNP   haloperidol lactate (HALDOL) 5 MG/ML injection Inject 1 mL into the muscle every 6 hours as needed for Agitation 3/3/21  Yes Renaye Esters, APRN - CNP   furosemide (LASIX) 20 MG tablet Take 1 tablet by mouth daily 2/16/21  Yes Mynor Greene MD   potassium chloride (KLOR-CON M20) 20 MEQ extended release tablet Take 1 tablet by mouth daily Go to lab and hospital were orders are waiting for blood tests. Can get 90 day supply after blood tests 12/24/20  Yes Santa Ramirez MD   donepezil (ARICEPT) 5 MG tablet Take 1 tablet by mouth nightly 7/14/20  Yes Mynor Greene MD       Allergies:  Patient has no known allergies. Social History:       reports that he has never smoked. He has never used smokeless tobacco. He reports that he does not drink alcohol or use drugs. Family History:  Reviewed in detail and negative for DM, Early CAD, Cancer, CVA. History reviewed. No pertinent family history. REVIEW OF SYSTEMS:   Positive review  noted in the HPI. All other systems reviewed and negative.     PHYSICAL EXAM:    BP (!) 106/59   Pulse 90   Temp 97.7 °F (36.5 °C) (Rectal)   Resp 16   Ht 5' 9\" (1.753 m)   Wt 147 lb 0.8 oz (66.7 kg)   SpO2 100%   BMI 21.72 kg/m²   General Appearance: Intubated  Skin: warm and dry, no rash or erythema  Head: normocephalic and atraumatic  Eyes: pupils equal, round, and reactive to light, extraocular eye movements intact, conjunctivae normal  ENT: tympanic membrane, external ear and ear canal normal bilaterally, nose without deformity, nasal mucosa and turbinates normal without polyps  Neck: supple and non-tender without mass, no thyromegaly or thyroid nodules, no cervical lymphadenopathy  Pulmonary/Chest: clear to auscultation bilaterally- no wheezes, rales or rhonchi, normal air movement, no respiratory distress  Cardiovascular: normal rate, regular rhythm, normal S1 and S2, no murmurs, rubs, clicks, or gallops, Peripheral pulses good, Cap refill <3 sec, no carotid bruits  Abdomen: soft, ostomy in place, midline abdominal scar from recent surgery, normal bowel sounds, no masses or organomegaly  Extremities: no cyanosis, clubbing or edema  Musculoskeletal: normal range of motion, no joint swelling, deformity or tenderness  Neurologic: Moving all extremities, withdrawing to pain      LABS:    CBC   Recent Labs     03/12/21  1319   WBC 12.4*   HGB 10.5*   HCT 31.7*         RENAL  Recent Labs     03/12/21  1320   *   K 5.0   *   CO2 20*   BUN 62*   CREATININE 5.8*     LFT'S  Recent Labs     03/12/21  1320   AST 33   ALT 20   BILITOT 0.4   ALKPHOS 79     COAG  Recent Labs     03/12/21  1320   INR 1.19*     CARDIAC ENZYMES  Recent Labs     03/12/21  1320   TROPONINI 0.10*       U/A:    Lab Results   Component Value Date    COLORU DK YELLOW 03/04/2021    WBCUA 15 03/04/2021    RBCUA 2 03/04/2021    BACTERIA 4+ 02/18/2021    CLARITYU TURBID 03/04/2021    SPECGRAV 1.012 03/04/2021    LEUKOCYTESUR SMALL 03/04/2021    BLOODU Negative 03/04/2021    GLUCOSEU Negative 03/04/2021    AMORPHOUS 3+ 05/29/2015       ABG    Lab Results   Component Value Date    YFZ3WAB 19.9 03/12/2021    BEART -4.8 03/12/2021    M2EYDRWY 100.0 03/12/2021    PHART 7.369 03/12/2021    FOH4TUX 34.6 03/12/2021    PO2ART 286.0 03/12/2021    XVZ7FUT 21.0 03/12/2021       UA:No results for input(s): NITRITE, COLORU, PHUR, LABCAST, WBCUA, RBCUA, MUCUS, TRICHOMONAS, YEAST, BACTERIA, CLARITYU, SPECGRAV, LEUKOCYTESUR, UROBILINOGEN, BILIRUBINUR, BLOODU, GLUCOSEU, KETUA, AMORPHOUS in the last 72 hours. Microbiology:  No results for input(s): LABGRAM, LABANAE, ORG, CXSURG in the last 72 hours.   Nasal Culture: No results for input(s): ORG, MRSAPCR in the

## 2021-03-12 NOTE — ED PROVIDER NOTES
[address] [facility]      Pt Name: Alvin Zayas  MRN: [id number]  [place] 1940  Date of evaluation: 3/12/2021  Provider: Mehran Oleary DO    CHIEF COMPLAINT       Chief Complaint   Patient presents with    Altered Mental Status    Fatigue     and renal failure          HISTORY OF PRESENT ILLNESS   (Location/Symptom, Timing/Onset, Context/Setting, Quality, Duration, Modifying Factors, Severity)  Note limiting factors. Alvin Zayas is a 78 y.o. male who presents to the emergency department with a complaint of altered mental status and acute renal failure. Patient is nonresponsive and is unable to provide any historical information. Paramedics report that they were called to the [facility] where he is a resident for altered mental status. They report that he became less responsive in route to the hospital.  No other information is available. There no family was at the bedside. Nursing Notes were reviewed. HPI        REVIEW OF SYSTEMS    (2-9 systems for level 4, 10 or more for level 5)     Review of systems are unobtainable due to the patient's current condition. He is unresponsive. PAST MEDICAL HISTORY     Past Medical History:   Diagnosis Date    Cancer [facility])     bladder    GASTRIC ULCER     Hypertension     Leukopenia          SURGICAL HISTORY       Past Surgical History:   Procedure Laterality Date    COLONOSCOPY      colo 2007, Dr Brennen Bustamante MD.   Diogo Bnaks N/A 2/18/2021    EXPLORATORY LAPAROTOMY, BOWEL RESECTION performed by Butch Corbett MD at [place] [address].  12/14/2016    RADICAL CYSTECTOMY, PROSTECTOMY ILEAL CONDUIT URINARY    TUNNELED VENOUS PORT PLACEMENT Right 06/06/2017    DR. BOND/MARY POWER PORT         CURRENT MEDICATIONS       Previous Medications    ACETAMINOPHEN (TYLENOL) 500 MG TABLET    Take 500 mg by mouth 3 times daily as needed for Pain DIVALPROEX (DEPAKOTE SPRINKLE) 125 MG CAPSULE    Take 125 mg by mouth 2 times daily    DONEPEZIL (ARICEPT) 5 MG TABLET    Take 1 tablet by mouth nightly    FUROSEMIDE (LASIX) 20 MG TABLET    Take 1 tablet by mouth daily    HALOPERIDOL LACTATE (HALDOL) 5 MG/ML INJECTION    Inject 1 mL into the muscle every 6 hours as needed for Agitation    LORAZEPAM (ATIVAN) 0.5 MG TABLET    Take 0.5 mg by mouth every 8 hours as needed for Anxiety. POTASSIUM CHLORIDE (KLOR-CON M20) 20 MEQ EXTENDED RELEASE TABLET    Take 1 tablet by mouth daily Go to lab and hospital were orders are waiting for blood tests. Can get 90 day supply after blood tests    RISPERIDONE (RISPERDAL) 1 MG TABLET    Take 1 tablet by mouth 2 times daily       ALLERGIES     Patient has no known allergies. FAMILY HISTORY     History reviewed. No pertinent family history. SOCIAL HISTORY       Social History     Socioeconomic History    Marital status:       Spouse name: None    Number of children: 3    Years of education: None    Highest education level: None   Occupational History    Occupation: [employer]    Financial resource strain: None    Food insecurity     Worry: None     Inability: None    Transportation needs     Medical: None     Non-medical: None   Tobacco Use    Smoking status: Never Smoker    Smokeless tobacco: Never Used   Substance and Sexual Activity    Alcohol use: No    Drug use: No    Sexual activity: None   Lifestyle    Physical activity     Days per week: None     Minutes per session: None    Stress: None   Relationships    Social connections     Talks on phone: None     Gets together: None     Attends Zoroastrianism service: None     Active member of club or organization: None     Attends meetings of clubs or organizations: None     Relationship status: None    Intimate partner violence     Fear of current or ex partner: None     Emotionally abused: None     Physically abused: None     Forced sexual activity: None   Other Topics Concern    None   Social History Narrative    None       SCREENINGS             PHYSICAL EXAM    (up to 7 for level 4, 8 or more for level 5)     ED Triage Vitals   BP Temp Temp src Pulse Resp SpO2 Height Weight   -- -- -- -- -- -- -- --         Physical Exam   Constitutional: Unresponsive to verbal or painful stimuli. Head: No visible evidence of trauma. Normocephalic. Eyes: Pupils equal and reactive. Pupils 2 mm bilaterally. No photophobia. Conjunctiva normal.    HENT: Oral mucosa moist.  Airway patent. Gag reflex was absent. Pharynx without erythema. Nares were clear. Neck:  Soft and supple. Nontender. No JVD. Heart:  Regular rate and rhythm. No murmur. Sinus rhythm on the monitor. Lungs:  Clear to auscultation. No wheezes, rales, or ronchi. No accessory muscle use. Chest: Chest wall non-tender. No evidence of trauma. Port present in the right upper chest.  Abdomen:  Soft, nondistended, bowel sounds present. No guarding rigidity or rebound. No masses. Midline incision was well-healed. No surrounding erythema or induration. No hernia. Ileoconduit bag present in the right lower quadrant. There is a small amount of clear yellow urine within the bag. No apparent abdominal tenderness. Musculoskeletal: Apparent extremity tenderness. No edema. Radial and dorsalis pedis pulses were intact. No calf tenderness erythema or edema. Neurological: Unresponsive to verbal and painful stimuli. Some slight spontaneous movement noted in all extremities. Eyes are closed but does slightly open the eyes spontaneously. No facial droop. Weakly withdraws to pain in all extremities. Skin: Skin is warm and dry. No rash. Lymphatic:  No lympadenopathy. Psychiatric: Unable to assess.         DIAGNOSTIC RESULTS     EKG: All EKG's are interpreted by the Emergency Department Physician who either signs or Co-signs this chart in the absence of a cardiologist.    Normal sinus rhythm. Rate 86. VA interval 138 ms. QRS duration 76 ms. QTc 440 ms.  R axis XX 7 degrees. Left ventricular hypertrophy. Occasional PVCs. EKG was similar in appearance to prior EKG from February 21, 2021. RADIOLOGY:   Non-plain film images such as CT, Ultrasound and MRI are read by the radiologist. Plain radiographic images are visualized and preliminarily interpreted by the emergency physician with the below findings:        Interpretation per the Radiologist below, if available at the time of this note:    CT Head WO Contrast   Final Result   No acute intracranial abnormality. CT CHEST ABDOMEN PELVIS WO CONTRAST   Final Result   Chest-      Supportive tubing is in normal position. Mild amount of secretions are noted in the right bronchus intermedius. Foci of airspace disease in the right upper and dependent both lower lobes. Pneumonia versus aspiration pneumonitis. ---      Abdomen pelvis-      Findings suggestive of enteritis. Previous bilateral hydroureteronephrosis has resolved. There is a right   lower quadrant ileal conduit with parastomal fluid, herniated portion of the   ileal conduit versus seroma. Suspected gallbladder sludge. Stable sclerotic foci along the right sacroiliac joint and left superior   medial pubis. XR CHEST PORTABLE   Final Result   No acute findings.                ED BEDSIDE ULTRASOUND:   Performed by ED Physician - none    LABS:  Labs Reviewed   TROPONIN - Abnormal; Notable for the following components:       Result Value    Troponin 0.10 (*)     All other components within normal limits    Narrative:     Performed at:  [address] [address]   Phone [phone]   PROTIME-INR - Abnormal; Notable for the following components:    Protime 13.8 (*)     INR 1.19 (*)     All other components within normal limits    Narrative:     Performed at:  [facility]) - [facility]  [address] [place] [address]   Phone [phone]   APTT - Abnormal; Notable for the following components:    aPTT 23.8 (*)     All other components within normal limits    Narrative:     Performed at:  [facility]  [address] [place] [address]   Phone [phone]   CBC WITH AUTO DIFFERENTIAL - Abnormal; Notable for the following components:    WBC 12.4 (*)     RBC 3.36 (*)     Hemoglobin 10.5 (*)     Hematocrit 31.7 (*)     RDW 15.8 (*)     Neutrophils Absolute 10.0 (*)     All other components within normal limits    Narrative:     Performed at:  [facility]  [address] [place] [address]   Phone [phone]   COMPREHENSIVE METABOLIC PANEL W/ REFLEX TO MG FOR LOW K - Abnormal; Notable for the following components:    Sodium 148 (*)     Chloride 111 (*)     CO2 20 (*)     Anion Gap 17 (*)     Glucose 185 (*)     BUN 62 (*)     CREATININE 5.8 (*)     GFR Non- 9 (*)     GFR  11 (*)     Albumin 3.2 (*)     Albumin/Globulin Ratio 0.7 (*)     All other components within normal limits    Narrative:     CALL  Philip Cardenas [id number],  Chemistry results called to and read back by Chai Looney, 03/12/2021  14:05, by TODD  Performed at:  [facility]  [address] [place] [address]   Phone [phone]   POCT GLUCOSE - Abnormal; Notable for the following components:    POC Glucose 159 (*)     All other components within normal limits    Narrative:     Performed at:  [facility]  [address] [facility] [place] [address]   Phone [phone]   CULTURE, BLOOD 1   CULTURE, BLOOD 2   BRAIN NATRIURETIC PEPTIDE    Narrative:     Performed at:  [place] [address] [address] [facility] [place] [address]   Phone [phone]   LACTATE, SEPSIS Preparations were made for rapid sequence elevation to protect the patient's airway and optimize ventilation and oxygenation. REASSESSMENT          2:14 PM: Lab results were reviewed. Glucose 185. Bicarb 20. Potassium 5.0.  BUN 62. Creatinine 5.8. Troponin is 0.10. No report of any chest pain. No ST elevation. This may be secondary to renal effect. Baseline is 1.8. White blood cell count 12.4. Chest x-ray reveals no acute findings. No evidence of infiltrate or pulmonary vascular congestion. He was reexamined. He did require sedation. He was starting to get restless on the ventilator. He did have spontaneous movement in all extremities. However, he was not awake and alert. He was given additional IV fluids with normal saline 1 L bolus. 2:41 PM: I spoke with the patient's fiancé who is now at the bedside. She reports that since he was discharged from the hospital and return to the extended care facility he has been agitated at times and attempting to crawl out of bed. She reports that he has not been eating and drinking. She states that he will take a bite of food and then spit it out before swallowing it.    3:21 PM: CT head is unremarkable. CT chest abdomen pelvis reveals no evidence of hydronephrosis. There is evidence of pneumonia possibly consistent with aspiration. Vancomycin and Flagyl were also added. 3:25 PM: Patient has evidence of acute kidney injury superimposed on chronic kidney disease. I suspect that he is volume depleted and will be hydrated. I suspect acute metabolic encephalopathy secondary to uremia from his acute kidney injury. He has hypoxia from pneumonia was likely contributing to his altered mental status as well with acute respiratory failure. He will be admitted for further treatment and evaluation. 3:50 PM: I spoke with Dr. Cameron Sunshine, nephrology, in the emergency department who evaluated the patient.     CRITICAL CARE TIME   Total Critical Care time was 65 minutes, excluding separately reportable procedures. There was a high probability of clinically significant/life threatening deterioration in the patient's condition which required my urgent intervention. CONSULTS:  IP CONSULT TO NEPHROLOGY  IP CONSULT TO CRITICAL CARE    PROCEDURES:  Unless otherwise noted below, none     Procedures    Rapid Sequence Intubation:    Preparations were made for rapid sequence intubation to protect the patient's airway and optimize ventilation and oxygenation. He was unresponsive with no gag reflex. Patient was preoxygenated with 100% oxygen with bag-valve-mask. He was premedicated with etomidate 20 mg IV followed by rocuronium 50 mg IV. Cords were visualized using the glide scope. On the first attempt a 7.5 endotracheal tube was passed through the cords under direct visualization to 22 cm at the lip. Cuff was inflated. Stylette was removed. There was excellent color change on the CO2 detector. Breath sounds were equal bilaterally. Oxygen saturation remained 100% during the procedure and following the procedure. Portable chest x-ray was obtained. Orogastric tube was inserted. Mills catheter was not necessary given prior history of cystectomy. FINAL IMPRESSION      1. Acute kidney injury superimposed on chronic kidney disease ([address]    2. Altered mental status, unspecified altered mental status type    3. Acute respiratory failure with hypoxia (HCC)    4. Pneumonia due to organism          DISPOSITION/PLAN   DISPOSITION        PATIENT REFERRED TO:  No follow-up provider specified. DISCHARGE MEDICATIONS:  New Prescriptions    No medications on file     Controlled Substances Monitoring:     No flowsheet data found. (Please note that portions of this note were completed with a voice recognition program.  Efforts were made to edit the dictations but occasionally words are mis-transcribed. )    [id number] Mauri Martines DO (electronically signed)  Attending Emergency

## 2021-03-12 NOTE — ED NOTES
Significant other at bedside. She reports he has not been eating or drinking x 1 week.      Ana Laura Hernandez RN  03/12/21 8376

## 2021-03-12 NOTE — ED NOTES
20mg Etomidate being pushed by sandra RN for RSI     Alfornia Dial  03/12/21 [address]  03/12/21 0070

## 2021-03-12 NOTE — ED NOTES
Patient arrived to ED via private transport from [facility]. Patient was sent in for AMS, fatigue, and renal failure. When patient arrived to ED patient was unresponsive to sternal rub. Patient resp rate was 6-7. Patient was on RA. ED MD made aware and arrived to bedside to assess patient. Patient nail beds cyanotic unable to get a good pleth ED provider aware. Patient placed on non-rebreather.       Wallace Leonard, CHEY  03/12/21 [address], RN  03/12/21 6490

## 2021-03-13 NOTE — CONSULTS
REASON FOR CONSULTATION/CC: Altered mental status      Consult at request of Angelo Riggs MD     PCP: Sarah Lewis MD  Established Pulmonologist:  None    Chief Complaint   Patient presents with    Altered Mental Status    Fatigue     and renal failure        HISTORY OF PRESENT ILLNESS: Elmo Peraza is a 78y.o. year old male with a history of bladder ca s/p ileoconduit who presents with acute altered mental status and ahypoxemia. Pt was intubated for mental status and hypoxemic respiratory failure. Patient came from [facility]. MAR reviewed he was getting Ativan but no other known mind altering medications. Labs also notable for an KEENAN. On top of his baseline CKD. HPI obtained from bedside nursing report and EMR given patient is intubated. Past Medical History:   Diagnosis Date    Cancer [facility])     bladder    GASTRIC ULCER     Hypertension     Leukopenia          Past Surgical History:   Procedure Laterality Date    COLONOSCOPY      colo 2007, Dr Mariam Dimas MD.   [facility] N/A 2/18/2021    EXPLORATORY LAPAROTOMY, BOWEL RESECTION performed by Kelli Bowman MD at [address] [facility]  12/14/2016    RADICAL CYSTECTOMY, PROSTECTOMY ILEAL CONDUIT URINARY    TUNNELED VENOUS PORT PLACEMENT Right 06/06/2017    DR. BOND/IR POWER PORT       Family Hx  family history is not on file. unable to obtain due to mechanical ventilation  Social Hx   reports that he has never smoked.  He has never used smokeless tobacco.    Scheduled Meds:   chlorhexidine  15 mL Mouth/Throat BID    famotidine (PEPCID) injection  20 mg Intravenous Daily    ampicillin-sulbactam  1,500 mg Intravenous Q8H    sodium chloride flush  10 mL Intravenous 2 times per day    heparin (porcine)  5,000 Units Subcutaneous 3 times per day       Continuous Infusions:   sodium chloride Stopped (03/12/21 2143)    sodium chloride 100 mL/hr at 03/12/21 2117    propofol Stopped (03/13/21 7461)    propofol $Subsequent Day  Skin Assessment: Clean, dry, & intact  Equipment ID: [id number]  Vent Type: 980  Vent Mode: AC/VC  Vt Ordered: 400 mL  Rate Set: 16 bmp  Peak Flow: 60 L/min  Pressure Support: 0 cmH20  FiO2 : (S) 30 %  SpO2: 99 %  SpO2/FiO2 ratio: 330  Sensitivity: 3  PEEP/CPAP: 5  Humidification Source: Heated wire  Humidification Temp: 37  Humidification Temp Measured: 37.2  Circuit Condensation: Drained    Radiology Review:  Pertinent images / reports were reviewed as a part of this visit. CT Chest w/ contrast:   Results for orders placed during the hospital encounter of 11/04/16   CT Chest W Contrast    Narrative EXAMINATION:  CT OF THE CHEST WITH CONTRAST 11/4/2016 8:55 am    TECHNIQUE:  CT of the chest was performed with the administration of intravenous  contrast. Multiplanar reformatted images are provided for review. Dose  modulation, iterative reconstruction, and/or weight based adjustment of the  mA/kV was utilized to reduce the radiation dose to as low as reasonably  achievable. COMPARISON:  None. HISTORY:  ORDERING SYSTEM PROVIDED HISTORY: Cancer of apex of urinary bladder [facility])  TECHNOLOGIST PROVIDED HISTORY:  Ordering Physician Provided Reason for Exam: new dx bladder ca? Acuity: Acute  Type of Exam: Initial  Relevant Medical/Surgical History: duodenal ca hx. FINDINGS:  Mediastinum: Calcification of the thoracic aorta. Coronary artery  calcification. Within the aorta, no evidence of intraluminal flap or abrupt  aortic caliber change. Calcified subcarinal lymph nodes, in keeping with  granulomatous disease. Prominent superior pericardial recess. Right lobe of  thyroid is smaller than the left. No evidence of axillary adenopathy  bilaterally. Lungs/pleura: Respiratory motion artifact is seen. No confluent airspace  disease. No evidence of pneumothorax. No evidence of pleural effusion. Minimal dependent reticular opacity bilaterally, likely atelectasis.     Upper Abdomen: Calcified splenic granulomas. Calcified lymph nodes are seen  within the midline upper abdomen. 9 mm low-density lesion is seen within the  left hepatic lobe. Soft Tissues/Bones: No acute process      Impression Indeterminate 9 mm low-density lesion within the left hepatic lobe. No  findings are otherwise seen to suggest metastatic disease. Sequela of granulomatous disease. Atherosclerosis, including coronary artery calcification. CT Chest w/o contrast:   Results for orders placed during the hospital encounter of 09/26/18   CT Chest WO Contrast    Narrative EXAMINATION:  CT OF THE ABDOMEN AND PELVIS WITHOUT CONTRAST; CT OF THE CHEST WITHOUT  CONTRAST 9/26/2018 10:56 am    TECHNIQUE:  CT of the abdomen and pelvis was performed without the administration of  intravenous contrast. Multiplanar reformatted images are provided for review. Dose modulation, iterative reconstruction, and/or weight based adjustment of  the mA/kV was utilized to reduce the radiation dose to as low as reasonably  achievable.; CT of the chest was performed without the administration of  intravenous contrast. Multiplanar reformatted images are provided for review. Dose modulation, iterative reconstruction, and/or weight based adjustment of  the mA/kV was utilized to reduce the radiation dose to as low as reasonably  achievable. COMPARISON:  06/11/2018    HISTORY:  ORDERING SYSTEM PROVIDED HISTORY: Cancer of apex of urinary bladder [facility])  TECHNOLOGIST PROVIDED HISTORY:  Additional Contrast?->None  Ordering Physician Provided Reason for Exam: follow up bladder ca  Acuity: Acute  Type of Exam: Subsequent/Follow-up; ORDERING SYSTEM PROVIDED HISTORY: Cancer  of apex of urinary bladder [facility])  TECHNOLOGIST PROVIDED HISTORY:  Ordering Physician Provided Reason for Exam: follow up bladder ca  Acuity: Acute  Type of Exam: Subsequent/Follow-up    FINDINGS:    Chest:    Mediastinum: The central airways are patent.   There is no hilar or mediastinal adenopathy. Calcific atherosclerosis is noted throughout the  coronary arteries. Lungs/pleura: There is no consolidation or suspicious parenchymal mass. The  pleural spaces are clear. Soft Tissues/Bones: There is no fracture or aggressive osseous lesion. Abdomen/Pelvis:    Organs: The liver, pancreas, spleen, kidneys and adrenal glands are  unremarkable. GI/Bowel: There is no bowel dilatation, wall thickening or obstruction. Right lower quadrant ileal conduit is again noted. There is a focal loop of  terminal ileum which has herniated into the lateral aspect of the parastomal  region. There is no evidence of complication. Pelvis: Postsurgical changes of cystoprostatectomy are present. Small benign  appearing bilateral inguinal lymph nodes are unchanged in size, number and  appearance. Peritoneum/Retroperitoneum: There is no free air, free fluid or  intraperitoneal inflammatory change. There is no adenopathy. Bones/Soft Tissues: There is no fracture or aggressive osseous lesion. Impression Stable CT of the chest, abdomen and pelvis. Negative for metastatic disease. CTPA: No results found for this or any previous visit. CXR PA/LAT:   Results for orders placed during the hospital encounter of 05/03/17   XR Chest Standard TWO VW    Narrative EXAM: PA AND LATERAL CHEST X-RAY ON 5/3/2017    INDICATION: Shortness of breath    COMPARISON: 1/23/2017    FINDINGS:    LIMITATIONS:None    LINES/TUBES:None    HEART / MEDIASTINUM: The heart size is normal. The trachea is  midline. PLEURAL SPACES: The costophrenic angles are clear. There is no  pneumothorax. LUNGS: No acute consolidation is visualized. BONES / SOFT TISSUES: No significant abnormality. OTHER: None.       Impression No acute pulmonary pathology                CXR portable:   Results for orders placed during the hospital encounter of 03/12/21   XR CHEST PORTABLE    Narrative EXAMINATION:  ONE XRAY

## 2021-03-13 NOTE — PROGRESS NOTES
Physician Progress Note      PATIENTMortdante Jarvis  CSN #:                  [id number]  :                       1940  ADMIT DATE:       3/12/2021 12:52 PM  [address] [place] DATE:  RESPONDING  PROVIDER #:        Tamela Greene MD          QUERY TEXT:    Pt admitted with Aspiration Pneumonia. Pt noted to have elevated WBC, HR, KEENAN   and metabolic encephalopathy. If possible, please document in the progress   notes and discharge summary if you are evaluating and /or treating any of the   following: The medical record reflects the following:  Risk Factors: Aspiration Pneumonia  Clinical Indicators: HR > 100, WBC 12.4, KEENAN and acute metabolic   encephalopathy  Treatment: In ED 1L NS Bolus and IV Ampicillin  Options provided:  -- Sepsis, present on admission  -- Sepsis, present on admission, now resolved  -- No Sepsis, aspiration pneumonia only  -- Other - I will add my own diagnosis  -- Disagree - Not applicable / Not valid  -- Disagree - Clinically unable to determine / Unknown  -- Refer to Clinical Documentation Reviewer    PROVIDER RESPONSE TEXT:    This patient has sepsis which was present on admission.     Query created by: He Jones on 3/13/2021 11:25 AM      Electronically signed by:  Tamela Greene MD 3/13/2021 12:25 PM

## 2021-03-13 NOTE — PROGRESS NOTES
on data. - Urine sodium was 50.              - Got volume resuscitated   - Cr improving today.              - not a dialysis candidate due to dementia, and his significant other is in agreement per discussion with Dr. Eric Baugh Friday.     2) ventilated              - pulmonary following.     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 Rossy Davenport consulted for f/u.     5) bladder cancer and ileal conduit              - previously seen by Dr. Eustacio Saint     6) FEN:              Hypernatremia                          - Add D5W today.    Yuli Said                          - K is normal today.               metabolic acidosis                          - monitor     7) dementia              - poor prognosis              - will ask palliative care to see patient on Monday       Charlie Cruz MD  [facility]  Office Number: [phone]

## 2021-03-13 NOTE — PLAN OF CARE
Problem: Nutrition  Goal: Optimal nutrition therapy  Outcome: Ongoing   Nutrition Problem #1: Inadequate oral intake  Intervention: Food and/or Nutrient Delivery: (Initiate nutrition support when able per MD)  Nutritional Goals: initiate most appropriate form of nutrition per MD

## 2021-03-13 NOTE — PROGRESS NOTES
Comprehensive Nutrition Assessment    Type and Reason for Visit:  Initial(mechanical vent)    Nutrition Recommendations/Plan:   Monitor for start of nutrition. If TF initiated, consult RD for \"Tube Feedings order and management\". Recommend Vital 1.2 (semi-elemental) with goal rate of 70 mL/hr. Flush per provider. Nutrition Assessment:  Pt presented with AMS and fatigue. Pt recently D/C'd from hospital after small bowel resection. Pt with possible aspiration PNA and was intubated to protect airway. Pt with hypernatremia, likely from dehydration. Noted no pressors or propofol running. Hard to assess wt trend d/t fluid losses in combination with poor PO on last admission. Wt was around 180-190# so suspect pt has had some wt loss causing nutritional compromise. PMH of CKD3 and dementia. Will monitor for start of nutrition and provide TF rec's should TF start over weekend. Malnutrition Assessment:  Malnutrition Status:  Insufficient data      Estimated Daily Nutrient Needs:  Energy (kcal):  6229-9388 kcal (25-30 kcal/kg CBW)  Protein (g):   gm (1.2-2 gm/kg CBW)      Fluid (ml/day):  per provider    Nutrition Related Findings:  trace BLe edema; urostomy noted; Na 149, Glu 130      Wounds:  Surgical Incision(abdomen)       Current Nutrition Therapies:    Diet NPO Effective Now  Current Tube Feeding (TF) Recommendations:  · Goal TF & Flush Orders Provides: Recommend Vital 1.2 (semi-elemental) with goal rate of 70 mL/hr. Flush per provider. TF regimen provides:  1400 mL TV, 1680 kcal, 105 gm pro, 1135 ml free water. Anthropometric Measures:  · Height: 5' 9\" (175.3 cm)  · Current Body Weight: 149 lb (67.6 kg)   · Admission Body Weight: 147 lb (66.7 kg)    · Ideal Body Weight: 160 lbs; % Ideal Body Weight 93.1 %   · BMI: 22  · BMI Categories: Normal Weight (BMI 18.5-24. 9)       Nutrition Diagnosis:   · Inadequate oral intake related to impaired respiratory function as evidenced by intubation, NPO or clear liquid status due to medical condition      Nutrition Interventions:   Food and/or Nutrient Delivery:  (Initiate nutrition support when able per MD)  Nutrition Education/Counseling:  No recommendation at this time   Coordination of Nutrition Care:  Continue to monitor while inpatient    Goals:  initiate most appropriate form of nutrition per MD       Nutrition Monitoring and Evaluation:   Food/Nutrient Intake Outcomes:  (Nutrition advancement)  Physical Signs/Symptoms Outcomes:  Biochemical Data, Skin, Weight, Fluid Status or Edema, Nutrition Focused Physical Findings, GI Status, Diarrhea, Constipation     Discharge Planning:     Too soon to determine     Electronically signed by Hakeem Ortiz RD, LD on 3/13/21 at 8:23 AM EST    Contact: [phone]

## 2021-03-13 NOTE — CONSULTS
Nutrition Note    Consult \"Tube Feedings order and management\". TF order placed. See previous RD note for full assessment.     Electronically signed by Sheyla Gonzalez RD, TUSHAR on 3/13/21 at 9:43 AM EST    Contact: [phone]

## 2021-03-13 NOTE — PROGRESS NOTES
Hospitalist Progress Note      PCP: Ceferino Peres MD    Date of Admission: 3/12/2021        Subjective: intubated. Nurse at bedside        Medications:  Reviewed    Infusion Medications    sodium chloride Stopped (03/12/21 2143)    sodium chloride 100 mL/hr at 03/12/21 2117    propofol Stopped (03/13/21 0425)    propofol       Scheduled Medications    chlorhexidine  15 mL Mouth/Throat BID    famotidine (PEPCID) injection  20 mg Intravenous Daily    ampicillin-sulbactam  1,500 mg Intravenous Q8H    sodium chloride flush  10 mL Intravenous 2 times per day    heparin (porcine)  5,000 Units Subcutaneous 3 times per day     PRN Meds: sodium chloride flush, promethazine **OR** ondansetron, polyethylene glycol, acetaminophen **OR** acetaminophen, propofol      Intake/Output Summary (Last 24 hours) at 3/13/2021 0822  Last data filed at 3/13/2021 0452  Gross per 24 hour   Intake 1741.5 ml   Output 400 ml   Net 1341.5 ml       Physical Exam Performed:    BP (!) 114/54   Pulse 86   Temp 97 °F (36.1 °C) (Oral)   Resp 22   Ht 5' 9\" (1.753 m)   Wt 149 lb 7.6 oz (67.8 kg)   SpO2 100%   BMI 22.07 kg/m²     General appearance: No apparent distress  Neck: Supple  Respiratory:  Normal respiratory effort. Clear to auscultation, bilaterally without Rales/Wheezes/Rhonchi. Cardiovascular: Regular rate and rhythm with normal S1/S2 without murmurs, rubs or gallops. Abdomen: Soft  Musculoskeletal: No clubbing, cyanosis   Skin: Skin color, texture, turgor normal.  No rashes or lesions.   Neurologic:  Moving all extremities   Psychiatric: intubated   Capillary Refill: Brisk,< 3 seconds   Peripheral Pulses: +2 palpable, equal bilaterally       Labs:   Recent Labs     03/12/21  1319 03/13/21  0455   WBC 12.4* 9.4   HGB 10.5* 9.2*   HCT 31.7* 28.2*    288     Recent Labs     03/12/21  1320 03/13/21  0455   * 149*   K 5.0 4.5   * 116*   CO2 20* 19*   BUN 62* 61*   CREATININE 5.8* 4.8*   CALCIUM 9.5 8.3 Recent Labs     03/12/21  1320   AST 33   ALT 20   BILITOT 0.4   ALKPHOS 79     Recent Labs     03/12/21  1320   INR 1.19*     Recent Labs     03/12/21  1320   TROPONINI 0.10*       Urinalysis:      Lab Results   Component Value Date    NITRU Negative 03/12/2021    WBCUA 8 03/12/2021    BACTERIA 4+ 03/12/2021    RBCUA 0-2 03/12/2021    BLOODU MODERATE 03/12/2021    SPECGRAV 1.015 03/12/2021    GLUCOSEU Negative 03/12/2021       Radiology:  CT Head WO Contrast   Final Result   No acute intracranial abnormality. CT CHEST ABDOMEN PELVIS WO CONTRAST   Final Result   Chest-      Supportive tubing is in normal position. Mild amount of secretions are noted in the right bronchus intermedius. Foci of airspace disease in the right upper and dependent both lower lobes. Pneumonia versus aspiration pneumonitis. ---      Abdomen pelvis-      Findings suggestive of enteritis. Previous bilateral hydroureteronephrosis has resolved. There is a right   lower quadrant ileal conduit with parastomal fluid, herniated portion of the   ileal conduit versus seroma. Suspected gallbladder sludge. Stable sclerotic foci along the right sacroiliac joint and left superior   medial pubis. XR CHEST PORTABLE   Final Result   No acute findings. Assessment/Plan:    Active Hospital Problems    Diagnosis    Acute encephalopathy [G93.40]     1. Acute metabolic encephalopathy, multifactorial, nephrology consulted, on antibiotics. 2. Acute hypoxic respiratory failure requiring intubation, intensivist has been consulted  3. Acute renal failure on CKD, nephrology following  4. Possible sepsis due to aspiration pneumonia, iv antibiotics. 5. Aspiration pneumonia, started on Unasyn  6. Recent history of small bowel obstruction status post partial bowel resection  7. History of bladder cancer and ileal conduit  8. Anemia of chronic disease, no obvious bleeding.    9. Metabolic acidosis, likely renal etiology      Diet: Diet NPO Effective Now  Code Status: Full Code        Dispo - ICU    Sarwat Garcia MD

## 2021-03-13 NOTE — PROGRESS NOTES
Pt appears agitated. Vent alarming. Pt is coughing and gagging on ETT. Kicking legs and attempting to pull at ETT. Pt is able to follow commands.      Spoke with MARCIA Uribe and will start propofol at this time for RASS -1 to +1

## 2021-03-14 NOTE — PROGRESS NOTES
Nephrology Progress Note  [phone]   [email or web address]    Patient:  Keely James Sr   : 1940    CC:  KEENAN, CKD    Subjective:  Mr. Debbie Del Valle remains intubated in the ICU. No major events overnight. ROS:   Limited d/t pt factors. SHx:  No visitors. Meds:  Scheduled Meds:   chlorhexidine  15 mL Mouth/Throat BID    famotidine (PEPCID) injection  20 mg Intravenous Daily    ampicillin-sulbactam  1,500 mg Intravenous Q8H    insulin lispro  0-6 Units Subcutaneous Q4H    sodium chloride flush  10 mL Intravenous 2 times per day    heparin (porcine)  5,000 Units Subcutaneous 3 times per day     Continuous Infusions:   dextrose 100 mL/hr at 21    dextrose      propofol Stopped (21 0700)    propofol 20 mcg/kg/min (210)     PRN Meds:.glucose, dextrose, glucagon (rDNA), dextrose, sodium chloride flush, promethazine **OR** ondansetron, polyethylene glycol, acetaminophen **OR** acetaminophen, propofol    Vitals:  BP (!) 122/47   Pulse 84   Temp 98.6 °F (37 °C) (Axillary)   Resp 17   Ht 5' 9\" (1.753 m)   Wt 149 lb 7.6 oz (67.8 kg)   SpO2 96%   BMI 22.07 kg/m²     Physical Exam:  Gen: Resting in bed, in ICU. On vent. HEENT: +ET tube. CV: RRR, no S3.  Lungs: +vent, bilateral coarse BS. Abd: S/NT +BS +ostomy  Ext: No edema, no cyanosis  Skin: Warm. No rashes appreciated. : +coleman.     Labs:  CBC:   Lab Results   Component Value Date    WBC 9.1 2021    RBC 2.75 2021    RBC 4.07 2017    HGB 8.5 2021    HCT 26.6 2021    MCV 96.5 2021    MCH 30.8 2021    MCHC 31.9 2021    RDW 16.1 2021     2021    MPV 8.2 2021     BMP:    Lab Results   Component Value Date     2021    K 4.0 2021    K 4.5 2021     2021    CO2 20 2021    BUN 53 2021    LABALBU 3.2 2021    CREATININE 3.8 2021    CALCIUM 7.9 2021    GFRAA 19 2021 GFRAA >60 01/18/2013    LABGLOM 15 03/14/2021    GLUCOSE 132 03/14/2021    GLUCOSE 128 08/17/2017       Assessment/Plan:  1) KEENAN on CKD: non-oliguric              - baseline 09/10/19 Cr 1.8              - Likely ATN based on data. - Urine sodium was 50.              - Got volume resuscitated   - Cr continues to improve daily.             - not a dialysis candidate due to dementia, and his significant other is in agreement per discussion with Dr. Benigno Mckeon Friday.     2) ventilated              - pulmonary following. SBT this morning.    3) ID              - CT reviewed              - question of aspiration pneumonia              - Remains on unasyn, off vancomycin     4) SBO from ischemic bowel s/p exp lap and partial bowel resection 02/19/21              -  Dr. Chadwick Michael consulted for f/u.     5) bladder cancer and ileal conduit              - previously seen by Dr. Mckeon Mediate     6) FEN:              Hypernatremia                          - Added D5W yesterday without much change- continue this today. - Add free water boluses q4hrs. - Free water deficit is around 2L.              Hypokalemia                          - K is normal today.               metabolic acidosis                          - monitor     7) dementia              - poor prognosis              - Palliative care to see patient on Monday       Eulogio Dave MD  [facility]  Office Number: [phone]

## 2021-03-14 NOTE — PROGRESS NOTES
Pt awake, confused. Pulled out lines prior to change of shift, harris wrist restraints + mitts to keep pt safe. Breathing easy and unlabored on 2 lpm of oxygen. Assessment completed. Safety ensured. Will continue care.   Electronically signed by Parminder Day RN on 3/14/2021 at 7:56 PM

## 2021-03-14 NOTE — PROGRESS NOTES
Pulmonary Progress Note    Date of Admission: 3/12/2021   LOS: 2 days       CC:  Acute hypoxemic altered mental status    Subjective:  Currently sedated. Not answering questions not follow commands    ROS:       Assessment:     Acute hypoxemic respiratory failure saturation less than 90% on room air  Acute metabolic encephalopathy  Acute on chronic kidney disease  History of bladder cancer status post ileal conduit  demenitia    Plan:        Hospital Day: 2     Mechanical Vent  - intubated secondary to acute hypoxemia  - started on SBT. AMS  - on SBT  - agitated over night.    - given haldol and Risperdal (1mg bid ) on last admission. Not receiving this admission.   - place NG to allow feeding and meds after extubated  - will plan to restart 0.5 mg bid when more awake. KEENAN on CKD  - Cr and sodium improved  - nephrology following       hyperglycemia  - monitor          Nutrition  - DIET TUBE FEED CONTINUOUS/CYCLIC NPO; Semi-elemental (Vital 1.2); 25; 70  -     Intake/Output Summary (Last 24 hours) at 3/14/2021 0938  Last data filed at 3/14/2021 9870  Gross per 24 hour   Intake 2730.75 ml   Output 1645 ml   Net 1085.75 ml       Mobility       Access  Arterial      PICC          CVC                      I spent 34 minutes of critical care time with this patient excluding any procedures. Data:        PHYSICAL EXAM:   Blood pressure (!) 120/52, pulse 86, temperature 99 °F (37.2 °C), temperature source Axillary, resp. rate 16, height 5' 9\" (1.753 m), weight 149 lb 7.6 oz (67.8 kg), SpO2 98 %.'  Body mass index is 22.07 kg/m². Gen: No distress. ENT:   Resp: No accessory muscle use. No crackles. No wheezes. No rhonchi. CV: Regular rate. Regular rhythm. No murmur or rub. No edema. Skin: Warm, dry, normal texture and turgor. No nodule on exposed extremities. M/S: No cyanosis. No clubbing. No joint deformity. Psych: Able to open eyes to sternal rub. Not following commands.     Medications: Scheduled Meds:   chlorhexidine  15 mL Mouth/Throat BID    famotidine (PEPCID) injection  20 mg Intravenous Daily    ampicillin-sulbactam  1,500 mg Intravenous Q8H    insulin lispro  0-6 Units Subcutaneous Q4H    sodium chloride flush  10 mL Intravenous 2 times per day    heparin (porcine)  5,000 Units Subcutaneous 3 times per day       Continuous Infusions:   dextrose 100 mL/hr at 03/14/21 0845    dextrose      propofol Stopped (03/14/21 0700)    propofol 20 mcg/kg/min (03/13/21 2210)       PRN Meds:  glucose, dextrose, glucagon (rDNA), dextrose, sodium chloride flush, promethazine **OR** ondansetron, polyethylene glycol, acetaminophen **OR** acetaminophen, propofol    Labs reviewed:  CBC:   Recent Labs     03/12/21  1319 03/13/21  0455 03/14/21  0435   WBC 12.4* 9.4 9.1   HGB 10.5* 9.2* 8.5*   HCT 31.7* 28.2* 26.6*   MCV 94.3 95.4 96.5    288 226     BMP:   Recent Labs     03/12/21  1320 03/13/21  0455 03/14/21  0435   * 149* 148*   K 5.0 4.5 4.0   * 116* 117*   CO2 20* 19* 20*   PHOS  --  5.2* 3.9   BUN 62* 61* 53*   CREATININE 5.8* 4.8* 3.8*     LIVER PROFILE:   Recent Labs     03/12/21  1320   AST 33   ALT 20   BILITOT 0.4   ALKPHOS 79     PT/INR:   Recent Labs     03/12/21  1320   PROTIME 13.8*   INR 1.19*     APTT:   Recent Labs     03/12/21  1320   APTT 23.8*     UA:  Recent Labs     03/12/21  1724   COLORU YELLOW   PHUR 6.5   WBCUA 8*   RBCUA 0-2   BACTERIA 4+*   CLARITYU CLOUDY*   SPECGRAV 1.015   LEUKOCYTESUR Negative   UROBILINOGEN 0.2   BILIRUBINUR Negative   BLOODU MODERATE*   GLUCOSEU Negative   AMORPHOUS 1+     No results for input(s): PH, PCO2, PO2 in the last 72 hours. Cx:      Films: This note was transcribed using [id number] [place] 9+. Please disregard any translational errors.       V[facility] [phone]

## 2021-03-14 NOTE — PROGRESS NOTES
1930:  Report received from [facility] . Handoff complete. Pt resting on vent; respirations easy and unlabored. Propofol infusing via PAC for sedation. Pt opens eyes to name call but does not follow commands. B wrist restraints in place to protect airway/lines    2017: Called to room per RT for pt agititation, reports pt trying to sit up and reach for ETT. Propofol increased a this time. 2030: Shift assessment complete--see flow sheets. Pt resting on vent with stable vital signs    2115: Increasingly agitated and restless in bed; will titrate propofol per order parameters    0030: am care completed; tolerated activity well. Opens eyes with care but not following commands. Tolerating TF; increased to goal rate. Urostomy bag leaking; new bag placed. VSS    0528: Propofol decreased for SBT this AM    0700:  Report given to [facility] . Handoff complete.  Propofol stopped at this time

## 2021-03-14 NOTE — PROGRESS NOTES
Perfect serve to MD Krishna Begin with ABG results. Pt with moderate amount of yellow thick sputum. Per MD ok to extubate.

## 2021-03-14 NOTE — PROGRESS NOTES
Pt sx'd and extubated at this time.  Placed on 4lpm NC, Spo2 100%, pt tolerated well, no stridor noted

## 2021-03-14 NOTE — PROGRESS NOTES
Hospitalist Progress Note      PCP: Shameka Tavarez MD    Date of Admission: 3/12/2021    Chief Complaint: [facility] Hospital Course: Patient is an 25-year-old male with a past medical history of recent small bowel obstruction status post exploratory laparotomy along with bowel resection, CKD stage III, dementia who presented from the [facility] due to altered mental status. Patient was emergently intubated in the emergency department due to lethargy and unresponsiveness. Work-up showed acute renal failure along with concerns for aspiration pneumonia. Subjective: Patient seen and examined. Discussed with nursing staff. Remains ventilated on minimal support. Cultures from respiratory positive for MRSA. Renal function continue as does his white count. Patient had episodes of confusion yesterday along with agitation. This is in line with the way he was behaving on previous admissions. He has a bad time in the afternoon or evenings.   He required multiple medications to calm him down      Medications:  Reviewed    Infusion Medications    dextrose 100 mL/hr at 03/13/21 2227    dextrose      propofol Stopped (03/14/21 0700)    propofol 20 mcg/kg/min (03/13/21 2210)     Scheduled Medications    chlorhexidine  15 mL Mouth/Throat BID    famotidine (PEPCID) injection  20 mg Intravenous Daily    ampicillin-sulbactam  1,500 mg Intravenous Q8H    insulin lispro  0-6 Units Subcutaneous Q4H    sodium chloride flush  10 mL Intravenous 2 times per day    heparin (porcine)  5,000 Units Subcutaneous 3 times per day     PRN Meds: glucose, dextrose, glucagon (rDNA), dextrose, sodium chloride flush, promethazine **OR** ondansetron, polyethylene glycol, acetaminophen **OR** acetaminophen, propofol      Intake/Output Summary (Last 24 hours) at 3/14/2021 0736  Last data filed at 3/14/2021 0600  Gross per 24 hour   Intake 2720.75 ml   Output 1645 ml   Net 1075.75 ml       Physical Exam Performed:    BP (!) 122/47 Pulse 84   Temp 98.6 °F (37 °C) (Axillary)   Resp 17   Ht 5' 9\" (1.753 m)   Wt 149 lb 7.6 oz (67.8 kg)   SpO2 96%   BMI 22.07 kg/m²     General appearance: Intubated and sedated. HEENT: Pupils equal, round, and reactive to light. Conjunctivae/corneas clear. Neck: Supple, with full range of motion. No jugular venous distention. Trachea midline. Respiratory:  Normal respiratory effort. Clear to auscultation  Cardiovascular: Regular rate and rhythm with normal S1/S2  Abdomen: Soft, non-tender, non-distended with normal bowel sounds. Musculoskeletal: No clubbing, cyanosis or edema bilaterally. Skin: Skin color, texture, turgor normal.  No rashes or lesions. Neurologic:  Intubated and sedated. Psychiatric: Intubated and sedated. Capillary Refill: Brisk,< 3 seconds   Peripheral Pulses: +2 palpable, equal bilaterally       Labs:   Recent Labs     03/12/21  1319 03/13/21  0455 03/14/21  0435   WBC 12.4* 9.4 9.1   HGB 10.5* 9.2* 8.5*   HCT 31.7* 28.2* 26.6*    288 226     Recent Labs     03/12/21  1320 03/13/21  0455 03/14/21  0435   * 149* 148*   K 5.0 4.5 4.0   * 116* 117*   CO2 20* 19* 20*   BUN 62* 61* 53*   CREATININE 5.8* 4.8* 3.8*   CALCIUM 9.5 8.3 7.9*   PHOS  --  5.2* 3.9     Recent Labs     03/12/21  1320   AST 33   ALT 20   BILITOT 0.4   ALKPHOS 79     Recent Labs     03/12/21  1320   INR 1.19*     Recent Labs     03/12/21  1320   TROPONINI 0.10*       Urinalysis:      Lab Results   Component Value Date    NITRU Negative 03/12/2021    WBCUA 8 03/12/2021    BACTERIA 4+ 03/12/2021    RBCUA 0-2 03/12/2021    BLOODU MODERATE 03/12/2021    SPECGRAV 1.015 03/12/2021    GLUCOSEU Negative 03/12/2021       Radiology:  CT Head WO Contrast   Final Result   No acute intracranial abnormality. CT CHEST ABDOMEN PELVIS WO CONTRAST   Final Result   Chest-      Supportive tubing is in normal position. Mild amount of secretions are noted in the right bronchus intermedius.       Foci of airspace disease in the right upper and dependent both lower lobes. Pneumonia versus aspiration pneumonitis. ---      Abdomen pelvis-      Findings suggestive of enteritis. Previous bilateral hydroureteronephrosis has resolved. There is a right   lower quadrant ileal conduit with parastomal fluid, herniated portion of the   ileal conduit versus seroma. Suspected gallbladder sludge. Stable sclerotic foci along the right sacroiliac joint and left superior   medial pubis. XR CHEST PORTABLE   Final Result   No acute findings. Assessment/Plan:    Acute metabolic encephalopathy  Unsure if medication induced from nursing home, infectious, his baseline  Continue to monitor    Acute respiratory failure with hypoxia  On minimal ventilatory support  Consider SBT if patient's mentation improves  Possible MRSA pneumonia induced? Acute kidney injury on CKD  Continues to improve with IV fluids  Continue to trend    Sepsis  Improving with IV antibiotics and IV fluids  Respiratory culture positive for MRSA    Aspiration pneumonia  Continue Unasyn along with vancomycin    Small bowel obstruction status post partial small bowel resection  Monitor    History of bladder cancer status post ileal conduit  Monitor    DVT Prophylaxis: Heparin  Diet: DIET TUBE FEED CONTINUOUS/CYCLIC NPO; Semi-elemental (Vital 1.2); 25; 70  Code Status: Full Code    PT/OT Eval Status:  Will need    Bemassimo Pichardo MD

## 2021-03-14 NOTE — CONSULTS
Clinical Pharmacy Note  Vancomycin Consult    Sonam Blood is a 78 y.o. male ordered Vancomycin for MRSA PNA; consult received from Dr. Roberto Westbrook to manage therapy. Also receiving unasyn for aspiration. Patient Active Problem List   Diagnosis    Hypertension    Leukopenia    Lightheadedness    Rectal bleed    Bladder [facility] ([facility]    S/P ileal conduit (HCC)    Rectal injury    Pelvic mass in male    Right adrenal mass ([address]    Bladder cancer metastasized to bone (HCC)    Cancer associated pain    Chronic fatigue    Benign prostatic hyperplasia without urinary obstruction    Small bowel obstruction (HCC)    Lactic acidosis    KEENAN (acute kidney injury) ([address]    Delirium    Acute encephalopathy    Acute kidney injury superimposed on chronic kidney disease (HCC)    Acute respiratory failure with hypoxia (HCC)    Altered mental status       Allergies:  Patient has no known allergies. Temp max:  Temp (24hrs), Av.7 °F (37.1 °C), Min:98.6 °F (37 °C), Max:99 °F (37.2 °C)      Recent Labs     21  1319 21  0455 21  0435   WBC 12.4* 9.4 9.1       Recent Labs     21  1320 21  0455 21  0435   BUN 62* 61* 53*   CREATININE 5.8* 4.8* 3.8*         Intake/Output Summary (Last 24 hours) at 3/14/2021 1057  Last data filed at 3/14/2021 5769  Gross per 24 hour   Intake 2720.75 ml   Output 1405 ml   Net 1315.75 ml       Culture Results:  Sputum + MRSA    Ht Readings from Last 1 Encounters:   21 5' 9\" (1.753 m)        Wt Readings from Last 1 Encounters:   21 149 lb 7.6 oz (67.8 kg)         Estimated Creatinine Clearance: 14 mL/min (A) (based on SCr of 3.8 mg/dL (H)). Assessment/Plan:    Vanco day # 1  Patient with KEENAN on CKD  Will give vanco 1250 mg x 1 today  Random vanco tomorrow am    Thank you for the consult.

## 2021-03-15 NOTE — PROGRESS NOTES
Pulmonary Progress Note    CC:  Follow up respiratory failure, pneumonia    Subjective:  Extubated yesterday and currently on room air  He won't say much and only opens his eyes to his name  Unable to obtain CC or HPI      Intake/Output Summary (Last 24 hours) at 3/15/2021 0819  Last data filed at 3/15/2021 0605  Gross per 24 hour   Intake 4281.13 ml   Output 2175 ml   Net 2106.13 ml         PHYSICAL EXAM:  Blood pressure (!) 116/58, pulse 72, temperature 98 °F (36.7 °C), temperature source Oral, resp. rate 15, height 5' 9\" (1.753 m), weight 153 lb 10.6 oz (69.7 kg), SpO2 99 %.'  Gen: Lethargic, flat  Eyes: PERRL. No sclera icterus. No conjunctival injection. ENT: No discharge. Pharynx with NG External appearance of ears and nose normal.  Neck: Trachea midline. No obvious mass. Resp: No crackles. No wheezes. No rhonchi. No dullness on percussion. CV: Regular rate. Regular rhythm. No murmur or rub. Accentuated S1  GI: Non-tender. Non-distended. No hernia. Skin: Warm, dry, normal texture and turgor. No nodule on exposed extremities. Lymph: No cervical LAD. No supraclavicular LAD. M/S: No cyanosis. No clubbing. No joint deformity.     Neuro: Lethargic, flat, minimal movement   Ext:   trace edema    Medications:    Scheduled Meds:   calcium gluconate-NaCl  1,000 mg Intravenous Once    vancomycin (VANCOCIN) intermittent dosing (placeholder)   Other RX Placeholder    ampicillin-sulbactam  1,500 mg Intravenous Q8H    insulin lispro  0-6 Units Subcutaneous Q4H    sodium chloride flush  10 mL Intravenous 2 times per day    heparin (porcine)  5,000 Units Subcutaneous 3 times per day       Continuous Infusions:   dextrose         PRN Meds:  glucose, dextrose, glucagon (rDNA), dextrose, sodium chloride flush, [DISCONTINUED] promethazine **OR** ondansetron, polyethylene glycol, acetaminophen **OR** acetaminophen    Labs:  CBC:   Recent Labs     03/13/21  0455 03/14/21  0435 03/15/21  0435   WBC 9.4 9.1 10.2 HGB 9.2* 8.5* 8.6*   HCT 28.2* 26.6* 26.0*   MCV 95.4 96.5 94.6    226 200     BMP:   Recent Labs     21  0455 21  0435 03/15/21  0435   * 148* 142   K 4.5 4.0 3.7   * 117* 111*   CO2 19* 20* 21   PHOS 5.2* 3.9 2.9   BUN 61* 53* 39*   CREATININE 4.8* 3.8* 2.7*     LIVER PROFILE:   Recent Labs     21  1320   AST 33   ALT 20   BILITOT 0.4   ALKPHOS 79     PT/INR:   Recent Labs     21  1320   PROTIME 13.8*   INR 1.19*     APTT:   Recent Labs     21  1320   APTT 23.8*     UA:  Recent Labs     21  1724   COLORU YELLOW   PHUR 6.5   WBCUA 8*   RBCUA 0-2   BACTERIA 4+*   CLARITYU CLOUDY*   SPECGRAV 1.015   LEUKOCYTESUR Negative   UROBILINOGEN 0.2   BILIRUBINUR Negative   BLOODU MODERATE*   GLUCOSEU Negative   AMORPHOUS 1+     No results for input(s): PH, PCO2, PO2 in the last 72 hours. Films:  Chest imaging reports were reviewed and imaging was reviewed by me and showed port, gastric tube, essentially clear lungs     AB.[id number]/131    Cultures:  Sputum:  MRSA    I reviewed the labs and images listed above    Assessment/Plan:    Acute Hypoxic Respiratory Failure with saturations less than 90% on room air, extubated on 3/14  Titrate oxygen for saturations greater than or equal to 92%  o IS, Acapella if able too   MRSA pneumonia with possible aspiration pneumonia   o Vanco and Unasyn for 7 days total.  Add lactobacillus    Metabolic encephalopathy with underlying dementia   o Ok right now. Has been on haldol and risperdal as outpatient. May need to resume one of those. Family apparently said they don't want those resumed. Unclear direction to take.   At least may need haldol   Dysphagia  o Speech needs to see and evaluate prior to removing NG    PT/OT  Heparin for DVT prophylaxis         Ted Khan, DO [facility]

## 2021-03-15 NOTE — PROGRESS NOTES
1835: Pt sitting in bed, calm but confused. Alert to name only. Noted with NGT out, PIV to right wrist out. Oxygen tubing out of nose. Pt was currently in bilateral wrist restraints. Pt is leaning up and putting face/head down near restraints to attempt to pull out lines. 1835: Re-applied oxygen at 2L/min. No desaturation noted. Dressing applied to IV site. No further bleeding. PAC still intact with IV fluids infusing. 1915: Attempted to replace small bore 12FR NGT. Pt continues with cough during NGT. NGT was removed. Did not advance no further than 40 cm. Evan Diop RN placed large bore [place] tube to left nostril without difficulties and xray was ordered for placement. Ausculted air bubble in abdomen.

## 2021-03-15 NOTE — PROGRESS NOTES
hours) at 3/15/2021 0908  Last data filed at 3/15/2021 0824  Gross per 24 hour   Intake 4291.13 ml   Output 2410 ml   Net 1881.13 ml       Physical Exam Performed:    BP (!) 128/46   Pulse 72   Temp 97.6 °F (36.4 °C) (Axillary)   Resp 16   Ht 5' 9\" (1.753 m)   Wt 153 lb 10.6 oz (69.7 kg)   SpO2 97%   BMI 22.69 kg/m²     General appearance: Alert  HEENT: Pupils equal, round, and reactive to light. Conjunctivae/corneas clear. Neck: Supple, with full range of motion. No jugular venous distention. Trachea midline. Respiratory:  Normal respiratory effort. Clear to auscultation  Cardiovascular: Regular rate and rhythm with normal S1/S2  Abdomen: Soft, non-tender, non-distended with normal bowel sounds. Musculoskeletal: No clubbing, cyanosis or edema bilaterally. Skin: Skin color, texture, turgor normal.  No rashes or lesions. Neurologic: Alert  Psychiatric: Alert  Capillary Refill: Brisk,< 3 seconds   Peripheral Pulses: +2 palpable, equal bilaterally       Labs:   Recent Labs     03/13/21  0455 03/14/21  0435 03/15/21  0435   WBC 9.4 9.1 10.2   HGB 9.2* 8.5* 8.6*   HCT 28.2* 26.6* 26.0*    226 200     Recent Labs     03/13/21  0455 03/14/21  0435 03/15/21  0435   * 148* 142   K 4.5 4.0 3.7   * 117* 111*   CO2 19* 20* 21   BUN 61* 53* 39*   CREATININE 4.8* 3.8* 2.7*   CALCIUM 8.3 7.9* 7.9*   PHOS 5.2* 3.9 2.9     Recent Labs     03/12/21  1320   AST 33   ALT 20   BILITOT 0.4   ALKPHOS 79     Recent Labs     03/12/21  1320   INR 1.19*     Recent Labs     03/12/21  1320   TROPONINI 0.10*       Urinalysis:      Lab Results   Component Value Date    NITRU Negative 03/12/2021    WBCUA 8 03/12/2021    BACTERIA 4+ 03/12/2021    RBCUA 0-2 03/12/2021    BLOODU MODERATE 03/12/2021    SPECGRAV 1.015 03/12/2021    GLUCOSEU Negative 03/12/2021       Radiology:  XR CHEST PORTABLE   Final Result   Enteric tube as described.          XR CHEST PORTABLE   Final Result   Enteric feeding tube projects to the proximal stomach. Other supportive   tubing is in normal position. No acute cardiopulmonary disease. CT Head WO Contrast   Final Result   No acute intracranial abnormality. CT CHEST ABDOMEN PELVIS WO CONTRAST   Final Result   Chest-      Supportive tubing is in normal position. Mild amount of secretions are noted in the right bronchus intermedius. Foci of airspace disease in the right upper and dependent both lower lobes. Pneumonia versus aspiration pneumonitis. ---      Abdomen pelvis-      Findings suggestive of enteritis. Previous bilateral hydroureteronephrosis has resolved. There is a right   lower quadrant ileal conduit with parastomal fluid, herniated portion of the   ileal conduit versus seroma. Suspected gallbladder sludge. Stable sclerotic foci along the right sacroiliac joint and left superior   medial pubis. XR CHEST PORTABLE   Final Result   No acute findings. Assessment/Plan:    Acute metabolic encephalopathy  Unsure if medication induced from nursing home, infectious, his baseline  Continue to monitor  We will discuss with family about restarting Zyprexa    Acute respiratory failure with hypoxia  Extubated on March 14 MRSA pneumonia      Acute kidney injury on CKD  Continues to improve with IV fluids  Continue to trend  Presented at 5.8, down to 2.7 with IV fluids    Sepsis  Improving with IV antibiotics and IV fluids  Respiratory culture positive for MRSA    Aspiration pneumonia  Continue Unasyn along with vancomycin    Small bowel obstruction status post partial small bowel resection  Monitor    History of bladder cancer status post ileal conduit  Monitor    DVT Prophylaxis: Heparin  Diet: DIET TUBE FEED CONTINUOUS/CYCLIC NPO; Semi-elemental (Vital 1.2); 25; 70  Code Status: Full Code    PT/OT Eval Status:  Will need    Dispo -can transfer from ICU to [facility], will need video or restraints    Pilar Willis MD

## 2021-03-15 NOTE — PROGRESS NOTES
Physical Therapy    Facility/Department: [address] ICU  Initial Assessment    NAME: Elbert Triplett  : 1940  MRN: [id number]    Date of Service: 3/15/2021    Discharge Recommendations:  Continue to assess pending progress   PT Equipment Recommendations  Other: Defer to next level of care. Assessment   Body structures, Functions, Activity limitations: Decreased functional mobility ; Decreased strength;Decreased safe awareness;Decreased cognition;Decreased endurance  Assessment: 79 y/o male admit 3/12/2021 with Acute Respiratory, KEENAN, Altered Mental Status. 3/12-3/14/2021 Pt Intubated. Recent admit -3/9/2021 with SBO (d/c to SNF setting). PMH as noted including SBO, Exp Lap/Small Bowel Resection (2021), Bladder Ca, Radical Cystectomy/Prostatectomy/ileal Conduit Urinary, Dementia. Pt admit from SNF setting (admit acute care following hospital admit -3/9/2021 with SBO). At this time, anticipate return SNF setting upon d/c. Will monitor pt's progress. Prognosis: Fair  Decision Making: Medium Complexity  History: 79 y/o male admit 3/12/2021 with Acute Respiratory, KEENAN, Altered Mental Status. 3/12-3/14/2021 Pt Intubated. Recent admit -3/9/2021 with SBO (d/c to SNF setting). PMH as noted including SBO, Exp Lap/Small Bowel Resection (2021), Bladder Ca, Radical Cystectomy/Prostatectomy/ileal Conduit Urinary, Dementia. Exam: See above. Clinical Presentation: See above. Patient Education: Role of PT, POC, Need to call for assist.  Barriers to Learning: Cognitive, [place]. REQUIRES PT FOLLOW UP: Yes  Activity Tolerance  Activity Tolerance: Patient limited by cognitive status; Patient limited by endurance       Patient Diagnosis(es): The primary encounter diagnosis was Acute kidney injury superimposed on chronic kidney disease ([address]  Diagnoses of Altered mental status, unspecified altered mental status type, Acute respiratory failure with hypoxia ([address] and Pneumonia due to organism were also pertinent to this visit. has a past medical history of Cancer ([address] GASTRIC ULCER, Hypertension, Leukopenia, and MRSA (methicillin resistant staph aureus) culture positive. has a past surgical history that includes Colonoscopy; Cystoscopy; other surgical history (12/14/2016); Tunneled venous port placement (Right, 06/06/2017); and laparotomy (N/A, 2/18/2021). Restrictions  Restrictions/Precautions  Restrictions/Precautions: Fall Risk  Position Activity Restriction  Other position/activity restrictions: Ostomy bag. NPO. Vision/Hearing  Vision: Within Functional Limits  Hearing: Exceptions to [facility]  Hearing Exceptions: Hard of hearing/hearing concerns     Subjective  General  Chart Reviewed: Yes  Patient assessed for rehabilitation services?: Yes  Additional Pertinent Hx: 77 y/o male admit 3/12/2021 with Acute Respiratory, KEENAN, Altered Mental Status. 3/12-3/14/2021 Pt Intubated. Recent admit 2/18-3/9/2021 with SBO (d/c to SNF setting). PMH as noted including SBO, Exp Lap/Small Bowel Resection (2/18/2021), Bladder Ca, Radical Cystectomy/Prostatectomy/ileal Conduit Urinary, Dementia. Family / Caregiver Present: No  Referring Practitioner: Rachael Tolentino NP  Diagnosis: Acute Respiratory, KEENAN, Altered Mental Status. Other (Comment): Pt briefly squeeze hands (R/L); otherwise did not follow any simple commands. Subjective  Subjective: Pt generally lethargic, diminished ability to follow any simple commands. Pain Screening  Patient Currently in Pain: No          Orientation  Orientation  Overall Orientation Status: Impaired  Orientation Level: Unable to assess  Social/Functional History  Social/Functional History  Type of Home: Facility([facility] setting[facility]  ADL Assistance: Needs assistance  Ambulation Assistance: Needs assistance  Transfer Assistance: Needs assistance  Additional Comments: Unable to obtain any further information at this time.   Upon d/c acute care setting 3/9/2021 pt amb very short distances at bedside with assist x 2. Cognition   Cognition  Cognition Comment: Pt not following any simple commands. Objective          PROM RLE (degrees)  RLE PROM: WFL  PROM LLE (degrees)  LLE PROM: WFL  PROM RUE (degrees)  RUE PROM: WFL  PROM LUE (degrees)  LUE PROM: WFL  Strength Other  Other: Unable to fully assess at this time due to pt's cognitive deficits. Active/nonpurposeful mvt noted UEs/LES. Bed mobility  Rolling to Left: Dependent/Total  Rolling to Right: Dependent/Total  Scooting: Dependent/Total  Comment: Bed Mob dependent for care and positioning. Transfers  Bed to Chair: Unable to assess  Comment: Unable to safely attempt eob/oob at this time. Exercises  Comments: PROM Exs B UEs/LEs. Plan   Plan  Times per week: 3-5x week while in acute care setting. Current Treatment Recommendations: Strengthening, Functional Mobility Training, Transfer Training, Gait Training, Safety Education & Training, Patient/Caregiver Education & Training  Safety Devices  Type of devices: Bed alarm in place, Call light within reach, Left in bed, Nurse notified  Restraints  Initially in place: Yes  Restraints: UE soft restraints/soft mitts. AM-PAC Score  AM-PAC Inpatient Mobility Raw Score : 6 (03/15/21 1121)  AM-PAC Inpatient T-Scale Score : 23.55 (03/15/21 1121)  Mobility Inpatient CMS 0-100% Score: 100 (03/15/21 1121)  Mobility Inpatient CMS G-Code Modifier : CN (03/15/21 1121)          Goals  Short term goals  Time Frame for Short term goals: Upon d/c acute care setting. Short term goal 1: Bed Mob Mod assist x 2. Short term goal 2: EOB ~ 10 min in prep Transfers Min assist.  Short term goal 3: Attempt Transfers/OOB via Stedy assist x 2. Short term goal 4: Attempt Stand/Amb activities as approp. Patient Goals   Patient goals : None stated at this time.        Therapy Time   Individual Concurrent Group Co-treatment   Time In 0920         Time Out 0950         Minutes 30 Florinda Cleveland, PT

## 2021-03-15 NOTE — CONSULTS
[facility]  Palliative Care   Consult Note    NAME:  Nolan Heredia Sr  MEDICAL RECORD NUMBER:  [id number]  AGE: 78 y.o. GENDER: male  : 1940  TODAY'S DATE:  3/15/2021    Subjective     Reason for Consult:  goals of care  Visit Type: Initial Consult      Gildardo Diane is a 78 y.o. male referred by:   [x] Physician    PAST MEDICAL HISTORY      Diagnosis Date    Cancer ([address]     bladder    GASTRIC ULCER     Hypertension     Leukopenia     MRSA (methicillin resistant staph aureus) culture positive 2021    sputum       PAST SURGICAL HISTORY  Past Surgical History:   Procedure Laterality Date    COLONOSCOPY      colo , Dr Alexx Cooney MD.   Peter Davis N/A 2021    EXPLORATORY LAPAROTOMY, BOWEL RESECTION performed by Alex Ugalde MD at [place] [address]  2016    RADICAL CYSTECTOMY, PROSTECTOMY ILEAL CONDUIT URINARY    TUNNELED VENOUS PORT PLACEMENT Right 2017    DR. BOND/IR POWER PORT       FAMILY HISTORY  History reviewed. No pertinent family history. SOCIAL HISTORY  Social History     Tobacco Use    Smoking status: Never Smoker    Smokeless tobacco: Never Used   Substance Use Topics    Alcohol use: No    Drug use: No       ALLERGIES  No Known Allergies    MEDICATIONS  No current facility-administered medications on file prior to encounter. Current Outpatient Medications on File Prior to Encounter   Medication Sig Dispense Refill    LORazepam (ATIVAN) 0.5 MG tablet Take 0.5 mg by mouth every 8 hours as needed for Anxiety.  Indications: Per ECF on , patient had not received any ativan yet at their facility       divalproex (DEPAKOTE SPRINKLE) 125 MG capsule Take 125 mg by mouth 2 times daily      acetaminophen (TYLENOL) 500 MG tablet Take 500 mg by mouth 3 times daily as needed for Pain      risperiDONE (RISPERDAL) 1 MG tablet Take 1 tablet by mouth 2 times daily 60 tablet 0    haloperidol lactate

## 2021-03-15 NOTE — PROGRESS NOTES
Nephrology Progress Note  [phone]  [phone]   [email or web address]    Patient:  Eduarda Alfaro Sr   : 1940    CC:  KEENAN, CKD    Subjective:  Mr. Cathie Kearney remains intubated in the ICU. No major events overnight. ROS:   In bed. PMSHx:  medications reviewed    Vitals:  BP (!) 127/47   Pulse 74   Temp 97.6 °F (36.4 °C) (Axillary)   Resp 16   Ht 5' 9\" (1.753 m)   Wt 153 lb 10.6 oz (69.7 kg)   SpO2 97%   BMI 22.69 kg/m²       Intake/Output Summary (Last 24 hours) at 3/15/2021 1028  Last data filed at 3/15/2021 0929  Gross per 24 hour   Intake 4541.13 ml   Output 2410 ml   Net 2131.13 ml       Physical Exam:  Gen: Resting in bed, in ICU. On vent. CV: RRR, no S3.  Lungs: +vent, bilateral coarse BS. Abd: S/NT +BS +ostomy  Ext: No edema, no cyanosis  Skin: Warm. No rashes appreciated. : +coleman. Labs:  CBC:   Lab Results   Component Value Date    WBC 10.2 03/15/2021    RBC 2.75 03/15/2021    RBC 4.07 2017    HGB 8.6 03/15/2021    HCT 26.0 03/15/2021    MCV 94.6 03/15/2021    MCH 31.3 03/15/2021    MCHC 33.0 03/15/2021    RDW 15.7 03/15/2021     03/15/2021    MPV 8.8 03/15/2021     BMP:    Lab Results   Component Value Date     03/15/2021    K 3.7 03/15/2021    K 4.5 2021     03/15/2021    CO2 21 03/15/2021    BUN 39 03/15/2021    LABALBU 3.2 2021    CREATININE 2.7 03/15/2021    CALCIUM 7.9 03/15/2021    GFRAA 27 03/15/2021    GFRAA >60 2013    LABGLOM 23 03/15/2021    GLUCOSE 140 03/15/2021    GLUCOSE 128 2017       Assessment/Plan:  1) KEENAN on CKD: non-oliguric              - baseline 09/10/19 Cr 1.8              - Likely ATN based on data. - Urine sodium was 50.              - Got volume resuscitated   - Cr continues to improve daily.             - not a dialysis candidate due to dementia, and his significant other is in agreement per discussion with Dr. Debra Mcgowan on Friday.     2) ventilated              - pulmonary following.   SBT this morning.    3) ID              - CT reviewed              - question of aspiration pneumonia              - Remains on unasyn, off vancomycin     4) SBO from ischemic bowel s/p exp lap and partial bowel resection 02/19/21              -  Dr. Emily Umana consulted for f/u.     5) bladder cancer and ileal conduit              - previously seen by Dr. Nalini Schmitt     6) FEN:  Deborra Boer                          - improved                Hypokalemia                          - K is normal today.               metabolic acidosis                          - monitor     7) dementia              - poor prognosis              - Palliative care to see patient on Monday

## 2021-03-15 NOTE — PROGRESS NOTES
1700: Pt pulled of NG & PIV.  Very combative kicking RN  [phone]: Perfect Serve sent to Dr. Luís Lennon  Electronically signed by Holli Weinstein RN on 3/15/2021 at 5:31 PM

## 2021-03-15 NOTE — PLAN OF CARE
Problem: Nutrition  Goal: Optimal nutrition therapy  Outcome: Ongoing   Nutrition Problem #1: Inadequate oral intake  Intervention: Food and/or Nutrient Delivery: Modify Tube Feeding  Nutritional Goals: initiate most appropriate form of nutrition per MD

## 2021-03-15 NOTE — PROGRESS NOTES
Speech Language Pathology    245PM: Attempt Clinical Swallow Evaluation: pt too lethargic and unable to safely participate with assessment at this time. Will retry on 3/16. Thank you.     Jacquie Doyle MS, CCC-SLP #[id number]  Speech Language Pathologist

## 2021-03-15 NOTE — PROGRESS NOTES
At 1041 received call from lab with critical value. Sputum with MRSA . Communicated with MD Lisa Campos and Pharmacist Shell Elder in lang rounding. Per MD he will put in new orders.

## 2021-03-15 NOTE — PROGRESS NOTES
"CC: f/u hypertension    History:  Hillary Mann is a 65 y.o. female   She notes she has been doing reasonably well, but needs refills on her meds. She feels her meds have been doing well without side effects. Her weight has decreased since last visit, which she attributes to poor eating habits when she was laid off her previous job. She has since improved eating habits, but feels better without the weight. She continues smoking and is not quite ready to quit.     ROS:  Review of Systems   Constitutional: Negative for chills and fever.   Respiratory: Negative for cough and shortness of breath.    Cardiovascular: Negative for chest pain and palpitations.   Gastrointestinal: Negative for abdominal pain and constipation.   Genitourinary: Negative for difficulty urinating and dysuria.        reports that she has been smoking.  She has been smoking about 0.50 packs per day. She has never used smokeless tobacco. She reports that she does not drink alcohol or use drugs.      Current Outpatient Medications:   •  amLODIPine (NORVASC) 5 MG tablet, Take 1 tablet by mouth Daily., Disp: 90 tablet, Rfl: 1  •  Cholecalciferol (VITAMIN D) 2000 units capsule, Take 12,000 Units by mouth Daily., Disp: , Rfl:   •  olmesartan-hydrochlorothiazide (BENICAR HCT) 40-12.5 MG per tablet, Take 1 tablet by mouth Daily., Disp: 90 tablet, Rfl: 1    OBJECTIVE:  /86 (BP Location: Right arm, Patient Position: Sitting, Cuff Size: Adult)   Pulse 84   Resp 16   Ht 149.9 cm (59\")   Wt 76.2 kg (168 lb)   SpO2 97%   BMI 33.93 kg/m²    Physical Exam   Constitutional: She is oriented to person, place, and time. She appears well-nourished. No distress.   Cardiovascular: Normal rate, regular rhythm and normal heart sounds.   No murmur heard.  Pulmonary/Chest: Effort normal and breath sounds normal. She has no wheezes.   Neurological: She is alert and oriented to person, place, and time.   Psychiatric: She has a normal mood and affect. " Son Iris Deshpande at bedside and updated. Per Iris Deshpande he will update family.       Assessment/Plan    Diagnoses and all orders for this visit:    Essential hypertension  -     amLODIPine (NORVASC) 5 MG tablet; Take 1 tablet by mouth Daily.  -     olmesartan-hydrochlorothiazide (BENICAR HCT) 40-12.5 MG per tablet; Take 1 tablet by mouth Daily.  Well controlled, BP goal for age is <140/90 per JNC 8 guidelines and continue current medications    Class 1 obesity due to excess calories with serious comorbidity and body mass index (BMI) of 33.0 to 33.9 in adult  Recommended attention to portion control and being careful about the types and timing of meals for the purpose of weight management.    Cigarette smoker  Patient was counseled on and understood the many dangers of continuing to use tobacco. Despite this, Ms. Mann states quitting is not an immediate priority at this time. I reminded the patient that if quitting becomes an increased priority to contact us for help with quitting including pharmacologic & nonpharmacologic options or any additional resources.    Personal history of nicotine dependence  -     CT Chest Without Contrast; Future  Lung cancer screening.     Encounter for osteoporosis screening in asymptomatic postmenopausal patient  -     DEXA Bone Density Axial; Future    Screening for colorectal cancer  -     Ambulatory Referral For Screening Colonoscopy    Encounter for screening mammogram for malignant neoplasm of breast  -     Mammo Screening Digital Tomosynthesis Bilateral With CAD; Future      An After Visit Summary was printed and given to the patient at discharge.  Return in about 6 months (around 6/2/2020) for Recheck.         Christopher Jo D.O. 12/2/2019   Electronically signed.

## 2021-03-15 NOTE — PROGRESS NOTES
Comprehensive Nutrition Assessment    Type and Reason for Visit:  Reassess    Nutrition Recommendations/Plan:   Modify TF to more standard formula, Glucerna 1.5 @ 60 mL/hr. Flush per provider. Monitor for BM, no BM since admission. Nutrition Assessment:  Follow-up. Pt extubated 3/14, possible aspiration PNA. Pt remains on TF via NG. Recommend changing to more standard formula. SLP consulted to wendy.    Malnutrition Assessment:  Malnutrition Status:  Insufficient data      Estimated Daily Nutrient Needs:  Energy (kcal):  6614-2481 kcal (25-30 kcal/kg CBW)  Protein (g):  60-97 gm (1-1.4gm/kg CBW)  Fluid (ml/day):  per provider     Nutrition Related Findings:  +4.5 L fluid; trace BLE edema; urostomy noted; Glu 183; No BM since admission      Wounds:  Surgical Incision(abdomen)       Current Nutrition Therapies:    DIET TUBE FEED CONTINUOUS/CYCLIC NPO; Semi-elemental (Vital 1.2); 25; 70  Current Tube Feeding (TF) Orders:  · Feeding Route:  NG  · Formula: Semi-Elemental(Vital 1.2)  · Schedule: [email or web address] mL/hr)  · Water Flushes: per provider  · Current TF & Flush Orders Provides: 1400 mL TV, 1680 kcal, 105 gm pro, 1135 ml free water. · Goal TF & Flush Orders Provides: Recommend changing TF to Glucerna 1.5 @ 60 mL/hr. Flush per provider. Tf regimen provides: 1200 mL TV, 1800 kcal, 99 gm pro, 911 mL free water. Anthropometric Measures:  · Height: 5' 9\" (175.3 cm)  · Current Body Weight: 153 lb (69.4 kg)   · Admission Body Weight: 147 lb (66.7 kg)    · Ideal Body Weight: 160 lbs; % Ideal Body Weight 95.6 %   · BMI: 22.6  · BMI Categories: Normal Weight (BMI 18.5-24. 9)       Nutrition Diagnosis:   · Inadequate oral intake related to cognitive or neurological impairment as evidenced by nutrition support - enteral nutrition      Nutrition Interventions:   Food and/or Nutrient Delivery:  Modify Tube Feeding  Nutrition Education/Counseling:  No recommendation at this time   Coordination of Nutrition Care:

## 2021-03-15 NOTE — PROGRESS NOTES
Clinical Pharmacy Note  Vancomycin Consult    Kaye Ball is a 78 y.o. male ordered Vancomycin for MRSA PNA; consult received from Dr. Raegan Parish to manage therapy. Also receiving Unasyn for aspiration. Patient Active Problem List   Diagnosis    Hypertension    Leukopenia    Lightheadedness    Rectal bleed    [facility] [address]    S/P ileal conduit (HCC)    Rectal injury    Pelvic mass in male    Right adrenal mass ([facility] [address]    Bladder cancer metastasized to bone (HCC)    Cancer associated pain    Chronic fatigue    Benign prostatic hyperplasia without urinary obstruction    Small bowel obstruction (HCC)    Lactic acidosis    KEENAN (acute kidney injury) ([facility] [address]    Delirium    Acute encephalopathy    Acute kidney injury superimposed on chronic kidney disease (HCC)    Acute respiratory failure with hypoxia (HCC)    Altered mental status       Allergies:  Patient has no known allergies. Temp max:  Temp (24hrs), Av.1 °F (36.7 °C), Min:97.6 °F (36.4 °C), Max:99 °F (37.2 °C)      Recent Labs     21  0455 21  0435 03/15/21  0435   WBC 9.4 9.1 10.2       Recent Labs     21  0455 21  0435 03/15/21  0435   BUN 61* 53* 39*   CREATININE 4.8* 3.8* 2.7*         Intake/Output Summary (Last 24 hours) at 3/15/2021 0828  Last data filed at 3/15/2021 1030  Gross per 24 hour   Intake 4291.13 ml   Output 2410 ml   Net 1881.13 ml       Culture Results:  Sputum + MRSA    Ht Readings from Last 1 Encounters:   21 5' 9\" (1.753 m)        Wt Readings from Last 1 Encounters:   03/15/21 153 lb 10.6 oz (69.7 kg)         Estimated Creatinine Clearance: 21 mL/min (A) (based on SCr of 2.7 mg/dL (H)). Assessment/Plan:    Vanco day # 2  Patient with KEENAN on CKD  Random level resulted at 13.6 mg/L (goal 15-20 mg/L)  Will give vanco 1000 mg x 1 today  Random vanco tomorrow am    Thank you for the consult.      Analilia Cherry, PharmD 3/15/2021 8:29 AM

## 2021-03-16 NOTE — PROGRESS NOTES
Nephrology Progress Note  [phone]   [email or web address]    Patient:  Ashli Olivo Sr   : 1940    CC:  KEENAN, CKD    Subjective:  Mr. Florence Whitmore remains intubated in the ICU. No major events overnight. ROS:   In bed. PMSHx:  medications reviewed    Vitals:  /63   Pulse 72   Temp 98.4 °F (36.9 °C) (Axillary)   Resp 16   Ht 5' 9\" (1.753 m)   Wt 149 lb 14.6 oz (68 kg)   SpO2 99%   BMI 22.14 kg/m²       Intake/Output Summary (Last 24 hours) at 3/16/2021 1300  Last data filed at 3/16/2021 1123  Gross per 24 hour   Intake 790.3 ml   Output 1785 ml   Net -994.7 ml       Physical Exam:  Gen: Resting in bed, in ICU. On vent. CV: RRR, no S3.  Lungs: +vent, bilateral coarse BS. Abd: S/NT +BS +ostomy  Ext: No edema, no cyanosis  Skin: Warm. No rashes appreciated. : +coleman. Labs:  CBC:   Lab Results   Component Value Date    WBC 7.7 2021    RBC 2.76 2021    RBC 4.07 2017    HGB 8.4 2021    HCT 26.1 2021    MCV 94.3 2021    MCH 30.5 2021    MCHC 32.4 2021    RDW 15.5 2021     2021    MPV 8.5 2021     BMP:    Lab Results   Component Value Date     2021    K 4.1 2021    K 4.5 2021     2021    CO2 21 2021    BUN 37 2021    LABALBU 3.2 2021    CREATININE 2.3 2021    CALCIUM 8.5 2021    GFRAA 33 2021    GFRAA >60 2013    LABGLOM 27 2021    GLUCOSE 108 2021    GLUCOSE 128 2017       Assessment/Plan:  1) KEENAN on CKD: non-oliguric              - baseline 09/10/19 Cr 1.8              - Likely ATN based on data. - Urine sodium was 50.              - Got volume resuscitated   - Cr continues to improve daily.             - not a dialysis candidate due to dementia, and his significant other is in agreement per discussion with Dr. Keerthi Medina on Friday.     2) ventilated              - pulmonary following.   SBT this morning.    3) ID              - CT reviewed              - question of aspiration pneumonia              - on unasyn, vancomycin     4) SBO from ischemic bowel s/p exp lap and partial bowel resection 02/19/21     5) bladder cancer and ileal conduit     6) FEN:              Hypernatremia                          - slightly higher    - this is a poor prognostic sign              Hypokalemia                          - K is normal today.               metabolic acidosis                          - monitor   nutrition    - family placed limit on his care at the PEG     7) dementia              - poor prognosis              - appreciate palliative care's input

## 2021-03-16 NOTE — PLAN OF CARE
Problem: Non-Violent Restraints  Goal: Removal from restraints as soon as assessed to be safe  Outcome: Ongoing  Goal: No harm/injury to patient while restraints in use  Outcome: Ongoing  Goal: Patient's dignity will be maintained  Outcome: Ongoing     Problem: Nutrition  Goal: Optimal nutrition therapy  Outcome: Ongoing     Problem: Skin Integrity:  Goal: Will show no infection signs and symptoms  Description: Will show no infection signs and symptoms  Outcome: Ongoing  Goal: Absence of new skin breakdown  Description: Absence of new skin breakdown  Outcome: Ongoing     Problem: Falls - Risk of:  Goal: Will remain free from falls  Description: Will remain free from falls  Outcome: Ongoing  Goal: Absence of physical injury  Description: Absence of physical injury  Outcome: Ongoing

## 2021-03-16 NOTE — PROGRESS NOTES
Occupational Therapy Attempt  Ferny Stevenson Sr    OT orders received and chart reviewed. Pt currently in restraints and unable to follow commands. Will re-attempt next date pending pt's ability to participate.      Electronically signed by Abdullahi Louie OT on 3/16/21 at 2:01 PM EDT

## 2021-03-16 NOTE — PROGRESS NOTES
Clinical Pharmacy Note  Vancomycin Consult    Hazel De Souza is a 78 y.o. male ordered Vancomycin for MRSA PNA; consult received from Dr. Heather Tavera to manage therapy. Also receiving Unasyn for aspiration. Patient Active Problem List   Diagnosis    Hypertension    Leukopenia    Lightheadedness    Rectal bleed    Bladder cancer ([facility] [address]    S/P ileal conduit (HCC)    Rectal injury    Pelvic mass in male    Right adrenal mass ([address] [address]    Bladder cancer metastasized to bone (HCC)    Cancer associated pain    Chronic fatigue    Benign prostatic hyperplasia without urinary obstruction    Small bowel obstruction (HCC)    Lactic acidosis    KEENAN (acute kidney injury) ([facility] [address]    Delirium    Acute encephalopathy    Acute kidney injury superimposed on chronic kidney disease (HCC)    Acute respiratory failure with hypoxia (HCC)    Altered mental status    Pneumonia due to methicillin resistant Staphylococcus aureus (MRSA) ([facility] [address]    Aspiration pneumonia ([facility] [address]    Metabolic encephalopathy       Allergies:  Patient has no known allergies. Temp max:  Temp (24hrs), Av.3 °F (36.8 °C), Min:97.6 °F (36.4 °C), Max:99 °F (37.2 °C)      Recent Labs     21  0435 03/15/21  0435 21  0335   WBC 9.1 10.2 7.7       Recent Labs     21  0435 03/15/21  0435 21  0335   BUN 53* 39* 37*   CREATININE 3.8* 2.7* 2.3*         Intake/Output Summary (Last 24 hours) at 3/16/2021 0744  Last data filed at 3/16/2021 0200  Gross per 24 hour   Intake 1140.3 ml   Output 1800 ml   Net -659.7 ml       Culture Results:  Sputum + MRSA    Ht Readings from Last 1 Encounters:   21 5' 9\" (1.753 m)        Wt Readings from Last 1 Encounters:   21 149 lb 14.6 oz (68 kg)         Estimated Creatinine Clearance: 24 mL/min (A) (based on SCr of 2.3 mg/dL (H)).     Assessment/Plan:    Vanco day # 3  · Patient with KEENAN on CKD, improving  · Random level resulted at 17.1 mg/L this morning (goal 15-20 mg/L)  · Will give vanco 1000 mg x 1 today  · Random vanco tomorrow am    Thank you for the consult.      Mery Schwartz, PharmD 3/16/2021 7:44 AM

## 2021-03-16 NOTE — PROGRESS NOTES
Call to Al Le, patient's son. Updated at this time. Al Le wanting to be main point of contact, stating he will relay updates to rest of family.

## 2021-03-16 NOTE — PROGRESS NOTES
@[id number]: Talked with patient's daughter Almaz Morgan on phone 20 minutes. Daughter is upset that the tube feeding was stopped. Updates given and questions answered.

## 2021-03-16 NOTE — ACP (ADVANCE CARE PLANNING)
Advance Care Planning     Advance Care Planning Activator (Inpatient)  Conversation Note      Date of ACP Chart Review: 3/15/2021 - per Palliative care note    Conversation Conducted with:  Healthcare Decision Maker: Next of Kin by law (only applies in absence of above) (name) children    ACP Activator: Pearl Zavaleta Decision Maker:     Current Designated Health Care Decision Maker:     Primary Decision Maker: Jonny Pearson - Child - [phone]    Primary Decision Maker: Sarah Thomas Child - [phone]    Primary Decision Maker: Rosa Didier - Child - [phone]  Click here to complete Healthcare Decision Makers including section of the Healthcare Decision Maker Relationship (ie \"Primary\")  Today we documented Decision Maker(s) consistent with Legal Next of Kin hierarchy. Care Preferences    Ventilation: \"If you were in your present state of health and suddenly became very ill and were unable to breathe on your own, what would your preference be about the use of a ventilator (breathing machine) if it were available to you? \"      Would the patient desire the use of ventilator (breathing machine)?: yes    \"If your health worsens and it becomes clear that your chance of recovery is unlikely, what would your preference be about the use of a ventilator (breathing machine) if it were available to you? \"     Would the patient desire the use of ventilator (breathing machine)?: Yes      Resuscitation  \"CPR works best to restart the heart when there is a sudden event, like a heart attack, in someone who is otherwise healthy. Unfortunately, CPR does not typically restart the heart for people who have serious health conditions or who are very sick. \"    \"In the event your heart stopped as a result of an underlying serious health condition, would you want attempts to be made to restart your heart (answer \"yes\" for attempt to resuscitate) or would you prefer a natural death (answer \"no\" for do not

## 2021-03-16 NOTE — ACP (ADVANCE CARE PLANNING)
ADVANCED CARE PLANNING    Name:Saad Zuniga       :  1940              MRN:  [id number]      Purpose of Encounter: Advanced care planning in light of multiple hospital admissions. Parties in attendance: :Franck Anderson MD, Family members: Yumiko Stiles, daughter Radha Hua, significant other. Decisional Capacity:Yes    Diagnosis: Active Problems:    Acute encephalopathy    Acute kidney injury superimposed on chronic kidney disease (HCC)    Acute respiratory failure with hypoxia (HCC)    Altered mental status    Pneumonia due to methicillin resistant Staphylococcus aureus (MRSA) (HCC)    Aspiration pneumonia (HCC)    Metabolic encephalopathy  Resolved Problems:    * No resolved hospital problems. *    Patients Medical Story:Patient is an 57-year-old male with a past medical history of recent small bowel obstruction status post exploratory laparotomy along with bowel resection, CKD stage III, dementia who presented from the [facility] due to altered mental status. Patient was emergently intubated in the emergency department due to lethargy and unresponsiveness. Work-up showed acute renal failure along with concerns for aspiration pneumonia. Goals of Care Determinations: Patient wishes to focus on getting back on  Plan: Will notify Viola Roldan MD of change in care plan. Will look at further interventions as needed. Code Status: At this time patient wishes to be Full Code  Time Spent with Patient: 30 minutes      Electronically signed by Norm Centeno MD on 3/16/2021 at 10:47 AM  Thank you Viola Roldan MD for the opportunity to be involved in this patient's care.

## 2021-03-16 NOTE — PROGRESS NOTES
Patient assessment complete. Patient calm, however remains in bilateral wrist restraints for pulling of lines and tubes. Mitten restraints off at this time.

## 2021-03-16 NOTE — PROGRESS NOTES
Hospitalist Progress Note      PCP: Chay South MD    Date of Admission: 3/12/2021    Chief Complaint: [facility] Hospital Course: Patient is an 78-year-old male with a past medical history of recent small bowel obstruction status post exploratory laparotomy along with bowel resection, CKD stage III, dementia who presented from the [facility] due to altered mental status. Patient was emergently intubated in the emergency department due to lethargy and unresponsiveness. Work-up showed acute renal failure along with concerns for aspiration pneumonia. 3/14  Discussed with nursing staff. Remains ventilated on minimal support. Cultures from respiratory positive for MRSA. Renal function continue as does his white count. Patient had episodes of confusion yesterday along with agitation. This is in line with the way he was behaving on previous admissions. He has a bad time in the afternoon or evenings. He required multiple medications to calm him down    3/15 Family meeting planned for later on today. Continue with antibiotic therapy of vancomycin and Unasyn for 7 days total.    Subjective: Patient seen and examined. Patient is alert but drowsy. He did get 2 doses of Zyprexa yesterday so I have decreased those doses to 2.5 for future use. Continue with current care and transfer to [facility].       Medications:  Reviewed    Infusion Medications    dextrose       Scheduled Medications    lactobacillus  2 capsule Oral BID WC    vancomycin (VANCOCIN) intermittent dosing (placeholder)   Other RX Placeholder    ampicillin-sulbactam  1,500 mg Intravenous Q8H    insulin lispro  0-6 Units Subcutaneous Q4H    sodium chloride flush  10 mL Intravenous 2 times per day    heparin (porcine)  5,000 Units Subcutaneous 3 times per day     PRN Meds: OLANZapine, glucose, dextrose, glucagon (rDNA), dextrose, sodium chloride flush, [DISCONTINUED] promethazine **OR** ondansetron, polyethylene glycol, acetaminophen **OR** acetaminophen      Intake/Output Summary (Last 24 hours) at 3/16/2021 1351  Last data filed at 3/16/2021 1123  Gross per 24 hour   Intake 175 ml   Output 1785 ml   Net -1610 ml       Physical Exam Performed:    /63   Pulse 72   Temp 98.4 °F (36.9 °C) (Axillary)   Resp 16   Ht 5' 9\" (1.753 m)   Wt 149 lb 14.6 oz (68 kg)   SpO2 99%   BMI 22.14 kg/m²     General appearance: Alert  HEENT: Pupils equal, round, and reactive to light. Conjunctivae/corneas clear. Neck: Supple, with full range of motion. No jugular venous distention. Trachea midline. Respiratory:  Normal respiratory effort. Clear to auscultation  Cardiovascular: Regular rate and rhythm with normal S1/S2  Abdomen: Soft, non-tender, non-distended with normal bowel sounds. Musculoskeletal: No clubbing, cyanosis or edema bilaterally. Skin: Skin color, texture, turgor normal.  No rashes or lesions. Neurologic: Alert but confused, oriented to self  Psychiatric: Alert  Capillary Refill: Brisk,< 3 seconds   Peripheral Pulses: +2 palpable, equal bilaterally       Labs:   Recent Labs     03/14/21  0435 03/15/21  0435 03/16/21  0335   WBC 9.1 10.2 7.7   HGB 8.5* 8.6* 8.4*   HCT 26.6* 26.0* 26.1*    200 229     Recent Labs     03/14/21  0435 03/15/21  0435 03/16/21  0335   * 142 145   K 4.0 3.7 4.1   * 111* 114*   CO2 20* 21 21   BUN 53* 39* 37*   CREATININE 3.8* 2.7* 2.3*   CALCIUM 7.9* 7.9* 8.5   PHOS 3.9 2.9 2.8     No results for input(s): AST, ALT, BILIDIR, BILITOT, ALKPHOS in the last 72 hours. No results for input(s): INR in the last 72 hours. No results for input(s): Shadia Older in the last 72 hours.     Urinalysis:      Lab Results   Component Value Date    NITRU Negative 03/12/2021    WBCUA 8 03/12/2021    BACTERIA 4+ 03/12/2021    RBCUA 0-2 03/12/2021    BLOODU MODERATE 03/12/2021    SPECGRAV 1.015 03/12/2021    GLUCOSEU Negative 03/12/2021       Radiology:  XR CHEST PORTABLE   Final Result   Enteric tube as described. XR CHEST PORTABLE   Final Result   Enteric feeding tube projects to the proximal stomach. Other supportive   tubing is in normal position. No acute cardiopulmonary disease. CT Head WO Contrast   Final Result   No acute intracranial abnormality. CT CHEST ABDOMEN PELVIS WO CONTRAST   Final Result   Chest-      Supportive tubing is in normal position. Mild amount of secretions are noted in the right bronchus intermedius. Foci of airspace disease in the right upper and dependent both lower lobes. Pneumonia versus aspiration pneumonitis. ---      Abdomen pelvis-      Findings suggestive of enteritis. Previous bilateral hydroureteronephrosis has resolved. There is a right   lower quadrant ileal conduit with parastomal fluid, herniated portion of the   ileal conduit versus seroma. Suspected gallbladder sludge. Stable sclerotic foci along the right sacroiliac joint and left superior   medial pubis. XR CHEST PORTABLE   Final Result   No acute findings. Assessment/Plan:    Acute metabolic encephalopathy  Unsure if medication induced from nursing home, infectious, his baseline  Continue to monitor  Restart low-dose Zyprexa for agitation    Acute respiratory failure with hypoxia  Extubated on March 14 MRSA pneumonia    Acute kidney injury on CKD  Continues to improve with IV fluids  Continue to trend  Presented at 5.8, down to 2.3 with IV fluids    Sepsis  Improving with IV antibiotics and IV fluids  Respiratory culture positive for MRSA    Aspiration pneumonia  Continue Unasyn along with vancomycin  Speech-language pathology assessing  Modified barium ordered  Family does not want PEG     Small bowel obstruction status post partial small bowel resection  Monitor    History of bladder cancer status post ileal conduit  Monitor    DVT Prophylaxis: Heparin  Diet: DIET DYSPHAGIA PUREED; Dysphagia Pureed;  Moderately Thick (Honey) Code Status: Full Code    PT/OT Eval Status:  Will need    Dispo -can transfer from ICU to Leni Diaz MD

## 2021-03-16 NOTE — PROGRESS NOTES
Speech Language Pathology  Facility/Department: [address] ICU   CLINICAL BEDSIDE SWALLOW EVALUATION    NAME: Darren Patel Sr  : 1940  MRN: [id number]    ADMISSION DATE: 3/12/2021  ADMITTING DIAGNOSIS: has Hypertension; Leukopenia; Lightheadedness; Rectal bleed; Bladder cancer ([address] S/P ileal conduit ([address] Rectal injury; Pelvic mass in male; Right adrenal mass ([address] Bladder cancer metastasized to bone [facility]); Cancer associated pain; Chronic fatigue; Benign prostatic hyperplasia without urinary obstruction; Small bowel obstruction ([address] Lactic acidosis; KEENAN (acute kidney injury) ([address] Delirium; Acute encephalopathy; Acute kidney injury superimposed on chronic kidney disease ([address] Acute respiratory failure with hypoxia ([address] Altered mental status; Pneumonia due to methicillin resistant Staphylococcus aureus (MRSA) ([address] Aspiration pneumonia ([address] and Metabolic encephalopathy on their problem list.   PMHX:Cancer ([address] GASTRIC ULCER, Hypertension, Leukopenia, and MRSA (methicillin resistant staph aureus) culture positive. ONSET DATE: 3/12/2021    Recent Chest Xray 3/14/2021  FINDINGS:   Enteric tube with tip projecting over the gastric body.  Side port projects   at the gastroesophageal junction.  This could be advanced by 4-5 cm.  Cardiac   silhouette is within normal limits in size. Mediastinal contours are   otherwise suboptimally evaluated due to rotated projection. Minimal left   basilar opacity could represent atelectasis.  No pleural effusion   appreciated.  Right port with tip projecting over the superior vena cava and   access needle projecting over the reservoir noted     Hospital Course: Patient is an 78-year-old male with a past medical history of recent small bowel obstruction status post exploratory laparotomy along with bowel resection, CKD stage III, dementia who presented from the [facility] due to altered mental status.   Patient was emergently intubated in the emergency department due to lethargy and unresponsiveness. Work-up showed acute renal failure along with concerns for aspiration pneumonia. 3/14  MD note: Discussed with nursing staff. Remains ventilated on minimal support. Cultures from respiratory positive for MRSA. Renal function continue as does his white count. Patient had episodes of confusion yesterday along with agitation. This is in line with the way he was behaving on previous admissions. He has a bad time in the afternoon or evenings. He required multiple medications to calm him down  3/14 extubated  3/15 family meeting with palliative care: Family have agreed they would not want PEG and would like speech eval, he will remain a full code and they are agreeable to sedation as they physicians sees fit to order for their father    Date of Eval: 3/16/2021  Evaluating Therapist: Marjorie Escalera    Current Diet level:  Current Diet : NPO  Current Liquid Diet : NPO    Primary Complaint  Patient Complaint: pt unable to generate complaint; medical team concern for safe PO initiation    Pain:  Pain Assessment  Pain Assessment: Advanced Dementia  Pain Level: 0  Jean-Baker Pain Rating: No hurt  Patient's Stated Pain Goal: No pain  RASS Score: Alert and calm    Reason for Referral  Macho Cedillo was referred for a bedside swallow evaluation to assess the efficiency of his swallow function, identify signs and symptoms of aspiration and make recommendations regarding safe dietary consistencies, effective compensatory strategies, and safe eating environment. Impression  Dysphagia Diagnosis: Moderate to severe oral stage dysphagia; Moderate pharyngeal stage dysphagia  Dysphagia Impression : Pt alert, confused, does not follow directions but is cooperative with appropriate task recognition for PO trials via spoon and cup. In bilateral wrist restraints.     · Mod-severe oral and moderate pharyngeal phase dysphagia characterized by prolonged bolus manipulation, reduced directions; Distractible;Agitated  Respiratory Status: Room air  Communication Observation: (confused, nonsensical)  Follows Directions: None  Dentition: Poor dental/oral hygeine  Patient Positioning: Upright in bed  Baseline Vocal Quality: Dysphonic  Volitional Cough: Congested  Volitional Swallow: Absent  Consistencies Administered: Dysphagia Pureed (Dysphagia I); Honey - cup;Honey - teaspoon;Nectar - teaspoon     Vision/Hearing  Vision  Vision: Within Functional Limits  Hearing  Hearing: Exceptions to [facility]  Hearing Exceptions: Hard of hearing/hearing concerns    Oral Motor Deficits  Oral/Motor  Oral Motor: Exceptions to WFL(does not follow directions for full exam)  Labial Strength: Reduced  Lingual ROM: Reduced left; Reduced right  Lingual Strength: Reduced  Lingual Coordination: Reduced  Gag: Unable to assess    Oral Phase Dysfunction  Oral Phase  Oral Phase: Exceptions  Oral Phase Dysfunction  Impaired Mastication: Puree  Spillage Left: Nectar - teaspoon  Decreased Anterior to Posterior Transit: All  Suspected Premature Bolus Loss: All     Indicators of Pharyngeal Phase Dysfunction   Pharyngeal Phase  Pharyngeal Phase: Exceptions  Indicators of Pharyngeal Phase Dysfunction  Delayed Swallow: All  Decreased Laryngeal Elevation: All  Throat Clearing - Immediate: Nectar - teaspoon;Honey - cup  Throat Clearing - Delayed: Honey - teaspoon;Honey - cup    Prognosis  Prognosis  Prognosis for safe diet advancement: guarded  Barriers to reach goals: age;cognitive deficits;severity of dysphagia;behavior  Individuals consulted  Consulted and agree with results and recommendations: Patient;RN;MD    Education  Patient Education: results  Patient Education Response: No evidence of learning        Therapy Time  SLP Individual Minutes  Time In: 6501  Time Out: 1055  Minutes: 40     This note serves as a D/C Summary in the event that this patient is discharged prior to the next therapy session.     Carmen Ya MS, CCC-SLP #[id number] Speech Language Pathologist  3/16/2021 10:57 AM

## 2021-03-17 NOTE — PROGRESS NOTES
unresponsiveness.  Work-up showed acute renal failure along with concerns for aspiration pneumonia. 3/14  MD note: Discussed with nursing staff.  Remains ventilated on minimal support.  Cultures from respiratory positive for MRSA.  Renal function continue as does his white count.  Patient had episodes of confusion yesterday along with agitation.  This is in line with the way he was behaving on previous admissions. Rosie Templeton has a bad time in the afternoon or evenings. Rosie Templeton required multiple medications to calm him down  3/14 extubated  3/15 family meeting with palliative care: Family have agreed they would not want PEG and would like speech eval, he will remain a full code and they are agreeable to sedation as they physicians sees fit to order for their father  3/16 CSE: recommended puree and spoon thick liquids   Subjective:     Current diet  Puree texture  Moderately honey thick ordered     Comments regarding tolerating Current Diet:   Nurse and aide report pt holding puree eggs but took liquid well with breakfast    Objective:     Pain   Patient Currently in Pain: Other (comment)(advanced dementia)    Cognitive/Behavior   Behavior/Cognition: Alert, Confused, Doesn't follow directions, Distractible    Presentations   Consistencies Administered: Dysphagia Pureed (Dysphagia I), Honey - cup, Nectar - cup    Positioning   Fully upright in bed; bilateral wrist restraints    PO Trials:  · Thin Liquids DNT  · Nectar thick liquids cup: reduced lingual coordination, reduced AP transit, delayed swallow initiation, reduced laryngeal elevation, suspicion for premature bolus loss. Eventual throat clear   · Honey Thick liquids cup: reduced lingual coordination, reduced AP transit, delayed swallow initiation, reduced laryngeal elevation, suspicion for premature bolus loss.   No cough, throat clear or VQ change  · Puree variable oral holding; reduced lingual coordination, reduced AP transit, delayed swallow initiation, reduced laryngeal elevation, suspicion for premature bolus loss. Oral residue. No cough, throat clear or VQ change  · Soft food DNT  · Regular food DNT    Dysphagia Tx:   · Pt awake, cooperative with treatment with verbal cues for attention to task. Bilateral wrist restraints in place. Pt distracted by TV at times. Able to state 1st/last name, but does not provide appropriate verbal response to additional questions re: birthdate, preferences, situation. · PO trials as described. Decreased safety r/t cognition during PO, including talking with PO in oral cavity and oral holding with puree suspected r/t poor attention to task. PCA present for part of session and was educated re: reducing distractions with PO, alternating with liquid to clear oral cavity, and completing oral care after meals. · No overt s/s with puree and honey thick liquids. Eventual throat clear with nectar liquids. Goals: The patient will tolerate recommended diet without observed clinical signs of aspiration ongoing; recommend puree/honey thick  The patient will tolerate repeat BSE when able. ongoing  New goal: The pt will tolerate nectar liquid trials 5/5 without overt s/s     Assessment:   Impressions:   Dysphagia Diagnosis: Moderate to severe oral stage dysphagia, Moderate pharyngeal stage dysphagia   · Mod-severe oral and moderate pharyngeal phase dysphagia characterized by prolonged bolus manipulation, reduced lingual coordination, reduced AP transit, delayed swallow initiation, reduced laryngeal elevation. At risk for premature bolus loss to pharynx with all trials. · Oral residue with puree which clears with honey thick liquid wash without overt s/s of aspiration. Eventual throat clear with nectar liquid   · Immediate throat clear with nectar tsp; delayed throat clear >50% of trials with honey via tsp and cup.    · Recommend continue puree texture diet and upgrade to honey thick liquids, with 1:1 supervision and close monitor for overt difficulty; only feed when alert and accepting of PO; discontinue PO if increased cough, throat clear, wet vocal quality, or decrease in O2 occurs. However, cannot fully r/o risk for aspiration given pt's fluctuating mental status and alertness. · ST will follow for additional recommendations    Diet Recommendations:  Puree diet texture  Moderately honey thick liquids   Recommended Form of Meds: Crushed in puree as able    Strategies:   Compensatory Swallowing Strategies: Upright as possible for all oral intake, Remain upright for 30-45 minutes after meals, Eat/Feed slowly, Small bites/sips, Total feed    Education:  Consulted and agree with results and recommendations: Patient, RN, MD  Patient Education: results  Patient Education Response: No evidence of learning    Prognosis:   Fair for diet upgrade    Plan:     Continue Dysphagia Therapy: yes  Interventions: Therapeutic Interventions: Diet tolerance monitoring, Patient/Family education, Therapeutic PO trials with SLP  Duration/Frequency of therapy while on unit: Duration/Frequency of Treatment  Duration/Frequency of Treatment: 3-5x/wk while on acute unit  Discharge Instructions:   Anticipate Yes for further skilled Speech Therapy for Dysphagia at discharge    This note serves as a D/C Summary in the event that this patient is discharged prior to the next therapy session.     Coded treatment time:10  Total treatment time: 25    Electronically signed by  Homero Houston MS, CCC-SLP #[id number]  Speech Language Pathologist    on 3/17/2021 at 10:26 AM

## 2021-03-17 NOTE — PROGRESS NOTES
Pt resting comfortably in bed. Pt calm and corporative. Restraints have been removed. Will continue to monitor.

## 2021-03-17 NOTE — PROGRESS NOTES
Occupational Therapy   Occupational Therapy Initial Assessment/ Treatment  Date: 3/17/2021   Patient Name: Mikaela Rosa  MRN: [id number]     : 1940    Date of Service: 3/17/2021    Discharge Recommendations:  [address]  OT Equipment Recommendations  Equipment Needed: No  Other: defer  Mikaela Rosa scored a  on the AM-PAC ADL Inpatient form. Current research shows that an AM-PAC score of 17 or less is typically not associated with a discharge to the patient's home setting. Based on the patient's AM-PAC score and their current ADL deficits, it is recommended that the patient have 3-5 sessions per week of Occupational Therapy at d/c to increase the patient's independence. Please see assessment section for further patient specific details. If patient discharges prior to next session this note will serve as a discharge summary. Please see below for the latest assessment towards goals. Assessment   Performance deficits / Impairments: Decreased functional mobility ; Decreased endurance;Decreased coordination;Decreased ADL status; Decreased balance;Decreased strength;Decreased safe awareness;Decreased cognition  Assessment: Prior to admission pt was in SNF, however prior to admission in  pt was living at home with his  and was independent per son. Pt now presents s/p AMS, now significantly below his baseline. Pt requiring min-mod A for transfers, max A for UB dressing and grooming tasks/ command following for functional tasks. Pt would benefit from continued OT while in acute care, [facility] score indicaes non homebound discharge, would benefit to return to SNF at [place]. Continue OT POC.   Treatment Diagnosis: decreased ADLs and transfers secondary to AMS  Prognosis: Fair  Decision Making: Medium Complexity  OT Education: OT Role;Plan of Care  Patient Education: verb understanding  REQUIRES OT FOLLOW UP: Yes  Activity Tolerance  Activity Tolerance: Treatment limited secondary to decreased cognition  Activity Tolerance: pt impulsive during transfer, requiring consistent cueing to redirect. Safety Devices  Safety Devices in place: Yes  Type of devices: Left in bed;Bed alarm in place;Call light within reach;Nurse notified  Restraints  Initially in place: Yes  Restraints: restraints were removed during transfers, ROM and self care was provided. Restraints replaced at end of session. Patient Diagnosis(es): The primary encounter diagnosis was Acute kidney injury superimposed on chronic kidney disease ([facility] Diagnoses of Altered mental status, unspecified altered mental status type, Acute respiratory failure with hypoxia ([address] and Pneumonia due to organism were also pertinent to this visit. has a past medical history of Cancer ([facility] GASTRIC ULCER, Hypertension, Leukopenia, and MRSA (methicillin resistant staph aureus) culture positive. has a past surgical history that includes Colonoscopy; Cystoscopy; other surgical history (12/14/2016); Tunneled venous port placement (Right, 06/06/2017); and laparotomy (N/A, 2/18/2021). Treatment Diagnosis: decreased ADLs and transfers secondary to AMS      Restrictions  Restrictions/Precautions  Restrictions/Precautions: Fall Risk  Position Activity Restriction  Other position/activity restrictions: up as tolerated    Subjective   General  Chart Reviewed: Yes  Patient assessed for rehabilitation services?: Yes  Additional Pertinent Hx: 79 y/o male admit 3/12/2021 with Acute Respiratory, KEENAN, Altered Mental Status. 3/12-3/14/2021 Pt Intubated. Recent admit 2/18-3/9/2021 with SBO (d/c to SNF setting). PMHx includes: Cancer [facility]), GASTRIC ULCER, Hypertension, Leukopenia, and MRSA (methicillin resistant staph aureus) culture positive (03/13/2021).   Family / Caregiver Present: No  Referring Practitioner: Pat Motta MD  Diagnosis: acute encephalopathy    Social/Functional History  Social/Functional History  Type of Home: Facility([facility])  ADL Assistance: Needs assistance  Ambulation Assistance: Needs assistance  Transfer Assistance: Needs assistance  Additional Comments: Pt has been at SNF since last admission in Feb/March 2021. Prior to that admission pt was living at home with his  and was independent with stairs and mobility. Pt unable to provide further PLOF. Objective   Vision: Impaired  Vision Exceptions: Wears glasses at all times  Hearing: Exceptions to [facility]  Hearing Exceptions: Hard of hearing/hearing concerns    Orientation  Overall Orientation Status: Impaired  Orientation Level: Oriented to person;Disoriented to place; Disoriented to time;Disoriented to situation     Balance  Sitting Balance: Minimal assistance(to CGA; pt impulsive while sitting EOB)  Standing Balance: Contact guard assistance(standing in stedy; pt impulsive/ some posterior leaning noted)  Standing Balance  Time: 1-2 mins  Activity: standing in jaya stedy  Functional Mobility  Functional Mobility Comments: used jaya [employer] this date 2/2 pts impulsivity during transfers  ADL  Grooming: Moderate assistance(assist to wash face, pt grasping washcloth however unable to bring to face)  UE Dressing: Maximum assistance(to change gown, pt grasping at chux pad and tele box wires vs engaging in UB dressing)           Transfers  Sit to stand: Moderate assistance(pt ranging from min-mod A x 1 standing to jaya stedy)  Stand to sit: Moderate assistance  Transfer Comments: multiple transfers, pt weight shifting in jaya stedy. Pt impulsive during transfers and difficult to redirect to sit EOB vs leaning on stedy bar     Cognition  Overall Cognitive Status: Exceptions  Arousal/Alertness: Inconsistent responses to stimuli  Following Commands: Follows one step commands with repetition; Follows one step commands with increased time  Attention Span: Attends with cues to redirect  Memory: Decreased recall of precautions;Decreased short term memory;Decreased recall of recent events  Safety Judgement: Decreased awareness of need for safety;Decreased awareness of need for assistance  Problem Solving: Assistance required to correct errors made;Decreased awareness of errors  Insights: Not aware of deficits  Initiation: Requires cues for all  Sequencing: Requires cues for all          Treatment included functional transfer training, ADLs, and patient education. Plan   Plan  Times per week: 3-5  Times per day: Daily  Current Treatment Recommendations: Strengthening, Balance Training, Functional Mobility Training, Endurance Training, Self-Care / ADL, Neuromuscular Re-education    Goals  Short term goals  Time Frame for Short term goals: by dc  Short term goal 1: Pt will complete LB dressing with mod A  Short term goal 2: Pt will complete toileting with mod A  Short term goal 3: Pt will complete BSC transfer with mod A  Short term goal 4: Pt will complete simple grooming task with mod A  Patient Goals   Patient goals : not stated 2/2 cognitive deficits       Therapy Time   Individual Concurrent Group Co-treatment   Time In 0940         Time Out 1020         Minutes 40         Timed Code Treatment Minutes: 25 Minutes(+15 min eval)    Mayra UMANA  [address] [facility], OTR/L [id number]

## 2021-03-17 NOTE — PROGRESS NOTES
Hospitalist Progress Note      PCP: Claudia Shaffer MD    Date of Admission: 3/12/2021    Chief Complaint: [facility] Course: Patient is an 78-year-old male with a past medical history of recent small bowel obstruction status post exploratory laparotomy along with bowel resection, CKD stage III, dementia who presented from the [facility] due to altered mental status. Patient was emergently intubated in the emergency department due to lethargy and unresponsiveness. Work-up showed acute renal failure along with concerns for aspiration pneumonia. 3/14  Discussed with nursing staff. Remains ventilated on minimal support. Cultures from respiratory positive for MRSA. Renal function continue as does his white count. Patient had episodes of confusion yesterday along with agitation. This is in line with the way he was behaving on previous admissions. He has a bad time in the afternoon or evenings. He required multiple medications to calm him down    3/15 Family meeting planned for later on today. Continue with antibiotic therapy of vancomycin and Unasyn for 7 days total.    3/16 Patient is alert but drowsy. He did get 2 doses of Zyprexa yesterday so I have decreased those doses to 2.5 for future use. Continue with current care and transfer to [place] [id number]. Subjective: Patient seen and examined. Patient remains drowsy and not agitated. Will remove wrist restraints. Zyprexa dose taken down to 2.5 if needed.       Medications:  Reviewed    Infusion Medications    dextrose       Scheduled Medications    vancomycin  1,000 mg Intravenous Once    lactobacillus  2 capsule Oral BID WC    vancomycin (VANCOCIN) intermittent dosing (placeholder)   Other RX Placeholder    ampicillin-sulbactam  1,500 mg Intravenous Q8H    insulin lispro  0-6 Units Subcutaneous Q4H    sodium chloride flush  10 mL Intravenous 2 times per day    heparin (porcine)  5,000 Units Subcutaneous 3 times per day     PRN Meds: OLANZapine, glucose, dextrose, glucagon (rDNA), dextrose, sodium chloride flush, [DISCONTINUED] promethazine **OR** ondansetron, polyethylene glycol, acetaminophen **OR** acetaminophen      Intake/Output Summary (Last 24 hours) at 3/17/2021 0902  Last data filed at 3/17/2021 4592  Gross per 24 hour   Intake 345 ml   Output 1260 ml   Net -915 ml       Physical Exam Performed:    BP (!) 146/77   Pulse 76   Temp 98.4 °F (36.9 °C) (Axillary)   Resp 18   Ht 5' 9\" (1.753 m)   Wt 155 lb 3.3 oz (70.4 kg)   SpO2 98%   BMI 22.92 kg/m²     General appearance: Alert  HEENT: Pupils equal, round, and reactive to light. Conjunctivae/corneas clear. Neck: Supple, with full range of motion. No jugular venous distention. Trachea midline. Respiratory:  Normal respiratory effort. Clear to auscultation  Cardiovascular: Regular rate and rhythm with normal S1/S2  Abdomen: Soft, non-tender, non-distended with normal bowel sounds. Musculoskeletal: No clubbing, cyanosis or edema bilaterally. Skin: Skin color, texture, turgor normal.  No rashes or lesions. Neurologic: Alert but confused, oriented to self  Psychiatric: Alert  Capillary Refill: Brisk,< 3 seconds   Peripheral Pulses: +2 palpable, equal bilaterally       Labs:   Recent Labs     03/15/21  0435 03/16/21  0335 03/17/21  0545   WBC 10.2 7.7 7.0   HGB 8.6* 8.4* 9.1*   HCT 26.0* 26.1* 27.1*    229 259     Recent Labs     03/15/21  0435 03/16/21  0335 03/17/21  0545    145 146*   K 3.7 4.1 4.3   * 114* 116*   CO2 21 21 22   BUN 39* 37* 37*   CREATININE 2.7* 2.3* 2.3*   CALCIUM 7.9* 8.5 8.9   PHOS 2.9 2.8 3.5     No results for input(s): AST, ALT, BILIDIR, BILITOT, ALKPHOS in the last 72 hours. No results for input(s): INR in the last 72 hours. No results for input(s): Kaelny Keen in the last 72 hours.     Urinalysis:      Lab Results   Component Value Date    NITRU Negative 03/12/2021    WBCUA 8 03/12/2021    BACTERIA 4+ 03/12/2021    RBCUA 0-2 03/12/2021    BLOODU MODERATE 03/12/2021    SPECGRAV 1.015 03/12/2021    GLUCOSEU Negative 03/12/2021       Radiology:  XR CHEST PORTABLE   Final Result   Enteric tube as described. XR CHEST PORTABLE   Final Result   Enteric feeding tube projects to the proximal stomach. Other supportive   tubing is in normal position. No acute cardiopulmonary disease. CT Head WO Contrast   Final Result   No acute intracranial abnormality. CT CHEST ABDOMEN PELVIS WO CONTRAST   Final Result   Chest-      Supportive tubing is in normal position. Mild amount of secretions are noted in the right bronchus intermedius. Foci of airspace disease in the right upper and dependent both lower lobes. Pneumonia versus aspiration pneumonitis. ---      Abdomen pelvis-      Findings suggestive of enteritis. Previous bilateral hydroureteronephrosis has resolved. There is a right   lower quadrant ileal conduit with parastomal fluid, herniated portion of the   ileal conduit versus seroma. Suspected gallbladder sludge. Stable sclerotic foci along the right sacroiliac joint and left superior   medial pubis. XR CHEST PORTABLE   Final Result   No acute findings.                  Assessment/Plan:    Acute metabolic encephalopathy  Unsure if medication induced from nursing home, infectious, his baseline  Continue to monitor  Restart low-dose Zyprexa for agitation, no doses for 36 hours  Remove restraints    Acute respiratory failure with hypoxia  Extubated on March 14 MRSA pneumonia    Acute kidney injury on CKD  Continues to improve with IV fluids  Continue to trend  Presented at 5.8, down to 2.3 with IV fluids  Currently at baseline    Sepsis  Improving with IV antibiotics and IV fluids  Respiratory culture positive for MRSA    Aspiration pneumonia  Continue Unasyn along with vancomycin  Speech-language pathology assessing  Modified barium ordered  Family does not want PEG     Small

## 2021-03-17 NOTE — PROGRESS NOTES
Physical Therapy  Facility/Department: [address] MED SURG  Daily Treatment Note  NAME: Alida Cook  : 1940  MRN: [id number]    Date of Service: 3/17/2021    Discharge Recommendations:  Patient would benefit from continued therapy after discharge, 3-5 sessions per week   PT Equipment Recommendations  Other: Defer to next level of care. Alida Cook scored a  on the AM-PAC short mobility form. Current research shows that an AM-PAC score of 17 or less is typically not associated with a discharge to the patient's home setting. Based on the patient's AM-PAC score and their current functional mobility deficits, it is recommended that the patient have 3-5 sessions per week of Physical Therapy at d/c to increase the patient's independence. Please see assessment section for further patient specific details. If patient discharges prior to next session this note will serve as a discharge summary. Please see below for the latest assessment towards goals. Assessment   Body structures, Functions, Activity limitations: Decreased functional mobility ; Decreased strength;Decreased safe awareness;Decreased cognition;Decreased endurance  Assessment: 77 y/o male admit 3/12/2021 with Acute Respiratory, KEENAN, Altered Mental Status. 3/12-3/14/2021 Pt Intubated. Recent admit -3/9/2021 with SBO (d/c to SNF setting). Pt reportedly from home with \"\", independent with ADLs. Pt currently functioning well below baseline. Anticipate he will require continued skilled therapy 3-5x/week to maximzie independence and safety with functional mobility. Treatment Diagnosis: impaired mobility  Prognosis: Fair  Decision Making: Medium Complexity  History: 77 y/o male admit 3/12/2021 with Acute Respiratory, KEENAN, Altered Mental Status. 3/12-3/14/2021 Pt Intubated. Recent admit -3/9/2021 with SBO (d/c to SNF setting).   PMH as noted including SBO, Exp Lap/Small Bowel Resection (2021), Bladder Ca, Radical Cystectomy/Prostatectomy/ileal Conduit Urinary, Dementia. Exam: See above. Clinical Presentation: See above. PT Education: PT Role;Plan of Care;General Safety;Orientation; Functional Mobility Training  Barriers to Learning: Cognitive, [place]. REQUIRES PT FOLLOW UP: Yes  Activity Tolerance  Activity Tolerance: Patient limited by cognitive status; Patient limited by endurance     Patient Diagnosis(es): The primary encounter diagnosis was Acute kidney injury superimposed on chronic kidney disease ([facility] [address] Diagnoses of Altered mental status, unspecified altered mental status type, Acute respiratory failure with hypoxia ([address] and Pneumonia due to organism were also pertinent to this visit. has a past medical history of Cancer ([facility] [address] GASTRIC ULCER, Hypertension, Leukopenia, and MRSA (methicillin resistant staph aureus) culture positive. has a past surgical history that includes Colonoscopy; Cystoscopy; other surgical history (12/14/2016); Tunneled venous port placement (Right, 06/06/2017); and laparotomy (N/A, 2/18/2021). Restrictions  Restrictions/Precautions  Restrictions/Precautions: Fall Risk  Position Activity Restriction  Other position/activity restrictions: up as tolerated     Subjective   General  Chart Reviewed: Yes  Additional Pertinent Hx: 79 y/o male admit 3/12/2021 with Acute Respiratory, KEENAN, Altered Mental Status. 3/12-3/14/2021 Pt Intubated. Recent admit 2/18-3/9/2021 with SBO (d/c to SNF setting). Response To Previous Treatment: Not applicable  Family / Caregiver Present: No  Referring Practitioner: Laith Antunez NP  Subjective  Subjective: Pt is agreeable to PT. Intermittently following commands. Orientation  Orientation  Overall Orientation Status: Impaired  Orientation Level: Oriented to person;Disoriented to place; Disoriented to time;Disoriented to situation     Cognition   Cognition  Overall Cognitive Status: Exceptions  Arousal/Alertness: Inconsistent responses to stimuli  Following Commands: Follows one step commands with repetition; Follows one step commands with increased time  Attention Span: Attends with cues to redirect  Memory: Decreased recall of precautions;Decreased short term memory;Decreased recall of recent events  Safety Judgement: Decreased awareness of need for safety;Decreased awareness of need for assistance  Problem Solving: Assistance required to correct errors made;Decreased awareness of errors  Insights: Not aware of deficits  Initiation: Requires cues for all  Sequencing: Requires cues for all     Objective   Bed mobility  Supine to Sit: Moderate assistance(for trunk)  Sit to Supine: Moderate assistance  Scooting: Stand by assistance  Comment: Pt sat to EOB x 5 minutes with multiple sit<>stands to jaya stedy. Transfers  Sit to Stand: Minimal Assistance; Moderate Assistance(within jaya stedy)  Stand to sit: Minimal Assistance; Moderate Assistance(within stedy)  Comment: Pt sat to EOB x 5 minutes with multiple sit<>stands to jaya stedy. Intermittently following commands.   Ambulation  Ambulation?: No     Balance  Posture: Fair  Sitting - Static: Fair  Sitting - Dynamic: Fair  Standing - Static: Fair;-(within jaya stedy)         Strength RLE  Strength RLE: Exception  Comment: not formally assessed - appears to have mild to moderate generalized weakness  Strength LLE  Strength LLE: Exception  Comment: not formally assessed - appears to have mild to moderate generalized weakness       AM-PAC Score  AM-PAC Inpatient Mobility Raw Score : 9 (03/17/21 1024)  AM-PAC Inpatient T-Scale Score : 30.55 (03/17/21 1024)  Mobility Inpatient CMS 0-100% Score: 81.38 (03/17/21 1024)  Mobility Inpatient CMS G-Code Modifier : CM (03/17/21 1024)          Goals  Short term goals  Time Frame for Short term goals: Upon d/c acute care setting. - all goals ongoing as of 3/17/21 unless noted otherwise  Short term goal 1: Bed Mob Mod assist x 2. - MET 3/17  Short term goal 2: EOB ~ 10 min in prep Transfers Min assist. - partly met; NEW GOAL 3/17/21: Sit<>stand with RW with CGA  Short term goal 3: Attempt Transfers/OOB via Stedy assist x 2. - MET 3/17/21  Short term goal 4: Attempt Stand/Amb activities as approp. - partly met; NEW GOAL 3/17/21: ambulate >5' with RW and CGA  Patient Goals   Patient goals : None stated at this time. Plan    Plan  Times per week: 3-5x/week  Current Treatment Recommendations: Strengthening, Functional Mobility Training, Transfer Training, Gait Training, Safety Education & Training, Patient/Caregiver Education & Training, Cognitive Reorientation, Endurance Training, Balance Training, Neuromuscular Re-education  Safety Devices  Type of devices:  All fall risk precautions in place, Call light within reach, Gait belt, Patient at risk for falls, Left in bed, Bed alarm in place, Nurse notified  Restraints  Initially in place: Yes  Restraints: UE soft restraints     Therapy Time   Individual Concurrent Group Co-treatment   Time In 0940         Time Out 1020         Minutes 40         Timed Code Treatment Minutes: Marco 64, PT

## 2021-03-17 NOTE — PLAN OF CARE
Problem: Non-Violent Restraints  Goal: Removal from restraints as soon as assessed to be safe  3/17/2021 0039 by Can Mckeon RN  Outcome: Ongoing    Problem: Non-Violent Restraints  Goal: No harm/injury to patient while restraints in use  3/17/2021 0039 by Can Mckeon RN  Outcome: Ongoing    Problem: Non-Violent Restraints  Goal: Patient's dignity will be maintained  3/17/2021 0039 by Can Mckeon RN  Outcome: Ongoing    Patient alert to self, and disoriented X3. Patient is on a soft bilateral wrist restraint for safety purposes. Patient is trying climb off the bed, and trying to pull out invasive catheters. Patient is a Q2 turn, safety maintained at all time. Will continue to monitor.

## 2021-03-17 NOTE — PLAN OF CARE
Problem: Non-Violent Restraints  Goal: Removal from restraints as soon as assessed to be safe  3/17/2021 0936 by Alis Garcia RN  Outcome: Ongoing  3/17/2021 0039 by Angelika Ramon RN  Outcome: Ongoing  3/17/2021 0038 by Angelika Ramon RN  Outcome: Ongoing  Goal: No harm/injury to patient while restraints in use  3/17/2021 0936 by Alis Garcia RN  Outcome: Ongoing  3/17/2021 0039 by Angelika Ramon RN  Outcome: Ongoing  3/17/2021 0038 by Angelika Ramon RN  Outcome: Ongoing  Goal: Patient's dignity will be maintained  3/17/2021 0936 by Alis Garcia RN  Outcome: Ongoing  3/17/2021 0039 by Angelika Ramon RN  Outcome: Ongoing  3/17/2021 0038 by Angelika Ramon RN  Outcome: Ongoing     Problem: Nutrition  Goal: Optimal nutrition therapy  3/17/2021 0936 by Alis Garcia RN  Outcome: Ongoing  3/17/2021 0038 by Angelika Ramon RN  Outcome: Ongoing     Problem: Nutrition  Goal: Optimal nutrition therapy  3/17/2021 0936 by Alis Garcia RN  Outcome: Ongoing  3/17/2021 0038 by Angelika Ramon RN  Outcome: Ongoing     Problem: Skin Integrity:  Goal: Will show no infection signs and symptoms  Description: Will show no infection signs and symptoms  3/17/2021 0936 by Alis Garcia RN  Outcome: Ongoing  3/17/2021 0038 by Angelika Ramon RN  Outcome: Ongoing  Goal: Absence of new skin breakdown  Description: Absence of new skin breakdown  3/17/2021 0936 by Alis Garcia RN  Outcome: Ongoing  3/17/2021 0038 by Angelika Ramon RN  Outcome: Ongoing     Problem: Falls - Risk of:  Goal: Will remain free from falls  Description: Will remain free from falls  3/17/2021 0936 by Alis Garcia RN  Outcome: Ongoing  3/17/2021 0038 by Angelika Ramon RN  Outcome: Ongoing  Goal: Absence of physical injury  Description: Absence of physical injury  3/17/2021 0936 by Alis Garcia RN  Outcome: Ongoing  3/17/2021 0038 by Angelika Ramon RN  Outcome: Ongoing

## 2021-03-17 NOTE — PROGRESS NOTES
Nephrology Progress Note  [phone]   [email or web address]    Patient:  Barbara Pro Sr   : 1940    CC:  KEENAN, CKD    Subjective:  Transferred out of ICU. Breathing stable. Confused. ROS:   In bed. PMSHx:  medications reviewed    Vitals:  /70   Pulse 75   Temp 97.7 °F (36.5 °C) (Axillary)   Resp 18   Ht 5' 9\" (1.753 m)   Wt 155 lb 3.3 oz (70.4 kg)   SpO2 98%   BMI 22.92 kg/m²       Intake/Output Summary (Last 24 hours) at 3/17/2021 1248  Last data filed at 3/17/2021 0832  Gross per 24 hour   Intake 345 ml   Output 975 ml   Net -630 ml       Physical Exam:  Gen: NAD, confused  CV: RRR, no S3.  Lungs: +vent, bilateral coarse BS. Abd: S/NT +BS +ostomy  Ext: No edema, no cyanosis  Skin: Warm. No rashes appreciated. : +coleman. Labs:  CBC:   Lab Results   Component Value Date    WBC 7.0 2021    RBC 2.89 2021    RBC 4.07 2017    HGB 9.1 2021    HCT 27.1 2021    MCV 93.5 2021    MCH 31.5 2021    MCHC 33.7 2021    RDW 15.6 2021     2021    MPV 8.5 2021     BMP:    Lab Results   Component Value Date     2021    K 4.3 2021    K 4.5 2021     2021    CO2 22 2021    BUN 37 2021    LABALBU 3.2 2021    CREATININE 2.3 2021    CALCIUM 8.9 2021    GFRAA 33 2021    GFRAA >60 2013    LABGLOM 27 2021    GLUCOSE 106 2021    GLUCOSE 128 2017       Assessment/Plan:  1) KEENAN on CKD: non-oliguric              - baseline 09/10/19 Cr 1.8              - Likely ATN based on data.    - Cr plateau'ed              - not a dialysis candidate due to dementia, and his significant other is in agreement per discussion with Dr. Sabra Lozano     2) extubated              - pulmonary following.      3) ID              - CT reviewed              - question of aspiration pneumonia              - on unasyn, vancomycin     4) SBO from ischemic bowel s/p exp lap and partial bowel resection 02/19/21     5) bladder cancer and ileal conduit     6) FEN:              Hypernatremia                          - slightly higher    - this is a poor prognostic sign              Hypokalemia                          - K is normal today.               metabolic acidosis                          - monitor   nutrition    - family placed limit on his care at the PEG     7) dementia              - poor prognosis              - appreciate palliative care's input

## 2021-03-17 NOTE — PROGRESS NOTES
Patient arrived the unit at about 1930 on a stretcher. Patient on restraint, patient alert to self, patient had a complete bed change due to incontinent episode. Suprapubic urostomy. Patient reoriented serveral time. Patient on restraint because of safety purposes, patient pulling on invasive catheter. Patient on aspiration and fall precaution. Patient placed on tele monitor. Will continue to monitor.

## 2021-03-17 NOTE — PROGRESS NOTES
Clinical Pharmacy Note  Vancomycin Consult    Saverio Fleischer is a 78 y.o. male ordered Vancomycin for MRSA PNA; consult received from Dr. Gabriela Hayward to manage therapy. Also receiving Unasyn for aspiration. Patient Active Problem List   Diagnosis    Hypertension    Leukopenia    Lightheadedness    Rectal bleed    Bladder cancer ([facility] [address]    S/P ileal conduit (HCC)    Rectal injury    Pelvic mass in male    Right adrenal mass ([address]    Bladder cancer metastasized to bone (HCC)    Cancer associated pain    Chronic fatigue    Benign prostatic hyperplasia without urinary obstruction    Small bowel obstruction (HCC)    Lactic acidosis    KEENAN (acute kidney injury) ([address]    Delirium    Acute encephalopathy    Acute kidney injury superimposed on chronic kidney disease (HCC)    Acute respiratory failure with hypoxia (HCC)    Altered mental status    Pneumonia due to methicillin resistant Staphylococcus aureus (MRSA) ([facility] [address]    Aspiration pneumonia ([facility] [address]    Metabolic encephalopathy       Allergies:  Patient has no known allergies. Temp max:  Temp (24hrs), Av.3 °F (36.8 °C), Min:97.3 °F (36.3 °C), Max:98.9 °F (37.2 °C)      Recent Labs     03/15/21  0435 21  0335 21  0545   WBC 10.2 7.7 7.0       Recent Labs     03/15/21  0435 21  0335 21  0545   BUN 39* 37* 37*   CREATININE 2.7* 2.3* 2.3*         Intake/Output Summary (Last 24 hours) at 3/17/2021 0740  Last data filed at 3/17/2021 0434  Gross per 24 hour   Intake 225 ml   Output 1435 ml   Net -1210 ml       Culture Results:  Sputum + MRSA    Ht Readings from Last 1 Encounters:   21 5' 9\" (1.753 m)        Wt Readings from Last 1 Encounters:   21 155 lb 3.3 oz (70.4 kg)         Estimated Creatinine Clearance: 25 mL/min (A) (based on SCr of 2.3 mg/dL (H)).     Assessment/Plan:    Vanco day # 6  · Patient with KEENAN on CKD, SCr remains @2.3 but improving   · Random level resulted at 20.1 mg/L this morning (21 hr level) Goal 15-20 mg/L  · Will give vanco 1000 mg x 1 today at 1200   · Plan in notes is to complete 7 days of antibiotics. Last day would be 3/18/21   · Will get a random level tomorrow. Thank you for the consult.      Donnie Coreas [facility] 3/17/2021 7:40 AM

## 2021-03-18 NOTE — PROGRESS NOTES
[facility]   Speech Therapy  Daily Dysphagia Treatment Note        Theo Notice Sr  AGE: 78 y.o. GENDER: male  : 1940  [id number]  EPISODE DATE:  3/12/2021  Patient Active Problem List   Diagnosis    Hypertension    Leukopenia    Lightheadedness    Rectal bleed    Bladder cancer ([address]    S/P ileal conduit (HCC)    Rectal injury    Pelvic mass in male    Right adrenal mass ([address]    Bladder cancer metastasized to bone ([address]    Cancer associated pain    Chronic fatigue    Benign prostatic hyperplasia without urinary obstruction    Small bowel obstruction (HCC)    Lactic acidosis    KEENAN (acute kidney injury) ([address]    Delirium    Acute encephalopathy    Acute kidney injury superimposed on chronic kidney disease (HCC)    Acute respiratory failure with hypoxia (HCC)    Altered mental status    Pneumonia due to methicillin resistant Staphylococcus aureus (MRSA) ([address]    Aspiration pneumonia ([address]    Metabolic encephalopathy     No Known Allergies  Treatment Diagnosis: Dysphagia       Chart review:   Recent Chest Xray 3/14/2021  FINDINGS:   Enteric tube with tip projecting over the gastric body.  Side port projects   at the gastroesophageal junction.  This could be advanced by 4-5 cm.  Cardiac   silhouette is within normal limits in size. Mediastinal contours are   otherwise suboptimally evaluated due to rotated projection.  Minimal left   basilar opacity could represent atelectasis.  No pleural effusion   appreciated.  Right port with tip projecting over the superior vena cava and   access needle projecting over the reservoir noted      Hospital Course: Patient is an 70-year-old male with a past medical history of recent small bowel obstruction status post exploratory laparotomy along with bowel resection, CKD stage III, dementia who presented from the [facility] due to altered mental status.  Patient was emergently intubated in the emergency department due to lethargy and unresponsiveness.  Work-up showed acute renal failure along with concerns for aspiration pneumonia. 3/14  MD note: Discussed with nursing staff.  Remains ventilated on minimal support.  Cultures from respiratory positive for MRSA.  Renal function continue as does his white count.  Patient had episodes of confusion yesterday along with agitation.  This is in line with the way he was behaving on previous admissions. [facility] has a bad time in the afternoon or evenings. [facility] required multiple medications to calm him down  3/14 extubated  3/15 family meeting with palliative care: Family have agreed they would not want PEG and would like speech eval, he will remain a full code and they are agreeable to sedation as they physicians sees fit to order for their father  3/16 CSE: recommended puree and spoon thick liquids     Subjective:     Current diet  Puree texture  Moderately honey thick liquid     Comments regarding tolerating Current Diet:   Nurse and aide report tolerating breakfast but spitting out some food and variable holding in mouth    Objective:     Pain   Patient Currently in Pain: Other (comment)(advanced dementia)    Cognitive/Behavior   Behavior/Cognition: Alert, Confused, Doesn't follow directions, Distractible    Presentations   Consistencies Administered: Dysphagia Pureed (Dysphagia I), Honey - cup, Nectar - cup    Positioning   Fully upright edge of bed with PT/OT present    PO Trials:  · Thin Liquids DNT  · Nectar thick liquids cup: reduced lingual coordination, reduced AP transit, delayed swallow initiation, reduced laryngeal elevation, suspicion for premature bolus loss. Delayed throat clear, delayed cough   · Honey Thick liquids cup: reduced lingual coordination, reduced AP transit, delayed swallow initiation, reduced laryngeal elevation, suspicion for premature bolus loss.   No cough, throat clear or VQ change  · Puree variable oral holding; reduced lingual coordination, reduced AP transit, delayed swallow initiation, reduced laryngeal elevation, suspicion for premature bolus loss. Oral residue. No cough, throat clear or VQ change  · Soft food DNT  · Regular food DNT    Dysphagia Tx:   · Pt awake, cooperative with treatment with verbal cues for attention to task. Sitting edge of bed with PT/OT present. Pt remains verbally interactive, occasionally provides appropriate response to simple questions, but majority of speech is confused. Requesting water. Able to self-administer liquid via cup and puree via tsp with S/U to CGA  · PO trials as described. Decreased safety r/t cognition during PO, including talking with PO in oral cavity and oral holding with puree, consistent with advanced dementia. · Nurse and PCA educated on oral care after all meals 2/2 oral residue. · No overt s/s with puree and honey thick liquids. Delayed throat clear and eventual cough with nectar liquids. · Oral care completed at termination of PO; pt tolerated with verbal cues. Goals: The patient will tolerate recommended diet without observed clinical signs of aspiration ongoing; recommend puree/honey thick  The patient will tolerate repeat BSE when able. ongoing  The pt will tolerate nectar liquid trials 5/5 without overt s/s ongoing; overt s/s this date    Assessment:   Impressions:   Dysphagia Diagnosis: Moderate to severe oral stage dysphagia, Moderate pharyngeal stage dysphagia   · Mod-severe oral and moderate pharyngeal phase dysphagia characterized by prolonged bolus manipulation, reduced lingual coordination, reduced AP transit, delayed swallow initiation, reduced laryngeal elevation. At risk for premature bolus loss to pharynx with all trials. · Oral residue with puree, requiring oral care at end of session.     · Persistent overt s/s of aspiration with nectar liquid   · Recommend continue puree texture diet and honey thick liquids, with 1:1 supervision and close monitor for overt difficulty; only feed when alert and accepting of PO; oral care after meals; discontinue PO if increased cough, throat clear, wet vocal quality, or decrease in O2 occurs. However, cannot fully r/o risk for aspiration given pt's fluctuating mental status and alertness. · ST will follow for additional recommendations    Diet Recommendations:  Puree diet texture  Moderately honey thick liquids   Recommended Form of Meds: Crushed in puree as able    Strategies:   Compensatory Swallowing Strategies: Upright as possible for all oral intake, Remain upright for 30-45 minutes after meals, Eat/Feed slowly, Small bites/sips, Total feed, oral care after PO    Education:  Consulted and agree with results and recommendations: Patient, RN, MD  Patient Education: results  Patient Education Response: No evidence of learning    Prognosis:   Fair-guarded for diet upgrade    Plan:     Continue Dysphagia Therapy: yes  Interventions: Therapeutic Interventions: Diet tolerance monitoring, Patient/Family education, Therapeutic PO trials with SLP  Duration/Frequency of therapy while on unit: Duration/Frequency of Treatment  Duration/Frequency of Treatment: 3-5x/wk while on acute unit  Discharge Instructions:   Anticipate Yes for further skilled Speech Therapy for Dysphagia at discharge    This note serves as a D/C Summary in the event that this patient is discharged prior to the next therapy session.     Coded treatment time:10  Total treatment time: 30    Electronically signed by  Rod Desai MS, CCC-SLP #[id number]  Speech Language Pathologist    on 3/18/2021 at [address] [place] [address] AM

## 2021-03-18 NOTE — PROGRESS NOTES
Physical Therapy  Facility/Department: [address] MED SURG  Daily Treatment Note  NAME: Jimmy Page  : 1940  MRN: [id number]    Date of Service: 3/18/2021    Discharge Recommendations:  Patient would benefit from continued therapy after discharge, 3-5 sessions per week   PT Equipment Recommendations  Other: Defer to next level of care. Jimmy Page scored a  on the AM-PAC short mobility form. Current research shows that an AM-PAC score of 17 or less is typically not associated with a discharge to the patient's home setting. Based on the patient's AM-PAC score and their current functional mobility deficits, it is recommended that the patient have 3-5 sessions per week of Physical Therapy at d/c to increase the patient's independence. Please see assessment section for further patient specific details. If patient discharges prior to next session this note will serve as a discharge summary. Please see below for the latest assessment towards goals. Assessment   Body structures, Functions, Activity limitations: Decreased functional mobility ; Decreased strength;Decreased safe awareness;Decreased cognition;Decreased endurance  Assessment: 77 y/o male admit 3/12/2021 with Acute Respiratory, KEENAN, Altered Mental Status. 3/12-3/14/2021 Pt Intubated. Recent admit -3/9/2021 with SBO (d/c to SNF setting). Pt reportedly from home with \"\", independent with ADLs. Pt currently functioning well below baseline. Anticipate he will require continued skilled therapy 3-5x/week to maximzie independence and safety with functional mobility. Treatment Diagnosis: impaired mobility  Prognosis: Fair  Decision Making: Medium Complexity  History: 77 y/o male admit 3/12/2021 with Acute Respiratory, KEENAN, Altered Mental Status. 3/12-3/14/2021 Pt Intubated. Recent admit -3/9/2021 with SBO (d/c to SNF setting).   PMH as noted including SBO, Exp Lap/Small Bowel Resection (2021), Bladder Ca, Radical Cystectomy/Prostatectomy/ileal Conduit Urinary, Dementia. Exam: See above. Clinical Presentation: See above. PT Education: PT Role;Plan of Care;General Safety;Orientation; Functional Mobility Training  Barriers to Learning: Cognitive, [place]. REQUIRES PT FOLLOW UP: Yes  Activity Tolerance  Activity Tolerance: Patient limited by cognitive status; Patient limited by endurance     Patient Diagnosis(es): The primary encounter diagnosis was Acute kidney injury superimposed on chronic kidney disease ([facility] Diagnoses of Altered mental status, unspecified altered mental status type, Acute respiratory failure with hypoxia ([address] and Pneumonia due to organism were also pertinent to this visit. has a past medical history of Cancer ([facility] GASTRIC ULCER, Hypertension, Leukopenia, and MRSA (methicillin resistant staph aureus) culture positive. has a past surgical history that includes Colonoscopy; Cystoscopy; other surgical history (12/14/2016); Tunneled venous port placement (Right, 06/06/2017); and laparotomy (N/A, 2/18/2021). Restrictions  Restrictions/Precautions  Restrictions/Precautions: Fall Risk  Position Activity Restriction  Other position/activity restrictions: Ostomy bag. NPO. Dysphagia. Subjective   General  Chart Reviewed: Yes  Additional Pertinent Hx: 79 y/o male admit 3/12/2021 with Acute Respiratory, KEENAN, Altered Mental Status. 3/12-3/14/2021 Pt Intubated. Recent admit 2/18-3/9/2021 with SBO (d/c to SNF setting). Response To Previous Treatment: Patient unable to report, no changes reported from family or staff  Family / Caregiver Present: No  Referring Practitioner: Johan Cline NP  Subjective  Subjective: Pt is agreeable to PT. Intermittently following commands. Orientation  Orientation  Overall Orientation Status: Impaired  Orientation Level: Oriented to person;Disoriented to place; Disoriented to time;Disoriented to situation     Cognition   Cognition  Overall Cognitive Status: Exceptions Arousal/Alertness: Inconsistent responses to stimuli  Following Commands: Follows one step commands with repetition; Follows one step commands with increased time  Attention Span: Attends with cues to redirect  Memory: Decreased recall of precautions;Decreased short term memory;Decreased recall of recent events  Safety Judgement: Decreased awareness of need for safety;Decreased awareness of need for assistance  Problem Solving: Assistance required to correct errors made;Decreased awareness of errors  Insights: Not aware of deficits  Initiation: Requires cues for all  Sequencing: Requires cues for all  Objective   Bed mobility  Supine to Sit: Maximum assistance  Sit to Supine: Maximum assistance  Scooting: Dependent/Total  Comment: Pt sat to EOB x 10 minutes while SLP working with patient. Transfers  Sit to Stand: Moderate Assistance;2 Person Assistance  Stand to sit: Moderate Assistance;2 Person Assistance  Comment: Pt stood at EOB ~ 30 seconds - able to take several lateral steps up towards HOB. Ambulation  Ambulation?: No     Balance  Posture: Fair  Sitting - Static: Fair  Sitting - Dynamic: Fair  Standing - Static: Poor  Standing - Dynamic: Poor           AM-PAC Score  AM-PAC Inpatient Mobility Raw Score : 9 (03/18/21 1135)  AM-PAC Inpatient T-Scale Score : 30.55 (03/18/21 1135)  Mobility Inpatient CMS 0-100% Score: 81.38 (03/18/21 1135)  Mobility Inpatient CMS G-Code Modifier : CM (03/18/21 1135)          Goals  Short term goals  Time Frame for Short term goals: Upon d/c acute care setting. - all goals ongoing as of 3/18/21 unless noted otherwise  Short term goal 1: Bed Mob Mod assist x 2. - MET 3/17  Short term goal 2: EOB ~ 10 min in prep Transfers Min assist. - partly met; NEW GOAL 3/17/21: Sit<>stand with RW with CGA  Short term goal 3: Attempt Transfers/OOB via [place] assist x 2. - MET 3/17/21  Short term goal 4: Attempt Stand/Amb activities as approp.  - partly met; NEW GOAL 3/17/21: ambulate >5' with RW and CGA  Patient Goals   Patient goals : None stated at this time. Plan    Plan  Times per week: 3-5x/week  Current Treatment Recommendations: Strengthening, Functional Mobility Training, Transfer Training, Gait Training, Safety Education & Training, Patient/Caregiver Education & Training, Cognitive Reorientation, Endurance Training, Balance Training, Neuromuscular Re-education  Safety Devices  Type of devices:  All fall risk precautions in place, Call light within reach, Gait belt, Patient at risk for falls, Left in bed, Bed alarm in place, Nurse notified  Restraints  Initially in place: No  Restraints: UE soft restraints     Therapy Time   Individual Concurrent Group Co-treatment   Time In 1100         Time Out 1130         Minutes 30         Timed Code Treatment Minutes: [address], PT

## 2021-03-18 NOTE — PROGRESS NOTES
Left patient's room to fix his telemetry leads and put in a new battery. Patient had a bed alarm go off. Other staff found patient trying to slide out of bed, patient did not hit the floor. No injury noted. Patient's bed changed, patient cleaned up. Patient safely in bed. Bed alarm on for safety, bed at lowest position. Call light with in reach. Will continue to monitor.

## 2021-03-18 NOTE — PROGRESS NOTES
[DISCONTINUED] promethazine **OR** ondansetron, polyethylene glycol, acetaminophen **OR** acetaminophen      Intake/Output Summary (Last 24 hours) at 3/18/2021 0956  Last data filed at 3/18/2021 0347  Gross per 24 hour   Intake 120 ml   Output 1475 ml   Net -1355 ml       Physical Exam Performed:    /62   Pulse 93   Temp 97.9 °F (36.6 °C) (Axillary)   Resp 18   Ht 5' 9\" (1.753 m)   Wt 147 lb 11.3 oz (67 kg)   SpO2 99%   BMI 21.81 kg/m²     General appearance: Alert  HEENT: Pupils equal, round, and reactive to light. Conjunctivae/corneas clear. Neck: Supple, with full range of motion. No jugular venous distention. Trachea midline. Respiratory:  Normal respiratory effort. Clear to auscultation  Cardiovascular: Regular rate and rhythm with normal S1/S2  Abdomen: Soft, non-tender, non-distended with normal bowel sounds. Musculoskeletal: No clubbing, cyanosis or edema bilaterally. Skin: Skin color, texture, turgor normal.  No rashes or lesions. Neurologic: More alert today but remains confused, oriented to self  Psychiatric: Alert  Capillary Refill: Brisk,< 3 seconds   Peripheral Pulses: +2 palpable, equal bilaterally       Labs:   Recent Labs     03/16/21  0335 03/17/21  0545 03/18/21  0530   WBC 7.7 7.0 7.0   HGB 8.4* 9.1* 9.2*   HCT 26.1* 27.1* 28.5*    259 284     Recent Labs     03/16/21  0335 03/17/21  0545 03/18/21  0530    146* 151*   K 4.1 4.3 4.3   * 116* 119*   CO2 21 22 22   BUN 37* 37* 41*   CREATININE 2.3* 2.3* 2.5*   CALCIUM 8.5 8.9 8.8   PHOS 2.8 3.5 3.6     No results for input(s): AST, ALT, BILIDIR, BILITOT, ALKPHOS in the last 72 hours. No results for input(s): INR in the last 72 hours. No results for input(s): Larry Sidney in the last 72 hours.     Urinalysis:      Lab Results   Component Value Date    NITRU Negative 03/12/2021    WBCUA 8 03/12/2021    BACTERIA 4+ 03/12/2021    RBCUA 0-2 03/12/2021    BLOODU MODERATE 03/12/2021    SPECGRAV 1.015 03/12/2021 GLUCOSEU Negative 03/12/2021       Radiology:  XR CHEST PORTABLE   Final Result   Enteric tube as described. XR CHEST PORTABLE   Final Result   Enteric feeding tube projects to the proximal stomach. Other supportive   tubing is in normal position. No acute cardiopulmonary disease. CT Head WO Contrast   Final Result   No acute intracranial abnormality. CT CHEST ABDOMEN PELVIS WO CONTRAST   Final Result   Chest-      Supportive tubing is in normal position. Mild amount of secretions are noted in the right bronchus intermedius. Foci of airspace disease in the right upper and dependent both lower lobes. Pneumonia versus aspiration pneumonitis. ---      Abdomen pelvis-      Findings suggestive of enteritis. Previous bilateral hydroureteronephrosis has resolved. There is a right   lower quadrant ileal conduit with parastomal fluid, herniated portion of the   ileal conduit versus seroma. Suspected gallbladder sludge. Stable sclerotic foci along the right sacroiliac joint and left superior   medial pubis. XR CHEST PORTABLE   Final Result   No acute findings. Assessment/Plan:    Acute metabolic encephalopathy  Unsure if medication induced from nursing home, infectious, his baseline  Continue to monitor  Restart low-dose Zyprexa for agitation, no doses for 36 hours  Continues off restraints    Acute respiratory failure with hypoxia  - Resolved. On room air  Extubated on March 14 MRSA pneumonia    Acute kidney injury on CKD  Continues to improve with IV fluids  Continue to trend  Presented at 5.8, down to 2.5 with IV fluids (slightly higher today)  Currently at/near baseline    Hypernatremia - Sodium higher today.  Nephrology managing    Sepsis - Resolved  Improved with IV antibiotics and IV fluids  Respiratory culture positive for MRSA    Aspiration pneumonia - improved  Continue Unasyn along with vancomycin x 7 days total (03/18/2021) Speech-language pathology assessing  Modified barium ordered  Family does not want PEG     Small bowel obstruction status post partial small bowel resection  Monitor    History of bladder cancer status post ileal conduit  Monitor        DVT Prophylaxis: Heparin  Diet: DIET DYSPHAGIA PUREED; Dysphagia Pureed; Moderately Thick (Honey)  Dietary Nutrition Supplements: Frozen Oral Supplement  Code Status: Full Code    PT/OT Eval Status: 3-5 sessions per week    Dispo - IV antibiotics until 03/18/2021. Will need SNF at discharge.  Palliative care consulted    Nuris Morris MD

## 2021-03-18 NOTE — PLAN OF CARE
Problem: Non-Violent Restraints  Goal: Removal from restraints as soon as assessed to be safe  Outcome: Ongoing  Goal: No harm/injury to patient while restraints in use  Outcome: Ongoing  Goal: Patient's dignity will be maintained  Outcome: Ongoing     Problem: Nutrition  Goal: Optimal nutrition therapy  3/18/2021 1140 by Lina Willson RN  Outcome: Ongoing  3/18/2021 0924 by Rebekah Dubose RD, LD  Outcome: Ongoing  3/18/2021 0107 by Nilda Bhatia RN  Outcome: Ongoing     Problem: Skin Integrity:  Goal: Will show no infection signs and symptoms  Description: Will show no infection signs and symptoms  3/18/2021 1140 by Lina Willson RN  Outcome: Ongoing  3/18/2021 0107 by Nilda Bhatia RN  Outcome: Ongoing  Goal: Absence of new skin breakdown  Description: Absence of new skin breakdown  3/18/2021 1140 by Lina Willson RN  Outcome: Ongoing  3/18/2021 0107 by Nilda Bhatia RN  Outcome: Ongoing     Problem: Falls - Risk of:  Goal: Will remain free from falls  Description: Will remain free from falls  3/18/2021 1140 by Lina Willson RN  Outcome: Ongoing  3/18/2021 0107 by Nilda Bhatia RN  Outcome: Ongoing  Goal: Absence of physical injury  Description: Absence of physical injury  3/18/2021 1140 by Lina Willson RN  Outcome: Ongoing  3/18/2021 0107 by Nilda Bhatia RN  Outcome: Ongoing

## 2021-03-18 NOTE — PROGRESS NOTES
Comprehensive Nutrition Assessment    Type and Reason for Visit:  Reassess    Nutrition Recommendations/Plan:   Continue Dysphagia pureed with moderately thick liquids. Trial Magic cup BID. Monitor BS given high sugar in ONS. Nutrition Assessment:  Follow-up. Pt extubated on 3/14 and transferred to floor. SLP following pt, diet advanced to Dysphagia pureed with moderately thick liquids. Noted intakes <25%. Pt with confusion and agitation at times. Will trial ONS. Malnutrition Assessment:  Malnutrition Status:  Insufficient data    Context:  Acute Illness     Findings of the 6 clinical characteristics of malnutrition:  Energy Intake:  7 - 50% or less of estimated energy requirements for 5 or more days  Weight Loss:  Unable to assess     Body Fat Loss:  Unable to assess     Muscle Mass Loss:  Unable to assess    Fluid Accumulation:  No significant fluid accumulation     Strength:  Not Performed    Estimated Daily Nutrient Needs:  Energy (kcal):  4903-0123 kcal (25-30 kcal/kg CBW)  Protein (g):  60-97 gm (1-1.4gm/kg CBW)     Fluid (ml/day):  1 mL/kcal    Nutrition Related Findings:  trace BLE edema; BM 3/16; urostomy noted; Glu 106, Na 151      Wounds:  Surgical Incision(abdomen)       Current Nutrition Therapies:    DIET DYSPHAGIA PUREED; Dysphagia Pureed; Moderately Thick (Honey)    Anthropometric Measures:  · Height: 5' 9\" (175.3 cm)  · Current Body Weight: 147 lb (66.7 kg)   · Admission Body Weight: 147 lb (66.7 kg)    · Ideal Body Weight: 160 lbs; % Ideal Body Weight 91.9 %   · BMI: 21.7  · BMI Categories: Normal Weight (BMI 18.5-24. 9)       Nutrition Diagnosis:   · Inadequate oral intake related to cognitive or neurological impairment as evidenced by intake 0-25%      Nutrition Interventions:   Food and/or Nutrient Delivery:  Continue Current Diet, Start Oral Nutrition Supplement  Nutrition Education/Counseling:  No recommendation at this time   Coordination of Nutrition Care:  Continue to monitor while inpatient    Goals:  New goal- consume >50% of meals and ONS during admission       Nutrition Monitoring and Evaluation:   Food/Nutrient Intake Outcomes:  Food and Nutrient Intake, Supplement Intake  Physical Signs/Symptoms Outcomes:  Biochemical Data, Chewing or Swallowing, Weight, Skin, Nutrition Focused Physical Findings     Discharge Planning:     Too soon to determine     Electronically signed by Sheyla Gonzalez RD, LD on 3/18/21 at 9:23 AM EDT    Contact: [phone]

## 2021-03-18 NOTE — PROGRESS NOTES
Nephrology Progress Note  [phone]  [phone]   [email or web address]    Patient:  Joshua Pisano Sr   : 1940    CC:  KEENAN, CKD    Subjective:  Transferred out of ICU. Breathing stable. Confused. ROS:   In bed. PMSHx:  medications reviewed    Vitals:  /62   Pulse 93   Temp 97.9 °F (36.6 °C) (Axillary)   Resp 18   Ht 5' 9\" (1.753 m)   Wt 147 lb 11.3 oz (67 kg)   SpO2 99%   BMI 21.81 kg/m²       Intake/Output Summary (Last 24 hours) at 3/18/2021 1213  Last data filed at 3/18/2021 0347  Gross per 24 hour   Intake 120 ml   Output 1475 ml   Net -1355 ml       Physical Exam:  Gen: NAD, confused  CV: RRR, no S3.  Lungs: +vent, bilateral coarse BS. Abd: S/NT +BS +ostomy  Ext: No edema, no cyanosis  Skin: Warm. No rashes appreciated. : +coleman. Labs:  CBC:   Lab Results   Component Value Date    WBC 7.0 2021    RBC 3.01 2021    RBC 4.07 2017    HGB 9.2 2021    HCT 28.5 2021    MCV 94.9 2021    MCH 30.6 2021    MCHC 32.3 2021    RDW 15.8 2021     2021    MPV 8.9 2021     BMP:    Lab Results   Component Value Date     2021    K 4.3 2021    K 4.5 2021     2021    CO2 22 2021    BUN 41 2021    LABALBU 3.2 2021    CREATININE 2.5 2021    CALCIUM 8.8 2021    GFRAA 30 2021    GFRAA >60 2013    LABGLOM 25 2021    GLUCOSE 104 2021    GLUCOSE 128 2017       Assessment/Plan:  1) KEENAN on CKD: non-oliguric              - baseline 09/10/19 Cr 1.8              - Likely ATN based on data.    - Cr is now increasing again              - not a dialysis candidate due to dementia, and his significant other is in agreement per discussion with Dr. Hafsa Espinal     2) extubated              - pulmonary following.      3) ID              - CT reviewed              - question of aspiration pneumonia              - on unasyn, vancomycin     4) SBO from ischemic bowel s/p exp lap and partial bowel resection 02/19/21     5) bladder cancer and ileal conduit     6) FEN:              Hypernatremia                          - higher    - this is a poor prognostic sign              Hypokalemia                          - K is normal today. metabolic acidosis                          - monitor   nutrition    - family placed limit on his care at the PEG     7) dementia              - poor prognosis              - appreciate palliative care's input     This is a very unfortunate situation. The underlying issue is the patient's advanced dementia. The precipitating factor was the SBO and the partial bowel resection. Unfortunately, in these situations, once the precipitating factor occurs, the patient's entire health falls apart, and the patient is unable to reocver. He is unable to adequately eat and drink, and this is quite unlikely to improve. Providing IVF is a futile intervention as it only delays the inevitable and the underlying issue of the patient not eating/drinking due to his dementia will cause the situation to repeatedly occur. Hospice is strongly recommended. addendum:  (ACP)  Palliative care asked me to speak to family. I discussed with the patient's son and daughter via phone. We discussed the underlying dementia and why I am recommending that further IVF is futile. It is a very unfortunate situation. I explained this is a recurring issue at this point with no clear evidence that the underlying dementia will improve. All questions were answered, and they sounded like this understood the discussion.   [time 25+ min]

## 2021-03-18 NOTE — PLAN OF CARE
Problem: Nutrition  Goal: Optimal nutrition therapy  3/18/2021 0924 by Kin Loera RD, LD  Outcome: Ongoing  3/18/2021 0107 by Artemio Lai RN  Outcome: Ongoing   Nutrition Problem #1: Inadequate oral intake  Intervention: Food and/or Nutrient Delivery: Continue Current Diet, Start Oral Nutrition Supplement  Nutritional Goals: New goal- consume >50% of meals and ONS during admission

## 2021-03-18 NOTE — PROGRESS NOTES
Occupational Therapy  Facility/Department: [address] MED SURG  Daily Treatment Note  NAME: Bhavana Benson  : 1940  MRN: [id number]    Date of Service: 3/18/2021    Discharge Recommendations:  3-5 sessions per week, Patient would benefit from continued therapy after discharge  OT Equipment Recommendations  Other: defer     Bhavana Benson scored a 13 on the AM-PAC ADL Inpatient form. Current research shows that an AM-PAC score of 17 or less is typically not associated with a discharge to the patient's home setting. Based on the patient's AM-PAC score and their current ADL deficits, it is recommended that the patient have 3-5 sessions per week of Occupational Therapy at d/c to increase the patient's independence. Please see assessment section for further patient specific details. If patient discharges prior to next session this note will serve as a discharge summary. Please see below for the latest assessment towards goals. Assessment   Performance deficits / Impairments: Decreased functional mobility ; Decreased endurance;Decreased coordination;Decreased ADL status; Decreased balance;Decreased strength;Decreased safe awareness;Decreased cognition  Assessment: Pt functioning well below baseline d/t the above deficits, today requiring max A for bed mobility, SBA/CGA for sitting balance at EOB, mod A x2 for sit<>stand transfer and for standing balance to take ~3 sidesteps at bedside, min A beverage mangaement, and anticipate pt would require overall max A for ADLs. Pt confused, needs verbal cues to redirect and increased time/repetition to follow commands. Pt demonstrates need for ongoing skilled OT at d/c to maximize pt's safety and independence prior to return home. Will cont to follow while hospitalized.   Prognosis: Fair  OT Education: OT Role;Plan of Care;Transfer Training;ADL Adaptive Strategies  Barriers to Learning: cognition  REQUIRES OT FOLLOW UP: Yes  Activity Tolerance  Activity Tolerance: Treatment limited secondary to decreased cognition;Patient limited by fatigue  Safety Devices  Safety Devices in place: Yes  Type of devices: Call light within reach; Bed alarm in place; Left in bed;Nurse notified         Patient Diagnosis(es): The primary encounter diagnosis was Acute kidney injury superimposed on chronic kidney disease ([facility] [address] Diagnoses of Altered mental status, unspecified altered mental status type, Acute respiratory failure with hypoxia ([facility] [address] and Pneumonia due to organism were also pertinent to this visit. has a past medical history of Cancer ([facility] [address] GASTRIC ULCER, Hypertension, Leukopenia, and MRSA (methicillin resistant staph aureus) culture positive. has a past surgical history that includes Colonoscopy; Cystoscopy; other surgical history (12/14/2016); Tunneled venous port placement (Right, 06/06/2017); and laparotomy (N/A, 2/18/2021). Restrictions  Restrictions/Precautions  Restrictions/Precautions: Fall Risk  Position Activity Restriction  Other position/activity restrictions: Ostomy bag. NPO. Dysphagia. Subjective   General  Chart Reviewed: Yes  Patient assessed for rehabilitation services?: Yes  Additional Pertinent Hx: 79 y/o male admit 3/12/2021 with Acute Respiratory, KEENAN, Altered Mental Status. 3/12-3/14/2021 Pt Intubated. Recent admit 2/18-3/9/2021 with SBO (d/c to SNF setting). PMHx includes: Cancer [facility]), GASTRIC ULCER, Hypertension, Leukopenia, and MRSA (methicillin resistant staph aureus) culture positive (03/13/2021). Family / Caregiver Present: No  Referring Practitioner: Lizzie Oquendo MD  Diagnosis: acute encephalopathy  Subjective  Subjective: Pt met b/s for OT cotx with PT. Pt in bed on arrival, agreeable to participate in therapy. Pt pleasantly confused. Pt did not report or indicate any pain. Objective    ADL  Feeding: Minimal assistance; Beverage management  Toileting: (catheter)     Balance  Sitting Balance: Contact guard assistance(seated EOB ~10 goal 4: Pt will complete simple grooming task with mod A  Patient Goals   Patient goals : not stated 2/2 cognitive deficits       Therapy Time   Individual Concurrent Group Co-treatment   Time In 1100         Time Out 1130         Minutes Madeline Maldonado., OTR/L [id number]

## 2021-03-19 NOTE — PROGRESS NOTES
Attempted to access chest port and was unsuccessful. Placement was correct however there was no blood return.

## 2021-03-19 NOTE — PROGRESS NOTES
Physical Therapy  Facility/Department: [address] MED SURG  Daily Treatment Note  NAME: Cathie De Santiago  : 1940  MRN: [id number]    Date of Service: 3/19/2021    Discharge Recommendations:  Patient would benefit from continued therapy after discharge, 3-5 sessions per week   PT Equipment Recommendations  Other: Defer to next level of care. Cathie De Santiago scored a  on the AM-PAC short mobility form. Current research shows that an AM-PAC score of 17 or less is typically not associated with a discharge to the patient's home setting. Based on the patient's AM-PAC score and their current functional mobility deficits, it is recommended that the patient have 3-5 sessions per week of Physical Therapy at d/c to increase the patient's independence. Please see assessment section for further patient specific details. If patient discharges prior to next session this note will serve as a discharge summary. Please see below for the latest assessment towards goals. Assessment   Body structures, Functions, Activity limitations: Decreased functional mobility ; Decreased strength;Decreased safe awareness;Decreased cognition;Decreased endurance  Assessment: 77 y/o male admit 3/12/2021 with Acute Respiratory, KEENAN, Altered Mental Status. 3/12-3/14/2021 Pt Intubated. Recent admit -3/9/2021 with SBO (d/c to SNF setting). Pt reportedly from home with \"\", independent with ADLs. Pt currently functioning well below baseline. Anticipate he will require continued skilled therapy 3-5x/week to maximzie independence and safety with functional mobility. Treatment Diagnosis: impaired mobility  Prognosis: Fair  Decision Making: Medium Complexity  History: 77 y/o male admit 3/12/2021 with Acute Respiratory, KEENAN, Altered Mental Status. 3/12-3/14/2021 Pt Intubated. Recent admit -3/9/2021 with SBO (d/c to SNF setting).   PMH as noted including SBO, Exp Lap/Small Bowel Resection (2021), Bladder Ca, Radical Cystectomy/Prostatectomy/ileal Conduit Urinary, Dementia. Exam: See above. Clinical Presentation: See above. PT Education: PT Role;Plan of Care;General Safety;Orientation; Functional Mobility Training  Barriers to Learning: Cognitive, [place]. REQUIRES PT FOLLOW UP: Yes  Activity Tolerance  Activity Tolerance: Patient limited by cognitive status; Patient limited by endurance     Patient Diagnosis(es): The primary encounter diagnosis was Acute kidney injury superimposed on chronic kidney disease ([facility] [address] Diagnoses of Altered mental status, unspecified altered mental status type, Acute respiratory failure with hypoxia ([address] and Pneumonia due to organism were also pertinent to this visit. has a past medical history of Cancer ([facility] [address] GASTRIC ULCER, Hypertension, Leukopenia, and MRSA (methicillin resistant staph aureus) culture positive. has a past surgical history that includes Colonoscopy; Cystoscopy; other surgical history (12/14/2016); Tunneled venous port placement (Right, 06/06/2017); and laparotomy (N/A, 2/18/2021). Restrictions  Restrictions/Precautions  Restrictions/Precautions: Fall Risk  Position Activity Restriction  Other position/activity restrictions: Ostomy bag. Dysphagia. Subjective   General  Chart Reviewed: Yes  Additional Pertinent Hx: 77 y/o male admit 3/12/2021 with Acute Respiratory, KEENAN, Altered Mental Status. 3/12-3/14/2021 Pt Intubated. Recent admit 2/18-3/9/2021 with SBO (d/c to SNF setting). Response To Previous Treatment: Patient unable to report, no changes reported from family or staff  Family / Caregiver Present: No  Referring Practitioner: Laith Antunez NP  Subjective  Subjective: Pt is agreeable to PT. Intermittently following commands. Orientation  Orientation  Overall Orientation Status: Impaired  Orientation Level: Oriented to person;Disoriented to place; Disoriented to time;Disoriented to situation     Cognition   Cognition  Overall Cognitive Status: Exceptions Arousal/Alertness: Inconsistent responses to stimuli  Following Commands: Follows one step commands with repetition; Follows one step commands with increased time  Attention Span: Attends with cues to redirect  Memory: Decreased recall of precautions;Decreased short term memory;Decreased recall of recent events  Safety Judgement: Decreased awareness of need for safety;Decreased awareness of need for assistance  Problem Solving: Assistance required to correct errors made;Decreased awareness of errors  Insights: Not aware of deficits  Initiation: Requires cues for all  Sequencing: Requires cues for all     Objective   Bed mobility  Supine to Sit: Maximum assistance  Sit to Supine: Unable to assess(in recliner at end of session)  Scooting: Unable to assess  Transfers  Sit to Stand: Moderate Assistance;2 Person Assistance  Stand to sit: Moderate Assistance;2 Person Assistance  Comment: Static standing at edge of chair ~ 1 minute while pericare is performed.   Ambulation  Ambulation?: Yes  Ambulation 1  Device: Hand-Held Assist  Assistance: Maximum assistance  Quality of Gait: flexed posture  Gait Deviations: Staggers  Distance: 3'  Comments: unsteady throughout  Stairs/Curb  Stairs?: No     Balance  Posture: Fair  Sitting - Static: Fair  Sitting - Dynamic: Fair  Standing - Static: Poor  Standing - Dynamic: Poor        AM-PAC Score  AM-PAC Inpatient Mobility Raw Score : 11 (03/19/21 0911)  AM-PAC Inpatient T-Scale Score : 33.86 (03/19/21 0911)  Mobility Inpatient CMS 0-100% Score: 72.57 (03/19/21 0911)  Mobility Inpatient CMS G-Code Modifier : CL (03/19/21 0911)          Goals  Short term goals  Time Frame for Short term goals: Upon d/c acute care setting. - all goals ongoing as of 3/19/21 unless noted otherwise  Short term goal 1: Bed Mob Mod assist x 2. - MET 3/17  Short term goal 2: EOB ~ 10 min in prep Transfers Min assist. - partly met; NEW GOAL 3/17/21: Sit<>stand with RW with CGA  Short term goal 3: Attempt Transfers/OOB via [place] assist x 2. - MET 3/17/21  Short term goal 4: Attempt Stand/Amb activities as approp. - partly met; NEW GOAL 3/17/21: ambulate >5' with RW and CGA  Patient Goals   Patient goals : None stated at this time. Plan    Plan  Times per week: 3-5x/week  Current Treatment Recommendations: Strengthening, Functional Mobility Training, Transfer Training, Gait Training, Safety Education & Training, Patient/Caregiver Education & Training, Cognitive Reorientation, Endurance Training, Balance Training, Neuromuscular Re-education  Safety Devices  Type of devices:  All fall risk precautions in place, Call light within reach, Gait belt, Patient at risk for falls, Nurse notified, Left in bed, Left in chair, Chair alarm in place  Restraints  Initially in place: No  Restraints: UE soft restraints     Therapy Time   Individual Concurrent Group Co-treatment   Time In 0840         Time Out 0910         Minutes 30         Timed Code Treatment Minutes: [address], PT

## 2021-03-19 NOTE — PROGRESS NOTES
[facility]   Speech Therapy  Daily Dysphagia Treatment Note        Joshua Pisano   AGE: 78 y.o. GENDER: male  : 1940  [id number]  EPISODE DATE:  3/12/2021  Patient Active Problem List   Diagnosis    Hypertension    Leukopenia    Lightheadedness    Rectal bleed    Bladder cancer ([facility] [address]    S/P ileal conduit (HCC)    Rectal injury    Pelvic mass in male    Right adrenal mass ([address]    Bladder cancer metastasized to bone ([address]    Cancer associated pain    Chronic fatigue    Benign prostatic hyperplasia without urinary obstruction    Small bowel obstruction (HCC)    Lactic acidosis    KEENAN (acute kidney injury) ([address]    Delirium    Acute encephalopathy    Acute kidney injury superimposed on chronic kidney disease (HCC)    Acute respiratory failure with hypoxia (HCC)    Altered mental status    Pneumonia due to methicillin resistant Staphylococcus aureus (MRSA) ([facility] [address]    Aspiration pneumonia ([address]    Metabolic encephalopathy     No Known Allergies  Treatment Diagnosis: Dysphagia       Chart review:   Recent Chest Xray 3/14/2021  FINDINGS:   Enteric tube with tip projecting over the gastric body.  Side port projects   at the gastroesophageal junction.  This could be advanced by 4-5 cm.  Cardiac   silhouette is within normal limits in size. Mediastinal contours are   otherwise suboptimally evaluated due to rotated projection.  Minimal left   basilar opacity could represent atelectasis.  No pleural effusion   appreciated.  Right port with tip projecting over the superior vena cava and   access needle projecting over the reservoir noted      Hospital Course: Patient is an 78-year-old male with a past medical history of recent small bowel obstruction status post exploratory laparotomy along with bowel resection, CKD stage III, dementia who presented from the [facility] due to altered mental status.  Patient was emergently intubated in the emergency department due to lethargy and cough, throat clear or VQ change. · Second trial with oral holding without swallow initiation and spitting out of bolus; oral care completed to clear oral cavity. Trials discontinued 2/2 cognitive status and poor PO acceptance;   · Soft food DNT  · Regular food DNT    Dysphagia Tx:   · Pt asleep upon entry with nursing care; does awaken and respond to verbal and tactile cues. Verbally agreeable with PO trials when lunch tray presented. Not following commands today. · PO trials limited to 2 tsp as described above. Pt initially accepting and tolerated the first bite without difficulty, but demonstrated aversion to the 2nd trial, with prolonged oral holding, spitting out of bolus, and no swallow initiated. Pt stated he was going to throw it up. Oral care completed to remove bolus. · Nurse was present and aware. Goals: The patient will tolerate recommended diet without observed clinical signs of aspiration ongoing; recommend puree/honey thick, only when awake/alert and accepting of PO  The patient will tolerate repeat BSE when able. Previously met  The pt will tolerate nectar liquid trials 5/5 without overt s/s ongoing; overt s/s last session; not targeted this date    Assessment:   Impressions:   Dysphagia Diagnosis: Moderate to severe oral stage dysphagia, Moderate pharyngeal stage dysphagia   · Mod-severe oral and moderate pharyngeal phase dysphagia characterized by prolonged bolus manipulation, reduced lingual coordination, reduced AP transit, delayed swallow initiation, reduced laryngeal elevation. At risk for premature bolus loss to pharynx with all trials. · Oral residue with puree, requiring oral care after PO completion.     · Persistent overt s/s of aspiration with nectar liquid   · Recommend continue puree texture diet and honey thick liquids, with 1:1 supervision and close monitor for overt difficulty; only feed when alert and accepting of PO; oral care after meals; discontinue PO if increased cough, throat clear, wet vocal quality, or decrease in O2 occurs. However, cannot fully r/o risk for aspiration given pt's fluctuating mental status and alertness. · ST will follow for additional recommendations. It appears family is considering hospice. Diet Recommendations:  Puree diet texture  Moderately honey thick liquids   Recommended Form of Meds: Crushed in puree as able    Strategies:   Compensatory Swallowing Strategies: Upright as possible for all oral intake, Remain upright for 30-45 minutes after meals, Eat/Feed slowly, Small bites/sips, Total feed, oral care after PO    Education:  Consulted and agree with results and recommendations: Patient, RN, MD  Patient Education: results  Patient Education Response: No evidence of learning    Prognosis:   guarded for diet upgrade    Plan:     Continue Dysphagia Therapy: yes  Interventions: Therapeutic Interventions: Diet tolerance monitoring, Patient/Family education, Therapeutic PO trials with SLP  Duration/Frequency of therapy while on unit: Duration/Frequency of Treatment  Duration/Frequency of Treatment: 3-5x/wk while on acute unit  Discharge Instructions:   Anticipate potential for further skilled Speech Therapy for Dysphagia at discharge, pending family decision re: hospice    This note serves as a D/C Summary in the event that this patient is discharged prior to the next therapy session.     Coded treatment time:5  Total treatment time: 15    Electronically signed by  Konstantin Reaves MS, CCC-SLP #[id number]  Speech Language Pathologist    on 3/19/2021 at 1249PM

## 2021-03-19 NOTE — PROGRESS NOTES
Hospitalist Progress Note      PCP: Rocio Sargent MD    Date of Admission: 3/12/2021    Chief Complaint: [facility] Course: Patient is an 69-year-old male with a past medical history of recent small bowel obstruction status post exploratory laparotomy along with bowel resection, CKD stage III, dementia who presented from the [facility] due to altered mental status. Patient was emergently intubated in the emergency department due to lethargy and unresponsiveness. Work-up showed acute renal failure along with concerns for aspiration pneumonia. 3/14  Discussed with nursing staff. Remains ventilated on minimal support. Cultures from respiratory positive for MRSA. Renal function continue as does his white count. Patient had episodes of confusion yesterday along with agitation. This is in line with the way he was behaving on previous admissions. He has a bad time in the afternoon or evenings. He required multiple medications to calm him down    3/15 Family meeting planned for later on today. Continue with antibiotic therapy of vancomycin and Unasyn for 7 days total.    3/16 Patient is alert but drowsy. He did get 2 doses of Zyprexa yesterday so I have decreased those doses to 2.5 for future use. Continue with current care and transfer to [place] [id number]. Subjective: Patient at baseline with Dementia.  Oriented to self only      Medications:  Reviewed    Infusion Medications    dextrose       Scheduled Medications    lactobacillus  2 capsule Oral BID WC    insulin lispro  0-6 Units Subcutaneous Q4H    sodium chloride flush  10 mL Intravenous 2 times per day    heparin (porcine)  5,000 Units Subcutaneous 3 times per day     PRN Meds: OLANZapine, glucose, dextrose, glucagon (rDNA), dextrose, sodium chloride flush, [DISCONTINUED] promethazine **OR** ondansetron, polyethylene glycol, acetaminophen **OR** acetaminophen      Intake/Output Summary (Last 24 hours) at 3/19/2021 1042  Last data filed at 3/19/2021 0004  Gross per 24 hour   Intake    Output 1200 ml   Net -1200 ml       Physical Exam Performed:    BP (!) 97/56   Pulse 111   Temp 97.5 °F (36.4 °C) (Oral)   Resp 16   Ht 5' 9\" (1.753 m)   Wt 146 lb 6.2 oz (66.4 kg)   SpO2 97%   BMI 21.62 kg/m²     General appearance: Alert, sitting in a chair  HEENT: Pupils equal, round, and reactive to light. Conjunctivae/corneas clear. Neck: Supple, with full range of motion. No jugular venous distention. Trachea midline. Respiratory:  Normal respiratory effort. Clear to auscultation  Cardiovascular: Regular rate and rhythm with normal S1/S2  Abdomen: Soft, non-tender, non-distended with normal bowel sounds. Musculoskeletal: No clubbing, cyanosis or edema bilaterally. Skin: Skin color, texture, turgor normal.  No rashes or lesions. Neurologic: More alert today but remains confused, oriented to self  Psychiatric: Alert  Capillary Refill: Brisk,< 3 seconds   Peripheral Pulses: +2 palpable, equal bilaterally       Labs:   Recent Labs     03/17/21  0545 03/18/21  0530 03/19/21  0721   WBC 7.0 7.0 7.2   HGB 9.1* 9.2* 9.7*   HCT 27.1* 28.5* 30.3*    284 299     Recent Labs     03/17/21  0545 03/18/21  0530 03/19/21  0721   * 151* 153*   K 4.3 4.3 5.0   * 119* 120*   CO2 22 22 21   BUN 37* 41* 40*   CREATININE 2.3* 2.5* 2.5*   CALCIUM 8.9 8.8 8.9   PHOS 3.5 3.6 3.7     No results for input(s): AST, ALT, BILIDIR, BILITOT, ALKPHOS in the last 72 hours. No results for input(s): INR in the last 72 hours. No results for input(s): Onetha Mookie in the last 72 hours. Urinalysis:      Lab Results   Component Value Date    NITRU Negative 03/12/2021    WBCUA 8 03/12/2021    BACTERIA 4+ 03/12/2021    RBCUA 0-2 03/12/2021    BLOODU MODERATE 03/12/2021    SPECGRAV 1.015 03/12/2021    GLUCOSEU Negative 03/12/2021       Radiology:  XR CHEST PORTABLE   Final Result   Enteric tube as described.          XR CHEST PORTABLE   Final Result   Enteric feeding tube projects to the proximal stomach. Other supportive   tubing is in normal position. No acute cardiopulmonary disease. CT Head WO Contrast   Final Result   No acute intracranial abnormality. CT CHEST ABDOMEN PELVIS WO CONTRAST   Final Result   Chest-      Supportive tubing is in normal position. Mild amount of secretions are noted in the right bronchus intermedius. Foci of airspace disease in the right upper and dependent both lower lobes. Pneumonia versus aspiration pneumonitis. ---      Abdomen pelvis-      Findings suggestive of enteritis. Previous bilateral hydroureteronephrosis has resolved. There is a right   lower quadrant ileal conduit with parastomal fluid, herniated portion of the   ileal conduit versus seroma. Suspected gallbladder sludge. Stable sclerotic foci along the right sacroiliac joint and left superior   medial pubis. XR CHEST PORTABLE   Final Result   No acute findings. Assessment/Plan:    Acute metabolic encephalopathy  At baseline. Oriented to self only  Continue low-dose Zyprexa for agitation (has not been needed x 72 hours)  Continues off restraints  Continue to monitor    Acute respiratory failure with hypoxia   Resolved. On room air  Extubated on March 14 MRSA pneumonia    Acute kidney injury on CKD  Continues to improve with IV fluids  Continue to trend  Presented at 5.8, down to 2.5 with IV fluids (slightly higher today)  Currently at/near baseline    Hypernatremia - Sodium higher today.  Nephrology managing    Sepsis - Resolved  Improved with IV antibiotics and IV fluids  Respiratory culture positive for MRSA    Aspiration pneumonia - improved  S/p Unasyn and Vancomycin x 7 days total  Speech-language pathology assessing  Family does not want PEG     Nutrition - Pt not eating or drinking enough to maintain nutritional needs  - Palliative Care discussing with his family    Small bowel obstruction status post partial small bowel resection  Monitor    History of bladder cancer status post ileal conduit  Monitor        DVT Prophylaxis: Heparin  Diet: DIET DYSPHAGIA PUREED; Dysphagia Pureed; Moderately Thick (Honey)  Dietary Nutrition Supplements: Frozen Oral Supplement  Code Status: Full Code    PT/OT Eval Status: 3-5 sessions per week    Dispo - Will need SNF at discharge.  Palliative care consulting    Stephen Mckeon MD

## 2021-03-19 NOTE — PROGRESS NOTES
Pt removed chest port. Will re-access closer to am labs due to his tendency to remove anything attached to him.

## 2021-03-19 NOTE — PROGRESS NOTES
Nephrology Progress Note  [phone]   [email or web address]    Patient:  Melanie Craig    : 1940    CC:  KEENAN, CKD    Subjective:  Transferred out of ICU. Breathing stable. Confused. ROS:   In bed. PMSHx:  medications reviewed    Vitals:  /73   Pulse 97   Temp 97.8 °F (36.6 °C) (Oral)   Resp 16   Ht 5' 9\" (1.753 m)   Wt 146 lb 6.2 oz (66.4 kg)   SpO2 98%   BMI 21.62 kg/m²       Intake/Output Summary (Last 24 hours) at 3/19/2021 1504  Last data filed at 3/19/2021 0004  Gross per 24 hour   Intake    Output 1200 ml   Net -1200 ml       Physical Exam:  Gen: NAD, confused  CV: RRR, no S3.  Lungs: +vent, bilateral coarse BS. Abd: S/NT +BS +ostomy  Ext: No edema, no cyanosis  Skin: Warm. No rashes appreciated. : +coleman. Labs:  CBC:   Lab Results   Component Value Date    WBC 7.2 2021    RBC 3.17 2021    RBC 4.07 2017    HGB 9.7 2021    HCT 30.3 2021    MCV 95.5 2021    MCH 30.4 2021    MCHC 31.9 2021    RDW 15.9 2021     2021    MPV 8.5 2021     BMP:    Lab Results   Component Value Date     2021    K 5.0 2021    K 4.5 2021     2021    CO2 21 2021    BUN 40 2021    LABALBU 3.2 2021    CREATININE 2.5 2021    CALCIUM 8.9 2021    GFRAA 30 2021    GFRAA >60 2013    LABGLOM 25 2021    GLUCOSE 112 2021    GLUCOSE 128 2017       Assessment/Plan:  1) KEENAN on CKD: non-oliguric              - baseline 09/10/19 Cr 1.8              - Likely ATN based on data.    - Cr continuing to increase              - not a dialysis candidate due to dementia, and his significant other is in agreement per discussion with Dr. Princess Fontana   - holding off IVF as it is a futile, temporizing measure     2) extubated              - pulmonary following.      3) ID              - CT reviewed              - question of aspiration pneumonia - on unasyn, vancomycin     4) SBO from ischemic bowel s/p exp lap and partial bowel resection 02/19/21     5) bladder cancer and ileal conduit     6) FEN:              Hypernatremia                          - worse    - this is a poor prognostic sign              Hypokalemia                          - K is normal today. metabolic acidosis                          - monitor   nutrition    - family placed limit on his care at the PEG     7) dementia              - poor prognosis              - appreciate palliative care's input     This is a very unfortunate situation. The underlying issue is the patient's advanced dementia. The precipitating factor was the SBO and the partial bowel resection. Unfortunately, in these situations, once the precipitating factor occurs, the patient's entire health falls apart, and the patient is unable to reocver. He is unable to adequately eat and drink, and this is quite unlikely to improve. Providing IVF is a futile intervention as it only delays the inevitable and the underlying issue of the patient not eating/drinking due to his dementia will cause the situation to repeatedly occur. Hospice is strongly recommended. I spoke to the other son today via phone as well as with palliative care. I did explain to the son that the family should feel comfortable that they have done everything, and I explained that we have given him every opportunity to improve as well with Dr. José Antonio Herrera doing the surgery and the correction of his volume depletion at the start of this hospitalization. Family is still in the process of discussing how to approach his care. We continue to wait for family to decide what to do with a family planned tonight.   [ACP 20+ min]

## 2021-03-19 NOTE — PROGRESS NOTES
Occupational Therapy  Facility/Department: [address] MED SURG  Daily Treatment Note  NAME: Jenna York  : 1940  MRN: [id number]    Date of Service: 3/19/2021    Discharge Recommendations:  3-5 sessions per week, Patient would benefit from continued therapy after discharge     Jenna York scored a  on the AM-PAC ADL Inpatient form. Current research shows that an AM-PAC score of 17 or less is typically not associated with a discharge to the patient's home setting. Based on the patient's AM-PAC score and their current ADL deficits, it is recommended that the patient have 3-5 sessions per week of Occupational Therapy at d/c to increase the patient's independence. Please see assessment section for further patient specific details. If patient discharges prior to next session this note will serve as a discharge summary. Please see below for the latest assessment towards goals. Assessment   Performance deficits / Impairments: Decreased functional mobility ; Decreased endurance;Decreased coordination;Decreased ADL status; Decreased balance;Decreased strength;Decreased safe awareness;Decreased cognition  Assessment: Pt continues to be limited diminished cognition, generalized weakness, decreased balance/fxl mobility,a nd decreased fxl activity tolerance. Pt required max A for bed mobility, mod A x2 to take a couple of steps bed-chair, min A feeding, mod A grooming, and anticipate pt would require overall max A for ADLs. Pt needs max verbal cues/repetition to follow simple commands to participate in ADLs. Pt functioning well below baseline and demonstrates need for ongoing skilled OT at d/c to maximize pt's safety and independence prior to return home.   Prognosis: Fair  OT Education: OT Role;Plan of Care;Transfer Training;ADL Adaptive Strategies  Barriers to Learning: cognition  REQUIRES OT FOLLOW UP: Yes  Activity Tolerance  Activity Tolerance: Treatment limited secondary to decreased cognition;Patient limited by fatigue  Safety Devices  Safety Devices in place: Yes  Type of devices: Call light within reach; Chair alarm in place; Left in chair;Gait belt;Nurse notified         Patient Diagnosis(es): The primary encounter diagnosis was Acute kidney injury superimposed on chronic kidney disease ([facility] [address] Diagnoses of Altered mental status, unspecified altered mental status type, Acute respiratory failure with hypoxia ([facility] [address] and Pneumonia due to organism were also pertinent to this visit. has a past medical history of Cancer ([facility] [address] GASTRIC ULCER, Hypertension, Leukopenia, and MRSA (methicillin resistant staph aureus) culture positive. has a past surgical history that includes Colonoscopy; Cystoscopy; other surgical history (12/14/2016); Tunneled venous port placement (Right, 06/06/2017); and laparotomy (N/A, 2/18/2021). Restrictions  Restrictions/Precautions  Restrictions/Precautions: Fall Risk  Position Activity Restriction  Other position/activity restrictions: Ostomy bag. Dysphagia. Subjective   General  Chart Reviewed: Yes  Patient assessed for rehabilitation services?: Yes  Additional Pertinent Hx: 77 y/o male admit 3/12/2021 with Acute Respiratory, KEENAN, Altered Mental Status. 3/12-3/14/2021 Pt Intubated. Recent admit 2/18-3/9/2021 with SBO (d/c to SNF setting). PMHx includes: Cancer [facility]), GASTRIC ULCER, Hypertension, Leukopenia, and MRSA (methicillin resistant staph aureus) culture positive (03/13/2021). Family / Caregiver Present: No  Referring Practitioner: Rito Ko MD  Diagnosis: acute encephalopathy  Subjective  Subjective: Pt met b/s for OT cotx with PT. Pt in bed on arrival, agreeable to participate in therapy. Pt pleasantly confused.       Orientation  Orientation  Overall Orientation Status: Impaired  Orientation Level: Oriented to person;Disoriented to situation;Disoriented to time;Disoriented to place  Objective    ADL  Feeding: Minimal assistance;Scoop assist  Grooming: Moderate assistance(seated in recliner to wash face/hands)  LE Dressing: Dependent/Total(to change depends)  Toileting: Dependent/Total(BM in depends, total A of PT for balance while OT provided pericare and managed depends)     Balance  Sitting Balance: Stand by assistance(seated EOB)  Standing Balance: Maximum assistance(mod A x2 to take ~2-3 steps bed-chair, max A of PT for standing stance while OT provided pericare)     Bed mobility  Supine to Sit: Maximum assistance  Sit to Supine: Unable to assess(in recliner at end of session)  Scooting: Unable to assess     Transfers  Sit to stand: Moderate assistance;2 Person assistance(from EOB, harris feet had to be blocked)  Stand to sit: Moderate assistance;2 Person assistance     Cognition  Overall Cognitive Status: Exceptions  Arousal/Alertness: Inconsistent responses to stimuli  Following Commands: Follows one step commands with repetition; Follows one step commands with increased time  Attention Span: Attends with cues to redirect  Memory: Decreased recall of precautions;Decreased short term memory;Decreased recall of recent events  Safety Judgement: Decreased awareness of need for safety;Decreased awareness of need for assistance  Problem Solving: Assistance required to correct errors made;Decreased awareness of errors  Insights: Not aware of deficits  Initiation: Requires cues for all  Sequencing: Requires cues for all        Plan   Plan  Times per week: 3-5  Times per day: Daily  Current Treatment Recommendations: Strengthening, Balance Training, Functional Mobility Training, Endurance Training, Self-Care / ADL, Neuromuscular Re-education      AM-PAC Score        AM-[facility] Inpatient Daily Activity Raw Score: 11 (03/19/21 0916)  AM-PAC Inpatient ADL T-Scale Score : 29.04 (03/19/21 0916)  ADL Inpatient CMS 0-100% Score: 70.42 (03/19/21 0916)  ADL Inpatient CMS G-Code Modifier : CL (03/19/21 8208)    Goals  Short term goals  Time Frame for Short term goals: by dc: STATUS GOALS 3/19 : ALL GOALS ONGONIG  Short term goal 1: Pt will complete LB dressing with mod A  Short term goal 2: Pt will complete toileting with mod A  Short term goal 3: Pt will complete BSC transfer with mod A  Short term goal 4: Pt will complete simple grooming task with mod A  Patient Goals   Patient goals : not stated 2/2 cognitive deficits       Therapy Time   Individual Concurrent Group Co-treatment   Time In 0840         Time Out 0910         Minutes Madeline SE[address] OTR/L 6731

## 2021-03-20 NOTE — PROGRESS NOTES
Nephrology Progress Note  [phone]   [email or web address]    Patient:  Ferny Stevenson Sr   : 1940    CC:  KEENAN, CKD    Subjective:  Transferred out of ICU. Breathing stable. Confused. ROS:   In bed. PMSHx:  medications reviewed    Vitals:  /79   Pulse 87   Temp 97.6 °F (36.4 °C) (Axillary)   Resp 18   Ht 5' 9\" (1.753 m)   Wt 143 lb 8.3 oz (65.1 kg)   SpO2 99%   BMI 21.19 kg/m²       Intake/Output Summary (Last 24 hours) at 3/20/2021 1443  Last data filed at 3/20/2021 1016  Gross per 24 hour   Intake 60 ml   Output 475 ml   Net -415 ml       Physical Exam:  Gen: NAD, confused  CV: RRR, no S3.  Lungs: +vent, bilateral coarse BS. Abd: S/NT +BS +ostomy  Ext: No edema, no cyanosis  Skin: Warm. No rashes appreciated. : +coleman. Labs:  CBC:   Lab Results   Component Value Date    WBC 6.5 2021    RBC 3.30 2021    RBC 4.07 2017    HGB 10.2 2021    HCT 31.0 2021    MCV 94.0 2021    MCH 30.9 2021    MCHC 32.9 2021    RDW 15.4 2021     2021    MPV 8.9 2021     BMP:    Lab Results   Component Value Date     2021    K 4.0 2021    K 4.5 2021     2021    CO2 20 2021    BUN 45 2021    LABALBU 3.2 2021    CREATININE 2.5 2021    CALCIUM 8.8 2021    GFRAA 30 2021    GFRAA >60 2013    LABGLOM 25 2021    GLUCOSE 132 2021    GLUCOSE 128 2017       Assessment/Plan:  1) KEENAN on CKD: non-oliguric              - baseline 09/10/19 Cr 1.8              - Likely ATN based on data.    - Cr continuing to increase              - not a dialysis candidate due to dementia, and his significant other is in agreement per discussion with Dr. Georgette Jacinto   - holding off IVF as it is a futile, temporizing measure     2) extubated              - pulmonary following.      3) ID              - CT reviewed              - question of aspiration pneumonia

## 2021-03-20 NOTE — FLOWSHEET NOTE
Pt has had little sleep overnight. Constantly fidgeting and trying to get out of bed. Strips off gown, ostomy bag and throws blankets on the floor. Totally confused and disoriented. Unable to redirect. Tele camera sounding often reminding him to keep his legs in the bed. Pt just does not comprehend any commands or instructions. Zyprexa to be given per prn orders. Blood to be drawn from R chest port for ordered labs. Having to be in pt's room every few minutes to redirect and reposition pt to keep from falling and getting out of bed.

## 2021-03-20 NOTE — FLOWSHEET NOTE
Pt acting very restless and agitated. Has tried multiple times to get out of bed. Tele camera has been alerting staff of pulling at lines and attempts to get out of bed. Pt very confused and unable to understand directions. Zyprexa given at this time per prn orders. Pt pulling at chest port, urostomy bag, tele monitor box, gown and linens. Chest port re-accessed at [address] [place] [address] and is working properly, patent, and flushes well with good blood return. All fall precautions in place. Bed alarm on. Call light in reach. Continuous monitoring. No restraints at this time.

## 2021-03-20 NOTE — PLAN OF CARE
Problem: Non-Violent Restraints  Goal: Removal from restraints as soon as assessed to be safe  3/20/2021 1202 by Vane Miles RN  Outcome: Ongoing  3/19/2021 2354 by Herb Mustafa RN  Outcome: Ongoing  Goal: No harm/injury to patient while restraints in use  3/20/2021 1202 by Vane Miles RN  Outcome: Ongoing  3/19/2021 2354 by Herb Mustafa RN  Outcome: Ongoing  Goal: Patient's dignity will be maintained  3/20/2021 1202 by Vane Miles RN  Outcome: Ongoing  3/19/2021 2354 by Herb Mustafa RN  Outcome: Ongoing

## 2021-03-21 NOTE — PLAN OF CARE
Problem: Nutrition  Goal: Optimal nutrition therapy  3/21/2021 0151 by Charmayne Baseman, RN  Outcome: Ongoing  3/20/2021 1202 by Rosalie Mariscal RN  Outcome: Ongoing     Problem: Skin Integrity:  Goal: Will show no infection signs and symptoms  Description: Will show no infection signs and symptoms  3/21/2021 0151 by Charmayne Baseman, RN  Outcome: Ongoing  3/20/2021 1202 by Rosalie Mariscal RN  Outcome: Ongoing  Goal: Absence of new skin breakdown  Description: Absence of new skin breakdown  3/21/2021 0151 by Charmayne Baseman, RN  Outcome: Ongoing  3/20/2021 1202 by Rosalie Mariscal RN  Outcome: Ongoing     Problem: Falls - Risk of:  Goal: Will remain free from falls  Description: Will remain free from falls  3/21/2021 0151 by Charmayne Baseman, RN  Outcome: Ongoing  3/20/2021 1202 by Rosalie Mariscal RN  Outcome: Ongoing  Goal: Absence of physical injury  Description: Absence of physical injury  3/21/2021 0151 by Charmayne Baseman, RN  Outcome: Ongoing  3/20/2021 1202 by Rosalie Mariscal RN  Outcome: Ongoing

## 2021-03-21 NOTE — PROGRESS NOTES
Daughter was at bedside earlier in the shift, explained pt's current labs specifically sodium and creatinine and how they're contributing to his increasingly altered mental status, as well as poor oral intake. Daughter states the family is still discussing hospice, but they're leaning toward home hospice at this time. Daughter informed pt is still a full code at this time as well. Will continue to monitor.

## 2021-03-21 NOTE — PROGRESS NOTES
acetaminophen      Intake/Output Summary (Last 24 hours) at 3/21/2021 0910  Last data filed at 3/21/2021 9799  Gross per 24 hour   Intake 360 ml   Output 1300 ml   Net -940 ml       Physical Exam Performed:    /81   Pulse 91   Temp 97.6 °F (36.4 °C) (Oral)   Resp 18   Ht 5' 9\" (1.753 m)   Wt 139 lb 15.9 oz (63.5 kg)   SpO2 96%   BMI 20.67 kg/m²     General appearance: Alert, sitting up in bed  HEENT: Pupils equal, round, and reactive to light. Conjunctivae/corneas clear. Neck: Supple, with full range of motion. No jugular venous distention. Trachea midline. Respiratory:  Normal respiratory effort. Clear to auscultation  Cardiovascular: Regular rate and rhythm with normal S1/S2  Abdomen: Ostomy in place, Soft, non-tender, non-distended with normal bowel sounds. Musculoskeletal: No clubbing, cyanosis or edema bilaterally. Skin: Skin color, texture, turgor normal.  No rashes or lesions. Neurologic: More alert today but remains confused, oriented to self  Psychiatric: Alert  Capillary Refill: Brisk,< 3 seconds   Peripheral Pulses: +2 palpable, equal bilaterally       Labs:   Recent Labs     03/19/21  0721 03/20/21  0500 03/21/21  0703   WBC 7.2 6.5 10.4   HGB 9.7* 10.2* 10.8*   HCT 30.3* 31.0* 33.4*    308 323     Recent Labs     03/19/21  0721 03/20/21  0500 03/21/21  0703   * 154* 157*   K 5.0 4.0 4.5   * 122* 125*   CO2 21 20* 20*   BUN 40* 45* 50*   CREATININE 2.5* 2.5* 3.2*   CALCIUM 8.9 8.8 9.5   PHOS 3.7 3.8 4.2     No results for input(s): AST, ALT, BILIDIR, BILITOT, ALKPHOS in the last 72 hours. No results for input(s): INR in the last 72 hours. No results for input(s): Amorita Ripper in the last 72 hours.     Urinalysis:      Lab Results   Component Value Date    NITRU Negative 03/12/2021    WBCUA 8 03/12/2021    BACTERIA 4+ 03/12/2021    RBCUA 0-2 03/12/2021    BLOODU MODERATE 03/12/2021    SPECGRAV 1.015 03/12/2021    GLUCOSEU Negative 03/12/2021       Radiology: XR CHEST PORTABLE   Final Result   Enteric tube as described. XR CHEST PORTABLE   Final Result   Enteric feeding tube projects to the proximal stomach. Other supportive   tubing is in normal position. No acute cardiopulmonary disease. CT Head WO Contrast   Final Result   No acute intracranial abnormality. CT CHEST ABDOMEN PELVIS WO CONTRAST   Final Result   Chest-      Supportive tubing is in normal position. Mild amount of secretions are noted in the right bronchus intermedius. Foci of airspace disease in the right upper and dependent both lower lobes. Pneumonia versus aspiration pneumonitis. ---      Abdomen pelvis-      Findings suggestive of enteritis. Previous bilateral hydroureteronephrosis has resolved. There is a right   lower quadrant ileal conduit with parastomal fluid, herniated portion of the   ileal conduit versus seroma. Suspected gallbladder sludge. Stable sclerotic foci along the right sacroiliac joint and left superior   medial pubis. XR CHEST PORTABLE   Final Result   No acute findings. Assessment/Plan:      Nutrition - Pt not eating or drinking enough to maintain nutritional needs  - Palliative Care discussing with his family. Considering Hospice   - Await family decision    Acute metabolic encephalopathy - the acute aspect has resolved  At baseline. Oriented to self only  Continue low-dose Zyprexa for agitation   Continues off restraints  Continue to monitor    Acute respiratory failure with hypoxia   Resolved. On room air  Extubated on March 14 MRSA pneumonia    Acute kidney injury on CKD  Nephrology managing  Creatinine trending higher  Continue to trend  Presented at 5.8, down with IV fluids    Hypernatremia - Sodium increased.  Nephrology managing    Sepsis - Resolved  Improved with IV antibiotics and IV fluids  Respiratory culture positive for MRSA    Aspiration pneumonia - resolved  S/p Unasyn and Vancomycin x 7 days total  Speech-language pathology assessing  Family does not want PEG     Small bowel obstruction status post partial small bowel resection  Monitor    History of bladder cancer status post ileal conduit  Monitor        DVT Prophylaxis: Heparin  Diet: DIET DYSPHAGIA PUREED; Dysphagia Pureed; Moderately Thick (Honey)  Dietary Nutrition Supplements: Frozen Oral Supplement  Code Status: Full Code    PT/OT Eval Status: 3-5 sessions per week    Dispo - Will need SNF at discharge.  Palliative care consulting    Patience Cleaning MD

## 2021-03-21 NOTE — PLAN OF CARE
Problem: Non-Violent Restraints  Goal: Removal from restraints as soon as assessed to be safe  3/21/2021 1057 by Mary Mcneil RN  Outcome: Ongoing     Problem: Nutrition  Goal: Optimal nutrition therapy  3/21/2021 1057 by Mary Mcneil RN  Outcome: Ongoing     Problem: Skin Integrity:  Goal: Will show no infection signs and symptoms  Description: Will show no infection signs and symptoms  3/21/2021 1057 by Mary Mcneil RN  Outcome: Ongoing   Skin integrity improved/maintained this shift. See head to toe assessment. Problem: Falls - Risk of:  Goal: Will remain free from falls  Description: Will remain free from falls  3/21/2021 1057 by Mary Mcneil RN  Outcome: Ongoing   Pt. Free of falls and injuries this shift.

## 2021-03-21 NOTE — PROGRESS NOTES
Nephrology Progress Note  [phone]   [email or web address]    Patient:  Saul Flores Sr   : 1940    CC:  KEENAN, CKD    Subjective:  Breathing stable. Confused. ROS:   In bed. PMSHx:  medications reviewed    Vitals:  /63   Pulse 68   Temp 98.2 °F (36.8 °C) (Axillary)   Resp 18   Ht 5' 9\" (1.753 m)   Wt 139 lb 15.9 oz (63.5 kg)   SpO2 97%   BMI 20.67 kg/m²       Intake/Output Summary (Last 24 hours) at 3/21/2021 1532  Last data filed at 3/21/2021 1338  Gross per 24 hour   Intake 120 ml   Output 1175 ml   Net -1055 ml       Physical Exam:  Gen: NAD, confused  CV: RRR, no S3.  Lungs:  poor effort  Abd: S/NT +BS +ostomy  Ext: No edema, no cyanosis  Skin: Warm. No rashes appreciated. : +coleman.     Labs:  CBC:   Lab Results   Component Value Date    WBC 10.4 2021    RBC 3.53 2021    RBC 4.07 2017    HGB 10.8 2021    HCT 33.4 2021    MCV 94.7 2021    MCH 30.5 2021    MCHC 32.2 2021    RDW 15.9 2021     2021    MPV 8.9 2021     BMP:    Lab Results   Component Value Date     2021    K 4.5 2021    K 4.5 2021     2021    CO2 20 2021    BUN 50 2021    LABALBU 3.2 2021    CREATININE 3.2 2021    CALCIUM 9.5 2021    GFRAA 23 2021    GFRAA >60 2013    LABGLOM 19 2021    GLUCOSE 122 2021    GLUCOSE 128 2017       Assessment/Plan:  1) KEENAN on CKD, recurred after corrected due to patient's poor oral intake              - baseline 09/10/19 Cr 1.8   - Cr continuing to increase              - not a dialysis candidate due to dementia, and his significant other is in agreement per discussion with Dr. Kristin Nunez   - holding off IVF as it is a futile, temporizing measure   - d/c labs     2) extubated              - pulmonary following.      3) ID              - CT reviewed              - question of aspiration pneumonia              - on unasyn, vancomycin     4) SBO from ischemic bowel s/p exp lap and partial bowel resection 02/19/21     5) bladder cancer and ileal conduit     6) FEN:              Hypernatremia                          - worse    - this is a poor prognostic sign              Hypokalemia                          - K is normal today. metabolic acidosis                          - monitor   nutrition    - family placed limit on his care at the PEG     7) dementia              - poor prognosis              - appreciate palliative care's input     This is a very unfortunate situation. The underlying issue is the patient's advanced dementia. The precipitating factor was the SBO and the partial bowel resection. Unfortunately, in these situations, once the precipitating factor occurs, the patient's entire health falls apart, and the patient is unable to recover. He is unable to adequately eat and drink, and this is quite unlikely to improve. Providing IVF is a futile intervention as it only delays the inevitable and the underlying issue of the patient not eating/drinking due to his dementia will cause the situation to repeatedly occur. Hospice is strongly recommended. Still awaiting for family to make final decision.

## 2021-03-21 NOTE — PROGRESS NOTES
Writer entered room, telecamera in room for confusion and agitation, pt had removed his urostomy bag and threw on the floor. Pt only oriented to self. Attempted to feed pt breakfast and administer meds crushed in applesauce, but he spit everything out. New bag applied to urostomy, spoke with [facility] to ensure they call if pt begins to pick at his bag. Will continue to monitor.

## 2021-03-22 NOTE — PROGRESS NOTES
Speech Language Pathology    Dysphagia followup: chart reviewed and plan for hospice care noted. ST will hold today and followup to confirm no additional needs on 3/23 unless otherwise notified.     Jaylyn Hannah MS, CCC-SLP #[id number]  Speech Language Pathologist

## 2021-03-22 NOTE — PROGRESS NOTES
Hospitalist Progress Note      PCP: Shameka Tavarez MD    Date of Admission: 3/12/2021    Chief Complaint: [address] Course: 80m s/p exp lap with bowel rxn 2/18/21 for SBO, presents from [facility] with AMS requiring intubation in the ER or suspected aspiration pna. Sputum cultures were (+) for MRSA, tolerated extubation 3/14. Similar to previous admission, patient has had delirium worse in the evening/ afternoons with prior history of dementia. MRI shows no acute changes, patient remains afebrile, stable on room air, though with poor PO intake and worsening renal function. Subjective: No distress on room air, denies both chest and abdominal pain. Medications:  Reviewed    Infusion Medications    dextrose       Scheduled Medications    lactobacillus  2 capsule Oral BID WC    insulin lispro  0-6 Units Subcutaneous Q4H    sodium chloride flush  10 mL Intravenous 2 times per day    heparin (porcine)  5,000 Units Subcutaneous 3 times per day     PRN Meds: OLANZapine, glucose, dextrose, glucagon (rDNA), dextrose, sodium chloride flush, [DISCONTINUED] promethazine **OR** ondansetron, polyethylene glycol, acetaminophen **OR** acetaminophen      Intake/Output Summary (Last 24 hours) at 3/22/2021 1257  Last data filed at 3/22/2021 0940  Gross per 24 hour   Intake 0 ml   Output 850 ml   Net -850 ml       Physical Exam Performed:    /87   Pulse 101   Temp 97.5 °F (36.4 °C) (Axillary)   Resp 18   Ht 5' 9\" (1.753 m)   Wt 138 lb 14.2 oz (63 kg)   SpO2 98%   BMI 20.51 kg/m²     General appearance: No apparent distress, incoherently responsive  HEENT: Pupils equal, round, and reactive to light. Conjunctivae/corneas clear. Neck: Supple, with full range of motion. No jugular venous distention. Trachea midline. Respiratory:  Normal respiratory effort.  No use of accessory muscles, no intercostal retractions  Cardiovascular: Regular rate and rhythm    Abdomen: Soft, non-tender, non-distended Musculoskeletal: No clubbing, cyanosis or edema bilaterally. Labs:   Recent Labs     03/20/21  0500 03/21/21  0703   WBC 6.5 10.4   HGB 10.2* 10.8*   HCT 31.0* 33.4*    323     Recent Labs     03/20/21  0500 03/21/21  0703   * 157*   K 4.0 4.5   * 125*   CO2 20* 20*   BUN 45* 50*   CREATININE 2.5* 3.2*   CALCIUM 8.8 9.5   PHOS 3.8 4.2     No results for input(s): AST, ALT, BILIDIR, BILITOT, ALKPHOS in the last 72 hours. No results for input(s): INR in the last 72 hours. No results for input(s): Ja Spoon in the last 72 hours. Urinalysis:      Lab Results   Component Value Date    NITRU Negative 03/12/2021    WBCUA 8 03/12/2021    BACTERIA 4+ 03/12/2021    RBCUA 0-2 03/12/2021    BLOODU MODERATE 03/12/2021    SPECGRAV 1.015 03/12/2021    GLUCOSEU Negative 03/12/2021       Radiology:  XR CHEST PORTABLE   Final Result   Enteric tube as described. XR CHEST PORTABLE   Final Result   Enteric feeding tube projects to the proximal stomach. Other supportive   tubing is in normal position. No acute cardiopulmonary disease. CT Head WO Contrast   Final Result   No acute intracranial abnormality. CT CHEST ABDOMEN PELVIS WO CONTRAST   Final Result   Chest-      Supportive tubing is in normal position. Mild amount of secretions are noted in the right bronchus intermedius. Foci of airspace disease in the right upper and dependent both lower lobes. Pneumonia versus aspiration pneumonitis. ---      Abdomen pelvis-      Findings suggestive of enteritis. Previous bilateral hydroureteronephrosis has resolved. There is a right   lower quadrant ileal conduit with parastomal fluid, herniated portion of the   ileal conduit versus seroma. Suspected gallbladder sludge. Stable sclerotic foci along the right sacroiliac joint and left superior   medial pubis. XR CHEST PORTABLE   Final Result   No acute findings. Assessment/Plan:    Active Hospital Problems    Diagnosis Date Noted    Pneumonia due to methicillin resistant Staphylococcus aureus (MRSA) [facility] [J15.212]     Aspiration pneumonia ([facility] [N36.8]     Metabolic encephalopathy [H09.53]     Acute kidney injury superimposed on chronic kidney disease ([facility] [N17.9, N18.9]     Acute respiratory failure with hypoxia ([facility] [J96.01]     Altered mental status [R41.82]     Acute encephalopathy [G93.40] 03/12/2021     1) Asp PNA  - treted / resolved    2) Dysphagia  - tolerating diet    3) KEENAN  - worsening likely secondary too poor PO intake    4) Disp  - home with hospice   Diet: DIET DYSPHAGIA PUREED; Dysphagia Pureed;  Moderately Thick (Honey)  Dietary Nutrition Supplements: Frozen Oral Supplement  Code Status: DNR-CC         Dispo - DC maira Orr MD

## 2021-03-22 NOTE — PROGRESS NOTES
Comprehensive Nutrition Assessment    Type and Reason for Visit:  Reassess    Nutrition Recommendations/Plan:   Continue current plan  No further recommendations, dc to home Hospice tomorrow    Nutrition Assessment:  Follow-up. Pt is now on pureed diet with honey thick liquids and Magic Cup. Recorded intake is 0-50% of meals. Family decided for home Hopsice today and pt will be discharged tomorrow. Malnutrition Assessment:  Malnutrition Status:  Insufficient data    Context:  Acute Illness     Findings of the 6 clinical characteristics of malnutrition:  Energy Intake:  7 - 50% or less of estimated energy requirements for 5 or more days  Weight Loss:  Unable to assess     Body Fat Loss:  Unable to assess     Muscle Mass Loss:  Unable to assess    Fluid Accumulation:  No significant fluid accumulation     Strength:  Not Performed    Estimated Daily Nutrient Needs:  Energy (kcal):  7439-4993 kcal (25-30 kcal/kg CBW); Weight Used for Energy Requirements:        Protein (g):  60-97 gm (1-1.4gm/kg CBW); Weight Used for Protein Requirements:           Fluid (ml/day):  1 mL/kcal; Method Used for Fluid Requirements:         Nutrition Related Findings:  BM 3/22, trace BLE edema      Wounds:  Surgical Incision(abdomen)       Current Nutrition Therapies:    DIET DYSPHAGIA PUREED; Dysphagia Pureed;  Moderately Thick (Honey)  Dietary Nutrition Supplements: Frozen Oral Supplement    Anthropometric Measures:  · Height: 5' 9\" (175.3 cm)  · Current Body Weight: 138 lb 14.2 oz (63 kg)   · Admission Body Weight: 147 lb (66.7 kg)    · Usual Body Weight:       · Ideal Body Weight: 160 lbs; % Ideal Body Weight 91.9 %   · BMI: 20.5      · BMI Categories: Underweight (BMI less than 22) age over 72       Nutrition Diagnosis:   · Inadequate oral intake related to cognitive or neurological impairment as evidenced by intake 0-25%      Nutrition Interventions:   Food and/or Nutrient Delivery:  Continue Current Diet, Start Oral Nutrition Supplement  Nutrition Education/Counseling:  No recommendation at this time   Coordination of Nutrition Care:  Continue to monitor while inpatient    Goals:  New goal- consume >50% of meals and ONS during admission       Nutrition Monitoring and Evaluation:   Behavioral-Environmental Outcomes:  None Identified   Food/Nutrient Intake Outcomes:  Food and Nutrient Intake, Supplement Intake  Physical Signs/Symptoms Outcomes:  Biochemical Data, Chewing or Swallowing, Weight, Skin, Nutrition Focused Physical Findings     Discharge Planning:    No discharge needs at this time     Electronically signed by Jer Pichardo RD, LD on 3/22/21 at 1:37 PM EDT    Contact: [phone]

## 2021-03-22 NOTE — PROGRESS NOTES
Pt refusing all oral intake. Agitated and picking at abdominal binder and gown, given PRN zyprexa through accessed port. Speech more slurred today, less conversation. Depends changed for small bowel movement. Tele camera remains in place for safety.

## 2021-03-22 NOTE — PLAN OF CARE
Problem: Nutrition  Goal: Optimal nutrition therapy  3/22/2021 1339 by Victor Manuel Santamaria RD, LD  Outcome: Ongoing  3/22/2021 1021 by Rogelio Avery RN  Outcome: Ongoing     Problem: Nutrition  Goal: Optimal nutrition therapy  3/22/2021 1339 by Victor Manuel Santamaria RD, LD  Outcome: Ongoing  3/22/2021 1021 by Rogelio Aevry RN  Outcome: Ongoing   Nutrition Problem #1: Inadequate oral intake  Intervention: Food and/or Nutrient Delivery: Continue Current Diet, Start Oral Nutrition Supplement  Nutritional Goals: New goal- consume >50% of meals and ONS during admission

## 2021-03-22 NOTE — PROGRESS NOTES
Occupational Therapy  Name: Mo Lopez   : 1940    MRN: [id number]     OT follow-up attempt. Nursing reports pt is not appropriate for therapy this date due to cognition/not following commands/agitation. Awaiting family decision on hospice. Will continue to follow.     Electronically signed by JANET Doe, EDR/L on 3/22/2021 at 1:37 PM

## 2021-03-22 NOTE — PROGRESS NOTES
Physical Therapy  Daily Treatment Attempt    21    Name: Janes Sparrow    : 1940    MRN: [id number]    PT follow-up attempt. Nursing reports pt is not appropriate for therapy this date due to cognition/not following commands/agitation. Awaiting family decision on hospice. Will continue to follow.     Electronically signed by Kami Welsh PT on 3/22/2021 at 11:48 AM

## 2021-03-22 NOTE — PROGRESS NOTES
Pt awake,but confused. Urostomy bag in place,covered with a binder. Pt pulling at gown and blankets,repositioned. Bed alarm on. Telecam in place. Pt has a rt port that is capped. Monitor showing NSR/SINUS TACH.

## 2021-03-22 NOTE — PROGRESS NOTES
Nephrology Progress Note  [phone]   [email or web address]    Patient:  Radha Curry Sr   : 1940    CC:  KEENAN, CKD    Subjective:   Confused. NAD    ROS:   No CP or SOB    PMSHx:  medications reviewed    Vitals:  /68   Pulse 101   Temp 98.8 °F (37.1 °C) (Axillary)   Resp 18   Ht 5' 9\" (1.753 m)   Wt 138 lb 14.2 oz (63 kg)   SpO2 92%   BMI 20.51 kg/m²       Intake/Output Summary (Last 24 hours) at 3/22/2021 0858  Last data filed at 3/22/2021 0409  Gross per 24 hour   Intake 0 ml   Output 750 ml   Net -750 ml       Physical Exam:  Gen: NAD, confused  CV: RRR, no S3.  Lungs:  poor effort  Abd: S/NT +BS +ostomy  Ext: No edema, no cyanosis  Skin: Warm. No rashes appreciated. : +coleman.     Labs:  CBC:   Lab Results   Component Value Date    WBC 10.4 2021    RBC 3.53 2021    RBC 4.07 2017    HGB 10.8 2021    HCT 33.4 2021    MCV 94.7 2021    MCH 30.5 2021    MCHC 32.2 2021    RDW 15.9 2021     2021    MPV 8.9 2021     BMP:    Lab Results   Component Value Date     2021    K 4.5 2021    K 4.5 2021     2021    CO2 20 2021    BUN 50 2021    LABALBU 3.2 2021    CREATININE 3.2 2021    CALCIUM 9.5 2021    GFRAA 23 2021    GFRAA >60 2013    LABGLOM 19 2021    GLUCOSE 122 2021    GLUCOSE 128 2017       Assessment/Plan:  1) KEENAN on CKD, recurred after corrected due to patient's poor oral intake                         - not a dialysis candidate due to dementia, and his significant other is in agreement per discussion with Dr. Cathie Stevenson   - holding off IVF as it is a futile, temporizing measure   - d/c'd labs     2) extubated              - pulmonary following.      - question of aspiration pneumonia              - on unasyn, vancomycin     3) SBO from ischemic bowel s/p exp lap and partial bowel resection 21     4) bladder cancer and ileal conduit     5) FEN:    - family placed limit on his care at the PEG     6) dementia              - poor prognosis              - appreciate palliative care's input     7) PROGNOSIS- agree with dr Bolton:\"This is a very unfortunate situation. The underlying issue is the patient's advanced dementia. The precipitating factor was the SBO and the partial bowel resection. Unfortunately, in these situations, once the precipitating factor occurs, the patient's entire health falls apart, and the patient is unable to recover. He is unable to adequately eat and drink, and this is quite unlikely to improve. Providing IVF is a futile intervention as it only delays the inevitable and the underlying issue of the patient not eating/drinking due to his dementia will cause the situation to repeatedly occur. Hospice is strongly recommended. \"   Still awaiting for family to make final decision.

## 2021-03-23 NOTE — DISCHARGE SUMMARY
[facility], A CAMPUS OF [facility]    Discharge Summary    Patient:  Kaye Ball  YOB: 1940    MRN: [id number]   Acct: [de-identified]    Primary Care Physician: Madelene Closs, MD    Admit date:  3/12/2021    Discharge date:   3/23/2021      Discharge Diagnoses:   <principal problem not specified>  Active Problems:    Acute encephalopathy    Acute kidney injury superimposed on chronic kidney disease ([address]    Acute respiratory failure with hypoxia ([address]    Altered mental status    Pneumonia due to methicillin resistant Staphylococcus aureus (MRSA) ([facility]    Aspiration pneumonia ([facility]    Metabolic encephalopathy  Resolved Problems:    * No resolved hospital problems. *        Admitted for: (HPI) [address] Course:  80m s/p exp lap with bowel rxn 2/18/21 for SBO, presents from [facility] with AMS requiring intubation in the ER or suspected aspiration pna. Sputum cultures were (+) for MRSA, tolerated extubation 3/14. Similar to previous admission, patient has had delirium worse in the evening/ afternoons with prior history of dementia. MRI shows no acute changes, patient remains afebrile, stable on room air, though with poor PO intake and worsening renal function. Family has opted for home with Hospice care. Consultants:  Nephrology - Dr. Joanie Bashir ; [place]    Discharge Medications:       Medication List      START taking these medications    LORazepam 2 MG/ML concentrated solution  Commonly known as: LORazepam intensol  Take 0.5 mLs by mouth every 4 hours as needed (anxiety / agitation) for up to 14 days.   Replaces: LORazepam 0.5 MG tablet        CONTINUE taking these medications    acetaminophen 500 MG tablet  Commonly known as: TYLENOL     divalproex 125 MG capsule  Commonly known as: DEPAKOTE SPRINKLE        STOP taking these medications    donepezil 5 MG tablet  Commonly known as: Aricept     furosemide 20 MG tablet  Commonly known as: LASIX     haloperidol lactate 5 MG/ML injection  Commonly known as: HALDOL     LORazepam 0.5 MG tablet  Commonly known as: ATIVAN  Replaced by: LORazepam 2 MG/ML concentrated solution     potassium chloride 20 MEQ extended release tablet  Commonly known as: Klor-Con M20     risperiDONE 1 MG tablet  Commonly known as: RISPERDAL           Where to Get Your Medications      You can get these medications from any pharmacy    Bring a paper prescription for each of these medications  · LORazepam 2 MG/ML concentrated solution           Physical Exam:    Vitals:  Vitals:    03/22/21 2125 03/23/21 0037 03/23/21 0640 03/23/21 0900   BP: 117/78 110/72 104/65 134/67   Pulse: 84 124 107 112   Resp: 17 18 17 20   Temp: 98.9 °F (37.2 °C) 97.6 °F (36.4 °C) 98.2 °F (36.8 °C) 98.5 °F (36.9 °C)   TempSrc: Axillary Axillary Axillary Axillary   SpO2:       Weight:   133 lb 6.1 oz (60.5 kg)    Height:         Weight: Weight: 133 lb 6.1 oz (60.5 kg)     24 hour intake/output:    Intake/Output Summary (Last 24 hours) at 3/23/2021 1202  Last data filed at 3/22/2021 1813  Gross per 24 hour   Intake 0 ml   Output 150 ml   Net -150 ml       General appearance - chronically ill appearing, in no distress  Chest -  ymmetric air entry  Heart - normal rate, regular rhythm   Abdomen - soft, nontender, nondistended   Obese: No; Protuberant: No   Neurological - somnolent   Extremities - peripheral pulses normal, no pedal edema, no clubbing or cyanosis  Skin - normal coloration and turgor     Radiology reports as per the Radiologist  Radiology: Ct Head Wo Contrast    Result Date: 3/12/2021  EXAMINATION: CT OF THE HEAD WITHOUT CONTRAST  3/12/2021 2:44 pm TECHNIQUE: CT of the head was performed without the administration of intravenous contrast. Dose modulation, iterative reconstruction, and/or weight based adjustment of the mA/kV was utilized to reduce the radiation dose to as low as reasonably achievable.  COMPARISON: 03/03/2021 CT HISTORY: ORDERING SYSTEM PROVIDED HISTORY: altered mental status TECHNOLOGIST PROVIDED HISTORY: Reason for exam:->altered mental status Has a \"code stroke\" or \"stroke alert\" been called? ->No Decision Support Exception->Emergency Medical Condition (MA) Reason for Exam: ams, fatigue, Relevant Medical/Surgical History: bladder ca, FINDINGS: BRAIN/VENTRICLES: The ventricles and sulci are prominent. Pattern is consistent with age-related atrophy. No extra-axial fluid collections, and no sign of recent intracranial hemorrhage. Decreased attenuation is noted within the periventricular white matter. Pattern is consistent with chronic small vessel ischemic change. 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 No acute edema or mass effect. No mass lesions are detected. ORBITS: The visualized portion of the orbits demonstrate no acute abnormality. SINUSES: Mild mucosal thickening in the frontal and maxillary sinuses. SOFT TISSUES/SKULL:  Partial visualization of endotracheal and enteric tubes. No acute intracranial abnormality. Xr Chest Portable    Result Date: 3/12/2021  EXAMINATION: ONE XRAY VIEW OF THE CHEST 3/12/2021 1:22 pm COMPARISON: March 3, 2021 HISTORY: ORDERING SYSTEM PROVIDED HISTORY: hypoxia TECHNOLOGIST PROVIDED HISTORY: Reason for exam:->hypoxia Reason for Exam: hypoxia;ET and OG placement Acuity: Acute Type of Exam: Initial FINDINGS: Endotracheal tube with tip 6.4 cm from the prabhu. NG tube with tip in the stomach. Right central venous catheter with tip projecting in the region of the mid to lower SVC. No pneumothorax. No consolidation. No pleural effusion. No acute bony abnormality. Degenerative changes at the shoulders bilaterally. No acute findings.      Ct Chest Abdomen Pelvis Wo Contrast    Result Date: 3/12/2021  EXAMINATION: CT OF THE CHEST, ABDOMEN, AND PELVIS WITHOUT CONTRAST 3/12/2021 2:44 pm TECHNIQUE: CT of the chest, abdomen and pelvis was performed without the administration of intravenous contrast. Multiplanar reformatted images are provided for review. Dose modulation, iterative reconstruction, and/or weight based adjustment of the mA/kV was utilized to reduce the radiation dose to as low as reasonably achievable. COMPARISON: 02/18/2021 HISTORY: ORDERING SYSTEM PROVIDED HISTORY: acute renal failure TECHNOLOGIST PROVIDED HISTORY: Reason for exam:->acute renal failure Additional Contrast?->None Decision Support Exception->Emergency Medical Condition (MA) Reason for Exam: weakenss, fatigue, bladder ca, Acuity: Acute Type of Exam: Initial FINDINGS: Chest: Mediastinum: Endotracheal tube terminates over the mid trachea. Nasogastric tube has a normal course, distal side port at the level of the gastric body. Right jugular central line terminates in the superior vena cava. There are mild secretions in the right bronchus intermedius. No mediastinal or hilar adenopathy is seen. Lungs/pleura: A focus of peripheral airspace disease is noted in the posterior aspect of the right upper lobe. There is dependent patchy opacity in the lung bases, greater on the right. Soft Tissues/Bones: No acute osseous abnormality is appreciated. The chest wall is unremarkable. Abdomen/Pelvis: Organs: The liver is homogeneous, and normal in attenuation. There is dependent density in the gallbladder fundus, likely sludge. The pancreas, spleen, adrenal adrenal glands are unremarkable. The kidneys are symmetrical.  Previous bilateral hydroureteronephrosis has resolved. 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 There is a cyst-like parastomal fluid collection measuring 4 x 3 cm. GI/Bowel: The stomach is decompressed. No dilated loops of small bowel are seen. Non formed stool/liquid is noted in the colon and rectum, which are nondistended. No pericolonic edema is appreciated. Pelvis: Status post total cystectomy. No masses are identified. Peritoneum/Retroperitoneum: No aneurysm or adenopathy is identified. Bones/Soft Tissues:  At L4-5 there is mild anterolisthesis associated with facet arthropathy. Sclerotic foci are noted in the along the right sacroiliac joint and left medial pubis. Chest- Supportive tubing is in normal position. Mild amount of secretions are noted in the right bronchus intermedius. Foci of airspace disease in the right upper and dependent both lower lobes. Pneumonia versus aspiration pneumonitis. --- Abdomen pelvis- Findings suggestive of enteritis. Previous bilateral hydroureteronephrosis has resolved. There is a right lower quadrant ileal conduit with parastomal fluid, herniated portion of the ileal conduit versus seroma. Suspected gallbladder sludge. Stable sclerotic foci along the right sacroiliac joint and left superior medial pubis. Results for orders placed or performed during the hospital encounter of 03/12/21   Culture, Blood 1    Specimen: Blood   Result Value Ref Range    Blood Culture, Routine No Growth after 4 days of incubation. Culture, Blood 2    Specimen: Blood   Result Value Ref Range    Culture, Blood 2 No Growth after 4 days of incubation.     Culture, Respiratory    Specimen: Sputum, Suctioned   Result Value Ref Range    CULTURE, RESPIRATORY Light growth normal respiratory keri with (A)     Gram Stain Result       1+ Epithelial Cells  2+ WBC's (Polymorphonuclear)  1+ WBC's (Mononuclear)  2+ Gram positive cocci  1+ Yeast      Organism Staph aureus MRSA (A)     CULTURE, RESPIRATORY (A)      Heavy growth  CONTACT PRECAUTIONS INDICATED  PBP2= POSITIVE         Susceptibility    Staph aureus mrsa - BACTERIAL SUSCEPTIBILITY PANEL BY LUKE     ceFAZolin 8 Resistant mcg/mL     clindamycin <=0.5 Sensitive mcg/mL     erythromycin >4 Resistant mcg/mL     oxacillin >2 Resistant mcg/mL     tetracycline <=4 Sensitive mcg/mL     trimethoprim-sulfamethoxazole <=0.5/9.5 Sensitive mcg/mL     vancomycin 1 Sensitive mcg/mL   Troponin   Result Value Ref Range    Troponin 0.10 (H) <0.01 ng/mL   Brain Natriuretic Peptide   Result Value Ref Range    Pro- 0 - 449 pg/mL   Protime-INR   Result Value Ref Range    Protime 13.8 (H) 10.0 - 13.2 sec    INR 1.19 (H) 0.86 - 1.14   APTT   Result Value Ref Range    aPTT 23.8 (L) 24.2 - 36.2 sec   CBC Auto Differential   Result Value Ref Range    WBC 12.4 (H) 4.0 - 11.0 K/uL    RBC 3.36 (L) 4.20 - 5.90 M/uL    Hemoglobin 10.5 (L) 13.5 - 17.5 g/dL    Hematocrit 31.7 (L) 40.5 - 52.5 %    MCV 94.3 80.0 - 100.0 fL    MCH 31.4 26.0 - 34.0 pg    MCHC 33.3 31.0 - 36.0 g/dL    RDW 15.8 (H) 12.4 - 15.4 %    Platelets 412 035 - 882 K/uL    MPV 8.0 5.0 - 10.5 fL    Neutrophils % 80.5 %    Lymphocytes % 11.3 %    Monocytes % 7.4 %    Eosinophils % 0.1 %    Basophils % 0.7 %    Neutrophils Absolute 10.0 (H) 1.7 - 7.7 K/uL    Lymphocytes Absolute 1.4 1.0 - 5.1 K/uL    Monocytes Absolute 0.9 0.0 - 1.3 K/uL    Eosinophils Absolute 0.0 0.0 - 0.6 K/uL    Basophils Absolute 0.1 0.0 - 0.2 K/uL   Comprehensive Metabolic Panel w/ Reflex to MG   Result Value Ref Range    Sodium 148 (H) 136 - 145 mmol/L    Potassium reflex Magnesium 5.0 3.5 - 5.1 mmol/L    Chloride 111 (H) 99 - 110 mmol/L    CO2 20 (L) 21 - 32 mmol/L    Anion Gap 17 (H) 3 - 16    Glucose 185 (H) 70 - 99 mg/dL    BUN 62 (H) 7 - 20 mg/dL    CREATININE 5.8 (HH) 0.8 - 1.3 mg/dL    GFR Non-African American 9 (A) >60    GFR  11 (A) >60    Calcium 9.5 8.3 - 10.6 mg/dL    Total Protein 7.8 6.4 - 8.2 g/dL    Albumin 3.2 (L) 3.4 - 5.0 g/dL    Albumin/Globulin Ratio 0.7 (L) 1.1 - 2.2    Total Bilirubin 0.4 0.0 - 1.0 mg/dL    Alkaline Phosphatase 79 40 - 129 U/L    ALT 20 10 - 40 U/L    AST 33 15 - 37 U/L    Globulin 4.6 g/dL   Urinalysis Reflex to Culture    Specimen: Urine, clean catch   Result Value Ref Range    Color, UA YELLOW Straw/Yellow    Clarity, UA CLOUDY (A) Clear    Glucose, Ur Negative Negative mg/dL    Bilirubin Urine Negative Negative    Ketones, Urine Negative Negative mg/dL    Specific Gravity, UA 1.015 1.005 - 1.030    Blood, Urine MODERATE (A) Negative    pH, UA 6.5 5.0 - 8.0    Protein, UA 30 (A) Negative mg/dL    Urobilinogen, Urine 0.2 <2.0 E.U./dL    Nitrite, Urine Negative Negative    Leukocyte Esterase, Urine Negative Negative    Microscopic Examination YES     Urine Type 4     Urine Reflex to Culture Not Indicated    Lactate, Sepsis   Result Value Ref Range    Lactic Acid, Sepsis 1.8 0.4 - 1.9 mmol/L   Blood Gas, Arterial   Result Value Ref Range    pH, Arterial 7.369 7.350 - 7.450    pCO2, Arterial 34.6 (L) 35.0 - 45.0 mmHg    pO2, Arterial 286.0 (H) 75.0 - 108.0 mmHg    HCO3, Arterial 19.9 (L) 21.0 - 29.0 mmol/L    Base Excess, Arterial -4.8 (L) -3.0 - 3.0 mmol/L    Hemoglobin, Art, Extended 9.5 (L) 13.5 - 17.5 g/dL    O2 Sat, Arterial 100.0 >92 %    Carboxyhgb, Arterial 1.3 0.0 - 1.5 %    Methemoglobin, Arterial 0.5 <1.5 %    TCO2, Arterial 21.0 Not Established mmol/L    O2 Content, Arterial 14 Not Established mL/dL    O2 Therapy Unknown    Sodium, Urine, Random   Result Value Ref Range    Sodium, Ur 50 Not Established mmol/L   Creatinine, Random Urine   Result Value Ref Range    Creatinine, Ur 164.0 39.0 - 259.0 mg/dL   Urea Nitrogen, Urine   Result Value Ref Range    Urea Nitrogen, Ur 355.6 (L) 800.0 - 1666.0 mg/dL   Microscopic Urinalysis   Result Value Ref Range    RBC, UA 0-2 0 - 4 /HPF    Bacteria, UA 4+ (A) None Seen /HPF    Amorphous, UA 1+ /HPF    Hyaline Casts, UA 6 0 - 8 /LPF    WBC, UA 8 (H) 0 - 5 /HPF    Epithelial Cells, UA 5 0 - 5 /HPF   Basic Metabolic Panel w/ Reflex to MG   Result Value Ref Range    Sodium 149 (H) 136 - 145 mmol/L    Potassium reflex Magnesium 4.5 3.5 - 5.1 mmol/L    Chloride 116 (H) 99 - 110 mmol/L    CO2 19 (L) 21 - 32 mmol/L    Anion Gap 14 3 - 16    Glucose 130 (H) 70 - 99 mg/dL    BUN 61 (H) 7 - 20 mg/dL    CREATININE 4.8 (H) 0.8 - 1.3 mg/dL    GFR Non- 12 (A) >60    GFR  14 (A) >60    Calcium 8.3 8.3 - 10.6 mg/dL   CBC auto differential   Result Value Ref Range    WBC 9.4 4.0 - 11.0 K/uL    RBC 2.96 (L) 4.20 - 5.90 M/uL    Hemoglobin 9.2 (L) 13.5 - 17.5 g/dL    Hematocrit 28.2 (L) 40.5 - 52.5 %    MCV 95.4 80.0 - 100.0 fL    MCH 31.1 26.0 - 34.0 pg    MCHC 32.6 31.0 - 36.0 g/dL    RDW 15.5 (H) 12.4 - 15.4 %    Platelets 761 015 - 360 K/uL    MPV 8.0 5.0 - 10.5 fL    Neutrophils % 76.8 %    Lymphocytes % 12.7 %    Monocytes % 7.7 %    Eosinophils % 2.1 %    Basophils % 0.7 %    Neutrophils Absolute 7.2 1.7 - 7.7 K/uL    Lymphocytes Absolute 1.2 1.0 - 5.1 K/uL    Monocytes Absolute 0.7 0.0 - 1.3 K/uL    Eosinophils Absolute 0.2 0.0 - 0.6 K/uL    Basophils Absolute 0.1 0.0 - 0.2 K/uL   Magnesium   Result Value Ref Range    Magnesium 2.90 (H) 1.80 - 2.40 mg/dL   Phosphorus   Result Value Ref Range    Phosphorus 5.2 (H) 2.5 - 4.9 mg/dL   CBC auto differential   Result Value Ref Range    WBC 9.1 4.0 - 11.0 K/uL    RBC 2.75 (L) 4.20 - 5.90 M/uL    Hemoglobin 8.5 (L) 13.5 - 17.5 g/dL    Hematocrit 26.6 (L) 40.5 - 52.5 %    MCV 96.5 80.0 - 100.0 fL    MCH 30.8 26.0 - 34.0 pg    MCHC 31.9 31.0 - 36.0 g/dL    RDW 16.1 (H) 12.4 - 15.4 %    Platelets 667 140 - 414 K/uL    MPV 8.2 5.0 - 10.5 fL    Neutrophils % 82.3 %    Lymphocytes % 8.4 %    Monocytes % 7.0 %    Eosinophils % 1.9 %    Basophils % 0.4 %    Neutrophils Absolute 7.5 1.7 - 7.7 K/uL    Lymphocytes Absolute 0.8 (L) 1.0 - 5.1 K/uL    Monocytes Absolute 0.6 0.0 - 1.3 K/uL    Eosinophils Absolute 0.2 0.0 - 0.6 K/uL    Basophils Absolute 0.0 0.0 - 0.2 K/uL   Basic metabolic panel   Result Value Ref Range    Sodium 148 (H) 136 - 145 mmol/L    Potassium 4.0 3.5 - 5.1 mmol/L    Chloride 117 (H) 99 - 110 mmol/L    CO2 20 (L) 21 - 32 mmol/L    Anion Gap 11 3 - 16    Glucose 132 (H) 70 - 99 mg/dL    BUN 53 (H) 7 - 20 mg/dL    CREATININE 3.8 (H) 0.8 - 1.3 mg/dL    GFR Non-African American 15 (A) >60    GFR  19 (A) >60    Calcium 7.9 (L) 8.3 - 10.6 mg/dL   Magnesium   Result Value Ref Range    Magnesium 2.70 (H) 1.80 - 2.40 mg/dL   Phosphorus   Result Value Ref Range    Phosphorus 3.9 2.5 - 4.9 mg/dL   Blood gas, arterial   Result Value Ref Range    pH, Arterial 7.411 7.350 - 7.450    pCO2, Arterial 32.8 (L) 35.0 - 45.0 mmHg    pO2, Arterial 131.0 (H) 75.0 - 108.0 mmHg    HCO3, Arterial 20.8 (L) 21.0 - 29.0 mmol/L    Base Excess, Arterial -3.3 (L) -3.0 - 3.0 mmol/L    Hemoglobin, Art, Extended 8.6 (L) 13.5 - 17.5 g/dL    O2 Sat, Arterial 99.6 >92 %    Carboxyhgb, Arterial 1.2 0.0 - 1.5 %    Methemoglobin, Arterial 0.4 <1.5 %    TCO2, Arterial 21.8 Not Established mmol/L    O2 Content, Arterial 12 Not Established mL/dL    O2 Therapy Unknown    CBC auto differential   Result Value Ref Range    WBC 10.2 4.0 - 11.0 K/uL    RBC 2.75 (L) 4.20 - 5.90 M/uL    Hemoglobin 8.6 (L) 13.5 - 17.5 g/dL    Hematocrit 26.0 (L) 40.5 - 52.5 %    MCV 94.6 80.0 - 100.0 fL    MCH 31.3 26.0 - 34.0 pg    MCHC 33.0 31.0 - 36.0 g/dL    RDW 15.7 (H) 12.4 - 15.4 %    Platelets 325 869 - 525 K/uL    MPV 8.8 5.0 - 10.5 fL    Neutrophils % 81.7 %    Lymphocytes % 7.1 %    Monocytes % 7.7 %    Eosinophils % 2.9 %    Basophils % 0.6 %    Neutrophils Absolute 8.3 (H) 1.7 - 7.7 K/uL    Lymphocytes Absolute 0.7 (L) 1.0 - 5.1 K/uL    Monocytes Absolute 0.8 0.0 - 1.3 K/uL    Eosinophils Absolute 0.3 0.0 - 0.6 K/uL    Basophils Absolute 0.1 0.0 - 0.2 K/uL   Basic metabolic panel   Result Value Ref Range    Sodium 142 136 - 145 mmol/L    Potassium 3.7 3.5 - 5.1 mmol/L    Chloride 111 (H) 99 - 110 mmol/L    CO2 21 21 - 32 mmol/L    Anion Gap 10 3 - 16    Glucose 140 (H) 70 - 99 mg/dL    BUN 39 (H) 7 - 20 mg/dL    CREATININE 2.7 (H) 0.8 - 1.3 mg/dL    GFR Non-African American 23 (A) >60    GFR  27 (A) >60    Calcium 7.9 (L) 8.3 - 10.6 mg/dL   Magnesium   Result Value Ref Range    Magnesium 2.20 1.80 - 2.40 mg/dL   Phosphorus   Result Value Ref Range    Phosphorus 2.9 2.5 - 4.9 mg/dL   Vancomycin, Random   Result Value Eosinophils Absolute 0.3 0.0 - 0.6 K/uL    Basophils Absolute 0.1 0.0 - 0.2 K/uL   Basic metabolic panel   Result Value Ref Range    Sodium 146 (H) 136 - 145 mmol/L    Potassium 4.3 3.5 - 5.1 mmol/L    Chloride 116 (H) 99 - 110 mmol/L    CO2 22 21 - 32 mmol/L    Anion Gap 8 3 - 16    Glucose 106 (H) 70 - 99 mg/dL    BUN 37 (H) 7 - 20 mg/dL    CREATININE 2.3 (H) 0.8 - 1.3 mg/dL    GFR Non-African American 27 (A) >60    GFR  33 (A) >60    Calcium 8.9 8.3 - 10.6 mg/dL   Magnesium   Result Value Ref Range    Magnesium 2.30 1.80 - 2.40 mg/dL   Phosphorus   Result Value Ref Range    Phosphorus 3.5 2.5 - 4.9 mg/dL   Vancomycin, Random   Result Value Ref Range    Vancomycin Rm 20.1 (HH) ug/mL   CBC auto differential   Result Value Ref Range    WBC 7.0 4.0 - 11.0 K/uL    RBC 3.01 (L) 4.20 - 5.90 M/uL    Hemoglobin 9.2 (L) 13.5 - 17.5 g/dL    Hematocrit 28.5 (L) 40.5 - 52.5 %    MCV 94.9 80.0 - 100.0 fL    MCH 30.6 26.0 - 34.0 pg    MCHC 32.3 31.0 - 36.0 g/dL    RDW 15.8 (H) 12.4 - 15.4 %    Platelets 638 267 - 152 K/uL    MPV 8.9 5.0 - 10.5 fL    Neutrophils % 62.6 %    Lymphocytes % 20.0 %    Monocytes % 11.2 %    Eosinophils % 4.0 %    Basophils % 2.2 %    Neutrophils Absolute 4.4 1.7 - 7.7 K/uL    Lymphocytes Absolute 1.4 1.0 - 5.1 K/uL    Monocytes Absolute 0.8 0.0 - 1.3 K/uL    Eosinophils Absolute 0.3 0.0 - 0.6 K/uL    Basophils Absolute 0.2 0.0 - 0.2 K/uL   Basic metabolic panel   Result Value Ref Range    Sodium 151 (H) 136 - 145 mmol/L    Potassium 4.3 3.5 - 5.1 mmol/L    Chloride 119 (H) 99 - 110 mmol/L    CO2 22 21 - 32 mmol/L    Anion Gap 10 3 - 16    Glucose 104 (H) 70 - 99 mg/dL    BUN 41 (H) 7 - 20 mg/dL    CREATININE 2.5 (H) 0.8 - 1.3 mg/dL    GFR Non-African American 25 (A) >60    GFR  30 (A) >60    Calcium 8.8 8.3 - 10.6 mg/dL   Magnesium   Result Value Ref Range    Magnesium 2.30 1.80 - 2.40 mg/dL   Phosphorus   Result Value Ref Range    Phosphorus 3.6 2.5 - 4.9 mg/dL Vancomycin, Random   Result Value Ref Range    Vancomycin Rm 20.8 (HH) ug/mL   CBC auto differential   Result Value Ref Range    WBC 7.2 4.0 - 11.0 K/uL    RBC 3.17 (L) 4.20 - 5.90 M/uL    Hemoglobin 9.7 (L) 13.5 - 17.5 g/dL    Hematocrit 30.3 (L) 40.5 - 52.5 %    MCV 95.5 80.0 - 100.0 fL    MCH 30.4 26.0 - 34.0 pg    MCHC 31.9 31.0 - 36.0 g/dL    RDW 15.9 (H) 12.4 - 15.4 %    Platelets 400 788 - 785 K/uL    MPV 8.5 5.0 - 10.5 fL    Neutrophils % 62.2 %    Lymphocytes % 21.2 %    Monocytes % 10.6 %    Eosinophils % 4.0 %    Basophils % 2.0 %    Neutrophils Absolute 4.5 1.7 - 7.7 K/uL    Lymphocytes Absolute 1.5 1.0 - 5.1 K/uL    Monocytes Absolute 0.8 0.0 - 1.3 K/uL    Eosinophils Absolute 0.3 0.0 - 0.6 K/uL    Basophils Absolute 0.1 0.0 - 0.2 K/uL   Basic metabolic panel   Result Value Ref Range    Sodium 153 (H) 136 - 145 mmol/L    Potassium 5.0 3.5 - 5.1 mmol/L    Chloride 120 (H) 99 - 110 mmol/L    CO2 21 21 - 32 mmol/L    Anion Gap 12 3 - 16    Glucose 112 (H) 70 - 99 mg/dL    BUN 40 (H) 7 - 20 mg/dL    CREATININE 2.5 (H) 0.8 - 1.3 mg/dL    GFR Non-African American 25 (A) >60    GFR  30 (A) >60    Calcium 8.9 8.3 - 10.6 mg/dL   Magnesium   Result Value Ref Range    Magnesium 2.40 1.80 - 2.40 mg/dL   Phosphorus   Result Value Ref Range    Phosphorus 3.7 2.5 - 4.9 mg/dL   CBC auto differential   Result Value Ref Range    WBC 6.5 4.0 - 11.0 K/uL    RBC 3.30 (L) 4.20 - 5.90 M/uL    Hemoglobin 10.2 (L) 13.5 - 17.5 g/dL    Hematocrit 31.0 (L) 40.5 - 52.5 %    MCV 94.0 80.0 - 100.0 fL    MCH 30.9 26.0 - 34.0 pg    MCHC 32.9 31.0 - 36.0 g/dL    RDW 15.4 12.4 - 15.4 %    Platelets 000 887 - 193 K/uL    MPV 8.9 5.0 - 10.5 fL    Neutrophils % 58.7 %    Lymphocytes % 23.3 %    Monocytes % 10.8 %    Eosinophils % 5.1 %    Basophils % 2.1 %    Neutrophils Absolute 3.8 1.7 - 7.7 K/uL    Lymphocytes Absolute 1.5 1.0 - 5.1 K/uL    Monocytes Absolute 0.7 0.0 - 1.3 K/uL    Eosinophils Absolute 0.3 0.0 - 0.6 K/uL Basophils Absolute 0.1 0.0 - 0.2 K/uL   Basic metabolic panel   Result Value Ref Range    Sodium 154 (H) 136 - 145 mmol/L    Potassium 4.0 3.5 - 5.1 mmol/L    Chloride 122 (H) 99 - 110 mmol/L    CO2 20 (L) 21 - 32 mmol/L    Anion Gap 12 3 - 16    Glucose 132 (H) 70 - 99 mg/dL    BUN 45 (H) 7 - 20 mg/dL    CREATININE 2.5 (H) 0.8 - 1.3 mg/dL    GFR Non-African American 25 (A) >60    GFR  30 (A) >60    Calcium 8.8 8.3 - 10.6 mg/dL   Magnesium   Result Value Ref Range    Magnesium 2.50 (H) 1.80 - 2.40 mg/dL   Phosphorus   Result Value Ref Range    Phosphorus 3.8 2.5 - 4.9 mg/dL   CBC auto differential   Result Value Ref Range    WBC 10.4 4.0 - 11.0 K/uL    RBC 3.53 (L) 4.20 - 5.90 M/uL    Hemoglobin 10.8 (L) 13.5 - 17.5 g/dL    Hematocrit 33.4 (L) 40.5 - 52.5 %    MCV 94.7 80.0 - 100.0 fL    MCH 30.5 26.0 - 34.0 pg    MCHC 32.2 31.0 - 36.0 g/dL    RDW 15.9 (H) 12.4 - 15.4 %    Platelets 126 290 - 368 K/uL    MPV 8.9 5.0 - 10.5 fL    Neutrophils % 71.2 %    Lymphocytes % 16.9 %    Monocytes % 6.8 %    Eosinophils % 2.9 %    Basophils % 2.2 %    Neutrophils Absolute 7.4 1.7 - 7.7 K/uL    Lymphocytes Absolute 1.8 1.0 - 5.1 K/uL    Monocytes Absolute 0.7 0.0 - 1.3 K/uL    Eosinophils Absolute 0.3 0.0 - 0.6 K/uL    Basophils Absolute 0.2 0.0 - 0.2 K/uL   Basic metabolic panel   Result Value Ref Range    Sodium 157 (H) 136 - 145 mmol/L    Potassium 4.5 3.5 - 5.1 mmol/L    Chloride 125 (H) 99 - 110 mmol/L    CO2 20 (L) 21 - 32 mmol/L    Anion Gap 12 3 - 16    Glucose 122 (H) 70 - 99 mg/dL    BUN 50 (H) 7 - 20 mg/dL    CREATININE 3.2 (H) 0.8 - 1.3 mg/dL    GFR Non- 19 (A) >60    GFR  23 (A) >60    Calcium 9.5 8.3 - 10.6 mg/dL   Magnesium   Result Value Ref Range    Magnesium 2.60 (H) 1.80 - 2.40 mg/dL   Phosphorus   Result Value Ref Range    Phosphorus 4.2 2.5 - 4.9 mg/dL   POCT Glucose   Result Value Ref Range    POC Glucose 159 (H) 70 - 99 mg/dl    Performed on ACCU-CHEK POCT Glucose   Result Value Ref Range    POC Glucose 151 (H) 70 - 99 mg/dl    Performed on ACCU-CHEK    POCT Glucose   Result Value Ref Range    POC Glucose 155 (H) 70 - 99 mg/dl    Performed on ACCU-CHEK    POCT Glucose   Result Value Ref Range    POC Glucose 176 (H) 70 - 99 mg/dl    Performed on ACCU-CHEK    POCT Glucose   Result Value Ref Range    POC Glucose 216 (H) 70 - 99 mg/dl    Performed on ACCU-CHEK    POCT Glucose   Result Value Ref Range    POC Glucose 176 (H) 70 - 99 mg/dl    Performed on ACCU-CHEK    POCT Glucose   Result Value Ref Range    POC Glucose 135 (H) 70 - 99 mg/dl    Performed on ACCU-CHEK    POCT Glucose   Result Value Ref Range    POC Glucose 163 (H) 70 - 99 mg/dl    Performed on ACCU-CHEK    POCT Glucose   Result Value Ref Range    POC Glucose 160 (H) 70 - 99 mg/dl    Performed on ACCU-CHEK    POCT Glucose   Result Value Ref Range    POC Glucose 117 (H) 70 - 99 mg/dl    Performed on ACCU-CHEK    POCT Glucose   Result Value Ref Range    POC Glucose 142 (H) 70 - 99 mg/dl    Performed on ACCU-CHEK    POCT Glucose   Result Value Ref Range    POC Glucose 166 (H) 70 - 99 mg/dl    Performed on ACCU-CHEK    POCT Glucose   Result Value Ref Range    POC Glucose 125 (H) 70 - 99 mg/dl    Performed on ACCU-CHEK    POCT Glucose   Result Value Ref Range    POC Glucose 183 (H) 70 - 99 mg/dl    Performed on ACCU-CHEK    POCT Glucose   Result Value Ref Range    POC Glucose 141 (H) 70 - 99 mg/dl    Performed on ACCU-CHEK    POCT Glucose   Result Value Ref Range    POC Glucose 111 (H) 70 - 99 mg/dl    Performed on ACCU-CHEK    POCT Glucose   Result Value Ref Range    POC Glucose 105 (H) 70 - 99 mg/dl    Performed on ACCU-CHEK    POCT Glucose   Result Value Ref Range    POC Glucose 107 (H) 70 - 99 mg/dl    Performed on ACCU-CHEK    POCT Glucose   Result Value Ref Range    POC Glucose 108 (H) 70 - 99 mg/dl    Performed on ACCU-CHEK    POCT Glucose   Result Value Ref Range    POC Glucose 118 (H) 70 - 99 mg/dl Performed on ACCU-CHEK    POCT Glucose   Result Value Ref Range    POC Glucose 117 (H) 70 - 99 mg/dl    Performed on ACCU-CHEK    POCT Glucose   Result Value Ref Range    POC Glucose 107 (H) 70 - 99 mg/dl    Performed on ACCU-CHEK    POCT Glucose   Result Value Ref Range    POC Glucose 117 (H) 70 - 99 mg/dl    Performed on ACCU-CHEK    POCT Glucose   Result Value Ref Range    POC Glucose 101 (H) 70 - 99 mg/dl    Performed on ACCU-CHEK    POCT Glucose   Result Value Ref Range    POC Glucose 105 (H) 70 - 99 mg/dl    Performed on ACCU-CHEK    POCT Glucose   Result Value Ref Range    POC Glucose 111 (H) 70 - 99 mg/dl    Performed on ACCU-CHEK    POCT Glucose   Result Value Ref Range    POC Glucose 155 (H) 70 - 99 mg/dl    Performed on ACCU-CHEK    POCT Glucose   Result Value Ref Range    POC Glucose 93 70 - 99 mg/dl    Performed on ACCU-CHEK    POCT Glucose   Result Value Ref Range    POC Glucose 327 (H) 70 - 99 mg/dl    Performed on ACCU-CHEK    POCT Glucose   Result Value Ref Range    POC Glucose 85 70 - 99 mg/dl    Performed on ACCU-CHEK    POCT Glucose   Result Value Ref Range    POC Glucose 114 (H) 70 - 99 mg/dl    Performed on ACCU-CHEK    POCT Glucose   Result Value Ref Range    POC Glucose 106 (H) 70 - 99 mg/dl    Performed on ACCU-CHEK    POCT Glucose   Result Value Ref Range    POC Glucose 191 (H) 70 - 99 mg/dl    Performed on ACCU-CHEK    POCT Glucose   Result Value Ref Range    POC Glucose 144 (H) 70 - 99 mg/dl    Performed on ACCU-CHEK    POCT Glucose   Result Value Ref Range    POC Glucose 175 (H) 70 - 99 mg/dl    Performed on ACCU-CHEK    POCT Glucose   Result Value Ref Range    POC Glucose 98 70 - 99 mg/dl    Performed on ACCU-CHEK    POCT Glucose   Result Value Ref Range    POC Glucose 96 70 - 99 mg/dl    Performed on ACCU-CHEK    POCT Glucose   Result Value Ref Range    POC Glucose 103 (H) 70 - 99 mg/dl    Performed on ACCU-CHEK    POCT Glucose   Result Value Ref Range    POC Glucose 115 (H) 70 - 99 mg/dl    Performed on ACCU-CHEK    POCT Glucose   Result Value Ref Range    POC Glucose 132 (H) 70 - 99 mg/dl    Performed on ACCU-CHEK    POCT Glucose   Result Value Ref Range    POC Glucose 156 (H) 70 - 99 mg/dl    Performed on ACCU-CHEK    POCT Glucose   Result Value Ref Range    POC Glucose 117 (H) 70 - 99 mg/dl    Performed on ACCU-CHEK    POCT Glucose   Result Value Ref Range    POC Glucose 112 (H) 70 - 99 mg/dl    Performed on ACCU-CHEK    POCT Glucose   Result Value Ref Range    POC Glucose 112 (H) 70 - 99 mg/dl    Performed on ACCU-CHEK    POCT Glucose   Result Value Ref Range    POC Glucose 154 (H) 70 - 99 mg/dl    Performed on ACCU-CHEK    POCT Glucose   Result Value Ref Range    POC Glucose 210 (H) 70 - 99 mg/dl    Performed on ACCU-CHEK    POCT Glucose   Result Value Ref Range    POC Glucose 93 70 - 99 mg/dl    Performed on ACCU-CHEK    POCT Glucose   Result Value Ref Range    POC Glucose 136 (H) 70 - 99 mg/dl    Performed on ACCU-CHEK    POCT Glucose   Result Value Ref Range    POC Glucose 115 (H) 70 - 99 mg/dl    Performed on ACCU-CHEK    POCT Glucose   Result Value Ref Range    POC Glucose 118 (H) 70 - 99 mg/dl    Performed on ACCU-CHEK    POCT Glucose   Result Value Ref Range    POC Glucose 123 (H) 70 - 99 mg/dl    Performed on ACCU-CHEK    POCT Glucose   Result Value Ref Range    POC Glucose 138 (H) 70 - 99 mg/dl    Performed on ACCU-CHEK    POCT Glucose   Result Value Ref Range    POC Glucose 117 (H) 70 - 99 mg/dl    Performed on ACCU-CHEK    POCT Glucose   Result Value Ref Range    POC Glucose 133 (H) 70 - 99 mg/dl    Performed on ACCU-CHEK    POCT Glucose   Result Value Ref Range    POC Glucose 115 (H) 70 - 99 mg/dl    Performed on ACCU-CHEK    POCT Glucose   Result Value Ref Range    POC Glucose 105 (H) 70 - 99 mg/dl    Performed on ACCU-CHEK    POCT Glucose   Result Value Ref Range    POC Glucose 94 70 - 99 mg/dl    Performed on ACCU-CHEK    POCT Glucose   Result Value Ref Range    POC Glucose 134 (H) 70 - 99 mg/dl    Performed on ACCU-CHEK    POCT Glucose   Result Value Ref Range    POC Glucose 106 (H) 70 - 99 mg/dl    Performed on ACCU-CHEK    POCT Glucose   Result Value Ref Range    POC Glucose 118 (H) 70 - 99 mg/dl    Performed on ACCU-CHEK    POCT Glucose   Result Value Ref Range    POC Glucose 120 (H) 70 - 99 mg/dl    Performed on ACCU-CHEK    EKG 12 Lead   Result Value Ref Range    Ventricular Rate 86 BPM    Atrial Rate 86 BPM    P-R Interval 138 ms    QRS Duration 76 ms    Q-T Interval 368 ms    QTc Calculation (Bazett) 440 ms    P Axis 71 degrees    R Axis 27 degrees    T Axis 26 degrees    Diagnosis       Sinus rhythm with sinus arrhythmia with occasional and consecutive Premature ventricular complexesModerate voltage criteria for LVH, may be normal variantConfirmed by ISELA LEÓN, [facility] ([id number]) on 3/12/2021 5:07:35 PM       Diet:  DIET DYSPHAGIA PUREED; Dysphagia Pureed; Moderately Thick (Honey)  Dietary Nutrition Supplements: Frozen Oral Supplement    Activity:  Bedrest (Patient must remain in bed.  Therapy is not indicated)    Follow-up:  in the next few days with Cory Garcia MD      Disposition: home    Condition: terminal      Time Spent: 35 minutes    Electronically signed by Hernandez Neville MD on 3/23/2021 at 12:02 PM    Discharging Hospitalist

## 2021-03-23 NOTE — PROGRESS NOTES
Nephrology Progress Note  [phone]   [email or web address]    Patient:  Marisol Johnston Sr   : 1940    CC:  KEENAN, CKD    Subjective:   Confused. NAD    ROS:   No CP or SOB    PMSHx:  medications reviewed    Vitals:  /65   Pulse 107   Temp 98.2 °F (36.8 °C) (Axillary)   Resp 17   Ht 5' 9\" (1.753 m)   Wt 133 lb 6.1 oz (60.5 kg)   SpO2 97%   BMI 19.70 kg/m²       Intake/Output Summary (Last 24 hours) at 3/23/2021 0908  Last data filed at 3/22/2021 1813  Gross per 24 hour   Intake 0 ml   Output 250 ml   Net -250 ml       Physical Exam:  Gen: NAD, confused  CV: RRR, no S3.  Lungs:  poor effort  Abd: S/NT +BS +ostomy  Ext: No edema, no cyanosis  Skin: Warm. No rashes appreciated. : +coleman.     Labs:  CBC:   Lab Results   Component Value Date    WBC 10.4 2021    RBC 3.53 2021    RBC 4.07 2017    HGB 10.8 2021    HCT 33.4 2021    MCV 94.7 2021    MCH 30.5 2021    MCHC 32.2 2021    RDW 15.9 2021     2021    MPV 8.9 2021     BMP:    Lab Results   Component Value Date     2021    K 4.5 2021    K 4.5 2021     2021    CO2 20 2021    BUN 50 2021    LABALBU 3.2 2021    CREATININE 3.2 2021    CALCIUM 9.5 2021    GFRAA 23 2021    GFRAA >60 2013    LABGLOM 19 2021    GLUCOSE 122 2021    GLUCOSE 128 2017       Assessment/Plan:  1) KEENAN on CKD- no more labs, hospice care     No further recommendations, will sign off

## 2021-03-23 NOTE — PLAN OF CARE
Problem: Non-Violent Restraints  Goal: Removal from restraints as soon as assessed to be safe  Outcome: Ongoing  Goal: No harm/injury to patient while restraints in use  Outcome: Ongoing  Goal: Patient's dignity will be maintained  Outcome: Ongoing     Problem: Nutrition  Goal: Optimal nutrition therapy  3/23/2021 0159 by Kimberly Stuart RN  Outcome: Ongoing  3/22/2021 1339 by Tuan Beaver RD, LD  Outcome: Ongoing     Problem: Skin Integrity:  Goal: Will show no infection signs and symptoms  Description: Will show no infection signs and symptoms  Outcome: Ongoing  Goal: Absence of new skin breakdown  Description: Absence of new skin breakdown  Outcome: Ongoing     Problem: Falls - Risk of:  Goal: Will remain free from falls  Description: Will remain free from falls  Outcome: Ongoing  Goal: Absence of physical injury  Description: Absence of physical injury  Outcome: Ongoing

## 2021-03-23 NOTE — PROGRESS NOTES
Occupational Therapy  Pt's family planning to move forward with hospice. OT will sign off at this time.      Catalina Vargas, OTR/L

## 2021-03-23 NOTE — DISCHARGE INSTR - COC
Continuity of Care Form    Patient Name: Sree Blood   :  1940  MRN:  [id number]    Admit date:  3/12/2021  Discharge date:  ***    Code Status Order: DNR-CC   Advance Directives:     Admitting Physician:  Skyler Funez MD  PCP: Hesham Hwang MD    Discharging Nurse: [facility] Unit/Room#: B5H-1796/7713-20  Discharging Unit Phone Number: ***    Emergency Contact:   Extended Emergency Contact Information  Primary Emergency Contact: Lauren Vizcaino [address] Phone: [phone]  Relation: Child  Secondary Emergency Contact: Alyse Castillo [address] Phone: [phone]  Relation: Child    Past Surgical History:  Past Surgical History:   Procedure Laterality Date    COLONOSCOPY      colo , Dr Ila Jaime MD.   Jillian Smith N/A 2021    EXPLORATORY LAPAROTOMY, BOWEL RESECTION performed by Anahy Motley MD at [address] [place] [address] Drive  2016    RADICAL CYSTECTOMY, PROSTECTOMY ILEAL CONDUIT URINARY    TUNNELED VENOUS PORT PLACEMENT Right 2017    DR. 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    Rectal injury S36.60XA    Pelvic mass in male R19.00    Right adrenal mass ([facility] E27.8    Bladder cancer metastasized to bone (HCC) C67.9, C79.51    Cancer associated pain G89.3    Chronic fatigue R53.82    Benign prostatic hyperplasia without urinary obstruction N40.0    Small bowel obstruction (HCC) K56.609    Lactic acidosis E87.2    KEENAN (acute kidney injury) ([facility] N17.9    Delirium R41.0    Acute encephalopathy G93.40    Acute kidney injury superimposed on chronic kidney disease (HCC) N17.9, N18.9    Acute respiratory failure with hypoxia ([facility]) J96.01    Altered mental status R41.82    Pneumonia due to methicillin resistant Staphylococcus aureus (MRSA) ([facility] J15.212    Aspiration pneumonia ([facility] N85.4    Metabolic encephalopathy B89.09       Isolation/Infection:   Isolation          No Isolation        Patient Infection Status     Infection Onset Added Last Indicated Last Indicated By Review Planned Expiration Resolved Resolved By    None active    Resolved    MRSA 03/13/21 03/14/21 03/13/21 Culture, Respiratory   03/15/21 Robbie Ayala RN    COVID-19 Rule Out 03/09/21 03/09/21 03/09/21 COVID-19, Rapid (Ordered)   03/09/21 Rule-Out Test Resulted    COVID-19 Rule Out 02/18/21 02/18/21 02/18/21 SARS-CoV-2 NAAT (Rapid) (Ordered)   02/18/21 Rule-Out Test Resulted          Nurse Assessment:  Last Vital Signs: BP (!) 74/46   Pulse 73   Temp 97.3 °F (36.3 °C) (Axillary)   Resp 18   Ht 5' 9\" (1.753 m)   Wt 133 lb 6.1 oz (60.5 kg)   SpO2 90%   BMI 19.70 kg/m²     Last documented pain score (0-10 scale): Pain Level: 0  Last Weight:   Wt Readings from Last 1 Encounters:   03/23/21 133 lb 6.1 oz (60.5 kg)     Mental Status:  {IP PT MENTAL STATUS:20030}    IV Access:  {[facility] IV ACCESS:175311177}    Nursing Mobility/ADLs:  Walking   {[facility] MOFJ:575602419}  Transfer  {[facility] NXPN:403821952}  Bathing  {[facility] IRBS:954756957}  Dressing  {[facility] IVRI:565560570}  Toileting  {[facility] PJZN:192840172}  Feeding  {[facility] EIOH:839974406}  Med Admin  {CH[facility] DME DKSL:334549002}  Med Delivery   { [facility] MED Delivery:691551213}    Wound Care Documentation and Therapy:  Incision 06/06/17 Chest Right;Upper (Active)   Number of days: 1386        Elimination:  Continence:   · Bowel: {YES / IK:56570}  · Bladder: {YES / IR:59123}  Urinary Catheter: {Urinary Catheter:363972241}   Colostomy/Ileostomy/Ileal Conduit: {YES / VQ:26937}  Urostomy Ileal conduit RLQ-Stomal Appliance: Clean, Dry, Intact, Changed  Urostomy Ileal conduit RLQ-Stoma  Assessment: Protrudes, Red  Urostomy Ileal conduit RLQ-Mucocutaneous Junction: Intact  Urostomy Ileal conduit RLQ-Peristomal Assessment: Excoriation, Red  Urostomy Ileal conduit RLQ-Treatment: Bag change    Date of Last BM: ***    Intake/Output Summary (Last 24 hours) at 3/23/2021 1500  Last data filed at 3/22/2021 1813  Gross per 24 hour   Intake    Output 150 ml   Net -150 ml     I/O last 3 completed shifts:   In: 0   Out: 250 [Urine:250]    Safety Concerns:     [employer] Safety Concerns:849508913}    Impairments/Disabilities:      [employer] Impairments/Disabilities:963942143}    Nutrition Therapy:  Current Nutrition Therapy:   [employer] Diet List:290748024}    Routes of Feeding: {[facility] DME Other Feedings:439795254}  Liquids: {Slp liquid thickness:55763}  Daily Fluid Restriction: {CHP DME Yes amt example:699979599}  Last Modified Barium Swallow with Video (Video Swallowing Test): {Done Not Done JYBS:263377989}    Treatments at the Time of Hospital Discharge:   Respiratory Treatments: ***  Oxygen Therapy:  {Therapy; copd oxygen:07213}  Ventilator:    {[facility] Vent PERLA:216120760}    Rehab Therapies: {THERAPEUTIC INTERVENTION:0785110148}  Weight Bearing Status/Restrictions: [employer]  Weight Bearin}  Other Medical Equipment (for information only, NOT a DME order):  {EQUIPMENT:281209323}  Other Treatments: ***    Patient's personal belongings (please select all that are sent with patient):  {P DME Belongings:611415356}    RN SIGNATURE:  {Esignature:339987184}    CASE MANAGEMENT/SOCIAL WORK SECTION    Inpatient Status Date: 2021    Readmission Risk Assessment Score:  Readmission Risk              Risk of Unplanned Readmission:        40           Discharging to [facility] [address] [facility] [address], [address]     Phone [phone]   Fax: [phone]       / signature:     PHYSICIAN SECTION    Prognosis: {Prognosis:2459379916}    Condition at Discharge: [facility] Patient Condition:338302964}    Rehab Potential (if transferring to Rehab): {Prognosis:1389923545}    Recommended Labs or Other Treatments After Discharge: ***    Physician Certification: I certify the above information and transfer of Job Abigail  is necessary for the continuing treatment of the diagnosis listed and that he requires {Admit to Appropriate Level of Care:87010} for {GREATER/LESS:474671392} 30 days.      Update Admission H&P: {CHP DME Changes in XENYA:421373382}    PHYSICIAN SIGNATURE:  {Esignature:887891335}

## 2021-03-23 NOTE — PROGRESS NOTES
NP confirmed is ok to take the cm off   Tele   No acess for now since the  Pt has been pulling every thing off.

## 2021-03-23 NOTE — PLAN OF CARE
Problem: Non-Violent Restraints  Goal: Removal from restraints as soon as assessed to be safe  3/23/2021 1036 by Cristel Kirkpatrick RN  Outcome: Ongoing     Problem: Non-Violent Restraints  Goal: No harm/injury to patient while restraints in use  3/23/2021 1036 by Cristel Kirkpatrick RN  Outcome: Ongoing     Problem: Non-Violent Restraints  Goal: Patient's dignity will be maintained  3/23/2021 1036 by Cristel Kirkpatrick RN  Outcome: Ongoing     Problem: Nutrition  Goal: Optimal nutrition therapy  3/23/2021 1036 by Cristel Kirkpatrick RN  Outcome: Ongoing     Problem: Skin Integrity:  Goal: Will show no infection signs and symptoms  Description: Will show no infection signs and symptoms  3/23/2021 1036 by Cristel Kirkpatrick RN  Outcome: Ongoing     Problem: Skin Integrity:  Goal: Absence of new skin breakdown  Description: Absence of new skin breakdown  3/23/2021 1036 by Cristel Kirkpatrick RN  Outcome: Ongoing     Problem: Falls - Risk of:  Goal: Will remain free from falls  Description: Will remain free from falls  3/23/2021 1036 by Cristel Kirkpatrick RN  Outcome: Ongoing     Problem: Falls - Risk of:  Goal: Absence of physical injury  Description: Absence of physical injury  3/23/2021 1036 by Cristel Kirkpatrick RN  Outcome: Ongoing

## 2021-03-23 NOTE — CARE COORDINATION
03/15/21 1542   Readmission Assessment   Number of Days since last admission? 1-7 days   Previous Disposition SNF   Who is being Rosette Krishnan at Home at [facility])   What was the patient's/caregiver's perception as to why they think they needed to return back to the hospital? Other (Comment)  (abnormal labs and altered mental status)   Did you visit your Primary Care Physician after you left the hospital, before you returned this time? No   Why weren't you able to visit your PCP? Other (Comment)  (was in skilled facility)   Did you see a specialist, such as Cardiac, Pulmonary, Orthopedic Physician, etc. after you left the hospital? No  (seen by facility provider 3/12/2021)   Who advised the patient to return to the hospital? Physician   Does the patient report anything that got in the way of taking their medications? No   In our efforts to provide the best possible care to you and others like you, can you think of anything that we could have done to help you after you left the hospital the first time, so that you might not have needed to return so soon?  Other (Comment)  (patient was not taking PO well when he arrived, he may need an alternate source of nutrition)
DISCHARGE SUMMARY     DATE OF DISCHARGE: 03/23/2021    DISCHARGE DESTINATION:     [facility], [facility]  [address]  [facility], [facility], [address]    Phone [phone]   Fax: [phone]    TRANSPORTATION: [address] [place] [address] Name:  [facility]   Time: 7:00pm  Phone Number: [phone]    COMMENTS: SW spoke with family and they are in agreement with discharge to home. Respectfully submitted,    DONALD Michelle  [facility]   [phone]    Electronically signed by DONALD Kim on 3/23/2021 at 1:29 PM
Discharge Planning:  SW contacted [facility]. [phone]. SW spoke with the referral line to inform the agency that this SW just faxed a referral for this pt. SW was informed that this SW will be called if the fax is not received.   Fatuma Barlow, [place]  [phone]  Electronically signed by Aleksandr Burleson on 3/22/2021 at 1:36 PM
Ileana Renteria Medicare Authorization      Authorization ID [id number]     Start date  3/18/21    Next review date  3/22/21    Fax update to Priscila Gusman @ [phone]      PDPM    PT/OT    NTA    SLP    NSG
Per chart review, patient is from Home at [facility]. Call to [facility], [phone], she advised patient was skilled and is able to return with a precert but would have to be   24 hrs without restraints and would need to be taking PO or PEG tube feedings, they cannot do NG tube feeds. Palliative care having a family meeting today. Will follow up once care decisions have been made.   Rosanne Lofton RN, BSN, Case Management  Phone: [phone]  Electronically signed by Rosanne Lofton RN on 3/15/2021 at 3:55 PM
Precert initiated for eventual return to Home at [facility].   Electronically signed by Dalia Núñez on 3/17/2021 at 3:15 PM
REQUEST FOR SKILLED FACILITY AUTHORIZATION SUBMITTED TO [employer]ICARE:    Patient name:  Yordy Drummond    Current Location: [facility]    Admit date to hospital:  3/12/21    Requested Setting:  SNF    Anticipated Admit Date to SNF Level of Care: 3/18/21      ORDERING MD: Librado Owusu    NPI NUMBER: [id number]
RODOLFO faxed AVS and ROS[facility]. No further needs. Respectfully submitted,    Radha CHAMBERLAIN, AVELINA-S  [facility]   [phone]    Electronically signed by DONALD Quesada on 3/23/2021 at 3:03 PM
SW call to [facility] to get update on pre cert status. Precert still pending at this time. [facility] reference # [id number]    Plan is Home at [place] [address] at [address] [place] [address] pending.      Stephy CHAMBERLAIN, LISW-S, Social Work  [phone]    Electronically signed by CINTIA Walter, GRANT on 3/18/2021 at 12:28 PM
SW placed phone call to [facility] regarding transport time of 7pom, They stated that this was too late for their nurse and the patient needed to arrive no later than 5:30pm.     SW placed phone call to [facility] to see if they could accommodate. They stated that they can  the patient now which means 3-3:30pm. SW notified nurse working with the patient this shift. Hospice stated that they can have the nurse at the home by 4pm and would call the family. Respectfully submitted,    DONALD Childs  [facility]   [phone]      Electronically signed by DONALD Martinez on 3/23/2021 at 2:22 PM
SW transferred daughter in law to [facility] so they could continue coordinating delivery of items. Son Lima Stewart reports that the bed was delivered about 30 min ago. SW will wait for hospice to confirm that they have all equipment needed before setting up a transport. Respectfully submitted,    DONALD Lobato  [facility]   [phone]    Electronically signed by DONALD Douglass on 3/23/2021 at 1:13 PM
[facility]  Palliative Care   Progress Note    NAME:  Joshua Pisano Sr  MEDICAL RECORD NUMBER:  [id number]  AGE: 78 y.o. GENDER: male  : 1940  TODAY'S DATE:  3/22/2021    Subjective: patient remains oriented to self only     Objective:    Vitals:    21 0940   BP: 119/87   Pulse: 101   Resp: 18   Temp: 97.5 °F (36.4 °C)   SpO2: 98%     Lab Results   Component Value Date    WBC 10.4 2021    HGB 10.8 (L) 2021    HCT 33.4 (L) 2021    MCV 94.7 2021     2021     Lab Results   Component Value Date    CREATININE 3.2 (H) 2021    BUN 50 (H) 2021     (H) 2021    K 4.5 2021     (H) 2021    CO2 20 (L) 2021     Lab Results   Component Value Date    ALT 20 2021    AST 33 2021    ALKPHOS 79 2021    BILITOT 0.4 2021       Plan: I have spoken to patient's son [address] [facility], he states the family has agreed to DNR CC status and wants to take their father home with hospice. They were given a list and have chosen [facility] phone # [phone] fax # [phone]. He will be going to [facility] at [address] [place] [address], [address] [facility] [phone] daughter-in-law. I have called [facility], they have preliminary information and will wait for fax from hospital.   Plan is for home tomorrow. Dr Wendy Mendez informed of above. Code Status: DNR CC   Discharge Environment:  [x] Hospice Consult Agency:  List provided chose [facility]. [x] Home with Hospice Care     Teaching Time:  1hours      I will continue to follow Mr. Mag Burch care as needed.       Thank you for allowing me to participate in the care of Mr. Valencia Pandya .     Electronically signed by Jomar Sheets RN, [address] [facility] on 3/22/2021 at 12:12 PM  [address] [place] [address]  Office: [phone]
AM  Palliative Care Nurse [facility]  Office: [phone]
Angel Burgess     Electronically signed by Madai Allen RN, CHPN on 3/18/2021 at 1:48 PM  Palliative Care Nurse [facility]  Office: [phone]

## 2025-04-28 NOTE — PLAN OF CARE
Problem: Falls - Risk of:  Goal: Will remain free from falls  Description: Will remain free from falls  Outcome: Ongoing  Note: Fall risk assessment completed. Fall precautions in place. Call light within reach. Pt educated on calling for assistance before getting up. Walkway free of clutter. Will continue to monitor. Goal: Absence of physical injury  Description: Absence of physical injury  Outcome: Ongoing  Note: Pt is free of injury. No injury noted. Fall precautions in place. Call light within reach. Will monitor. Problem: Confusion - Acute:  Goal: Absence of continued neurological deterioration signs and symptoms  Description: Absence of continued neurological deterioration signs and symptoms  Outcome: Ongoing  Note: Patients neuro status will improve    Goal: Mental status will be restored to baseline  Description: Mental status will be restored to baseline  Outcome: Ongoing  Note: Patients mental status will be restored to baseline       Problem: Discharge Planning:  Goal: Ability to perform activities of daily living will improve  Description: Ability to perform activities of daily living will improve  Outcome: Ongoing  Note: Patients ability to perform ADLS will improve    Goal: Participates in care planning  Description: Participates in care planning  Outcome: Ongoing     Problem: Injury - Risk of, Physical Injury:  Goal: Will remain free from falls  Description: Will remain free from falls  Outcome: Ongoing  Note: Fall risk assessment completed. Fall precautions in place. Call light within reach. Pt educated on calling for assistance before getting up. Walkway free of clutter. Will continue to monitor. Goal: Absence of physical injury  Description: Absence of physical injury  Outcome: Ongoing  Note: Pt is free of injury. No injury noted. Fall precautions in place. Call light within reach. Will monitor.         Problem: Mood - Altered:  Goal: Mood stable  Description: Mood stable  Outcome: Ongoing  Note: Patients mood will become more stable    Goal: Absence of abusive behavior  Description: Absence of abusive behavior  Outcome: Ongoing  Note: Patient will be absent of abusive behavior    Goal: Verbalizations of feeling emotionally comfortable while being cared for will increase  Description: Verbalizations of feeling emotionally comfortable while being cared for will increase  Outcome: Ongoing     Problem: Psychomotor Activity - Altered:  Goal: Absence of psychomotor disturbance signs and symptoms  Description: Absence of psychomotor disturbance signs and symptoms  Outcome: Ongoing     Problem: Sensory Perception - Impaired:  Goal: Demonstrations of improved sensory functioning will increase  Description: Demonstrations of improved sensory functioning will increase  Outcome: Ongoing  Goal: Decrease in sensory misperception frequency  Description: Decrease in sensory misperception frequency  Outcome: Ongoing  Goal: Able to refrain from responding to false sensory perceptions  Description: Able to refrain from responding to false sensory perceptions  Outcome: Ongoing  Goal: Demonstrates accurate environmental perceptions  Description: Demonstrates accurate environmental perceptions  Outcome: Ongoing  Goal: Able to distinguish between reality-based and nonreality-based thinking  Description: Able to distinguish between reality-based and nonreality-based thinking  Outcome: Ongoing  Goal: Able to interrupt nonreality-based thinking  Description: Able to interrupt nonreality-based thinking  Outcome: Ongoing     Problem: Sleep Pattern Disturbance:  Goal: Appears well-rested  Description: Appears well-rested  Outcome: Ongoing  Note: Patient will appear well rested       Problem: Skin Integrity:  Goal: Will show no infection signs and symptoms  Description: Will show no infection signs and symptoms  Outcome: Ongoing  Note: Pt is free of signs and symptoms of infection.  Incision and dressing are clean, dry and intact. Vital signs stable. Will monitor. Goal: Absence of new skin breakdown  Description: Absence of new skin breakdown  Outcome: Ongoing  Note: Patient will be absent of new skin breakdown       Problem: Bowel/Gastric:  Goal: Control of bowel function will improve  Description: Control of bowel function will improve  Outcome: Ongoing  Note: Patients control over bowel functions will improve    Goal: Ability to achieve a regular elimination pattern will improve  Description: Ability to achieve a regular elimination pattern will improve  Outcome: Ongoing     Problem: Nutritional:  Goal: Ability to follow a diet with enough fiber (20 to 30 grams) for normal bowel function will improve  Description: Ability to follow a diet with enough fiber (20 to 30 grams) for normal bowel function will improve  Outcome: Ongoing     Problem: Non-Violent Restraints  Goal: Removal from restraints as soon as assessed to be safe  Outcome: Ongoing  Note: Patient remains out of restraints at this time    Goal: No harm/injury to patient while restraints in use  Outcome: Ongoing  Goal: Patient's dignity will be maintained  Outcome: Ongoing     Problem: Pain:  Goal: Pain level will decrease  Description: Pain level will decrease  Outcome: Ongoing  Goal: Control of acute pain  Description: Control of acute pain  Outcome: Ongoing  Note: Pt assessed for pain. Pt denies pain and assessed with 0-10 pain rating scale. Pt has not requested prescribed analgesic. (See eMar) Pt satisfied thus far. Will reassess and continue to monitor. Goal: Control of chronic pain  Description: Control of chronic pain  Outcome: Ongoing     Problem: Nutrition  Goal: Optimal nutrition therapy  Outcome: Ongoing  Note: Patient is being offered optimal nutrition therapy. Will continue to monitor I&O.   Electronically signed by Beatriz Garcia RN on 3/6/2021 at 11:51 PM Detail Level: Detailed

## (undated) DEVICE — TOTAL TRAY, 16FR 10ML SIL FOLEY, URN: Brand: MEDLINE

## (undated) DEVICE — STAPLER SKIN H3.9MM WIRE DIA0.58MM CRWN 6.9MM 35 STPL ROT

## (undated) DEVICE — SEALER ENDOSCP NANO COAT OPN DIV CRV L JAW LIGASURE IMPACT

## (undated) DEVICE — SUTURE PERMAHAND SZ 3-0 L18IN NONABSORBABLE BLK L26MM SH C013D

## (undated) DEVICE — COVER LT HNDL BLU PLAS

## (undated) DEVICE — SUTURE PDS II SZ 0 L60IN ABSRB VLT L48MM CTX 1/2 CIR Z990G

## (undated) DEVICE — GLOVE ORANGE PI 7   MSG9070

## (undated) DEVICE — MAJOR SET UP PK

## (undated) DEVICE — ELECTRODE BLDE L6.5IN CAUT EXT DISP

## (undated) DEVICE — SUTURE PERMAHAND SZ 2-0 L30IN NONABSORBABLE BLK SILK W/O A305H

## (undated) DEVICE — SPONGE GZ W4XL4IN COT 12 PLY TYP VII WVN C FLD DSGN

## (undated) DEVICE — Z DUP USE 2257490 ADHESIVE SKIN CLSRE 036ML TPCL 2CTL CNCRLTE HIGH VSCSTY DRMB

## (undated) DEVICE — YANKAUER,BULB TIP,W/O VENT,RIGID,STERILE: Brand: MEDLINE

## (undated) DEVICE — SOLUTION IV IRRIG POUR BRL 0.9% SODIUM CHL 2F7124

## (undated) DEVICE — BLANKET WRM W29.9XL79.1IN UP BODY FORC AIR MISTRAL-AIR

## (undated) DEVICE — GLOVE ORANGE PI 7 1/2   MSG9075

## (undated) DEVICE — SHEET, T, LAPAROTOMY, STERILE: Brand: MEDLINE

## (undated) DEVICE — SPONGE LAP W18XL18IN WHT COT 4 PLY FLD STRUNG RADPQ DISP ST

## (undated) DEVICE — MAJOR SET UP: Brand: MEDLINE INDUSTRIES, INC.

## (undated) DEVICE — RELOAD STPL L75MM OPN H3.8MM CLS 1.5MM WIRE DIA0.2MM REG

## (undated) DEVICE — CHLORAPREP 26ML ORANGE

## (undated) DEVICE — HYPODERMIC SAFETY NEEDLE: Brand: MAGELLAN

## (undated) DEVICE — CLEANER,CAUTERY TIP,2X2",STERILE: Brand: MEDLINE

## (undated) DEVICE — GOWN,SIRUS,POLYRNF,BRTHSLV,XL,30/CS: Brand: MEDLINE

## (undated) DEVICE — ELECTRODE PT RET AD L9FT HI MOIST COND ADH HYDRGEL CORDED

## (undated) DEVICE — SUTURE VCRL SZ 2-0 L27IN ABSRB UD L26MM SH 1/2 CIR J417H

## (undated) DEVICE — SYRINGE MED 10ML LUERLOCK TIP W/O SFTY DISP